# Patient Record
Sex: MALE | Race: WHITE | URBAN - NONMETROPOLITAN AREA
[De-identification: names, ages, dates, MRNs, and addresses within clinical notes are randomized per-mention and may not be internally consistent; named-entity substitution may affect disease eponyms.]

---

## 2017-08-02 ENCOUNTER — DOCTOR'S OFFICE (OUTPATIENT)
Dept: URBAN - NONMETROPOLITAN AREA CLINIC 1 | Facility: CLINIC | Age: 57
Setting detail: OPHTHALMOLOGY
End: 2017-08-02
Payer: COMMERCIAL

## 2017-08-02 ENCOUNTER — RX ONLY (RX ONLY)
Age: 57
End: 2017-08-02

## 2017-08-02 DIAGNOSIS — H53.9: ICD-10-CM

## 2017-08-02 DIAGNOSIS — H53.002: ICD-10-CM

## 2017-08-02 DIAGNOSIS — H25.13: ICD-10-CM

## 2017-08-02 PROCEDURE — 99204 OFFICE O/P NEW MOD 45 MIN: CPT | Performed by: OPHTHALMOLOGY

## 2017-08-02 ASSESSMENT — REFRACTION_MANIFEST
OD_VA1: 20/
OD_VA1: 20/
OS_VA3: 20/
OU_VA: 20/
OS_VA1: 20/
OD_VA2: 20/
OU_VA: 20/
OS_VA2: 20/
OS_VA3: 20/
OS_VA1: 20/
OD_VA3: 20/
OD_VA3: 20/
OS_VA3: 20/
OD_VA1: 20/
OU_VA: 20/
OS_VA2: 20/
OD_VA2: 20/
OS_VA2: 20/
OD_VA2: 20/
OD_VA3: 20/
OS_VA1: 20/

## 2017-08-02 ASSESSMENT — CONFRONTATIONAL VISUAL FIELD TEST (CVF)
OS_FINDINGS: FULL
OD_FINDINGS: FULL

## 2017-08-02 ASSESSMENT — REFRACTION_CURRENTRX
OD_OVR_VA: 20/
OS_OVR_VA: 20/
OD_OVR_VA: 20/
OD_OVR_VA: 20/

## 2017-08-02 ASSESSMENT — VISUAL ACUITY
OS_BCVA: 20/25-1
OD_BCVA: 20/40-1

## 2017-08-02 ASSESSMENT — REFRACTION_AUTOREFRACTION
OS_CYLINDER: -0.75
OS_SPHERE: 0.00
OD_CYLINDER: 0.00
OD_AXIS: 0
OD_SPHERE: +0.50
OS_AXIS: 78

## 2017-08-02 ASSESSMENT — SPHEQUIV_DERIVED
OS_SPHEQUIV: -0.375
OD_SPHEQUIV: 0.5

## 2017-08-23 ENCOUNTER — DOCTOR'S OFFICE (OUTPATIENT)
Dept: URBAN - NONMETROPOLITAN AREA CLINIC 1 | Facility: CLINIC | Age: 57
Setting detail: OPHTHALMOLOGY
End: 2017-08-23
Payer: COMMERCIAL

## 2017-08-23 DIAGNOSIS — H53.9: ICD-10-CM

## 2017-08-23 PROCEDURE — 92134 CPTRZ OPH DX IMG PST SGM RTA: CPT | Performed by: OPHTHALMOLOGY

## 2017-08-23 PROCEDURE — 92083 EXTENDED VISUAL FIELD XM: CPT | Performed by: OPHTHALMOLOGY

## 2017-11-01 ENCOUNTER — DOCTOR'S OFFICE (OUTPATIENT)
Dept: URBAN - NONMETROPOLITAN AREA CLINIC 1 | Facility: CLINIC | Age: 57
Setting detail: OPHTHALMOLOGY
End: 2017-11-01
Payer: COMMERCIAL

## 2017-11-01 DIAGNOSIS — H25.13: ICD-10-CM

## 2017-11-01 DIAGNOSIS — H53.002: ICD-10-CM

## 2017-11-01 DIAGNOSIS — H34.231: ICD-10-CM

## 2017-11-01 DIAGNOSIS — H53.9: ICD-10-CM

## 2017-11-01 PROCEDURE — 92133 CPTRZD OPH DX IMG PST SGM ON: CPT | Performed by: OPHTHALMOLOGY

## 2017-11-01 PROCEDURE — 92250 FUNDUS PHOTOGRAPHY W/I&R: CPT | Performed by: OPHTHALMOLOGY

## 2017-11-01 PROCEDURE — 92014 COMPRE OPH EXAM EST PT 1/>: CPT | Performed by: OPHTHALMOLOGY

## 2017-11-01 ASSESSMENT — REFRACTION_MANIFEST
OD_VA3: 20/
OU_VA: 20/
OS_VA1: 20/
OS_VA2: 20/
OD_VA2: 20/
OD_VA1: 20/
OS_VA2: 20/
OU_VA: 20/
OD_VA3: 20/
OU_VA: 20/
OD_VA2: 20/
OS_VA3: 20/
OD_VA1: 20/
OS_VA1: 20/
OD_VA1: 20/
OS_VA2: 20/
OS_VA3: 20/
OD_VA3: 20/
OS_VA3: 20/
OS_VA1: 20/
OD_VA2: 20/

## 2017-11-01 ASSESSMENT — CONFRONTATIONAL VISUAL FIELD TEST (CVF)
OS_FINDINGS: FULL
OD_FINDINGS: FULL

## 2017-11-01 ASSESSMENT — REFRACTION_CURRENTRX
OD_OVR_VA: 20/
OS_OVR_VA: 20/
OD_OVR_VA: 20/
OD_OVR_VA: 20/
OS_OVR_VA: 20/
OS_OVR_VA: 20/

## 2017-11-01 ASSESSMENT — REFRACTION_AUTOREFRACTION
OD_SPHERE: +0.50
OS_AXIS: 78
OS_CYLINDER: -0.75
OD_AXIS: 0
OS_SPHERE: 0.00
OD_CYLINDER: 0.00

## 2017-11-01 ASSESSMENT — SPHEQUIV_DERIVED
OS_SPHEQUIV: -0.375
OD_SPHEQUIV: 0.5

## 2017-11-01 ASSESSMENT — VISUAL ACUITY
OS_BCVA: 20/25-1
OD_BCVA: 20/40-1

## 2017-11-27 ENCOUNTER — DOCTOR'S OFFICE (OUTPATIENT)
Dept: URBAN - NONMETROPOLITAN AREA CLINIC 1 | Facility: CLINIC | Age: 57
Setting detail: OPHTHALMOLOGY
End: 2017-11-27
Payer: COMMERCIAL

## 2017-11-27 DIAGNOSIS — H34.231: ICD-10-CM

## 2017-11-27 DIAGNOSIS — H53.002: ICD-10-CM

## 2017-11-27 DIAGNOSIS — H25.13: ICD-10-CM

## 2017-11-27 DIAGNOSIS — H53.9: ICD-10-CM

## 2017-11-27 PROCEDURE — 92134 CPTRZ OPH DX IMG PST SGM RTA: CPT | Performed by: OPHTHALMOLOGY

## 2017-11-27 PROCEDURE — 92014 COMPRE OPH EXAM EST PT 1/>: CPT | Performed by: OPHTHALMOLOGY

## 2017-11-27 ASSESSMENT — CONFRONTATIONAL VISUAL FIELD TEST (CVF)
OD_FINDINGS: FULL
OS_FINDINGS: FULL

## 2017-11-28 ASSESSMENT — REFRACTION_CURRENTRX
OD_OVR_VA: 20/
OD_OVR_VA: 20/
OS_OVR_VA: 20/
OS_OVR_VA: 20/
OD_OVR_VA: 20/
OS_OVR_VA: 20/

## 2017-11-28 ASSESSMENT — REFRACTION_MANIFEST
OS_VA2: 20/
OS_VA3: 20/
OD_VA2: 20/
OS_VA1: 20/
OS_VA2: 20/
OS_VA1: 20/
OD_VA1: 20/
OD_VA3: 20/
OS_VA1: 20/
OS_VA2: 20/
OS_VA3: 20/
OD_VA2: 20/
OD_VA1: 20/
OD_VA2: 20/
OS_VA3: 20/
OU_VA: 20/
OU_VA: 20/
OD_VA3: 20/
OD_VA3: 20/
OD_VA1: 20/
OU_VA: 20/

## 2017-11-28 ASSESSMENT — REFRACTION_AUTOREFRACTION
OS_SPHERE: 0.00
OD_SPHERE: +0.50
OS_CYLINDER: -0.75
OD_AXIS: 0
OD_CYLINDER: 0.00
OS_AXIS: 78

## 2017-11-28 ASSESSMENT — SPHEQUIV_DERIVED
OD_SPHEQUIV: 0.5
OS_SPHEQUIV: -0.375

## 2017-11-28 ASSESSMENT — VISUAL ACUITY
OS_BCVA: 20/25-2
OD_BCVA: 20/40-2

## 2017-12-06 ENCOUNTER — DOCTOR'S OFFICE (OUTPATIENT)
Dept: URBAN - NONMETROPOLITAN AREA CLINIC 1 | Facility: CLINIC | Age: 57
Setting detail: OPHTHALMOLOGY
End: 2017-12-06
Payer: COMMERCIAL

## 2017-12-06 DIAGNOSIS — H53.19: ICD-10-CM

## 2017-12-06 PROCEDURE — 92083 EXTENDED VISUAL FIELD XM: CPT | Performed by: OPHTHALMOLOGY

## 2017-12-11 ENCOUNTER — DOCTOR'S OFFICE (OUTPATIENT)
Dept: URBAN - NONMETROPOLITAN AREA CLINIC 1 | Facility: CLINIC | Age: 57
Setting detail: OPHTHALMOLOGY
End: 2017-12-11
Payer: COMMERCIAL

## 2017-12-11 DIAGNOSIS — H25.13: ICD-10-CM

## 2017-12-11 DIAGNOSIS — H34.231: ICD-10-CM

## 2017-12-11 DIAGNOSIS — H53.9: ICD-10-CM

## 2017-12-11 PROCEDURE — 92235 FLUORESCEIN ANGRPH MLTIFRAME: CPT | Performed by: OPHTHALMOLOGY

## 2017-12-11 PROCEDURE — 92250 FUNDUS PHOTOGRAPHY W/I&R: CPT | Performed by: OPHTHALMOLOGY

## 2017-12-11 PROCEDURE — 92014 COMPRE OPH EXAM EST PT 1/>: CPT | Performed by: OPHTHALMOLOGY

## 2017-12-11 ASSESSMENT — REFRACTION_MANIFEST
OD_VA1: 20/
OD_VA2: 20/
OS_VA3: 20/
OS_VA3: 20/
OS_VA2: 20/
OD_VA3: 20/
OS_VA2: 20/
OD_VA1: 20/
OS_VA1: 20/
OS_VA1: 20/
OD_VA2: 20/
OD_VA3: 20/
OD_VA2: 20/
OS_VA2: 20/
OD_VA3: 20/
OU_VA: 20/
OS_VA3: 20/
OU_VA: 20/
OU_VA: 20/
OD_VA1: 20/
OS_VA1: 20/

## 2017-12-11 ASSESSMENT — REFRACTION_AUTOREFRACTION
OD_AXIS: 0
OD_CYLINDER: 0.00
OS_SPHERE: 0.00
OS_AXIS: 78
OS_CYLINDER: -0.75
OD_SPHERE: +0.50

## 2017-12-11 ASSESSMENT — CONFRONTATIONAL VISUAL FIELD TEST (CVF)
OS_FINDINGS: FULL
OD_FINDINGS: FULL

## 2017-12-11 ASSESSMENT — REFRACTION_CURRENTRX
OS_OVR_VA: 20/
OD_OVR_VA: 20/
OD_OVR_VA: 20/
OS_OVR_VA: 20/
OD_OVR_VA: 20/
OS_OVR_VA: 20/

## 2017-12-11 ASSESSMENT — SPHEQUIV_DERIVED
OS_SPHEQUIV: -0.375
OD_SPHEQUIV: 0.5

## 2017-12-11 ASSESSMENT — VISUAL ACUITY
OD_BCVA: 20/30-1
OS_BCVA: 20/20-2

## 2018-05-31 ENCOUNTER — DOCTOR'S OFFICE (OUTPATIENT)
Dept: URBAN - NONMETROPOLITAN AREA CLINIC 1 | Facility: CLINIC | Age: 58
Setting detail: OPHTHALMOLOGY
End: 2018-05-31
Payer: COMMERCIAL

## 2018-05-31 DIAGNOSIS — H43.813: ICD-10-CM

## 2018-05-31 DIAGNOSIS — H34.231: ICD-10-CM

## 2018-05-31 DIAGNOSIS — H43.812: ICD-10-CM

## 2018-05-31 DIAGNOSIS — H53.9: ICD-10-CM

## 2018-05-31 DIAGNOSIS — H25.13: ICD-10-CM

## 2018-05-31 DIAGNOSIS — H43.811: ICD-10-CM

## 2018-05-31 PROCEDURE — 92134 CPTRZ OPH DX IMG PST SGM RTA: CPT | Performed by: OPHTHALMOLOGY

## 2018-05-31 PROCEDURE — 92225 OPHTHALMOSCOPY EXTENDED INITIAL: CPT | Performed by: OPHTHALMOLOGY

## 2018-05-31 PROCEDURE — 92014 COMPRE OPH EXAM EST PT 1/>: CPT | Performed by: OPHTHALMOLOGY

## 2018-05-31 ASSESSMENT — SPHEQUIV_DERIVED
OS_SPHEQUIV: -0.375
OD_SPHEQUIV: 0.5

## 2018-05-31 ASSESSMENT — REFRACTION_AUTOREFRACTION
OS_AXIS: 78
OD_AXIS: 0
OD_SPHERE: +0.50
OD_CYLINDER: 0.00
OS_CYLINDER: -0.75
OS_SPHERE: 0.00

## 2018-05-31 ASSESSMENT — REFRACTION_MANIFEST
OD_VA3: 20/
OD_VA1: 20/
OU_VA: 20/
OS_VA1: 20/
OD_VA1: 20/
OS_VA2: 20/
OD_VA3: 20/
OS_VA2: 20/
OD_VA2: 20/
OD_VA3: 20/
OD_VA2: 20/
OS_VA1: 20/
OS_VA1: 20/
OS_VA3: 20/
OS_VA2: 20/
OU_VA: 20/
OS_VA3: 20/
OD_VA2: 20/
OD_VA1: 20/
OU_VA: 20/
OS_VA3: 20/

## 2018-05-31 ASSESSMENT — CONFRONTATIONAL VISUAL FIELD TEST (CVF)
OD_FINDINGS: FULL
OS_FINDINGS: FULL

## 2018-05-31 ASSESSMENT — REFRACTION_CURRENTRX
OS_OVR_VA: 20/
OD_OVR_VA: 20/
OD_OVR_VA: 20/
OS_OVR_VA: 20/
OS_OVR_VA: 20/
OD_OVR_VA: 20/

## 2018-05-31 ASSESSMENT — VISUAL ACUITY
OD_BCVA: 20/30-1
OS_BCVA: 20/20-2

## 2018-06-11 ENCOUNTER — DOCTOR'S OFFICE (OUTPATIENT)
Dept: URBAN - NONMETROPOLITAN AREA CLINIC 1 | Facility: CLINIC | Age: 58
Setting detail: OPHTHALMOLOGY
End: 2018-06-11
Payer: COMMERCIAL

## 2018-06-11 DIAGNOSIS — H53.9: ICD-10-CM

## 2018-06-11 PROCEDURE — 92083 EXTENDED VISUAL FIELD XM: CPT | Performed by: OPHTHALMOLOGY

## 2018-06-18 ENCOUNTER — DOCTOR'S OFFICE (OUTPATIENT)
Dept: URBAN - NONMETROPOLITAN AREA CLINIC 1 | Facility: CLINIC | Age: 58
Setting detail: OPHTHALMOLOGY
End: 2018-06-18
Payer: COMMERCIAL

## 2018-06-18 DIAGNOSIS — H34.231: ICD-10-CM

## 2018-06-18 DIAGNOSIS — H25.13: ICD-10-CM

## 2018-06-18 DIAGNOSIS — H43.813: ICD-10-CM

## 2018-06-18 DIAGNOSIS — H53.9: ICD-10-CM

## 2018-06-18 PROCEDURE — 92235 FLUORESCEIN ANGRPH MLTIFRAME: CPT | Performed by: OPHTHALMOLOGY

## 2018-06-18 PROCEDURE — 92250 FUNDUS PHOTOGRAPHY W/I&R: CPT | Performed by: OPHTHALMOLOGY

## 2018-06-18 PROCEDURE — 92014 COMPRE OPH EXAM EST PT 1/>: CPT | Performed by: OPHTHALMOLOGY

## 2018-06-18 ASSESSMENT — REFRACTION_CURRENTRX
OS_OVR_VA: 20/
OD_OVR_VA: 20/

## 2018-06-18 ASSESSMENT — REFRACTION_AUTOREFRACTION
OS_AXIS: 78
OS_SPHERE: 0.00
OS_CYLINDER: -0.75
OD_SPHERE: +0.50
OD_CYLINDER: 0.00
OD_AXIS: 0

## 2018-06-18 ASSESSMENT — REFRACTION_MANIFEST
OS_VA2: 20/
OD_VA3: 20/
OS_VA3: 20/
OS_VA2: 20/
OD_VA2: 20/
OD_VA2: 20/
OD_VA1: 20/
OU_VA: 20/
OD_VA1: 20/
OS_VA1: 20/
OD_VA2: 20/
OU_VA: 20/
OS_VA1: 20/
OD_VA3: 20/
OS_VA2: 20/
OD_VA3: 20/
OD_VA1: 20/
OU_VA: 20/
OS_VA3: 20/
OS_VA3: 20/
OS_VA1: 20/

## 2018-06-18 ASSESSMENT — VISUAL ACUITY
OS_BCVA: 20/20-1
OD_BCVA: 20/25-2

## 2018-06-18 ASSESSMENT — SPHEQUIV_DERIVED
OS_SPHEQUIV: -0.375
OD_SPHEQUIV: 0.5

## 2018-06-18 ASSESSMENT — CONFRONTATIONAL VISUAL FIELD TEST (CVF)
OS_FINDINGS: FULL
OD_FINDINGS: FULL

## 2018-12-20 ENCOUNTER — DOCTOR'S OFFICE (OUTPATIENT)
Dept: URBAN - NONMETROPOLITAN AREA CLINIC 1 | Facility: CLINIC | Age: 58
Setting detail: OPHTHALMOLOGY
End: 2018-12-20
Payer: COMMERCIAL

## 2018-12-20 DIAGNOSIS — H43.812: ICD-10-CM

## 2018-12-20 DIAGNOSIS — H43.813: ICD-10-CM

## 2018-12-20 DIAGNOSIS — H25.13: ICD-10-CM

## 2018-12-20 DIAGNOSIS — H43.811: ICD-10-CM

## 2018-12-20 DIAGNOSIS — H34.231: ICD-10-CM

## 2018-12-20 DIAGNOSIS — H53.9: ICD-10-CM

## 2018-12-20 PROBLEM — H53.002: Status: ACTIVE | Noted: 2017-08-02

## 2018-12-20 PROCEDURE — 92134 CPTRZ OPH DX IMG PST SGM RTA: CPT | Performed by: OPHTHALMOLOGY

## 2018-12-20 PROCEDURE — 92014 COMPRE OPH EXAM EST PT 1/>: CPT | Performed by: OPHTHALMOLOGY

## 2018-12-20 PROCEDURE — 92226 OPHTHALMOSCOPY EXT SUBSEQUENT: CPT | Performed by: OPHTHALMOLOGY

## 2018-12-20 ASSESSMENT — REFRACTION_AUTOREFRACTION
OS_SPHERE: 0.00
OD_CYLINDER: 0.00
OS_CYLINDER: -0.75
OD_AXIS: 0
OD_SPHERE: +0.50
OS_AXIS: 78

## 2018-12-20 ASSESSMENT — REFRACTION_CURRENTRX
OD_OVR_VA: 20/
OD_OVR_VA: 20/
OS_OVR_VA: 20/
OD_OVR_VA: 20/

## 2018-12-20 ASSESSMENT — REFRACTION_MANIFEST
OD_VA2: 20/
OU_VA: 20/
OS_VA2: 20/
OS_VA1: 20/
OD_VA1: 20/
OS_VA2: 20/
OD_VA2: 20/
OS_VA1: 20/
OD_VA3: 20/
OS_VA3: 20/
OS_VA3: 20/
OU_VA: 20/
OD_VA3: 20/
OD_VA1: 20/

## 2018-12-20 ASSESSMENT — CONFRONTATIONAL VISUAL FIELD TEST (CVF)
OS_FINDINGS: FULL
OD_FINDINGS: FULL

## 2018-12-20 ASSESSMENT — SPHEQUIV_DERIVED
OS_SPHEQUIV: -0.375
OD_SPHEQUIV: 0.5

## 2018-12-20 ASSESSMENT — VISUAL ACUITY
OS_BCVA: 20/20-1
OD_BCVA: 20/25-2

## 2020-07-31 ENCOUNTER — HOSPITAL ENCOUNTER (EMERGENCY)
Facility: HOSPITAL | Age: 60
Discharge: HOME | End: 2020-07-31
Attending: EMERGENCY MEDICINE | Admitting: EMERGENCY MEDICINE
Payer: MEDICARE

## 2020-07-31 VITALS
DIASTOLIC BLOOD PRESSURE: 90 MMHG | HEIGHT: 69 IN | TEMPERATURE: 97.7 F | BODY MASS INDEX: 32.65 KG/M2 | OXYGEN SATURATION: 97 % | SYSTOLIC BLOOD PRESSURE: 170 MMHG | RESPIRATION RATE: 18 BRPM | WEIGHT: 220.46 LBS | HEART RATE: 78 BPM

## 2020-07-31 DIAGNOSIS — K08.89 PAIN, DENTAL: Primary | ICD-10-CM

## 2020-07-31 PROCEDURE — 99283 EMERGENCY DEPT VISIT LOW MDM: CPT

## 2020-07-31 PROCEDURE — 99284 EMERGENCY DEPT VISIT MOD MDM: CPT | Performed by: EMERGENCY MEDICINE

## 2020-07-31 RX ORDER — SENNOSIDES 8.6 MG
650 CAPSULE ORAL EVERY 8 HOURS PRN
Qty: 30 TABLET | Refills: 0 | Status: SHIPPED | OUTPATIENT
Start: 2020-07-31

## 2020-07-31 RX ORDER — PENICILLIN V POTASSIUM 500 MG/1
500 TABLET ORAL 4 TIMES DAILY
Qty: 40 TABLET | Refills: 0 | Status: SHIPPED | OUTPATIENT
Start: 2020-07-31 | End: 2020-08-10

## 2020-07-31 RX ORDER — TRAMADOL HYDROCHLORIDE 50 MG/1
50 TABLET ORAL EVERY 6 HOURS PRN
Qty: 12 TABLET | Refills: 0 | Status: SHIPPED | OUTPATIENT
Start: 2020-07-31 | End: 2020-08-03

## 2020-07-31 NOTE — ED ATTENDING ATTESTATION
7/31/2020  I, Vanessa Garcia MD, saw and evaluated the patient  I have discussed the patient with the resident/non-physician practitioner and agree with the resident's/non-physician practitioner's findings, Plan of Care, and MDM as documented in the resident's/non-physician practitioner's note, except where noted  All available labs and Radiology studies were reviewed  I was present for key portions of any procedure(s) performed by the resident/non-physician practitioner and I was immediately available to provide assistance  At this point I agree with the current assessment done in the Emergency Department        ED Course         Critical Care Time  Procedures

## 2020-07-31 NOTE — ED PROVIDER NOTES
History  Chief Complaint   Patient presents with    Dental Pain     cracked a rightsided bottom tooth eating a rootbeer barrel, has an appt with dentist on 8/11, using orajel with no relief of pain     Patient is a 44-year-old male who presents emergency department today with a complaint of right sided tooth pain x2 days  Patient states that he was eating a root beer barrel and cracked his right lower tooth  Patient states that he has been having increased pain in the to since  Patient states that he has appointment on the 11th of August with his dentist however he could not stand the pain so he came in today  Patient denies any fevers or chills, chest pain, shortness of breath, difficulty swallowing, drooling  Patient has been attempted to take Tylenol, Motrin and Orajel without relief  Dental Pain   Location:  Lower  Lower teeth location:  28/RL 1st bicuspid  Quality:  Aching  Severity:  Moderate  Onset quality:  Sudden  Duration:  2 days  Timing:  Constant  Progression:  Unchanged  Chronicity:  New  Context: dental fracture and trauma    Relieved by:  Nothing  Worsened by:  Cold food/drink and hot food/drink  Ineffective treatments:  None tried  Associated symptoms: no congestion, no difficulty swallowing, no drooling, no facial pain, no facial swelling, no fever, no gum swelling, no headaches, no neck pain, no neck swelling, no oral bleeding, no oral lesions and no trismus    Risk factors: no alcohol problem, no cancer, no chewing tobacco use, no diabetes and no smoking        None       Past Medical History:   Diagnosis Date    Diverticulitis large intestine     Hypertension     TIA (transient ischemic attack)        Past Surgical History:   Procedure Laterality Date    ANTERIOR CRUCIATE LIGAMENT REPAIR      BOWEL RESECTION         History reviewed  No pertinent family history  I have reviewed and agree with the history as documented      E-Cigarette/Vaping    E-Cigarette Use Never User E-Cigarette/Vaping Substances     Social History     Tobacco Use    Smoking status: Former Smoker    Smokeless tobacco: Never Used   Substance Use Topics    Alcohol use: Yes     Frequency: 2-3 times a week     Drinks per session: 3 or 4    Drug use: Never       Review of Systems   Constitutional: Negative for chills and fever  HENT: Negative for congestion, drooling, ear pain, facial swelling and mouth sores  Eyes: Negative for pain and visual disturbance  Respiratory: Negative for shortness of breath and stridor  Cardiovascular: Negative for chest pain and palpitations  Gastrointestinal: Negative for abdominal pain, nausea and vomiting  Genitourinary: Negative for dysuria and hematuria  Musculoskeletal: Negative for arthralgias, back pain and neck pain  Skin: Negative for color change and rash  Neurological: Negative for seizures, speech difficulty and headaches  Physical Exam  Physical Exam   Constitutional: He is oriented to person, place, and time  He appears well-developed and well-nourished  No distress  HENT:   Head: Normocephalic and atraumatic  Mouth/Throat: Oropharynx is clear and moist and mucous membranes are normal  No trismus in the jaw  No dental abscesses or uvula swelling  No tonsillar abscesses  No abscess   Eyes: Pupils are equal, round, and reactive to light  EOM are normal    Neck: Normal range of motion  Cardiovascular: Normal rate and regular rhythm  No murmur heard  Pulmonary/Chest: Effort normal and breath sounds normal  No respiratory distress  Abdominal: Soft  Bowel sounds are normal  There is no tenderness  Neurological: He is alert and oriented to person, place, and time  Skin: Skin is warm and dry  Capillary refill takes less than 2 seconds  Psychiatric: He has a normal mood and affect  His behavior is normal    Nursing note and vitals reviewed        Vital Signs  ED Triage Vitals [07/31/20 1128]   Temperature Pulse Respirations Blood Pressure SpO2   97 7 °F (36 5 °C) 78 18 (!) 184/96 97 %      Temp Source Heart Rate Source Patient Position - Orthostatic VS BP Location FiO2 (%)   Temporal Monitor Lying Left arm --      Pain Score       Worst Possible Pain           Vitals:    07/31/20 1128 07/31/20 1141   BP: (!) 184/96 170/90   Pulse: 78    Patient Position - Orthostatic VS: Lying          Visual Acuity      ED Medications  Medications - No data to display    Diagnostic Studies  Results Reviewed     None                 No orders to display              Procedures  General Procedure  Date/Time: 7/31/2020 4:23 PM  Performed by: Valentín Hernandez PA-C  Authorized by: Valentín Hernandez PA-C     Patient location:  ED  Assisting Provider(s): No    Consent:     Consent obtained:  Verbal    Consent given by:  Patient    Risks discussed:  Infection, pain and poor cosmetic result    Alternatives discussed:  No treatment  Indications:     Indications:  Pain and broken tooth  Anesthesia (see MAR for exact dosages): Anesthesia method:  None  Procedure Detail:     Procedure note (site, laterality, method, findings):  Patient had Dermabond applied to right broken tooth to alleviate pain by giving a protective layer to the tooth  Post-procedure details:     Patient tolerance of procedure: Tolerated well, no immediate complications  Comments:      Patient actually had improvement of pain             ED Course       US AUDIT      Most Recent Value   Initial Alcohol Screen: US AUDIT-C    1  How often do you have a drink containing alcohol? 3 Filed at: 07/31/2020 1132   2  How many drinks containing alcohol do you have on a typical day you are drinking? 3 Filed at: 07/31/2020 1132   3a  Male UNDER 65: How often do you have five or more drinks on one occasion? 0 Filed at: 07/31/2020 1132   3b  FEMALE Any Age, or MALE 65+: How often do you have 4 or more drinks on one occassion?   0 Filed at: 07/31/2020 1132   Audit-C Score  6 Filed at: 07/31/2020 1132 JUAN DAVID/DAST-10      Most Recent Value   How many times in the past year have you    Used an illegal drug or used a prescription medication for non-medical reasons? Never Filed at: 07/31/2020 1132        [facility]  Number of Diagnoses or Management Options  Pain, dental:   Diagnosis management comments: Diagnosis of toothache  Amount and/or Complexity of Data Reviewed  Decide to obtain previous medical records or to obtain history from someone other than the patient: yes  Review and summarize past medical records: yes  Independent visualization of images, tracings, or specimens: yes    Risk of Complications, Morbidity, and/or Mortality  Presenting problems: low  Diagnostic procedures: low  Management options: low    Patient Progress  Patient progress: improved        Disposition  Final diagnoses:   Pain, dental     Time reflects when diagnosis was documented in both MDM as applicable and the Disposition within this note     Time User Action Codes Description Comment    7/31/2020 11:35 AM Magdalena Coleman [K08 89] Pain, dental       ED Disposition     ED Disposition Condition Date/Time Comment    Discharge Stable Fri Jul 31, 2020 11:35 AM Kenny Fam discharge to home/self care  Follow-up Information    None         Discharge Medication List as of 7/31/2020 11:38 AM      START taking these medications    Details   acetaminophen (TYLENOL) 650 mg CR tablet Take 1 tablet (650 mg total) by mouth every 8 (eight) hours as needed for mild pain, Starting Fri 7/31/2020, Normal      penicillin V potassium (VEETID) 500 mg tablet Take 1 tablet (500 mg total) by mouth 4 (four) times a day for 10 days, Starting Fri 7/31/2020, Until Mon 8/10/2020, Normal      traMADol (ULTRAM) 50 mg tablet Take 1 tablet (50 mg total) by mouth every 6 (six) hours as needed for severe pain for up to 3 days, Starting Fri 7/31/2020, Until Mon 8/3/2020, Normal           No discharge procedures on file      PDMP Review Value Time User    PDMP Reviewed  Yes 7/31/2020 11:36 AM Cathy Barrow PA-C          ED Provider  Electronically Signed by           Cathy Barrow PA-C  07/31/20 9692       Nursy Chavez MD  08/01/20 8692

## 2020-11-16 ENCOUNTER — OFFICE VISIT (OUTPATIENT)
Dept: OBGYN CLINIC | Facility: CLINIC | Age: 60
End: 2020-11-16
Payer: MEDICARE

## 2020-11-16 VITALS
SYSTOLIC BLOOD PRESSURE: 148 MMHG | WEIGHT: 210 LBS | DIASTOLIC BLOOD PRESSURE: 76 MMHG | HEART RATE: 78 BPM | HEIGHT: 69 IN | BODY MASS INDEX: 31.1 KG/M2 | TEMPERATURE: 96.7 F

## 2020-11-16 DIAGNOSIS — G56.03 CARPAL TUNNEL SYNDROME, BILATERAL: Primary | ICD-10-CM

## 2020-11-16 DIAGNOSIS — M25.562 LEFT KNEE PAIN, UNSPECIFIED CHRONICITY: ICD-10-CM

## 2020-11-16 PROCEDURE — 99204 OFFICE O/P NEW MOD 45 MIN: CPT | Performed by: ORTHOPAEDIC SURGERY

## 2020-12-04 ENCOUNTER — HOSPITAL ENCOUNTER (OUTPATIENT)
Dept: RADIOLOGY | Facility: CLINIC | Age: 60
Discharge: HOME | End: 2020-12-04
Payer: MEDICARE

## 2020-12-04 ENCOUNTER — OFFICE VISIT (OUTPATIENT)
Dept: OBGYN CLINIC | Facility: CLINIC | Age: 60
End: 2020-12-04
Payer: MEDICARE

## 2020-12-04 VITALS
WEIGHT: 210 LBS | BODY MASS INDEX: 31.1 KG/M2 | SYSTOLIC BLOOD PRESSURE: 128 MMHG | DIASTOLIC BLOOD PRESSURE: 70 MMHG | HEIGHT: 69 IN | HEART RATE: 68 BPM

## 2020-12-04 DIAGNOSIS — M25.562 CHRONIC PAIN OF LEFT KNEE: ICD-10-CM

## 2020-12-04 DIAGNOSIS — G56.03 CARPAL TUNNEL SYNDROME, BILATERAL: Primary | ICD-10-CM

## 2020-12-04 DIAGNOSIS — M17.12 PRIMARY OSTEOARTHRITIS OF LEFT KNEE: ICD-10-CM

## 2020-12-04 DIAGNOSIS — G89.29 CHRONIC PAIN OF LEFT KNEE: ICD-10-CM

## 2020-12-04 PROCEDURE — 73564 X-RAY EXAM KNEE 4 OR MORE: CPT

## 2020-12-04 PROCEDURE — 20610 DRAIN/INJ JOINT/BURSA W/O US: CPT | Performed by: ORTHOPAEDIC SURGERY

## 2020-12-04 PROCEDURE — 99213 OFFICE O/P EST LOW 20 MIN: CPT | Performed by: ORTHOPAEDIC SURGERY

## 2020-12-04 RX ORDER — TRIAMCINOLONE ACETONIDE 40 MG/ML
40 INJECTION, SUSPENSION INTRA-ARTICULAR; INTRAMUSCULAR
Status: COMPLETED | OUTPATIENT
Start: 2020-12-04 | End: 2020-12-04

## 2020-12-04 RX ORDER — LIDOCAINE HYDROCHLORIDE 10 MG/ML
4 INJECTION, SOLUTION INFILTRATION; PERINEURAL
Status: COMPLETED | OUTPATIENT
Start: 2020-12-04 | End: 2020-12-04

## 2020-12-04 RX ADMIN — TRIAMCINOLONE ACETONIDE 40 MG: 40 INJECTION, SUSPENSION INTRA-ARTICULAR; INTRAMUSCULAR at 10:55

## 2020-12-04 RX ADMIN — LIDOCAINE HYDROCHLORIDE 4 ML: 10 INJECTION, SOLUTION INFILTRATION; PERINEURAL at 10:55

## 2020-12-18 ENCOUNTER — OFFICE VISIT (OUTPATIENT)
Dept: OBGYN CLINIC | Facility: CLINIC | Age: 60
End: 2020-12-18
Payer: MEDICARE

## 2020-12-18 VITALS — TEMPERATURE: 98.3 F | WEIGHT: 207 LBS | HEIGHT: 69 IN | BODY MASS INDEX: 30.66 KG/M2 | RESPIRATION RATE: 18 BRPM

## 2020-12-18 DIAGNOSIS — M17.12 PRIMARY OSTEOARTHRITIS OF LEFT KNEE: Primary | ICD-10-CM

## 2020-12-18 PROCEDURE — 99213 OFFICE O/P EST LOW 20 MIN: CPT | Performed by: ORTHOPAEDIC SURGERY

## 2020-12-18 RX ORDER — HYDROCHLOROTHIAZIDE 25 MG/1
25 TABLET ORAL DAILY
COMMUNITY
Start: 2020-09-26

## 2021-02-16 ENCOUNTER — HOSPITAL ENCOUNTER (EMERGENCY)
Facility: HOSPITAL | Age: 61
Discharge: HOME | End: 2021-02-16
Attending: EMERGENCY MEDICINE
Payer: MEDICARE

## 2021-02-16 VITALS
RESPIRATION RATE: 17 BRPM | BODY MASS INDEX: 32.98 KG/M2 | TEMPERATURE: 98.6 F | SYSTOLIC BLOOD PRESSURE: 148 MMHG | HEIGHT: 69 IN | HEART RATE: 74 BPM | OXYGEN SATURATION: 97 % | WEIGHT: 222.66 LBS | DIASTOLIC BLOOD PRESSURE: 78 MMHG

## 2021-02-16 DIAGNOSIS — B34.9 VIRAL SYNDROME: Primary | ICD-10-CM

## 2021-02-16 PROCEDURE — 99284 EMERGENCY DEPT VISIT MOD MDM: CPT | Performed by: EMERGENCY MEDICINE

## 2021-02-16 PROCEDURE — U0003 INFECTIOUS AGENT DETECTION BY NUCLEIC ACID (DNA OR RNA); SEVERE ACUTE RESPIRATORY SYNDROME CORONAVIRUS 2 (SARS-COV-2) (CORONAVIRUS DISEASE [COVID-19]), AMPLIFIED PROBE TECHNIQUE, MAKING USE OF HIGH THROUGHPUT TECHNOLOGIES AS DESCRIBED BY CMS-2020-01-R: HCPCS | Performed by: EMERGENCY MEDICINE

## 2021-02-16 PROCEDURE — U0005 INFEC AGEN DETEC AMPLI PROBE: HCPCS | Performed by: EMERGENCY MEDICINE

## 2021-02-16 PROCEDURE — 99283 EMERGENCY DEPT VISIT LOW MDM: CPT

## 2021-02-16 NOTE — DISCHARGE INSTRUCTIONS
Probable viral syndrome   EXPOSED  TO COVID OR TEST PENDING isolate for 10 days from symptom onset and until symptoms resolve  Return immediately if worse or any new symptoms  COVID 19 Hotlines:  [facility] Jose CARRENO [facility] [phone]

## 2021-02-16 NOTE — ED PROVIDER NOTES
History  Chief Complaint   Patient presents with    Generalized Body Aches     pt c/o body aches, cough, fever, and headache yesterday  pt stated he's here for covid test  pt exposed to family member covid positive last week and has no c/o today  denies travel/sob/fevers/cough     25-year-old man complains of headache and fever 101 7 yesterday amenable to Aleve, last taken this morning  He denies chest pain or dyspnea  He denies nausea vomiting and diarrhea  He denies UTI symptoms  No rash changes  Notes he was probably exposed to Dom via 1month-old granddaughter, extended family that tested positive over the weekend  Notes he does not mask often    Past medical history includes hypertension  Surgical history includes UPPP, subtotal colectomy secondary to diverticulitis  Social history denies tobacco use currently, infrequent alcohol use, no illicits      History provided by:  Patient  Medical Problem  Location:  Generalized  Quality:  Fever, headache, aches  Severity:  Mild  Onset quality:  Sudden  Timing:  Intermittent  Progression:  Resolved  Chronicity:  New  Relieved by:  Aleve  Associated symptoms: fever    Associated symptoms: no abdominal pain, no chest pain, no congestion, no diarrhea, no nausea, no shortness of breath and no vomiting    Associated symptoms comment:  Chronic cough without change      Prior to Admission Medications   Prescriptions Last Dose Informant Patient Reported?  Taking?   acetaminophen (TYLENOL) 650 mg CR tablet   No No   Sig: Take 1 tablet (650 mg total) by mouth every 8 (eight) hours as needed for mild pain   Patient not taking: Reported on 11/16/2020   hydrochlorothiazide (HYDRODIURIL) 25 mg tablet   Yes Yes   Sig: Take 25 mg by mouth daily      Facility-Administered Medications: None       Past Medical History:   Diagnosis Date    Diverticulitis large intestine     "During Bowel Resection determined not to have Diverticulitis "- Patient    Hypertension     TIA (transient ischemic attack)        Past Surgical History:   Procedure Laterality Date    ANTERIOR CRUCIATE LIGAMENT REPAIR Right     ACL Repair    BOWEL RESECTION      FOOT SURGERY Right     patient not sure procedure    SHOULDER SURGERY Bilateral     Rotator cuff/ Right shoulder bone growth removal       History reviewed  No pertinent family history  I have reviewed and agree with the history as documented  E-Cigarette/Vaping    E-Cigarette Use Never User      E-Cigarette/Vaping Substances    Nicotine No     THC No     CBD No     Flavoring No     Other No     Unknown No      Social History     Tobacco Use    Smoking status: Former Smoker    Smokeless tobacco: Former User     Types: Chew   Substance Use Topics    Alcohol use: Yes     Alcohol/week: 1 0 - 2 0 standard drinks     Types: 1 - 2 Cans of beer per week     Frequency: 2-4 times a month     Drinks per session: 1 or 2     Binge frequency: Never    Drug use: Never       Review of Systems   Constitutional: Positive for fever  HENT: Negative for congestion  Respiratory: Negative for shortness of breath  Cardiovascular: Negative for chest pain  Gastrointestinal: Negative for abdominal pain, diarrhea, nausea and vomiting  All other systems reviewed and are negative  Physical Exam  Physical Exam  Vitals signs and nursing note reviewed  Constitutional:       Comments: Pleasant, comfortable-appearing   HENT:      Head: Normocephalic and atraumatic  Eyes:      Conjunctiva/sclera: Conjunctivae normal       Pupils: Pupils are equal, round, and reactive to light  Neck:      Musculoskeletal: Neck supple  Cardiovascular:      Rate and Rhythm: Normal rate and regular rhythm  Heart sounds: Normal heart sounds  Pulmonary:      Effort: Pulmonary effort is normal       Breath sounds: Normal breath sounds  Abdominal:      General: Bowel sounds are normal  There is no distension  Palpations: Abdomen is soft  Tenderness:  There is no abdominal tenderness  Musculoskeletal: Normal range of motion  Skin:     General: Skin is warm and dry  Neurological:      Mental Status: He is alert and oriented to person, place, and time  Cranial Nerves: No cranial nerve deficit  Coordination: Coordination normal    Psychiatric:         Behavior: Behavior normal          Thought Content: Thought content normal          Judgment: Judgment normal          Vital Signs  ED Triage Vitals [02/16/21 0800]   Temperature Pulse Respirations Blood Pressure SpO2   98 6 °F (37 °C) 74 17 148/78 97 %      Temp Source Heart Rate Source Patient Position - Orthostatic VS BP Location FiO2 (%)   Temporal Monitor Sitting Right arm --      Pain Score       --           Vitals:    02/16/21 0800   BP: 148/78   Pulse: 74   Patient Position - Orthostatic VS: Sitting         Visual Acuity      ED Medications  Medications - No data to display    Diagnostic Studies  Results Reviewed     Procedure Component Value Units Date/Time    Novel Coronavirus Ruta [facility]s [facility] [[id number]] Collected: 02/16/21 0813    Lab Status: In process Specimen: Nares from Nasopharyngeal Swab Updated: 02/16/21 0823                 No orders to display              Procedures  Procedures         ED Course                             SBIRT 20yo+      Most Recent Value   SBIRT (25 yo +)   In order to provide better care to our patients, we are screening all of our patients for alcohol and drug use  Would it be okay to ask you these screening questions? Yes Filed at: 02/16/2021 1631   Initial Alcohol Screen: US AUDIT-C    1  How often do you have a drink containing alcohol?  0 Filed at: 02/16/2021 0806   2  How many drinks containing alcohol do you have on a typical day you are drinking? 0 Filed at: 02/16/2021 0806   3a  Male UNDER 65: How often do you have five or more drinks on one occasion? 0 Filed at: 02/16/2021 0806   3b  FEMALE Any Age, or MALE 65+:  How often do you have 4 or more drinks on one occassion? 0 Filed at: 02/16/2021 0806   Audit-C Score  0 Filed at: 02/16/2021 4593   JUAN DAVID: How many times in the past year have you    Used an illegal drug or used a prescription medication for non-medical reasons? Never Filed at: 02/16/2021 0806                    MDM    Disposition  Final diagnoses:   Viral syndrome     Time reflects when diagnosis was documented in both MDM as applicable and the Disposition within this note     Time User Action Codes Description Comment    2/16/2021  8:04 AM Pamla Rings Add [B34 9] Viral illness     2/16/2021  8:04 AM Pamla Rings Remove [B34 9] Viral illness     2/16/2021  8:04 AM Pamla Rings Add [B34 9] Viral syndrome       ED Disposition     ED Disposition Condition Date/Time Comment    Discharge Stable Tue Feb 16, 2021  8:04 AM Cindy Monterroso discharge to home/self care  Follow-up Information     Follow up With Specialties Details Why Contact Info    Kelly Peñaloza DO Family Medicine Schedule an appointment as soon as possible for a visit in 1 week  [phone]  [phone]            Discharge Medication List as of 2/16/2021  8:06 AM      CONTINUE these medications which have NOT CHANGED    Details   hydrochlorothiazide (HYDRODIURIL) 25 mg tablet Take 25 mg by mouth daily, Starting Sat 9/26/2020, Historical Med      acetaminophen (TYLENOL) 650 mg CR tablet Take 1 tablet (650 mg total) by mouth every 8 (eight) hours as needed for mild pain, Starting Fri 7/31/2020, Normal           No discharge procedures on file      PDMP Review       Value Time User    PDMP Reviewed  Yes 7/31/2020 11:36 AM Lucio Cadet PA-C          ED Provider  Electronically Signed by           Jim Carrera DO  02/16/21 7864

## 2021-02-17 ENCOUNTER — TELEPHONE (OUTPATIENT)
Dept: EMERGENCY DEPT | Facility: HOSPITAL | Age: 61
End: 2021-02-17

## 2021-02-17 LAB — SARS-COV-2 RNA RESP QL NAA+PROBE: POSITIVE

## 2021-02-17 NOTE — TELEPHONE ENCOUNTER
Patient called requesting lab results  He was advised a positive COVID  A letter was provided for him

## 2021-07-14 ENCOUNTER — APPOINTMENT (INPATIENT)
Dept: RADIOLOGY | Facility: HOSPITAL | Age: 61
DRG: 037 | End: 2021-07-14
Payer: MEDICARE

## 2021-07-14 ENCOUNTER — APPOINTMENT (EMERGENCY)
Dept: CT IMAGING | Facility: HOSPITAL | Age: 61
End: 2021-07-14
Payer: MEDICARE

## 2021-07-14 ENCOUNTER — APPOINTMENT (EMERGENCY)
Dept: RADIOLOGY | Facility: HOSPITAL | Age: 61
End: 2021-07-14
Payer: MEDICARE

## 2021-07-14 ENCOUNTER — HOSPITAL ENCOUNTER (INPATIENT)
Facility: HOSPITAL | Age: 61
LOS: 13 days | Discharge: HOME | DRG: 037 | End: 2021-07-27
Attending: ANESTHESIOLOGY | Admitting: ANESTHESIOLOGY
Payer: MEDICARE

## 2021-07-14 ENCOUNTER — HOSPITAL ENCOUNTER (EMERGENCY)
Facility: HOSPITAL | Age: 61
End: 2021-07-14
Attending: EMERGENCY MEDICINE | Admitting: EMERGENCY MEDICINE
Payer: MEDICARE

## 2021-07-14 VITALS
HEIGHT: 69 IN | RESPIRATION RATE: 18 BRPM | HEART RATE: 77 BPM | DIASTOLIC BLOOD PRESSURE: 72 MMHG | SYSTOLIC BLOOD PRESSURE: 137 MMHG | BODY MASS INDEX: 33.65 KG/M2 | WEIGHT: 227.18 LBS | TEMPERATURE: 97.5 F | OXYGEN SATURATION: 96 %

## 2021-07-14 DIAGNOSIS — E87.6 HYPOKALEMIA: ICD-10-CM

## 2021-07-14 DIAGNOSIS — I63.9 STROKE (CEREBRUM) (HCC): Primary | ICD-10-CM

## 2021-07-14 DIAGNOSIS — K59.00 CONSTIPATION: ICD-10-CM

## 2021-07-14 DIAGNOSIS — R78.81 GRAM-NEGATIVE BACTEREMIA: ICD-10-CM

## 2021-07-14 DIAGNOSIS — I63.9 ISCHEMIC STROKE (HCC): ICD-10-CM

## 2021-07-14 DIAGNOSIS — I63.239 CAROTID STENOSIS, SYMPTOMATIC, WITH INFARCTION (HCC): ICD-10-CM

## 2021-07-14 DIAGNOSIS — I65.21 STENOSIS OF RIGHT INTERNAL CAROTID ARTERY: ICD-10-CM

## 2021-07-14 DIAGNOSIS — I63.9 CVA (CEREBRAL VASCULAR ACCIDENT) (HCC): Primary | ICD-10-CM

## 2021-07-14 PROBLEM — I10 HYPERTENSION: Status: ACTIVE | Noted: 2021-07-14

## 2021-07-14 LAB
ANION GAP SERPL CALCULATED.3IONS-SCNC: 7 MMOL/L (ref 4–13)
APTT PPP: 26 SECONDS (ref 23–37)
APTT PPP: 28 SECONDS (ref 23–37)
APTT PPP: 32 SECONDS (ref 23–37)
BUN SERPL-MCNC: 20 MG/DL (ref 5–25)
CALCIUM SERPL-MCNC: 8.9 MG/DL (ref 8.3–10.1)
CHLORIDE SERPL-SCNC: 104 MMOL/L (ref 100–108)
CO2 SERPL-SCNC: 31 MMOL/L (ref 21–32)
CREAT SERPL-MCNC: 1.3 MG/DL (ref 0.6–1.3)
ERYTHROCYTE [DISTWIDTH] IN BLOOD BY AUTOMATED COUNT: 13 % (ref 11.6–15.1)
ERYTHROCYTE [DISTWIDTH] IN BLOOD BY AUTOMATED COUNT: 13 % (ref 11.6–15.1)
GFR SERPL CREATININE-BSD FRML MDRD: 59 ML/MIN/1.73SQ M
GLUCOSE SERPL-MCNC: 102 MG/DL (ref 65–140)
GLUCOSE SERPL-MCNC: 120 MG/DL (ref 65–140)
HCT VFR BLD AUTO: 41.2 % (ref 36.5–49.3)
HCT VFR BLD AUTO: 43.1 % (ref 36.5–49.3)
HGB BLD-MCNC: 14.2 G/DL (ref 12–17)
HGB BLD-MCNC: 14.7 G/DL (ref 12–17)
INR PPP: 0.92 (ref 0.84–1.19)
INR PPP: 0.97 (ref 0.84–1.19)
MCH RBC QN AUTO: 31.6 PG (ref 26.8–34.3)
MCH RBC QN AUTO: 31.9 PG (ref 26.8–34.3)
MCHC RBC AUTO-ENTMCNC: 34.1 G/DL (ref 31.4–37.4)
MCHC RBC AUTO-ENTMCNC: 34.5 G/DL (ref 31.4–37.4)
MCV RBC AUTO: 93 FL (ref 82–98)
MCV RBC AUTO: 93 FL (ref 82–98)
PLATELET # BLD AUTO: 212 THOUSANDS/UL (ref 149–390)
PLATELET # BLD AUTO: 224 THOUSANDS/UL (ref 149–390)
PMV BLD AUTO: 9.2 FL (ref 8.9–12.7)
PMV BLD AUTO: 9.3 FL (ref 8.9–12.7)
POTASSIUM SERPL-SCNC: 3.4 MMOL/L (ref 3.5–5.3)
PROTHROMBIN TIME: 12.2 SECONDS (ref 11.6–14.5)
PROTHROMBIN TIME: 12.9 SECONDS (ref 11.6–14.5)
RBC # BLD AUTO: 4.45 MILLION/UL (ref 3.88–5.62)
RBC # BLD AUTO: 4.65 MILLION/UL (ref 3.88–5.62)
SARS-COV-2 RNA RESP QL NAA+PROBE: NEGATIVE
SODIUM SERPL-SCNC: 142 MMOL/L (ref 136–145)
TROPONIN I SERPL-MCNC: <0.02 NG/ML
WBC # BLD AUTO: 4.85 THOUSAND/UL (ref 4.31–10.16)
WBC # BLD AUTO: 4.91 THOUSAND/UL (ref 4.31–10.16)

## 2021-07-14 PROCEDURE — 82948 REAGENT STRIP/BLOOD GLUCOSE: CPT

## 2021-07-14 PROCEDURE — NC001 PR NO CHARGE: Performed by: ANESTHESIOLOGY

## 2021-07-14 PROCEDURE — 71045 X-RAY EXAM CHEST 1 VIEW: CPT

## 2021-07-14 PROCEDURE — 70498 CT ANGIOGRAPHY NECK: CPT

## 2021-07-14 PROCEDURE — 4A133J1 MONITORING OF ARTERIAL PULSE, PERIPHERAL, PERCUTANEOUS APPROACH: ICD-10-PCS | Performed by: SURGERY

## 2021-07-14 PROCEDURE — 85730 THROMBOPLASTIN TIME PARTIAL: CPT | Performed by: EMERGENCY MEDICINE

## 2021-07-14 PROCEDURE — 96365 THER/PROPH/DIAG IV INF INIT: CPT

## 2021-07-14 PROCEDURE — 80048 BASIC METABOLIC PNL TOTAL CA: CPT | Performed by: EMERGENCY MEDICINE

## 2021-07-14 PROCEDURE — 70551 MRI BRAIN STEM W/O DYE: CPT

## 2021-07-14 PROCEDURE — 70496 CT ANGIOGRAPHY HEAD: CPT

## 2021-07-14 PROCEDURE — 99223 1ST HOSP IP/OBS HIGH 75: CPT | Performed by: ANESTHESIOLOGY

## 2021-07-14 PROCEDURE — 4A133B1 MONITORING OF ARTERIAL PRESSURE, PERIPHERAL, PERCUTANEOUS APPROACH: ICD-10-PCS | Performed by: SURGERY

## 2021-07-14 PROCEDURE — 03HY32Z INSERTION OF MONITORING DEVICE INTO UPPER ARTERY, PERCUTANEOUS APPROACH: ICD-10-PCS | Performed by: SURGERY

## 2021-07-14 PROCEDURE — G1004 CDSM NDSC: HCPCS

## 2021-07-14 PROCEDURE — 85610 PROTHROMBIN TIME: CPT | Performed by: STUDENT IN AN ORGANIZED HEALTH CARE EDUCATION/TRAINING PROGRAM

## 2021-07-14 PROCEDURE — 99221 1ST HOSP IP/OBS SF/LOW 40: CPT | Performed by: PSYCHIATRY & NEUROLOGY

## 2021-07-14 PROCEDURE — 99291 CRITICAL CARE FIRST HOUR: CPT

## 2021-07-14 PROCEDURE — 36415 COLL VENOUS BLD VENIPUNCTURE: CPT | Performed by: STUDENT IN AN ORGANIZED HEALTH CARE EDUCATION/TRAINING PROGRAM

## 2021-07-14 PROCEDURE — 02HV33Z INSERTION OF INFUSION DEVICE INTO SUPERIOR VENA CAVA, PERCUTANEOUS APPROACH: ICD-10-PCS | Performed by: PHYSICIAN ASSISTANT

## 2021-07-14 PROCEDURE — U0003 INFECTIOUS AGENT DETECTION BY NUCLEIC ACID (DNA OR RNA); SEVERE ACUTE RESPIRATORY SYNDROME CORONAVIRUS 2 (SARS-COV-2) (CORONAVIRUS DISEASE [COVID-19]), AMPLIFIED PROBE TECHNIQUE, MAKING USE OF HIGH THROUGHPUT TECHNOLOGIES AS DESCRIBED BY CMS-2020-01-R: HCPCS | Performed by: EMERGENCY MEDICINE

## 2021-07-14 PROCEDURE — 99292 CRITICAL CARE ADDL 30 MIN: CPT | Performed by: EMERGENCY MEDICINE

## 2021-07-14 PROCEDURE — 36620 INSERTION CATHETER ARTERY: CPT | Performed by: ANESTHESIOLOGY

## 2021-07-14 PROCEDURE — 99222 1ST HOSP IP/OBS MODERATE 55: CPT | Performed by: RADIOLOGY

## 2021-07-14 PROCEDURE — 85730 THROMBOPLASTIN TIME PARTIAL: CPT | Performed by: ANESTHESIOLOGY

## 2021-07-14 PROCEDURE — 85027 COMPLETE CBC AUTOMATED: CPT | Performed by: EMERGENCY MEDICINE

## 2021-07-14 PROCEDURE — 93005 ELECTROCARDIOGRAM TRACING: CPT

## 2021-07-14 PROCEDURE — 36556 INSERT NON-TUNNEL CV CATH: CPT | Performed by: ANESTHESIOLOGY

## 2021-07-14 PROCEDURE — 85730 THROMBOPLASTIN TIME PARTIAL: CPT | Performed by: STUDENT IN AN ORGANIZED HEALTH CARE EDUCATION/TRAINING PROGRAM

## 2021-07-14 PROCEDURE — 36415 COLL VENOUS BLD VENIPUNCTURE: CPT | Performed by: EMERGENCY MEDICINE

## 2021-07-14 PROCEDURE — 99291 CRITICAL CARE FIRST HOUR: CPT | Performed by: EMERGENCY MEDICINE

## 2021-07-14 PROCEDURE — 96375 TX/PRO/DX INJ NEW DRUG ADDON: CPT

## 2021-07-14 PROCEDURE — 96374 THER/PROPH/DIAG INJ IV PUSH: CPT

## 2021-07-14 PROCEDURE — 85027 COMPLETE CBC AUTOMATED: CPT | Performed by: STUDENT IN AN ORGANIZED HEALTH CARE EDUCATION/TRAINING PROGRAM

## 2021-07-14 PROCEDURE — 84484 ASSAY OF TROPONIN QUANT: CPT | Performed by: EMERGENCY MEDICINE

## 2021-07-14 PROCEDURE — 85610 PROTHROMBIN TIME: CPT | Performed by: EMERGENCY MEDICINE

## 2021-07-14 PROCEDURE — U0005 INFEC AGEN DETEC AMPLI PROBE: HCPCS | Performed by: EMERGENCY MEDICINE

## 2021-07-14 RX ORDER — LORAZEPAM 2 MG/ML
1 INJECTION INTRAMUSCULAR ONCE
Status: COMPLETED | OUTPATIENT
Start: 2021-07-14 | End: 2021-07-14

## 2021-07-14 RX ORDER — POTASSIUM CHLORIDE 14.9 MG/ML
20 INJECTION INTRAVENOUS ONCE
Status: DISCONTINUED | OUTPATIENT
Start: 2021-07-14 | End: 2021-07-14 | Stop reason: HOSPADM

## 2021-07-14 RX ORDER — ASPIRIN 81 MG/1
324 TABLET, CHEWABLE ORAL ONCE
Status: COMPLETED | OUTPATIENT
Start: 2021-07-14 | End: 2021-07-14

## 2021-07-14 RX ORDER — SODIUM CHLORIDE 9 MG/ML
100 INJECTION, SOLUTION INTRAVENOUS CONTINUOUS
Status: DISCONTINUED | OUTPATIENT
Start: 2021-07-14 | End: 2021-07-15

## 2021-07-14 RX ORDER — HEPARIN SODIUM 10000 [USP'U]/100ML
3-20 INJECTION, SOLUTION INTRAVENOUS
Status: DISCONTINUED | OUTPATIENT
Start: 2021-07-14 | End: 2021-07-16

## 2021-07-14 RX ORDER — ALBUMIN, HUMAN INJ 5% 5 %
12.5 SOLUTION INTRAVENOUS ONCE
Status: COMPLETED | OUTPATIENT
Start: 2021-07-14 | End: 2021-07-14

## 2021-07-14 RX ORDER — HEPARIN SODIUM 10000 [USP'U]/100ML
300-2000 INJECTION, SOLUTION INTRAVENOUS
Status: DISCONTINUED | OUTPATIENT
Start: 2021-07-14 | End: 2021-07-14 | Stop reason: HOSPADM

## 2021-07-14 RX ORDER — ATORVASTATIN CALCIUM 40 MG/1
40 TABLET, FILM COATED ORAL EVERY EVENING
Status: DISCONTINUED | OUTPATIENT
Start: 2021-07-14 | End: 2021-07-27 | Stop reason: HOSPADM

## 2021-07-14 RX ADMIN — LORAZEPAM 1 MG: 2 INJECTION INTRAMUSCULAR; INTRAVENOUS at 15:48

## 2021-07-14 RX ADMIN — ALBUMIN (HUMAN) 12.5 G: 12.5 INJECTION, SOLUTION INTRAVENOUS at 18:40

## 2021-07-14 RX ADMIN — NOREPINEPHRINE BITARTRATE 18 MCG/MIN: 1 INJECTION, SOLUTION, CONCENTRATE INTRAVENOUS at 18:39

## 2021-07-14 RX ADMIN — HEPARIN SODIUM 11.1 UNITS/KG/HR: 10000 INJECTION, SOLUTION INTRAVENOUS at 17:40

## 2021-07-14 RX ADMIN — SODIUM CHLORIDE 100 ML/HR: 0.9 INJECTION, SOLUTION INTRAVENOUS at 18:42

## 2021-07-14 RX ADMIN — NOREPINEPHRINE BITARTRATE 25 MCG/MIN: 1 INJECTION, SOLUTION, CONCENTRATE INTRAVENOUS at 22:19

## 2021-07-14 RX ADMIN — IOHEXOL 85 ML: 350 INJECTION, SOLUTION INTRAVENOUS at 13:55

## 2021-07-14 RX ADMIN — NOREPINEPHRINE BITARTRATE 1 MCG/MIN: 1 INJECTION INTRAVENOUS at 15:24

## 2021-07-14 RX ADMIN — HEPARIN SODIUM 500 UNITS/HR: 10000 INJECTION, SOLUTION INTRAVENOUS at 15:19

## 2021-07-14 RX ADMIN — ASPIRIN 81 MG CHEWABLE TABLET 324 MG: 81 TABLET CHEWABLE at 15:10

## 2021-07-14 RX ADMIN — NOREPINEPHRINE BITARTRATE 5 MCG/MIN: 1 INJECTION, SOLUTION, CONCENTRATE INTRAVENOUS at 16:36

## 2021-07-14 NOTE — ED NOTES
[facility] called with transfer info: Rudy Dietrich arriving ; Dr Patricia Vazquez accepting; B ED; report called to [address], RN  07/14/21 6442

## 2021-07-14 NOTE — CONSULTS
NEUROLOGY RESIDENCY CONSULT NOTE     Name: Edi Amaya   Age & Sex: 64 y o  male   MRN: [id number]  Unit/Bed#: ICU 10   Encounter: [id number]  Length of Stay: 0    ASSESSMENT & PLAN     * CVA (cerebral vascular accident) [facility])  Assessment & Plan  Assessment: 63 y/o M w/ acute ischemic stroke of thromboembolic etiology (carotid likely)   Presenting sx: left facial droop, dysarthria, LKN 2100 last night (approx 20 hrs prior to this eval)    However, on further discussion w/ family it appears his [de-identified] may have been the day before yesterday and that he was experiencing a right-sided headache for about 2 weeks which peaked around the time the family noticed sx   NIHSS: 2 on arrival    tPA not given 2/2 time course, non-disabling sx   CTH/CTA:  Right caudate/lentiform/BG hypodensity and right frontal focal encephalomalacia due to remote infarct, small, likely chronic infarct in the right parietal lobe; severe right ICA stenosis (noncalcified plaque at the carotid bifurcation), and nearly occlusive thrombus in the right supraclinoid ICA extending to the terminus, and into right M1 and A1 segments; hypoplastic but patent left A1     Plan:   Admit on stroke pathway   Heparin gtt, PTT q4-q6, goal PTT 60-90 given known thrombus   ASA, statin   Goal BP < 180 (goal 160-180), levo for support (pt has RIJ)   If patient deteriorates, stat [facility] and endovascular may reconsider intervention - reach out to neurovasc   F/u am labs: A1c, lipid panel, mb panel, cbc    F/u MRI   Frequent neuro checks: stat [facility] for acute change   Telemetry, echo   + BLE VAS, + thrombophilia workup   Replete lytes to goal K 4, Mg 2, PO4 4   Euglycemia (gluc < 180, SSI or drip as needed), normothermia   PT/OT/PMR/ST w/ swallow eval    Hypertension  Assessment & Plan  Permissive HTN to max 160-180 at this time    Edi Amaya will need follow up in in 4 weeks with neurovascular      He will not require outpatient neurological testing at this time but will likely need repeat imaging pending progression during this stay    SUBJECTIVE     Reason for Consult / Principal Problem: Facial droop/stroke-like sx  Hx and PE limited by: nothing    HPI: Garfield Trujillo is a 64 y o  male w/ PMH multiple episodes of focal neurologic deficits (6 y/a L weakness, 4 y/a L weakness, 3 y/a R vision loss, no AC/AP, no neuro f/u), HTN, diverticulitis who p/w left facial droop and slurred speech noticed this morning @ 8am by family, LKW 2100 last night prior to bed  No known blood thinners  CTH/CTA demonstrated R caudate/BG hypodensity and chronic infarcts in the right parietal lobe and right frontal lobe with focal right frontal encephalomalacia, w/ severe R ICA stenosis, and nearly occlusive thrombus in the right supraclinoid ICA extending to the terminus and into the right M1 and A1 segments  Not tPA candidate (outside window)  Transferred [facility] to [facility] for [facility]ovascular evaluation  Per neurovascular no endovascular intervention at this time either  No recent illnesses, no recent travel, no new exposures or changes in habits, no changes in weight, diet, or medications  No palpitations, or chest pain reported  No known conditions  Patient does tend to eschew medical care and has not seen a physician for the above-mentioned episodes of focal neurologic deficits  His episode 3 years ago resulted in a partial, permanent, subtle right eye right field defect  Patient endorses having had a right-sided headache for about 1-2 weeks, which peaked about the day before yesterday  Though he says that he went to bed last night at his known normal, family states that the day before yesterday they found him in bed with severe left-sided weakness including LUE, severe left facial droop and severe dysarthria    They report that he is not back to his normal self cognitively and that he is a little bit slower than they would expect him to be normally  Inpatient consult to Neurology  Consult performed by: Jasmyne Mederos MD  Consult ordered by: Aaron Torrez DO          Inpatient consult to Neurology     Performed by  Jasmyne Mederos MD     Authorized by Aaron Torrez DO            Historical Information   Past Medical History:   Diagnosis Date    Diverticulitis large intestine     "During Bowel Resection determined not to have Diverticulitis "- Patient    Hypertension     TIA (transient ischemic attack)      Past Surgical History:   Procedure Laterality Date    ANTERIOR CRUCIATE LIGAMENT REPAIR Right     ACL Repair    BOWEL RESECTION      FOOT SURGERY Right     patient not sure procedure    SHOULDER SURGERY Bilateral     Rotator cuff/ Right shoulder bone growth removal     Social History   Social History     Substance and Sexual Activity   Alcohol Use Yes    Alcohol/week: 1 0 - 2 0 standard drinks    Types: 1 - 2 Cans of beer per week     Social History     Substance and Sexual Activity   Drug Use Never     E-Cigarette/Vaping    E-Cigarette Use Never User      E-Cigarette/Vaping Substances    Nicotine No     THC No     CBD No     Flavoring No     Other No     Unknown No      Social History     Tobacco Use   Smoking Status Former Smoker   Smokeless Tobacco Former User    Types: Chew     Family History: History reviewed  No pertinent family history    Meds/Allergies   all current active meds have been reviewed, current meds:   Current Facility-Administered Medications   Medication Dose Route Frequency    atorvastatin (LIPITOR) tablet 40 mg  40 mg Oral QPM    heparin (porcine) 25,000 units in 0 45% NaCl 250 mL infusion (premix)  3-20 Units/kg/hr (Order-Specific) Intravenous Titrated    norepinephrine (LEVOPHED) 4 mg (STANDARD CONCENTRATION) IV in sodium chloride 0 9% 250 mL  1-30 mcg/min Intravenous Titrated    sodium chloride 0 9 % infusion  100 mL/hr Intravenous Continuous    and PTA meds:   Prior to Admission Medications   Prescriptions Last Dose Informant Patient Reported? Taking?   acetaminophen (TYLENOL) 650 mg CR tablet   No Yes   Sig: Take 1 tablet (650 mg total) by mouth every 8 (eight) hours as needed for mild pain   hydrochlorothiazide (HYDRODIURIL) 25 mg tablet   Yes Yes   Sig: Take 25 mg by mouth daily      Facility-Administered Medications: None     No Known Allergies    Previous medical records under review    Review of Systems   Constitutional: Negative for chills, fatigue, fever and unexpected weight change  Eyes: Positive for visual disturbance  Cardiovascular: Negative for chest pain and palpitations  Gastrointestinal: Negative for abdominal pain, nausea and vomiting  Endocrine: Negative for polyuria  Genitourinary: Negative for dysuria and hematuria  Neurological: Positive for facial asymmetry, weakness and headaches  Negative for dizziness, tremors and numbness  OBJECTIVE     Patient ID: Garfield Trujillo is a 64 y o  male  Vitals:   Vitals:    21 1700 21 1706 21 1729 21 1800   BP: 118/68  138/75 129/68   BP Location: Left arm      Pulse: 72  68 68   Resp: 18  15 20   Temp:  98 °F (36 7 °C)     TempSrc:  Oral     SpO2: 96%  98% 97%   Weight:   100 kg (220 lb 7 4 oz)    Height:   5' 11" (1 803 m)       Body mass index is 30 75 kg/m²  Intake/Output Summary (Last 24 hours) at 2021  Last data filed at 2021 1800  Gross per 24 hour   Intake 42 36 ml   Output --   Net 42 36 ml     Temperature:   Temp (24hrs), Av 7 °F (36 5 °C), Min:97 5 °F (36 4 °C), Max:98 °F (36 7 °C)    Temperature: 98 °F (36 7 °C)    Invasive Devices:    Invasive Devices     Central Venous Catheter Line            CVC Central Lines 21 Triple 16cm <1 day          Peripheral Intravenous Line            Peripheral IV 21 Left Antecubital <1 day    Peripheral IV 21 Right Antecubital <1 day          Arterial Line            Arterial Line 21 Radial <1 day              Physical Exam  Vitals reviewed  Constitutional:       General: He is not in acute distress  Appearance: Normal appearance  He is not ill-appearing, toxic-appearing or diaphoretic  Comments: RIJ in place   HENT:      Head: Normocephalic and atraumatic  Right Ear: External ear normal       Left Ear: External ear normal       Nose: Nose normal  No congestion or rhinorrhea  Mouth/Throat:      Mouth: Mucous membranes are moist       Pharynx: Oropharynx is clear  No oropharyngeal exudate or posterior oropharyngeal erythema  Eyes:      General: No scleral icterus  Right eye: No discharge  Left eye: No discharge  Extraocular Movements: Extraocular movements intact and EOM normal       Conjunctiva/sclera: Conjunctivae normal       Pupils: Pupils are equal, round, and reactive to light  Pulmonary:      Effort: Pulmonary effort is normal  No respiratory distress  Musculoskeletal:         General: No swelling or deformity  Right lower leg: No edema  Left lower leg: No edema  Skin:     Coloration: Skin is not pale  Findings: No erythema or rash  Neurological:      Mental Status: He is alert and oriented to person, place, and time  Cranial Nerves: Cranial nerve deficit present  Sensory: No sensory deficit  Motor: Weakness present  Coordination: Coordination normal       Deep Tendon Reflexes: Reflexes normal       Reflex Scores:       Bicep reflexes are 2+ on the right side and 2+ on the left side  Brachioradialis reflexes are 2+ on the right side and 2+ on the left side  Patellar reflexes are 3+ on the right side and 3+ on the left side  Psychiatric:         Mood and Affect: Mood normal          Behavior: Behavior normal          Thought Content: Thought content normal          Judgment: Judgment normal        Neurologic Exam     Mental Status   Oriented to person, place, and time  Follows 2 step commands     Attention: normal    Speech: (Mild dysarthria - per patient and family, significantly improved from prior)  Level of consciousness: alert  Knowledge: consistent with education  Able to name object  Able to repeat  Normal comprehension  Cranial Nerves     CN II   Visual acuity: normal  Right visual field deficit: upper temporal and lower temporal (subtle deficit) quadrant(s)    CN III, IV, VI   Pupils are equal, round, and reactive to light  Extraocular motions are normal    Right pupil: Reactivity: brisk  Consensual response: intact  Left pupil: Reactivity: brisk  Consensual response: intact  CN III: no CN III palsy  CN VI: no CN VI palsy  Nystagmus: none     CN V   Facial sensation intact  CN VII   Left facial weakness: central    CN VIII   Hearing: intact    CN IX, X   Palate: symmetric    CN XI   Right trapezius strength: normal  Left trapezius strength: normal    CN XII   CN XII normal      Motor Exam   Muscle bulk: normal  Overall muscle tone: normal  Right arm pronator drift: absent  Left arm pronator drift: absent    Strength   Right deltoid: 5/5  Left deltoid: 5/5  Right biceps: 5/5  Left biceps: 5/5  Right triceps: 5/5  Left triceps: 5/5  Right iliopsoas: 5/5  Left iliopsoas: 5/5  Right anterior tibial: 5/5  Left anterior tibial: 5/5  Right gastroc: 5/5  Left gastroc: 5/5    Sensory Exam   Light touch normal    Temperature intact and symmetric     Gait, Coordination, and Reflexes     Tremor   Resting tremor: absent  Action tremor: absent    Reflexes   Right brachioradialis: 2+  Left brachioradialis: 2+  Right biceps: 2+  Left biceps: 2+  Right patellar: 3+  Left patellar: 3+  Right plantar: equivocal  Left plantar: equivocal  DTRs mildly diffusely brisk, possibly R > L      LABORATORY DATA     Labs: I have personally reviewed pertinent reports      Results from last 7 days   Lab Units 07/14/21  1706 07/14/21  1349   WBC Thousand/uL 4 91 4 85   HEMOGLOBIN g/dL 14 2 14 7   HEMATOCRIT % 41 2 43 1   PLATELETS Thousands/uL 224 212      Results from last 7 days   Lab Units 07/14/21  1349   POTASSIUM mmol/L 3 4*   CHLORIDE mmol/L 104   CO2 mmol/L 31   BUN mg/dL 20   CREATININE mg/dL 1 30   CALCIUM mg/dL 8 9              Results from last 7 days   Lab Units 07/14/21  1706 07/14/21  1349   INR  0 97 0 92   PTT seconds 28 26         Results from last 7 days   Lab Units 07/14/21  1349   TROPONIN I ng/mL <0 02     IMAGING & DIAGNOSTIC TESTING     Radiology Results: I have personally reviewed pertinent reports  and I have personally reviewed pertinent films in PACS  MRI brain wo contrast    (Results Pending)   XR chest portable    (Results Pending)     CTH/CTA: Acute infarct in the right basal ganglia  Hyperdense distal right ICA and right MCA consistent with thrombus  Small chronic infarct in the right frontal lobe  Small parietal infarct also favored to be chronic  No acute hemorrhage or mass effect  Severe noncalcified plaque in the proximal right internal carotid artery with small mobile thrombus at the distal aspect - RIGHT EXTRACRANIAL CAROTID SEGMENT: Severe noncalcified plaque at the carotid bifurcation and proximal internal carotid artery with a small focus of mobile thrombus at the distal aspect  Remainder of the cervical carotid artery is widely patent  INTERNAL CAROTID ARTERIES:  There is nearly occlusive thrombus in the right supraclinoid ICA extending to the terminus  Extends into the right A1 and M1 segments  No significant stenosis of the left internal carotid artery  Normal ophthalmic artery origins  ANTERIOR CIRCULATION:  Nonocclusive thrombus in the dominant right A1 segment  Hypoplastic left A1 segment is patent  Anterior cerebral arteries are otherwise patent  Normal anterior communicating artery  MIDDLE CEREBRAL ARTERY CIRCULATION: Nearly occlusive thrombus in the right M1 segment  Right M2 and distal MCA branches are patent     Left M1 segment and middle cerebral artery branches demonstrate normal enhancement bilaterally  Other Diagnostic Testing: I have personally reviewed pertinent reports  ACTIVE MEDICATIONS     Current Facility-Administered Medications   Medication Dose Route Frequency    atorvastatin (LIPITOR) tablet 40 mg  40 mg Oral QPM    heparin (porcine) 25,000 units in 0 45% NaCl 250 mL infusion (premix)  3-20 Units/kg/hr (Order-Specific) Intravenous Titrated    norepinephrine (LEVOPHED) 4 mg (STANDARD CONCENTRATION) IV in sodium chloride 0 9% 250 mL  1-30 mcg/min Intravenous Titrated    sodium chloride 0 9 % infusion  100 mL/hr Intravenous Continuous     Prior to Admission medications    Medication Sig Start Date End Date Taking?  Authorizing Provider   acetaminophen (TYLENOL) 650 mg CR tablet Take 1 tablet (650 mg total) by mouth every 8 (eight) hours as needed for mild pain 7/31/20   Kalee Smith PA-C   hydrochlorothiazide (HYDRODIURIL) 25 mg tablet Take 25 mg by mouth daily 9/26/20   Historical Provider, MD     CODE STATUS & ADVANCED DIRECTIVES     Code Status: Level 1 - Full Code  Advance Directive and Living Will:      Power of :    POLST:      VTE Pharmacologic Prophylaxis: Heparin Drip  VTE Mechanical Prophylaxis: sequential compression device    ==  Christy Hardwick MD  Resident, Neurology, PGY-2  [address]

## 2021-07-14 NOTE — EMTALA/ACUTE CARE TRANSFER
[facility] EMERGENCY DEPARTMENT  [address]  [facility] [address]  Dept: [phone]      EMTALA TRANSFER CONSENT    NAME Ryan Buck                                         1960                              MRN [id number]    I have been informed of my rights regarding examination, treatment, and transfer   by Dr Rolando Johnston MD    Benefits: Specialized equipment and/or services available at the receiving facility (Include comment)________________________, Continuity of care, Patient preference    Risks: Potential for delay in receiving treatment, Potential deterioration of medical condition, Loss of IV, Increased discomfort during transfer, Possible worsening of condition or death during transfer      Transfer Request   I acknowledge that my medical condition has been evaluated and explained to me by the emergency department physician or other qualified medical person and/or my attending physician who has recommended and offered to me further medical examination and treatment  I understand the Hospital's obligation with respect to the treatment and stabilization of my emergency medical condition  I nevertheless request to be transferred  I release the Hospital, the doctor, and any other persons caring for me from all responsibility or liability for any injury or ill effects that may result from my transfer and agree to accept all responsibility for the consequences of my choice to transfer, rather than receive stabilizing treatment at the Hospital  I understand that because the transfer is my request, my insurance may not provide reimbursement for the services  The Hospital will assist and direct me and my family in how to make arrangements for transfer, but the hospital is not liable for any fees charged by the transport service    In spite of this understanding, I refuse to consent to further medical examination and treatment which has been offered to me, and request transfer to [address] [place] [address] Rd Name, [address] : [facility]  I authorize the performance of emergency medical procedures and treatments upon me in both transit and upon arrival at the receiving facility  Additionally, I authorize the release of any and all medical records to the receiving facility and request they be transported with me, if possible  I authorize the performance of emergency medical procedures and treatments upon me in both transit and upon arrival at the receiving facility  Additionally, I authorize the release of any and all medical records to the receiving facility and request they be transported with me, if possible  I understand that the safest mode of transportation during a medical emergency is an ambulance and that the Hospital advocates the use of this mode of transport  Risks of traveling to the receiving facility by car, including absence of medical control, life sustaining equipment, such as oxygen, and medical personnel has been explained to me and I fully understand them  (SARA CORRECT BOX BELOW)  [  ]  I consent to the stated transfer and to be transported by ambulance/helicopter  [  ]  I consent to the stated transfer, but refuse transportation by ambulance and accept full responsibility for my transportation by car  I understand the risks of non-ambulance transfers and I exonerate the Hospital and its staff from any deterioration in my condition that results from this refusal     X___________________________________________    DATE  21  TIME________  Signature of patient or legally responsible individual signing on patient behalf           RELATIONSHIP TO PATIENT_________________________          Provider Certification    NAME Lexy Arellano                                         1960                              MRN [id number]    A medical screening exam was performed on the above named patient    Based on the examination:    Condition Necessitating Transfer The encounter diagnosis was Stroke (cerebrum) ([address] )  Patient Condition: The patient has been stabilized such that within reasonable medical probability, no material deterioration of the patient condition or the condition of the unborn child(jennifer) is likely to result from the transfer    Reason for Transfer: Level of Care needed not available at this facility, Patient/Family request, No bed available at level of patient's needs    Transfer Requirements: [address]   · Space available and qualified personnel available for treatment as acknowledged by    · Agreed to accept transfer and to provide appropriate medical treatment as acknowledged by       Dr Merlin Shire ([address])  · Appropriate medical records of the examination and treatment of the patient are provided at the time of transfer   [address], [address] _______  · Transfer will be performed by qualified personnel from    and appropriate transfer equipment as required, including the use of necessary and appropriate life support measures      Provider Certification: I have examined the patient and explained the following risks and benefits of being transferred/refusing transfer to the patient/family:  General risk, such as traffic hazards, adverse weather conditions, rough terrain or turbulence, possible failure of equipment (including vehicle or aircraft), or consequences of actions of persons outside the control of the transport personnel, Unanticipated needs of medical equipment and personnel during transport, Risk of worsening condition, The possibility of a transport vehicle being unavailable      Based on these reasonable risks and benefits to the patient and/or the unborn child(jennifer), and based upon the information available at the time of the patients examination, I certify that the medical benefits reasonably to be expected from the provision of appropriate medical treatments at another medical facility outweigh the increasing risks, if any, to the individuals medical condition, and in the case of labor to the unborn child, from effecting the transfer      X____________________________________________ DATE 07/14/21        TIME_______      ORIGINAL - SEND TO MEDICAL RECORDS   COPY - SEND WITH PATIENT DURING TRANSFER

## 2021-07-14 NOTE — PROCEDURES
Arterial Line Insertion    Date/Time: 7/14/2021 6:18 PM  Performed by: Jose Jackson MD  Authorized by: Jose Jackson MD     Patient location:  Bedside  Other Assisting Provider: No    Consent:     Consent obtained:  Written    Consent given by:  Patient    Risks discussed:  Bleeding, ischemia, repeat procedure, infection and pain  Universal protocol:     Procedure explained and questions answered to patient or proxy's satisfaction: yes      Relevant documents present and verified: yes      Test results available and properly labeled: yes      Radiology Images displayed and confirmed  If images not available, report reviewed: yes      Required blood products, implants, devices, and special equipment available: yes      Site/side marked: yes      Immediately prior to procedure a time out was called: yes      Patient identity confirmed:  Verbally with patient, arm band and hospital-assigned identification number  Indications:     Indications: hemodynamic monitoring and continuous blood pressure monitoring    Pre-procedure details:     Skin preparation:  Chlorhexidine    Preparation: Patient was prepped and draped in sterile fashion    Procedure details:     Location / Tip of Catheter:  Radial    Laterality:  Left    Armani's test performed: yes      Needle gauge:  22 G    Placement technique:  Percutaneous    Number of attempts:  1    Successful placement: yes      Transducer: waveform confirmed    Post-procedure details:     Post-procedure:  Biopatch applied and sutured    CMS:  Normal    Patient tolerance of procedure:   Tolerated well, no immediate complications

## 2021-07-14 NOTE — ED NOTES
Noreprinephrine continued from [facility] at this time   Noreprinephrine continued at 5 mcg/min at this time in [address], [facility]  07/14/21 6520

## 2021-07-14 NOTE — QUICK NOTE
Stroke alert note    Paged at 1:47pm    Responded at 1:47pm    NIH = 2    Last known normal last night at 9pm     Patient has Left sided facial droop and slurred speech that was noticed by family this AM at 8  He went to bed last night normal at 9pm   He is currently not on any blood thinners  CT head does show hypodensity in the R caudate/R BG region     CTA head & neck R ICA stenosis with R M1 and R LUZ thrombus     Not a candidate for TPA since symptoms outside the window but will need to touch base with IR regarding whether or not she can go for thrombectomy  Spoke to IR and ICU team at [facility] and patient will be transferred to [facility]

## 2021-07-14 NOTE — ED NOTES
Heparin continued from [facility] transport  Non-weight base Heparin infusion continued at 500 units/hr        Johana Akbar RN  07/14/21 4298

## 2021-07-14 NOTE — PROCEDURES
Central Line Insertion    Date/Time: 7/14/2021 6:27 PM  Performed by: Marlyn Severance, PA-C  Authorized by: Marlyn Severance, PA-C     Patient location:  ICU  Other Assisting Provider: Yes (comment) (Dr Hannah Hendricks )    Consent:     Consent obtained:  Verbal and written    Consent given by:  Patient    Risks discussed:  Arterial puncture, incorrect placement, nerve damage, pneumothorax, infection and bleeding    Alternatives discussed:  No treatment, delayed treatment, alternative treatment, observation and referral  Universal protocol:     Procedure explained and questions answered to patient or proxy's satisfaction: yes      Relevant documents present and verified: yes      Radiology Images displayed and confirmed  If images not available, report reviewed: yes      Required blood products, implants, devices, and special equipment available: yes      Site/side marked: yes      Immediately prior to procedure, a time out was called: yes      Patient identity confirmed:  Verbally with patient, arm band, provided demographic data and hospital-assigned identification number  Pre-procedure details:     Hand hygiene: Hand hygiene performed prior to insertion      Sterile barrier technique: All elements of maximal sterile technique followed      Skin preparation:  2% chlorhexidine    Skin preparation agent: Skin preparation agent completely dried prior to procedure    Indications:     Central line indications: medications requiring central line    Anesthesia (see MAR for exact dosages):      Anesthesia method:  Local infiltration    Local anesthetic:  Lidocaine 1% w/o epi  Procedure details:     Location:  Right internal jugular    Vessel type: vein      Laterality:  Right    Approach: percutaneous technique used      Patient position:  Reverse Trendelenburg    Catheter type:  Triple lumen 16cm    Catheter size:  7 Fr    Landmarks identified: yes      Ultrasound guidance: yes      Ultrasound image availability:  Still images obtained    Sterile ultrasound techniques: Sterile gel and sterile probe covers were used      Manometry confirmation: yes      Number of attempts:  1    Successful placement: yes      Vessel of catheter tip end:  Cavoatrial   Post-procedure details:     Post-procedure:  Dressing applied and line sutured    Assessment:  Blood return through all ports, no pneumothorax on x-ray, free fluid flow and placement verified by x-ray    Patient tolerance of procedure:   Tolerated well, no immediate complications

## 2021-07-14 NOTE — ASSESSMENT & PLAN NOTE
Assessment/Impression: 63 y/o M w/ acute ischemic strokes likely 2/2 mobile carotid thrombus (possible to likely etiology for his prior strokes as well), currently significantly improved clinically and overall stable    Presenting sx: left facial droop, dysarthria, LKN 2100 reportedly the night PTA (approx 20 hrs prior to initial consult), but on further discussion w/ family it appears his LKN may have been the day before yesterday and that he was experiencing a right-sided headache for about 2 weeks which peaked around the time the family noticed sx   NIHSS: 2 on arrival    tPA not given 2/2 time course, non-disabling sx   CTH/CTA:  Right caudate/lentiform/BG hypodensity and right frontal focal encephalomalacia due to remote infarct, small, likely chronic infarct in the right parietal lobe; severe right ICA stenosis (noncalcified plaque at the carotid bifurcation), and nearly occlusive thrombus in the right supraclinoid ICA extending to the terminus, and into right M1 and A1 segments; hypoplastic but patent left A1   MRI: new focal ischemia in medial R T lobe, R corpus striatum, R corona radiata, R frontal lobe (all R MCA territory strokes); no susceptibility weighting imaging signal consistent w/ old or new blood products   Labs: A1c 5 3, LDL 97,    Echo: LVEF 75%, mild concentric hypertrophy, no RWMA, no PFO on bubble, trace MR, TR, MO, mild dilatation of the ascending aorta (4 2 cm)   Exam: mild left facial weakness but no residual dysarthria or asymmetric tongue protrusion   Repeat CTA w/ significant improvement in intracranial thrombus    Plan: Repeat CTA w/ improved intracranial thrombus - no neurosurgical intervention or f/u indicated, will ultimately need CEA for symptomatic R ICA stenosis   Repeat CTA w/ significant improvement in intracranial thrombus - can d/c heparin gtt and start DAPT, NSg signed off - no indication for neurosurgical intervention   Recommend DAPT w/ ASA, plavix, Continue statin   Euglycemia, normothermia, normotension   Now s/p R CEA w/ vascular surgery   Discharge plan: after CEA (tentatively scheduled for Monday)  o DAPT w/ ASA and plavix for 3 months or per vascular surgery recommendations  o Statin   o F/u neuro stroke clinic

## 2021-07-14 NOTE — ED PROVIDER NOTES
History  Chief Complaint   Patient presents with    Facial Droop     pt c/o left facial droop with slurred speech starting at 0800 per family member  hx mini stroke  70-year-old male with a past medical history of TIA, not on blood thinners, hypertension, diverticulitis presents with left-sided facial droop and slurred speech since awakening at 8:00 a m  This morning  Patient and family member at bedside states that patient was last seen normal at 9:00 p m  When he went to bed last night  Patient woke up at 8:00 a m  And family noticed that he had left-sided facial droop and mild slurred speech  Patient states he had his first TIA six years ago which presented with left-sided weakness  Patient had another TIA four years ago with similar presentation  Patient had third TIA three years ago with right sided visual loss  Patient does not follow with neurology  Family member at bedside states that he appears improved from this morning when his symptoms were worse  Patient denies fever, chills, dizziness, diaphoresis, loss of taste or smell, headache, neck stiffness, sore throat, cough, sputum production, nausea, vomiting, chest pain, shortness of breath, back pain, rash, abdominal pain, urinary symptoms, penile discharge, scrotal swelling, focal motor or sensory deficits, lower extremity swelling or pain, diarrhea, bloody stools or constipation  No known COVID-19 exposure  Review of medical chart shows no recent hospitalizations          History provided by:  Patient and medical records   used: No    STROKE Alert  Location:  Left facial droop, slurred speech  Onset quality:  Unable to specify  Timing:  Constant  Progression:  Partially resolved  Chronicity:  New  Context:  Last seen normal was 9pm last night; woke up at 8am today and family noticed left facial droop and slurred speech  Associated symptoms: no abdominal pain, no chest pain, no cough, no diarrhea, no headaches, no loss of consciousness, no myalgias, no nausea, no shortness of breath, no vomiting and no wheezing    Risk factors: Three previous TIAs; not on blood thinners      Prior to Admission Medications   Prescriptions Last Dose Informant Patient Reported? Taking?   acetaminophen (TYLENOL) 650 mg CR tablet Past Month at Unknown time  No Yes   Sig: Take 1 tablet (650 mg total) by mouth every 8 (eight) hours as needed for mild pain   hydrochlorothiazide (HYDRODIURIL) 25 mg tablet   Yes No   Sig: Take 25 mg by mouth daily      Facility-Administered Medications: None       Past Medical History:   Diagnosis Date    Diverticulitis large intestine     "During Bowel Resection determined not to have Diverticulitis "- Patient    Hypertension     TIA (transient ischemic attack)        Past Surgical History:   Procedure Laterality Date    ANTERIOR CRUCIATE LIGAMENT REPAIR Right     ACL Repair    BOWEL RESECTION      FOOT SURGERY Right     patient not sure procedure    SHOULDER SURGERY Bilateral     Rotator cuff/ Right shoulder bone growth removal       History reviewed  No pertinent family history  I have reviewed and agree with the history as documented  E-Cigarette/Vaping    E-Cigarette Use Never User      E-Cigarette/Vaping Substances    Nicotine No     THC No     CBD No     Flavoring No     Other No     Unknown No      Social History     Tobacco Use    Smoking status: Former Smoker    Smokeless tobacco: Former User     Types: Chew   Vaping Use    Vaping Use: Never used   Substance Use Topics    Alcohol use: Yes     Alcohol/week: 1 0 - 2 0 standard drinks     Types: 1 - 2 Cans of beer per week    Drug use: Never       Review of Systems   Constitutional: Negative  HENT: Negative  Eyes: Negative  Respiratory: Negative for cough, chest tightness, shortness of breath and wheezing  Cardiovascular: Negative for chest pain, palpitations and leg swelling     Gastrointestinal: Negative for abdominal pain, constipation, diarrhea, nausea and vomiting  Genitourinary: Negative  Musculoskeletal: Negative for arthralgias, back pain, myalgias, neck pain and neck stiffness  Skin: Negative  Allergic/Immunologic: Negative  Neurological: Positive for facial asymmetry and speech difficulty  Negative for dizziness, tremors, seizures, loss of consciousness, syncope, weakness, light-headedness, numbness and headaches  Hematological: Negative  Psychiatric/Behavioral: Negative  Physical Exam  Physical Exam  Vitals and nursing note reviewed  Exam conducted with a chaperone present  Constitutional:       General: He is not in acute distress  Appearance: Normal appearance  He is well-developed and normal weight  He is not ill-appearing, toxic-appearing or diaphoretic  Comments: Well appearing, in no acute distress   HENT:      Head: Normocephalic and atraumatic  Jaw: There is normal jaw occlusion  Right Ear: Hearing, tympanic membrane, ear canal and external ear normal  There is no impacted cerumen  No mastoid tenderness  No hemotympanum  Left Ear: Hearing, tympanic membrane, ear canal and external ear normal  There is no impacted cerumen  No mastoid tenderness  No hemotympanum  Nose: Nose normal       Right Nostril: No epistaxis  Left Nostril: No epistaxis  Right Sinus: No maxillary sinus tenderness or frontal sinus tenderness  Left Sinus: No maxillary sinus tenderness or frontal sinus tenderness  Mouth/Throat:      Lips: Pink  No lesions  Mouth: Mucous membranes are moist  No lacerations or angioedema  Tongue: No lesions  Tongue does not deviate from midline  Palate: No mass and lesions  Pharynx: Oropharynx is clear  Uvula midline  No pharyngeal swelling, oropharyngeal exudate, posterior oropharyngeal erythema or uvula swelling  Tonsils: No tonsillar exudate or tonsillar abscesses     Eyes:      General: Lids are normal  Vision grossly intact  Gaze aligned appropriately  No visual field deficit or scleral icterus  Right eye: No discharge  Left eye: No discharge  Extraocular Movements: Extraocular movements intact  Right eye: No nystagmus  Left eye: No nystagmus  Conjunctiva/sclera: Conjunctivae normal       Right eye: Right conjunctiva is not injected  Left eye: Left conjunctiva is not injected  Pupils: Pupils are equal, round, and reactive to light  Neck:      Thyroid: No thyroid mass or thyromegaly  Trachea: Trachea and phonation normal    Cardiovascular:      Rate and Rhythm: Normal rate and regular rhythm  Pulses: Normal pulses  Radial pulses are 2+ on the right side and 2+ on the left side  Dorsalis pedis pulses are 2+ on the right side and 2+ on the left side  Heart sounds: Normal heart sounds, S1 normal and S2 normal    Pulmonary:      Effort: Pulmonary effort is normal  No tachypnea, accessory muscle usage, respiratory distress or retractions  Breath sounds: Normal breath sounds and air entry  No stridor or decreased air movement  No decreased breath sounds, wheezing, rhonchi or rales  Chest:      Chest wall: No tenderness  Abdominal:      General: Abdomen is flat  Bowel sounds are normal  There is no distension  Palpations: Abdomen is soft  Abdomen is not rigid  There is no mass  Tenderness: There is no abdominal tenderness  There is no right CVA tenderness, left CVA tenderness, guarding or rebound  Negative signs include Jarrell's sign and McBurney's sign  Hernia: No hernia is present  Musculoskeletal:         General: No swelling, tenderness, deformity or signs of injury  Normal range of motion  Cervical back: Full passive range of motion without pain, normal range of motion and neck supple  No rigidity  No spinous process tenderness or muscular tenderness  Right lower leg: No edema  Left lower leg: No edema  Lymphadenopathy:      Cervical: No cervical adenopathy  Skin:     General: Skin is warm and dry  Capillary Refill: Capillary refill takes less than 2 seconds  Coloration: Skin is not ashen, cyanotic, jaundiced, mottled, pale or sallow  Findings: No abrasion, abscess, acne, bruising, burn, ecchymosis, erythema, signs of injury, laceration, lesion, petechiae, rash or wound  Rash is not macular or papular  Neurological:      Mental Status: He is alert and oriented to person, place, and time  He is not disoriented  GCS: GCS eye subscore is 4  GCS verbal subscore is 5  GCS motor subscore is 6  Cranial Nerves: Dysarthria and facial asymmetry present  No cranial nerve deficit  Sensory: Sensation is intact  No sensory deficit  Motor: Motor function is intact  No weakness, tremor, atrophy, abnormal muscle tone or seizure activity  Coordination: Coordination is intact  Coordination normal       Gait: Gait is intact  Gait normal       Comments: Patient is AAOx4, GCS 15; motor and sensation intact; visual fields intact; cranial nerves II-XII grossly intact; patient has left-sided facial droop and mild slurred speech; no arm drift   Psychiatric:         Attention and Perception: Attention and perception normal  He is attentive  Mood and Affect: Mood and affect normal          Speech: Speech normal          Behavior: Behavior normal  Behavior is cooperative  Thought Content:  Thought content normal          Cognition and Memory: Cognition and memory normal          Judgment: Judgment normal          Vital Signs  ED Triage Vitals [07/14/21 1342]   Temperature Pulse Respirations Blood Pressure SpO2   97 5 °F (36 4 °C) 75 17 143/67 97 %      Temp Source Heart Rate Source Patient Position - Orthostatic VS BP Location FiO2 (%)   Temporal Monitor Lying Right arm --      Pain Score       --           Vitals:    07/14/21 1430 07/14/21 1445 07/14/21 1500 07/14/21 1515   BP: 112/65 104/62 139/70 146/73   Pulse: 73 70 73 78   Patient Position - Orthostatic VS: Lying Lying Lying Lying         Visual Acuity  Visual Acuity      Most Recent Value   L Pupil Size (mm)  3   R Pupil Size (mm)  3          ED Medications  Medications   potassium chloride 20 mEq IVPB (premix) (0 mEq Intravenous Hold 7/14/21 1516)   heparin (porcine) 25,000 units in 0 45% NaCl 250 mL infusion (premix) (500 Units/hr Intravenous New Bag 7/14/21 1519)   norepinephrine (LEVOPHED) 4 mg (STANDARD CONCENTRATION) IV in sodium chloride 0 9% 250 mL (1 mcg/min Intravenous New Bag 7/14/21 1524)   sodium chloride 0 9 % bolus 500 mL (has no administration in time range)   LORazepam (ATIVAN) injection 1 mg (has no administration in time range)   iohexol (OMNIPAQUE) 350 MG/ML injection (SINGLE-DOSE) 85 mL (85 mL Intravenous Given 7/14/21 1355)   aspirin chewable tablet 324 mg (324 mg Oral Given 7/14/21 1510)       Diagnostic Studies  Results Reviewed     Procedure Component Value Units Date/Time    Novel Coronavirus (Covid-19),PCR [facility] - 2 hour stat [[id number]]  (Normal) Collected: 07/14/21 1404    Lab Status: Final result Specimen: Nares from Nose Updated: 07/14/21 1508     SARS-CoV-2 Negative    Narrative: The specimen collection materials, transport medium, and/or testing methodology utilized in the production of these test results have been proven to be reliable in a limited validation with an abbreviated program under the Emergency Utilization Authorization provided by the FDA  Testing reported as "Presumptive positive" will be confirmed with secondary testing to ensure result accuracy  Clinical caution and judgement should be used with the interpretation of these results with consideration of the clinical impression and other laboratory testing  Testing reported as "Positive" or "Negative" has been proven to be accurate according to standard laboratory validation requirements    All testing is performed with control materials showing appropriate reactivity at standard intervals        Troponin I [[id number]]  (Normal) Collected: 07/14/21 1349    Lab Status: Final result Specimen: Blood from Arm, Left Updated: 07/14/21 1418     Troponin I <0 02 ng/mL     Protime-INR [[id number]]  (Normal) Collected: 07/14/21 1349    Lab Status: Final result Specimen: Blood from Arm, Left Updated: 07/14/21 1411     Protime 12 2 seconds      INR 0 92    APTT [[id number]]  (Normal) Collected: 07/14/21 1349    Lab Status: Final result Specimen: Blood from Arm, Left Updated: 07/14/21 1411     PTT 26 seconds     Basic metabolic panel [[id number]]  (Abnormal) Collected: 07/14/21 1349    Lab Status: Final result Specimen: Blood from Arm, Left Updated: 07/14/21 1410     Sodium 142 mmol/L      Potassium 3 4 mmol/L      Chloride 104 mmol/L      CO2 31 mmol/L      ANION GAP 7 mmol/L      BUN 20 mg/dL      Creatinine 1 30 mg/dL      Glucose 102 mg/dL      Calcium 8 9 mg/dL      eGFR 59 ml/min/1 73sq m     Narrative:      [place] guidelines for Chronic Kidney Disease (CKD):     Stage 1 with normal or high GFR (GFR > 90 mL/min/1 73 square meters)    Stage 2 Mild CKD (GFR = 60-89 mL/min/1 73 square meters)    Stage 3A Moderate CKD (GFR = 45-59 mL/min/1 73 square meters)    Stage 3B Moderate CKD (GFR = 30-44 mL/min/1 73 square meters)    Stage 4 Severe CKD (GFR = 15-29 mL/min/1 73 square meters)    Stage 5 End Stage CKD (GFR <15 mL/min/1 73 square meters)  Note: GFR calculation is accurate only with a steady state creatinine    CBC and Platelet [[id number]]  (Normal) Collected: 07/14/21 1349    Lab Status: Final result Specimen: Blood from Arm, Left Updated: 07/14/21 1357     WBC 4 85 Thousand/uL      RBC 4 65 Million/uL      Hemoglobin 14 7 g/dL      Hematocrit 43 1 %      MCV 93 fL      MCH 31 6 pg      MCHC 34 1 g/dL      RDW 13 0 %      Platelets 746 Thousands/uL      MPV 9 2 fL     Fingerstick Glucose (POCT) [[id number]]  (Normal) Collected: 07/14/21 1341    Lab Status: Final result Updated: 07/14/21 1349     POC Glucose 120 mg/dl                  X-ray chest 1 view portable   ED Interpretation by Rolando Johnston MD (07/14 1442)   Chest x-ray read and interpreted by me  Negative for pneumothorax, mediastinal widening, rib fracture, focal consolidation or pleural effusion  Final Result by Ajay Rodríguez DO (07/14 1535)      No acute cardiopulmonary disease  Workstation performed: [id number]         CT stroke alert brain   Final Result by Niharika Mcdermott MD (07/14 1439)      Acute infarct in the right basal ganglia  Hyperdense distal right ICA and right MCA consistent with thrombus  Small chronic infarct in the right frontal lobe  Small parietal infarct also favored to be chronic  No acute hemorrhage or mass effect  Findings were directly discussed with Dr Kylah Lipscomb on 7/14/2021 2:21 PM      Workstation performed: [id number]         CTA stroke alert (head/neck)   Final Result by Niharika Mcdermott MD (07/14 1439)      Severe noncalcified plaque in the proximal right internal carotid artery with small mobile thrombus at the distal aspect  Nearly occlusive thrombus in the distal right internal carotid artery, right M1 and dominant right A1 segments  Findings were directly discussed with Dr Kylah Lipscomb on 7/14/2021 2:21 PM                      Workstation performed: [id number]                    Procedures  ECG 12 Lead Documentation Only    Date/Time: 7/14/2021 1:45 PM  Performed by: Rolando Johnston MD  Authorized by:  Rolando Johnston MD     Indications / Diagnosis:  Stroke  ECG reviewed by me, the ED Provider: yes    Patient location:  ED  Previous ECG:     Comparison to cardiac monitor: Yes    Interpretation:     Interpretation: normal    Rate:     ECG rate:  75bpm    ECG rate assessment: normal    Rhythm:     Rhythm: sinus rhythm    Ectopy:     Ectopy: none    QRS:     QRS axis: Normal  Conduction:     Conduction: normal    ST segments:     ST segments:  Normal  T waves:     T waves: flattening and inverted      Flattening:  III and aVL    Inverted:  V1  Comments:      No STEMI  SC 184ms  QRS 96ms  QT 406ms    Cardiac monitoring ordered  Heart rate and rhythm as above  I have personally read and interpreted the EKG as above  CriticalCare Time  Performed by: Lakia Woods MD  Authorized by: Lakia Woods MD     Critical care provider statement:     Critical care time (minutes):  90    Critical care was necessary to treat or prevent imminent or life-threatening deterioration of the following conditions:  CNS failure or compromise    Critical care was time spent personally by me on the following activities:  Obtaining history from patient or surrogate, discussions with consultants, discussions with primary provider, evaluation of patient's response to treatment, examination of patient, review of old charts, re-evaluation of patient's condition, ordering and review of radiographic studies, ordering and review of laboratory studies and ordering and performing treatments and interventions    I assumed direction of critical care for this patient from another provider in my specialty: no    Comments:      60-year-old male presents with left-sided facial droop and slurred speech noticed upon waking at 8:00 a m  This morning  Last seen normal was 9:00 p m  Last night  Previous history of 3 transient ischemic attacks, patient is not on blood thinners  Vitals have been stable patient is alert and oriented x4 with GCS 15  NIH 2  Ischemic stroke requiring ischemic stroke heparin protocol, aspirin 325 and Levophed drip to maintain systolic blood pressure over 160  Management and disposition of patient managed with [facility] neurologist, neuro interventionalist and critical care attendings  Required [facility] due to lack of critical care ambulance availability               ED Course  ED Course as of Jul 14 1542   Wed Jul 14, 2021   1340 Stroke Alert called  NIH 2      65 Spoke with Dr Luis Black, Neurology who agrees with CTH/CTA and to admit for stroke pathway  Patient's last seen normal was last night at 2100, so he is out of the window for tPA  [address] Dr Luis Black called me about partial M1 occlusion  She will contact neuro-interventionalist at [facility] to discuss whether patient should be immediately transferred for intervention or if he should go for Neuro ICU only  Spoke with patient and daughter who is at bedside  Patient has no neurologist and does not have a preference of hospital should he require transfer  Patient and daughter are agreeable to [facility] if needed  1438 Will replete potassium 3 4        1441 CTH:IMPRESSION:     Acute infarct in the right basal ganglia  Hyperdense distal right ICA and right MCA consistent with thrombus  Small chronic infarct in the right frontal lobe  Small parietal infarct also favored to be chronic  No acute hemorrhage or mass effect      Findings were directly discussed with Dr Luis Black on 7/14/2021 2:21 PM         1500 CTA:IMPRESSION:     Severe noncalcified plaque in the proximal right internal carotid artery with small mobile thrombus at the distal aspect  Nearly occlusive thrombus in the distal right internal carotid artery, right M1 and dominant right A1 segments      Findings were directly discussed with Dr Luis Black on 7/14/2021 2:21 PM             1507 Spoke with Dr Jones (Neurology), Dr Shaheen Moreno (IR) and Dr Rachele Gregory ([phone]) at [facility] regarding patient  Due to severe plaque build-up and nearly occlusive thrombus in right ICA, right M1 and right A1 segments, decision made to transfer [facility] to [facility] [place] under Dr Rachele Gregory  There are no ICU beds at present and patient will go to [facility] ED   Dr Shaheen Moreno recommends SBP > 160, start Levophed drip, give ASA 324mg PO, and ischemic stroke heparin drip (no bolus)  Guero Bunch from [facility] stated that the Levophed gtt will require Critical Care ambulance  If no ambulance is available, Dr Ida Ahmadi agreed with helicopter option  Patient updated on plan  NIH 2, similar symptoms as upon initial presentation  /70, HR 84       1519 Patient is requesting Ativan before flying as he is anxious  [facility] will be here in 10 minutes  [address] [facility] has arrived to transport patient          MDM  Number of Diagnoses or Management Options     Amount and/or Complexity of Data Reviewed  Clinical lab tests: reviewed and ordered  Tests in the radiology section of CPT®: reviewed and ordered  Tests in the medicine section of CPT®: ordered and reviewed  Review and summarize past medical records: yes  Discuss the patient with other providers: yes (Neurology, Dr Greer Renee, Neuro Interventionalist, Dr Maritza Wray, Critical Care, Dr Rachele Gregory)  Independent visualization of images, tracings, or specimens: yes (CT head, CT angio, chest x-ray, EKG)    Risk of Complications, Morbidity, and/or Mortality  Presenting problems: moderate  Diagnostic procedures: moderate  Management options: moderate    Patient Progress  Patient progress: stable      Disposition  Final diagnoses:   Stroke (cerebrum) ([facility] [address] )   Hypokalemia   Ischemic stroke ([facility] [address] )     Time reflects when diagnosis was documented in both MDM as applicable and the Disposition within this note     Time User Action Codes Description Comment    7/14/2021  1:45 PM Matheus Avalos Add [I63 9] Stroke (cerebrum) ([facility] [address] )     7/14/2021  3:40 PM Matheus Avalos Add [E87 6] Hypokalemia     7/14/2021  3:41 PM Matheus Avalos Add [I63 9] Ischemic stroke [facility])       ED Disposition     ED Disposition Condition Date/Time Comment    Transfer to Another Facility-In Network  Wed Jul 14, 2021  3:00 PM Audelia Cortes should be transferred out to [facility]""        MD Documentation      Most Recent Value   Patient Condition  The patient has been stabilized such that within reasonable medical probability, no material deterioration of the patient condition or the condition of the unborn child(jennifer) is likely to result from the transfer   Reason for Transfer  Level of Care needed not available at this facility, Patient/Family request, No bed available at level of patient's needs   Benefits of Transfer  Specialized equipment and/or services available at the receiving facility (Include comment)________________________, Continuity of care, Patient preference   Risks of Transfer  Potential for delay in receiving treatment, Potential deterioration of medical condition, Loss of IV, Increased discomfort during transfer, Possible worsening of condition or death during transfer   Accepting Physician  Dr Chari Siddiqi ([phone])   [address], [address] [facility]   Perry Stokes MD   Provider Certification  General risk, such as traffic hazards, adverse weather conditions, rough terrain or turbulence, possible failure of equipment (including vehicle or aircraft), or consequences of actions of persons outside the control of the transport personnel, Unanticipated needs of medical equipment and personnel during transport, Risk of worsening condition, The possibility of a transport vehicle being unavailable      RN Documentation      Most Recent Value   [address] Name, [address] [facility]   Transport Mode  Helicopter      Follow-up Information    None         Patient's Medications   Discharge Prescriptions    No medications on file     No discharge procedures on file      PDMP Review       Value Time User    PDMP Reviewed  Yes 7/31/2020 11:36 AM Roxy Holguin PA-C          ED Provider  Electronically Signed by    MD Jamey Hua MD  07/14/21 2726

## 2021-07-14 NOTE — CONSULTS
Consultation - Neurosurgery   Dimitris Haro 64 y o  male MRN: [id number]  Unit/Bed#: ICU 10 Encounter: [id number]      Assessment/Plan     Assessment:  Right carotid critical stenosis  Right ICA/MCA/LUZ thrombus    Plan:  Bello Rodas has significant carotid disease and intracranial thrombus  Fortunately it is all non-occlusive and symptoms are surprisingly very minimal  However, risk for deterioration is high  We discussed the need for potential thrombectomy and stenting if he worsens  He is understanding of this  Heparin gtt, 60-90 ptt goal with q4 ptt check   once and then 81 mg daily  -180  Head CT in 24 hours or once PTT therapeutic  Repeat CTA H&N in one week  History of Present Illness       HPI: Dimitris Haro is a 64y o  year old male who presents with acute right MCA infarct  He has a history of remote TIAs without workup  This morning woke-up with left facial droop and some dysarthria  Presented to ED this afternoon  NIHSS 2  CT with subacute BG infarct  Remote cortical infarct  CTA with right carotid near occlusion but also with heavy thrombus burden in the carotid T  Thrombus is non-occlusive  Transferred for closer neuro-monitoring  Consults    Review of Systems   Neurological: Positive for speech difficulty  All other systems reviewed and are negative        Historical Information   Past Medical History:   Diagnosis Date    Diverticulitis large intestine     "During Bowel Resection determined not to have Diverticulitis "- Patient    Hypertension     TIA (transient ischemic attack)      Past Surgical History:   Procedure Laterality Date    ANTERIOR CRUCIATE LIGAMENT REPAIR Right     ACL Repair    BOWEL RESECTION      FOOT SURGERY Right     patient not sure procedure    SHOULDER SURGERY Bilateral     Rotator cuff/ Right shoulder bone growth removal     Social History     Substance and Sexual Activity   Alcohol Use Yes    Alcohol/week: 1 0 - 2 0 standard drinks    Types: 1 - 2 Cans of beer per week     Social History     Substance and Sexual Activity   Drug Use Never     E-Cigarette/Vaping    E-Cigarette Use Never User      E-Cigarette/Vaping Substances    Nicotine No     THC No     CBD No     Flavoring No     Other No     Unknown No      Social History     Tobacco Use   Smoking Status Former Smoker   Smokeless Tobacco Former User    Types: Chew     History reviewed  No pertinent family history  Meds/Allergies   all current active meds have been reviewed  No Known Allergies    Objective     Intake/Output Summary (Last 24 hours) at 7/14/2021 1828  Last data filed at 7/14/2021 1800  Gross per 24 hour   Intake 42 36 ml   Output --   Net 42 36 ml       Physical Exam  Constitutional:       Appearance: Normal appearance  HENT:      Head: Normocephalic and atraumatic  Eyes:      Extraocular Movements: Extraocular movements intact  Pupils: Pupils are equal, round, and reactive to light  Cardiovascular:      Rate and Rhythm: Normal rate  Pulses: Normal pulses  Pulmonary:      Effort: Pulmonary effort is normal    Musculoskeletal:         General: Normal range of motion  Skin:     General: Skin is warm and dry  Neurological:      Mental Status: He is alert and oriented to person, place, and time  Cranial Nerves: Cranial nerve deficit present  Sensory: No sensory deficit  Motor: No weakness  Coordination: Coordination normal    Psychiatric:         Mood and Affect: Mood normal          Behavior: Behavior normal          Thought Content: Thought content normal          Judgment: Judgment normal        Neurologic Exam     Mental Status   Oriented to person, place, and time  Cranial Nerves     CN III, IV, VI   Pupils are equal, round, and reactive to light  Vitals:Blood pressure 129/68, pulse 68, temperature 98 °F (36 7 °C), temperature source Oral, resp   rate 20, height 5' 11" (1 803 m), weight 100 kg (220 lb 7 4 oz), SpO2 97 %  ,Body mass index is 30 75 kg/m²  Lab Results: I have personally reviewed pertinent results  Imaging Studies: I have personally reviewed pertinent reports  EKG, Pathology, and Other Studies: I have personally reviewed pertinent reports        VTE Prophylaxis: Sequential compression device Lauren Garcia     Code Status: Level 1 - Full Code  Advance Directive and Living Will:      Power of :    POLST:      Counseling / Coordination of Care  None

## 2021-07-14 NOTE — H&P
H&P Exam - [address] 64 y o  male MRN: [id number]  Unit/Bed#: ED 26 Encounter: [id number]      -------------------------------------------------------------------------------------------------------------  Chief Complaint: stroke    History of Present Illness   HX and PE limited by: N/A  Carter Neri is a 64 y o  male who presents with PMH TIA and hypertension who presented to [facility] 7/14 for concern of new onset L sided facial droop and slurred speech since he woke up at 8am today  CTH showed Acute infarct in the right basal ganglia, hyperdense distal right ICA and right MCA consistent with thrombus, small chronic infarct in the right frontal lobe with CTA demonstrating noncalcified plaque in the proximal right internal carotid artery with small mobile thrombus at the distal aspect, nearly occlusive thrombus in the distal right ICA, right M1, and R A1 segment  Patient not a tpa candidate due to time frame, not a thrombectomy candidate per neurosurgery as he still had flow, and concern for propagation of clot  He was started on heparin drip per neurosurgery recommendations and transferred here for close blood pressure management and in case there is acute decompensation so that neurosurgery can intervene     -------------------------------------------------------------------------------------------------------------  Assessment and Plan:    Neuro:    Diagnosis: CVA  o Imaging:  - 7/14 CTA: severe noncalcified plaque in the proximal right internal carotid artery with small mobile thrombus at the distal aspect, nearly occlusive thrombus in the distal right ICA, right M1, and R A1 segments  - 7/14 CTH: Acute infarct in the right basal ganglia, hyperdense distal right ICA and right MCA consistent with thrombus, small chronic infarct in the right frontal lobe   Small parietal infarct also favored to be chronic  No acute hemorrhage or mass effect     o Plan:  - Not tpa candidate due to outside window/last known normal unknown  - Neurology consulted - appreciate recs  - Neurosurgery consulted - appreciate recs   No intervention at this time as patient currently has flow and mobile thrombus   Recommend starting heparin drip with PTT Q4 hours and goal 60-90  o Repeat CTH when heparin therapeutic   SBP goals 160-180 - currently on levophed to maintain  - Echo and MRI pending  - A1C and Lipids in AM labs  - Q1 hour neuro checks  - Stat head CT for change in neuro exam or drop in GCS >2 in 1 hour  - PT/OT/Speech   Diagnosis: Hx of TIA  o Plan:  - Patient reports three prior instances of TIA  - States he is supposed to be on baby aspirin daily - noncompliant  - Never on statin per patient report      CV:    Diagnosis: Hypertension  o Plan:   - On HCTZ at home - held at this time  - Goal SBP per neurosurgery 160-180   Continue levophed drip to maintain      Pulm:   Diagnosis: No acute issues      GI:    Diagnosis: No acute issues      :    Diagnosis: No acute issues      F/E/N:    Plan:  o Fluids - NSS at 100cc/hour while NPO  o Electrolytes - monitor and replete as needed  o Nutrition - NPO      Heme/Onc:    Diagnosis: DVT prophylaxis  o Plan:  - SCD  - Patient on heparin drip per neurosurgery recs      Endo:    Diagnosis: No acute issues      ID:    Diagnosis: No acute issues  o Plan:  - Monitor and trend fever curve and WBC      MSK/Skin:    Diagnosis: L knee osteoarthritis  o Plan:  - Tylenol for pain   Diagnosis: Recent CVA  o Plan:   - PT/OT/PM&R      Disposition: Admit to Critical Care   Code Status: Level 1 - Full Code  --------------------------------------------------------------------------------------------------------------  Review of Systems   Constitutional: Negative for chills and fatigue  HENT: Negative for hearing loss, rhinorrhea, sinus pressure, sneezing, sore throat and trouble swallowing  Eyes: Negative for pain and visual disturbance     Respiratory: Negative for cough, shortness of breath and wheezing  Cardiovascular: Negative for chest pain, palpitations and leg swelling  Gastrointestinal: Negative for abdominal pain, constipation, diarrhea, nausea and vomiting  Genitourinary: Negative for difficulty urinating and dysuria  Musculoskeletal: Negative for arthralgias and back pain  Skin: Negative for color change and rash  Neurological: Positive for facial asymmetry and speech difficulty  Negative for dizziness, weakness, light-headedness and headaches  Psychiatric/Behavioral: Negative for confusion  The patient is not nervous/anxious  Physical Exam  Vitals reviewed  Constitutional:       General: He is not in acute distress  Appearance: Normal appearance  He is obese  He is not ill-appearing, toxic-appearing or diaphoretic  HENT:      Head: Normocephalic and atraumatic  Right Ear: External ear normal       Left Ear: External ear normal       Nose: Nose normal       Mouth/Throat:      Mouth: Mucous membranes are moist       Pharynx: Oropharynx is clear  Eyes:      General:         Right eye: No discharge  Left eye: No discharge  Extraocular Movements: Extraocular movements intact  Conjunctiva/sclera: Conjunctivae normal       Pupils: Pupils are equal, round, and reactive to light  Cardiovascular:      Rate and Rhythm: Normal rate and regular rhythm  Heart sounds: Normal heart sounds  Pulmonary:      Effort: Pulmonary effort is normal  No respiratory distress  Breath sounds: Normal breath sounds  Abdominal:      General: Bowel sounds are normal       Palpations: Abdomen is soft  Tenderness: There is no abdominal tenderness  Musculoskeletal:         General: No swelling or tenderness  Normal range of motion  Cervical back: Normal range of motion  Right lower leg: No edema  Left lower leg: No edema  Skin:     General: Skin is warm and dry  Coloration: Skin is not jaundiced  Findings: No bruising  Neurological:      General: No focal deficit present  Mental Status: He is alert and oriented to person, place, and time  Sensory: No sensory deficit  Motor: No weakness  Coordination: Coordination normal       Comments: Patient with L sided facial droop and mild dysarthria, strength 5/5 throughout, no visual field deficit or ataxia   Psychiatric:         Mood and Affect: Mood normal          Behavior: Behavior normal          Thought Content: Thought content normal        --------------------------------------------------------------------------------------------------------------  Vitals: There were no vitals filed for this visit  Temp  Min: 97 5 °F (36 4 °C)  Max: 97 5 °F (36 4 °C)        There is no height or weight on file to calculate BMI  Laboratory and Diagnostics:  Results from last 7 days   Lab Units 07/14/21  1349   WBC Thousand/uL 4 85   HEMOGLOBIN g/dL 14 7   HEMATOCRIT % 43 1   PLATELETS Thousands/uL 212     Results from last 7 days   Lab Units 07/14/21  1349   SODIUM mmol/L 142   POTASSIUM mmol/L 3 4*   CHLORIDE mmol/L 104   CO2 mmol/L 31   ANION GAP mmol/L 7   BUN mg/dL 20   CREATININE mg/dL 1 30   CALCIUM mg/dL 8 9   GLUCOSE RANDOM mg/dL 102          Results from last 7 days   Lab Units 07/14/21  1349   INR  0 92   PTT seconds 26      Results from last 7 days   Lab Units 07/14/21  1349   TROPONIN I ng/mL <0 02     Imaging: I have personally reviewed pertinent reports     and I have personally reviewed pertinent films in PACS    Historical Information   Past Medical History:   Diagnosis Date    Diverticulitis large intestine     "During Bowel Resection determined not to have Diverticulitis "- Patient    Hypertension     TIA (transient ischemic attack)      Past Surgical History:   Procedure Laterality Date    ANTERIOR CRUCIATE LIGAMENT REPAIR Right     ACL Repair    BOWEL RESECTION      FOOT SURGERY Right     patient not sure procedure    SHOULDER SURGERY Bilateral     Rotator cuff/ Right shoulder bone growth removal     Social History   Social History     Substance and Sexual Activity   Alcohol Use Yes    Alcohol/week: 1 0 - 2 0 standard drinks    Types: 1 - 2 Cans of beer per week     Social History     Substance and Sexual Activity   Drug Use Never     Social History     Tobacco Use   Smoking Status Former Smoker   Smokeless Tobacco Former User    Types: Chew     Medications:  Current Facility-Administered Medications   Medication Dose Route Frequency    atorvastatin (LIPITOR) tablet 40 mg  40 mg Oral QPM    heparin (ACS LOW)   Intravenous Once     Home medications:  Prior to Admission Medications   Prescriptions Last Dose Informant Patient Reported? Taking?   acetaminophen (TYLENOL) 650 mg CR tablet   No No   Sig: Take 1 tablet (650 mg total) by mouth every 8 (eight) hours as needed for mild pain   hydrochlorothiazide (HYDRODIURIL) 25 mg tablet   Yes No   Sig: Take 25 mg by mouth daily      Facility-Administered Medications: None     Allergies:  No Known Allergies  ------------------------------------------------------------------------------------------------------------  Advance Directive and Living Will:      Power of :    POLST:    ------------------------------------------------------------------------------------------------------------  Anticipated Length of Stay is > 2 midnights    Zaida Vogel DO    Portions of the record may have been created with voice recognition software  Occasional wrong word or "sound a like" substitutions may have occurred due to the inherent limitations of voice recognition software    Read the chart carefully and recognize, using context, where substitutions have occurred

## 2021-07-15 ENCOUNTER — APPOINTMENT (INPATIENT)
Dept: RADIOLOGY | Facility: HOSPITAL | Age: 61
DRG: 037 | End: 2021-07-15
Payer: MEDICARE

## 2021-07-15 ENCOUNTER — APPOINTMENT (INPATIENT)
Dept: NON INVASIVE DIAGNOSTICS | Facility: HOSPITAL | Age: 61
DRG: 037 | End: 2021-07-15
Payer: MEDICARE

## 2021-07-15 LAB
ALBUMIN SERPL BCP-MCNC: 3.6 G/DL (ref 3.5–5)
ALP SERPL-CCNC: 56 U/L (ref 46–116)
ALT SERPL W P-5'-P-CCNC: 32 U/L (ref 12–78)
ANION GAP SERPL CALCULATED.3IONS-SCNC: 7 MMOL/L (ref 4–13)
APTT PPP: 39 SECONDS (ref 23–37)
APTT PPP: 56 SECONDS (ref 23–37)
APTT PPP: 77 SECONDS (ref 23–37)
APTT PPP: 81 SECONDS (ref 23–37)
AST SERPL W P-5'-P-CCNC: 17 U/L (ref 5–45)
ATRIAL RATE: 75 BPM
BILIRUB SERPL-MCNC: 1.78 MG/DL (ref 0.2–1)
BUN SERPL-MCNC: 12 MG/DL (ref 5–25)
CALCIUM SERPL-MCNC: 8.3 MG/DL (ref 8.3–10.1)
CHLORIDE SERPL-SCNC: 111 MMOL/L (ref 100–108)
CHOLEST SERPL-MCNC: 172 MG/DL (ref 50–200)
CO2 SERPL-SCNC: 22 MMOL/L (ref 21–32)
CREAT SERPL-MCNC: 0.98 MG/DL (ref 0.6–1.3)
ERYTHROCYTE [DISTWIDTH] IN BLOOD BY AUTOMATED COUNT: 12.4 % (ref 11.6–15.1)
EST. AVERAGE GLUCOSE BLD GHB EST-MCNC: 105 MG/DL
GFR SERPL CREATININE-BSD FRML MDRD: 83 ML/MIN/1.73SQ M
GLUCOSE SERPL-MCNC: 133 MG/DL (ref 65–140)
HBA1C MFR BLD: 5.3 %
HCT VFR BLD AUTO: 38.9 % (ref 36.5–49.3)
HDLC SERPL-MCNC: 31 MG/DL
HGB BLD-MCNC: 13.6 G/DL (ref 12–17)
LDLC SERPL CALC-MCNC: 97 MG/DL (ref 0–100)
MCH RBC QN AUTO: 31.8 PG (ref 26.8–34.3)
MCHC RBC AUTO-ENTMCNC: 35 G/DL (ref 31.4–37.4)
MCV RBC AUTO: 91 FL (ref 82–98)
P AXIS: 23 DEGREES
PLATELET # BLD AUTO: 221 THOUSANDS/UL (ref 149–390)
PMV BLD AUTO: 9.3 FL (ref 8.9–12.7)
POTASSIUM SERPL-SCNC: 3.6 MMOL/L (ref 3.5–5.3)
PR INTERVAL: 184 MS
PROT SERPL-MCNC: 6.9 G/DL (ref 6.4–8.2)
QRS AXIS: 6 DEGREES
QRSD INTERVAL: 96 MS
QT INTERVAL: 364 MS
QTC INTERVAL: 406 MS
RBC # BLD AUTO: 4.28 MILLION/UL (ref 3.88–5.62)
SODIUM SERPL-SCNC: 140 MMOL/L (ref 136–145)
T WAVE AXIS: 31 DEGREES
TRIGL SERPL-MCNC: 222 MG/DL
VENTRICULAR RATE: 75 BPM
WBC # BLD AUTO: 7.66 THOUSAND/UL (ref 4.31–10.16)

## 2021-07-15 PROCEDURE — 93306 TTE W/DOPPLER COMPLETE: CPT

## 2021-07-15 PROCEDURE — 93010 ELECTROCARDIOGRAM REPORT: CPT | Performed by: INTERNAL MEDICINE

## 2021-07-15 PROCEDURE — 99233 SBSQ HOSP IP/OBS HIGH 50: CPT | Performed by: ANESTHESIOLOGY

## 2021-07-15 PROCEDURE — 93799 UNLISTED CV SVC/PROCEDURE: CPT

## 2021-07-15 PROCEDURE — 70450 CT HEAD/BRAIN W/O DYE: CPT

## 2021-07-15 PROCEDURE — 99232 SBSQ HOSP IP/OBS MODERATE 35: CPT | Performed by: PHYSICIAN ASSISTANT

## 2021-07-15 PROCEDURE — 99232 SBSQ HOSP IP/OBS MODERATE 35: CPT | Performed by: PSYCHIATRY & NEUROLOGY

## 2021-07-15 PROCEDURE — 97163 PT EVAL HIGH COMPLEX 45 MIN: CPT

## 2021-07-15 PROCEDURE — 93306 TTE W/DOPPLER COMPLETE: CPT | Performed by: INTERNAL MEDICINE

## 2021-07-15 PROCEDURE — 85027 COMPLETE CBC AUTOMATED: CPT | Performed by: STUDENT IN AN ORGANIZED HEALTH CARE EDUCATION/TRAINING PROGRAM

## 2021-07-15 PROCEDURE — 85730 THROMBOPLASTIN TIME PARTIAL: CPT | Performed by: REGISTERED NURSE

## 2021-07-15 PROCEDURE — 99222 1ST HOSP IP/OBS MODERATE 55: CPT | Performed by: PHYSICAL MEDICINE & REHABILITATION

## 2021-07-15 PROCEDURE — 83036 HEMOGLOBIN GLYCOSYLATED A1C: CPT | Performed by: STUDENT IN AN ORGANIZED HEALTH CARE EDUCATION/TRAINING PROGRAM

## 2021-07-15 PROCEDURE — 80053 COMPREHEN METABOLIC PANEL: CPT | Performed by: STUDENT IN AN ORGANIZED HEALTH CARE EDUCATION/TRAINING PROGRAM

## 2021-07-15 PROCEDURE — 80061 LIPID PANEL: CPT | Performed by: STUDENT IN AN ORGANIZED HEALTH CARE EDUCATION/TRAINING PROGRAM

## 2021-07-15 PROCEDURE — 97166 OT EVAL MOD COMPLEX 45 MIN: CPT

## 2021-07-15 PROCEDURE — 85730 THROMBOPLASTIN TIME PARTIAL: CPT | Performed by: ANESTHESIOLOGY

## 2021-07-15 PROCEDURE — 92610 EVALUATE SWALLOWING FUNCTION: CPT

## 2021-07-15 RX ORDER — ACETAMINOPHEN 325 MG/1
650 TABLET ORAL EVERY 6 HOURS PRN
Status: DISCONTINUED | OUTPATIENT
Start: 2021-07-15 | End: 2021-07-17

## 2021-07-15 RX ORDER — ASPIRIN 81 MG/1
81 TABLET, CHEWABLE ORAL DAILY
Status: DISCONTINUED | OUTPATIENT
Start: 2021-07-15 | End: 2021-07-27 | Stop reason: HOSPADM

## 2021-07-15 RX ORDER — ALBUMIN, HUMAN INJ 5% 5 %
12.5 SOLUTION INTRAVENOUS ONCE
Status: COMPLETED | OUTPATIENT
Start: 2021-07-15 | End: 2021-07-15

## 2021-07-15 RX ORDER — MIDODRINE HYDROCHLORIDE 5 MG/1
5 TABLET ORAL
Status: DISCONTINUED | OUTPATIENT
Start: 2021-07-15 | End: 2021-07-24

## 2021-07-15 RX ORDER — POTASSIUM CHLORIDE 20 MEQ/1
40 TABLET, EXTENDED RELEASE ORAL ONCE
Status: COMPLETED | OUTPATIENT
Start: 2021-07-15 | End: 2021-07-15

## 2021-07-15 RX ADMIN — NOREPINEPHRINE BITARTRATE 18 MCG/MIN: 1 INJECTION, SOLUTION, CONCENTRATE INTRAVENOUS at 18:14

## 2021-07-15 RX ADMIN — ASPIRIN 81 MG CHEWABLE TABLET 81 MG: 81 TABLET CHEWABLE at 08:26

## 2021-07-15 RX ADMIN — MIDODRINE HYDROCHLORIDE 5 MG: 5 TABLET ORAL at 16:19

## 2021-07-15 RX ADMIN — NOREPINEPHRINE BITARTRATE 28 MCG/MIN: 1 INJECTION, SOLUTION, CONCENTRATE INTRAVENOUS at 00:53

## 2021-07-15 RX ADMIN — NOREPINEPHRINE BITARTRATE 15 MCG/MIN: 1 INJECTION, SOLUTION, CONCENTRATE INTRAVENOUS at 22:51

## 2021-07-15 RX ADMIN — NOREPINEPHRINE BITARTRATE 25 MCG/MIN: 1 INJECTION, SOLUTION, CONCENTRATE INTRAVENOUS at 12:18

## 2021-07-15 RX ADMIN — ALBUMIN (HUMAN) 12.5 G: 12.5 INJECTION, SOLUTION INTRAVENOUS at 10:38

## 2021-07-15 RX ADMIN — NOREPINEPHRINE BITARTRATE 27 MCG/MIN: 1 INJECTION, SOLUTION, CONCENTRATE INTRAVENOUS at 03:15

## 2021-07-15 RX ADMIN — HEPARIN SODIUM 19.1 UNITS/KG/HR: 10000 INJECTION, SOLUTION INTRAVENOUS at 08:26

## 2021-07-15 RX ADMIN — POTASSIUM CHLORIDE 40 MEQ: 1500 TABLET, EXTENDED RELEASE ORAL at 08:26

## 2021-07-15 RX ADMIN — MIDODRINE HYDROCHLORIDE 5 MG: 5 TABLET ORAL at 11:54

## 2021-07-15 RX ADMIN — NOREPINEPHRINE BITARTRATE 25 MCG/MIN: 1 INJECTION, SOLUTION, CONCENTRATE INTRAVENOUS at 14:59

## 2021-07-15 RX ADMIN — NOREPINEPHRINE BITARTRATE 25.01 MCG/MIN: 1 INJECTION, SOLUTION, CONCENTRATE INTRAVENOUS at 09:29

## 2021-07-15 RX ADMIN — SODIUM CHLORIDE 100 ML/HR: 0.9 INJECTION, SOLUTION INTRAVENOUS at 05:00

## 2021-07-15 RX ADMIN — HEPARIN SODIUM 21.1 UNITS/KG/HR: 10000 INJECTION, SOLUTION INTRAVENOUS at 21:18

## 2021-07-15 RX ADMIN — NOREPINEPHRINE BITARTRATE 24 MCG/MIN: 1 INJECTION, SOLUTION, CONCENTRATE INTRAVENOUS at 06:41

## 2021-07-15 RX ADMIN — ATORVASTATIN CALCIUM 40 MG: 20 TABLET, FILM COATED ORAL at 19:00

## 2021-07-15 NOTE — OCCUPATIONAL THERAPY NOTE
Occupational Therapy Evaluation     Patient Name: Lizzie Im  EKRUC'Y Date: 7/15/2021  Problem List  Principal Problem:    CVA (cerebral vascular accident) [facility])  Active Problems:    Primary osteoarthritis of left knee    Hypertension    Past Medical History  Past Medical History:   Diagnosis Date    Diverticulitis large intestine     "During Bowel Resection determined not to have Diverticulitis "- Patient    Hypertension     TIA (transient ischemic attack)      Past Surgical History  Past Surgical History:   Procedure Laterality Date    ANTERIOR CRUCIATE LIGAMENT REPAIR Right     ACL Repair    BOWEL RESECTION      FOOT SURGERY Right     patient not sure procedure    SHOULDER SURGERY Bilateral     Rotator cuff/ Right shoulder bone growth removal             07/15/21 0904   OT Last Visit   OT Visit Date 07/15/21   Note Type   Note type Evaluation   Restrictions/Precautions   Weight Bearing Precautions Per Order No   Other Precautions Multiple lines;Telemetry; Fall Risk;Pain   Pain Assessment   Pain Assessment Tool Pain Assessment not indicated - pt denies pain   Pain Score No Pain   Home Living   Type of Home House   Home Layout Two level;Performs ADLs on one level; Able to live on main level with bedroom/bathroom  (3 TAO; 1st floor setup)   Bathroom Shower/Tub Tub/shower unit   Bathroom Toilet Standard   Bathroom Equipment   (denies any)   Home Equipment Other (Comment)  (denies any)   Prior Function   Level of [place] Independent with ADLs and functional mobility   Lives With Spouse   Receives Help From Family   ADL Assistance Independent   IADLs Independent   Falls in the last 6 months 0   Vocational Full time employment   Lifestyle   Autonomy I w/ ADLS/IADLS, transfers and functional mobility PTA   Reciprocal Relationships Pt lives w/ his spouse who works during the day   Service to Others Pt works full time as a    Intrinsic Gratification Enjoyed teaching martial arts; 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   Psychosocial   Psychosocial (WDL) WDL   ADL   Eating Assistance 7  Independent   Grooming Assistance 7  Independent   UB Bathing Assistance 7  Independent   LB Bathing Assistance 5  Supervision/Setup   UB Dressing Assistance 7  Independent   LB Dressing Assistance 5  [phone]  [phone]  Supervision/Setup   Functional Assistance 5  Supervision/Setup   Bed Mobility   Supine to Sit Unable to assess   Sit to Supine Unable to assess   Additional Comments Seated up OOB at beginning and end of session   Transfers   Sit to Stand 5  Supervision   Additional items Verbal cues   Stand to Sit 5  Supervision   Additional items Verbal cues   Additional Comments no AD/DME used   Functional Mobility   Functional Mobility 5  Supervision   Additional Comments Pt ambulated short household distance w/ S, no DME/AD used   Balance   Static Sitting Good   Dynamic Sitting Fair +   Static Standing Fair   Dynamic Standing Fair   Ambulatory Fair   Activity Tolerance   Activity Tolerance Patient tolerated treatment well   Medical Staff Made Aware PT   Nurse Made Aware yes, Alice   RUE Assessment   RUE Assessment WFL   LUE Assessment   LUE Assessment WFL   Hand Function   Gross Motor Coordination Functional   Fine Motor Coordination Functional   Sensation   Light Touch No apparent deficits   Proprioception   Proprioception No apparent deficits   Vision-Basic Assessment   Current Vision Wears glasses only for reading   Vision - Complex Assessment   Ocular Range of Motion [facility]   Additional Comments per chart review has hx of R eye R visual field deficit   Cognition   Overall Cognitive Status WFL   Arousal/Participation Responsive; Cooperative   Attention Within functional limits   Orientation Level Oriented X4   Memory Within functional limits   Following Commands Follows all commands and directions without difficulty   Comments Pt is pleasant and cooperative   Assessment   Limitation Decreased endurance;Decreased high-level ADLs   Prognosis Fair   Assessment Pt is a 65 y/o male seen for OT eval s/p adm to [facility] as a [facility] where he presented on 7/14 for concern of new onset of L sided facial droop and slurred speech  CTH showed acute infarct in R basal ganglia and R frontal lobe; in addition R ICA and MCA thrombus  Pt is dx'd w/ acute ischemic stroke  Pt  has a past medical history of Diverticulitis large intestine, Hypertension, and TIA (transient ischemic attack)  Pt with active OT orders and up with assistance  orders  Pt lives with his spouse in 2 SH, 3 TAO, 1st floor setup  Pt was I w/  ADLS and IADLS, drove, & required no use of DME PTA  Pt is not currently demonstrating any significant deficits in occupational performance @ this time  Overall, pt is functioning at a S level for all functional tasks w/ no use of DME  Pt reports no concerns about D/C home w/ family support once medically stable  Recommend home with family support upon D/C  Pt will no longer benefit from immediate acute skilled OT services  Pt w/ no further questions or concerns regarding OT services  Will D/C OT at this time  Please re-consult if pt's medical status changes  Goals   Patient Goals to go home   Recommendation   OT Discharge Recommendation No rehabilitation needs   OT - OK to Discharge Yes  (when medically stable)   Additional Comments  The patient's raw score on the AM-PAC Daily Activity inpatient short form is 24, standardized score is 57 54, greater than 39 4  Patients at this level are likely to benefit from discharge to home  Please refer to the recommendation of the Occupational Therapist for safe discharge planning     AM-PAC Daily Activity Inpatient   Lower Body Dressing 4   Bathing 4   Toileting 4   Upper Body Dressing 4   Grooming 4   Eating 4   Daily Activity Raw Score 24   Daily Activity Standardized Score (Calc for Raw Score >=11) 57 54   AM-PAC Applied Cognition Inpatient   Following a Speech/Presentation 4   Understanding Ordinary Conversation 4   Taking Medications 4   Remembering Where Things Are Placed or Put Away 4   Remembering List of 4-5 Errands 4   Taking Care of Complicated Tasks 4   Applied Cognition Raw Score 24   Applied Cognition Standardized Score 62 21      Rachel Lizama MS, OTR/L

## 2021-07-15 NOTE — PLAN OF CARE
Problem: PHYSICAL THERAPY ADULT  Goal: Performs mobility at highest level of function for planned discharge setting  See evaluation for individualized goals  Description: Treatment/Interventions: LE strengthening/ROM, Functional transfer training, Therapeutic exercise, Elevations, Endurance training, Bed mobility, Gait training          See flowsheet documentation for full assessment, interventions and recommendations  Note: Prognosis: Good  Problem List: Decreased strength, Decreased endurance, Impaired balance, Decreased mobility  Assessment: Pt is a 65 yo male admitted to [place] [id number] on 7/14/2021 s/p facial droop  DX: CVA, hypertension, L knee osteoarthritis  Two patient identifiers were used to confirm  Pt lives in a [facility] with 3 TAO  Pt lives with his wife who works  Pt works part time and drives  Pt has a FFSU if needed  Pt was I for ADL's and mobility prior  Pt's impairments include reduced mobility, reduced balance, high risk of falling  These impairments limit the ability of the patient to perform mobility without increased assistance, return to PLOF and participate in everyday life activities  Pt would benefit from continued skilled therapy while in the hospital to improve overall mobility and work towards a safe d/c  Recommend discharge to home  At the end of the session the patient was left in seated position with call bell and phone within reach  Would like to see the patient 1-2x to ambulate further and try steps again due to LOB  The patient's AM-PAC Basic Mobility Inpatient Short Form Raw Score is 20, Standardized Score is 43 99  A standardized score greater than 42 9 suggests the patient may benefit from discharge to home  Please also refer to the recommendation of the Physical Therapist for safe discharge planning    Barriers to Discharge: Inaccessible home environment, Decreased caregiver support        PT Discharge Recommendation: No rehabilitation needs     PT - OK to Discharge: Yes    See flowsheet documentation for full assessment

## 2021-07-15 NOTE — ASSESSMENT & PLAN NOTE
Right acute ischemia  · Patient presented with left-sided facial weakness and speech difficulties  · CTA was critical right carotid stenosis along with right ICA, MCA and LUZ thrombus    Imaging:  · MRI brain without 7/14/2021:  Medial right temporal lobe, right corpus striatum, right chronic Rady on a and right frontal cortical focus of is acute ischemia  · CTA stroke alert 7/14/2021:  Severe noncalcified plaque in proximal right internal carotid artery with small mobile thrombus at the distal aspect  Near occlusive thrombus in the distal right internal carotid artery, right M1 and dominant right A1 segments  · CTA stroke alert 7/14/2021:  Acute infarct in the right nasal cannula  Hyperdense distal right ICA and right MCA consistent with thrombus  Small chronic infarct in the right frontal lobe  Small parietal infarcts also fever chronic  No acute hemorrhage or mass effect  Plan:  · Continue frequent neurological checks  · Continue on heparin drip  A PTT goal 60-90  Requests aPTT check Q4H - d/w nursing  · Repeat CT head once a PTT therapeutic  · Continue aspirin 81 mg daily  · Systolic blood pressure goal 160-180mmHg  · Imaging results reviewed with patient and family at bedside  Patient continues with minimal symptoms but remains at high risk for deterioration  We discussed potential need for thrombectomy or stenting if he had any change in clinical status  · Continue stroke pathway  · Medical management per primary team  · Neurology following and input appreciated  Neurosurgery will continue follow  Resulting questions or concerns    · Repeat CTA head and neck in one week

## 2021-07-15 NOTE — PROGRESS NOTES
Daily Progress Note - Critical Care   Audrey Breen 64 y o  male MRN: [id number]  Unit/Bed#: ICU 10 Encounter: [id number]        ----------------------------------------------------------------------------------------  HPI/24hr events: Audrey Breen is a 64 y o  male who presents with PMH TIA and hypertension who presented to [facility] 7/14 for concern of new onset L sided facial droop and slurred speech, found to have nearly occlusive thrombus in the distal right ICA, right M1, and R A1 segment with small mobile thrombus in R ICA  No acute events overnight  PTT remains subtherapuetic  No changes in neuro exam     ---------------------------------------------------------------------------------------  SUBJECTIVE  Patient feeling well overall  Denies acute complaints at this time  Review of Systems   Constitutional: Negative for chills and fever  HENT: Negative for ear pain, rhinorrhea, sore throat and trouble swallowing  Eyes: Negative for pain and visual disturbance  Respiratory: Negative for cough and shortness of breath  Cardiovascular: Negative for chest pain, palpitations and leg swelling  Gastrointestinal: Negative for abdominal pain, constipation, diarrhea, nausea and vomiting  Genitourinary: Negative for difficulty urinating and dysuria  Musculoskeletal: Negative for back pain and joint swelling  Skin: Negative for color change and rash  Neurological: Positive for facial asymmetry  Negative for dizziness, weakness and light-headedness  Psychiatric/Behavioral: Negative for confusion  The patient is not nervous/anxious       ---------------------------------------------------------------------------------------  Assessment and Plan:     Neuro:   · Diagnosis: CVA  ?  Imaging:  § 7/14 CTA: severe noncalcified plaque in the proximal right internal carotid artery with small mobile thrombus at the distal aspect, nearly occlusive thrombus in the distal right ICA, right M1, and R A1 segments  § 7/14 CTH: Acute infarct in the right basal ganglia, hyperdense distal right ICA and right MCA consistent with thrombus, small chronic infarct in the right frontal lobe   Small parietal infarct also favored to be chronic  No acute hemorrhage or mass effect  § 7/14 MRI: Medial right temporal lobe, right corpus striatum, right corona radiata and right frontal cortical foci of acute ischemia  ? Plan:  § Not tpa candidate due to outside window/last known normal unknown  § Neurology consulted - appreciate [address] Neurosurgery consulted - appreciate recs  § Heparin drip with PTT Q4 hours and goal 60-90  § Repeat CTH when heparin therapeutic or 24 hours  § SBP goals 160-180 - currently on levophed to maintain  § Repeat CTA H/N in 1 week  § Aspirin 81mg daily  § Echo pending  § A1C and Lipids in AM labs  § Q1 hour neuro checks  § Stat head CT for change in neuro exam or drop in GCS >2 in 1 hour  § PT/OT/Speech  · Diagnosis: Hx of TIA  ? Plan:  § Patient reports three prior instances of TIA  § States he is supposed to be on baby aspirin daily - noncompliant  § Never on statin per patient report        CV:   · Diagnosis: Hypertension  ? Plan:   § On HCTZ at home - held at this time  § Goal SBP per neurosurgery 160-180  § Continue levophed drip to maintain        Pulm:  · Diagnosis: No acute issues        GI:   · Diagnosis: No acute issues        :   · Diagnosis: No acute issues        F/E/N:   · Plan:  ? Fluids - NSS at 100cc/hour while NPO  ? Electrolytes - monitor and replete as needed  ? Nutrition - NPO        Heme/Onc:   · Diagnosis: DVT prophylaxis  ? Plan:  § SCD  § Patient on heparin drip per neurosurgery recs        Endo:   · Diagnosis: No acute issues        ID:   · Diagnosis: No acute issues  ? Plan:  § Monitor and trend fever curve and WBC        MSK/Skin:   · Diagnosis: L knee osteoarthritis  ? Plan:  § Tylenol for pain  · Diagnosis: Recent CVA  ?  Plan:   § PT/OT/PM&R    Disposition: Continue Critical Care   Code Status: Level 1 - Full Code  ---------------------------------------------------------------------------------------  ICU CORE MEASURES    Prophylaxis   VTE Pharmacologic Prophylaxis: Heparin Drip  VTE Mechanical Prophylaxis: sequential compression device  Stress Ulcer Prophylaxis: Prophylaxis Not Indicated     Invasive Devices Review  Invasive Devices     Central Venous Catheter Line            CVC Central Lines 21 Triple 16cm <1 day          Peripheral Intravenous Line            Peripheral IV 21 Left Antecubital <1 day    Peripheral IV 21 Right Antecubital <1 day          Arterial Line            Arterial Line 21 Radial <1 day              ---------------------------------------------------------------------------------------  OBJECTIVE    Vitals   Vitals:    07/15/21 0400 07/15/21 0419 07/15/21 0420 07/15/21 0500   BP:    (!) 174/76   BP Location:       Pulse: 56 56 60 66   Resp: 20 16 20 18   Temp: 98 2 °F (36 8 °C)      TempSrc:       SpO2: 96% 96% 94% 97%   Weight:       Height:         Temp (24hrs), Av 9 °F (36 6 °C), Min:97 5 °F (36 4 °C), Max:98 4 °F (36 9 °C)  Current: Temperature: 98 2 °F (36 8 °C) 98 2  HR: 66  BP: 129-174/66-76 (174/76)  RR: 18  SpO2: 97%  I/O: 1112/600, +512/24    Respiratory:  SpO2: SpO2: 97 %, SpO2 Activity: SpO2 Activity: At Rest       Invasive/non-invasive ventilation settings   Respiratory    Lab Data (Last 4 hours)    None         O2/Vent Data (Last 4 hours)    None                Physical Exam  Vitals reviewed  Constitutional:       General: He is not in acute distress  Appearance: Normal appearance  He is well-developed  He is obese  He is not ill-appearing, toxic-appearing or diaphoretic  HENT:      Head: Normocephalic and atraumatic        Right Ear: External ear normal       Left Ear: External ear normal       Nose: Nose normal       Mouth/Throat:      Mouth: Mucous membranes are moist       Pharynx: Oropharynx is clear  Eyes:      General:         Right eye: No discharge  Left eye: No discharge  Conjunctiva/sclera: Conjunctivae normal    Cardiovascular:      Rate and Rhythm: Normal rate and regular rhythm  Heart sounds: Normal heart sounds  Pulmonary:      Effort: Pulmonary effort is normal  No respiratory distress  Breath sounds: Normal breath sounds  No wheezing  Abdominal:      General: Bowel sounds are normal  There is no distension  Palpations: Abdomen is soft  Tenderness: There is no abdominal tenderness  Musculoskeletal:         General: No swelling or tenderness  Normal range of motion  Cervical back: Normal range of motion  Right lower leg: No edema  Left lower leg: No edema  Skin:     General: Skin is warm and dry  Coloration: Skin is not jaundiced  Findings: No bruising  Neurological:      General: No focal deficit present  Mental Status: He is alert and oriented to person, place, and time  Cranial Nerves: Dysarthria and facial asymmetry present  Motor: No weakness  Psychiatric:         Mood and Affect: Mood normal          Behavior: Behavior normal  Behavior is cooperative  Thought Content:  Thought content normal          Laboratory and Diagnostics:  Results from last 7 days   Lab Units 07/15/21  0552 07/14/21  1706 07/14/21  1349   WBC Thousand/uL 7 66 4 91 4 85   HEMOGLOBIN g/dL 13 6 14 2 14 7   HEMATOCRIT % 38 9 41 2 43 1   PLATELETS Thousands/uL 221 224 212     Results from last 7 days   Lab Units 07/14/21  1349   SODIUM mmol/L 142   POTASSIUM mmol/L 3 4*   CHLORIDE mmol/L 104   CO2 mmol/L 31   ANION GAP mmol/L 7   BUN mg/dL 20   CREATININE mg/dL 1 30   CALCIUM mg/dL 8 9   GLUCOSE RANDOM mg/dL 102          Results from last 7 days   Lab Units 07/14/21  2326 07/14/21  1706 07/14/21  1349   INR   --  0 97 0 92   PTT seconds 32 28 26      Results from last 7 days   Lab Units 07/14/21  1349   TROPONIN I ng/mL <0 02     Intake and Output  I/O       07/13 0701 - 07/14 0700 07/14 0701 - 07/15 0700    I V  (mL/kg)  1112 (11 1)    Total Intake(mL/kg)  1112 (11 1)    Urine (mL/kg/hr)  600    Total Output  600    Net  +512              Height and Weights   Height: 5' 11" (180 3 cm)  IBW (Ideal Body Weight): 75 3 kg  Body mass index is 30 75 kg/m²  Weight (last 2 days)     Date/Time   Weight    07/14/21 1729   100 (220 46)    07/14/21 1640   103 (227 18)            Nutrition       Diet Orders   (From admission, onward)             Start     Ordered    07/14/21 1641  Diet NPO  Diet effective now     Question Answer Comment   Diet Type NPO    RD to adjust diet per protocol? No        07/14/21 1640              Active Medications  Scheduled Meds:  Current Facility-Administered Medications   Medication Dose Route Frequency Provider Last Rate    atorvastatin  40 mg Oral QPM Charlsie Boom, DO      heparin (porcine)  3-20 Units/kg/hr (Order-Specific) Intravenous Titrated Charlsie Boom, DO 15 1 Units/kg/hr (07/14/21 2351)    norepinephrine  1-30 mcg/min Intravenous Titrated Charlsie Boom, DO 24 mcg/min (07/15/21 0516)    sodium chloride  100 mL/hr Intravenous Continuous Charlsie Boom,  mL/hr (07/15/21 0500)     Continuous Infusions:  heparin (porcine), 3-20 Units/kg/hr (Order-Specific), Last Rate: 15 1 Units/kg/hr (07/14/21 2351)  norepinephrine, 1-30 mcg/min, Last Rate: 24 mcg/min (07/15/21 0516)  sodium chloride, 100 mL/hr, Last Rate: 100 mL/hr (07/15/21 0500)      PRN Meds:        Allergies   No Known Allergies  ---------------------------------------------------------------------------------------  Advance Directive and Living Will:      Power of :    POLST:    ---------------------------------------------------------------------------------------    Agusto Nuñez DO      Portions of the record may have been created with voice recognition software    Occasional wrong word or "sound a like" substitutions may have occurred due to the inherent limitations of voice recognition software    Read the chart carefully and recognize, using context, where substitutions have occurred

## 2021-07-15 NOTE — PROGRESS NOTES
NEUROLOGY RESIDENCY PROGRESS NOTE     Name: Angelina Medina   Age & Sex: 64 y o  male   MRN: [id number]  Unit/Bed#: ICU 10   Encounter: [id number]    ASSESSMENT & PLAN     * CVA (cerebral vascular accident) [facility])  Assessment & Plan  Assessment: 63 y/o M w/ acute ischemic stroke likely 2/2 mobile carotid thrombus (likely etiology for his prior strokes as well)   Presenting sx: left facial droop, dysarthria, LKN 2100 reportedly the night PTA (approx 20 hrs prior to initial consult), but on further discussion w/ family it appears his [de-identified] may have been the day before yesterday and that he was experiencing a right-sided headache for about 2 weeks which peaked around the time the family noticed sx   NIHSS: 2 on arrival    tPA not given 2/2 time course, non-disabling sx   CTH/CTA:  Right caudate/lentiform/BG hypodensity and right frontal focal encephalomalacia due to remote infarct, small, likely chronic infarct in the right parietal lobe; severe right ICA stenosis (noncalcified plaque at the carotid bifurcation), and nearly occlusive thrombus in the right supraclinoid ICA extending to the terminus, and into right M1 and A1 segments; hypoplastic but patent left A1   MRI: new focal ischemia in medial R T lobe, R corpus striatum, R corona radiata, R frontal lobe    Labs: A1c 5 3, LDL 97,    Exam: mild left facial weakness w/ flattening of nasolabial fold and asymmetric tongue protrusion but no residual dysarthria    Plan:   Admit on stroke pathway   Heparin gtt, PTT q4-q6, goal PTT 60-90 given known thrombus (given difficulty in reaching PTT goal, possibly consider thrombophilic workup?)   ASA, statin   Goal BP < 180 (goal 160-180), levo for support (pt has RIJ)   If patient deteriorates, stat CTH and endovascular may reconsider intervention - reach out to neurovasc   Frequent neuro checks: stat CTH for acute change   Telemetry, echo   Replete lytes to goal K 4, Mg 2, PO4 4   Euglycemia (gluc < 180, SSI or drip as needed), normothermia   PT/OT/PMR/ST w/ swallow eval    Hypertension  Assessment & Plan  Permissive HTN to max 160-180 at this time    Dominique Pal will need follow up in in 4 weeks with neurovascular    He will not require outpatient neurological testing at this time but this is subject to change pending his progress    SUBJECTIVE     Patient was seen and examined  No critical events overnight  Sx improving from yesterday     ROS negative unless otherwise noted    OBJECTIVE     Patient ID: Dominique Pal is a 64 y o  male  Vitals:    07/15/21 0700 07/15/21 0800 07/15/21 0845 07/15/21 1000   BP:       BP Location:       Pulse: 70 68 78 98   Resp: 21 21 15 21   Temp:       TempSrc:       SpO2: 96% 96%  97%   Weight:       Height:          Temperature:   Temp (24hrs), Av 9 °F (36 6 °C), Min:97 5 °F (36 4 °C), Max:98 4 °F (36 9 °C)    Temperature: 98 2 °F (36 8 °C)    Physical Exam  Vitals reviewed  Constitutional:       General: He is not in acute distress  Appearance: Normal appearance  He is not ill-appearing, toxic-appearing or diaphoretic  HENT:      Head: Normocephalic and atraumatic  Right Ear: External ear normal       Left Ear: External ear normal       Nose: Nose normal  No congestion or rhinorrhea  Mouth/Throat:      Mouth: Mucous membranes are moist       Pharynx: Oropharynx is clear  No oropharyngeal exudate or posterior oropharyngeal erythema  Eyes:      General: No scleral icterus  Right eye: No discharge  Left eye: No discharge  Extraocular Movements: Extraocular movements intact and EOM normal       Conjunctiva/sclera: Conjunctivae normal       Pupils: Pupils are equal, round, and reactive to light  Pulmonary:      Effort: Pulmonary effort is normal  No respiratory distress  Skin:     Coloration: Skin is not pale  Findings: No erythema or rash     Neurological:      Mental Status: He is alert and oriented to person, place, and time  Cranial Nerves: Cranial nerve deficit present  Sensory: No sensory deficit  Motor: No weakness  Coordination: Coordination normal  Finger-Nose-Finger Test abnormal (mild incoordination LUE)  Deep Tendon Reflexes: Reflexes abnormal    Psychiatric:         Mood and Affect: Mood normal          Speech: Speech normal          Behavior: Behavior normal      Neurologic Exam     Mental Status   Oriented to person, place, and time  Follows 2 step commands  Attention: normal    Speech: speech is normal   Level of consciousness: alert  Knowledge: consistent with education  Able to name object  Able to repeat  Normal comprehension  Cranial Nerves     CN II   Right visual field deficit: upper temporal and lower temporal (subtle deficit) quadrant(s)    CN III, IV, VI   Pupils are equal, round, and reactive to light  Extraocular motions are normal    Right pupil: Reactivity: brisk  Consensual response: intact  Left pupil: Reactivity: brisk  Consensual response: intact  CN III: no CN III palsy  CN VI: no CN VI palsy  Nystagmus: none     CN V   Facial sensation intact       CN VII   Left facial weakness: central (significantly improved from yesterday)    CN VIII   Hearing: intact    CN IX, X   Palate: symmetric    CN XI   Right trapezius strength: normal  Left trapezius strength: normal    CN XII   Tongue deviation: right    Motor Exam   Muscle bulk: normal  Overall muscle tone: normal  Right arm pronator drift: absent  Left arm pronator drift: absent    Strength   Right deltoid: 5/5  Left deltoid: 5/5  Right biceps: 5/5  Left biceps: 5/5  Right triceps: 5/5  Left triceps: 5/5  Right iliopsoas: 5/5  Left iliopsoas: 5/5  Right anterior tibial: 5/5  Left anterior tibial: 5/5  Right gastroc: 5/5  Left gastroc: 5/5    Sensory Exam   Light touch normal    Temperature intact and symmetric     Gait, Coordination, and Reflexes     Coordination   Finger to nose coordination: abnormal (mild incoordination LUE)    Tremor   Resting tremor: absent  Action tremor: absent    Reflexes   Right plantar: equivocal  Left plantar: equivocal  DTR's diffusely brisk, less so than yesterday     LABORATORY DATA     Labs: I have personally reviewed pertinent reports  Results from last 7 days   Lab Units 07/15/21  0552 07/14/21  1706 07/14/21  1349   WBC Thousand/uL 7 66 4 91 4 85   HEMOGLOBIN g/dL 13 6 14 2 14 7   HEMATOCRIT % 38 9 41 2 43 1   PLATELETS Thousands/uL 221 224 212      Results from last 7 days   Lab Units 07/15/21  0552 07/14/21  1349   POTASSIUM mmol/L 3 6 3 4*   CHLORIDE mmol/L 111* 104   CO2 mmol/L 22 31   BUN mg/dL 12 20   CREATININE mg/dL 0 98 1 30   CALCIUM mg/dL 8 3 8 9   ALK PHOS U/L 56  --    ALT U/L 32  --    AST U/L 17  --               Results from last 7 days   Lab Units 07/15/21  0552 07/14/21  2326 07/14/21  1706 07/14/21  1349   INR   --   --  0 97 0 92   PTT seconds 39* 32 28 26         Results from last 7 days   Lab Units 07/14/21  1349   TROPONIN I ng/mL <0 02     ABG:No results found for: PHART, WSB8LTO, PO2ART, ABT1JFW, D0EAHFLV, BEART, SOURCE    IMAGING & DIAGNOSTIC TESTING   Radiology Results: I have personally reviewed pertinent reports  and I have personally reviewed pertinent films in PACS  MRI brain wo contrast   Final Result by Petrona Macias MD (07/14 2255)      Medial right temporal lobe, right corpus striatum, right corona radiata and right frontal cortical foci of acute ischemia  Findings were marked as "immediate"in Epic and will now be related to the ordering physician or covering clinical team by the radiology liaison  Workstation performed: [id number]         XR chest portable   Final Result by Valerio Peterson MD (07/15 0815)      No acute cardiopulmonary disease  Workstation performed: [id number]           Other Diagnostic Testing: I have personally reviewed pertinent reports        ACTIVE MEDICATIONS     Current Facility-Administered Medications   Medication Dose Route Frequency    acetaminophen (TYLENOL) tablet 650 mg  650 mg Oral Q6H PRN    aspirin chewable tablet 81 mg  81 mg Oral Daily    atorvastatin (LIPITOR) tablet 40 mg  40 mg Oral QPM    heparin (porcine) 25,000 units in 0 45% NaCl 250 mL infusion (premix)  3-20 Units/kg/hr (Order-Specific) Intravenous Titrated    midodrine (PROAMATINE) tablet 5 mg  5 mg Oral TID AC    norepinephrine (LEVOPHED) 4 mg (STANDARD CONCENTRATION) IV in sodium chloride 0 9% 250 mL  1-30 mcg/min Intravenous Titrated     Prior to Admission medications    Medication Sig Start Date End Date Taking?  Authorizing Provider   acetaminophen (TYLENOL) 650 mg CR tablet Take 1 tablet (650 mg total) by mouth every 8 (eight) hours as needed for mild pain 7/31/20  Yes Shanice Ramirez PA-C   hydrochlorothiazide (HYDRODIURIL) 25 mg tablet Take 25 mg by mouth daily 9/26/20  Yes Historical Provider, MD     VTE Pharmacologic Prophylaxis: Heparin Drip  VTE Mechanical Prophylaxis: sequential compression device    ==  Judith Hudson MD  Resident, Neurology, PGY-2  [facility]

## 2021-07-15 NOTE — CONSULTS
PHYSICAL MEDICINE AND REHABILITATION CONSULT NOTE  Linda Lombardi 64 y o  male MRN: [id number]  Unit/Bed#: ICU 10 Encounter: [id number]    Requested by (Physician/Service): Dimitry Reddy MD  Reason for Consultation:  Assessment of rehabilitation needs  Chief Complaint:  Feeling fine    Assessment:  Rehabilitation Diagnosis:   Acute CVA with L sided facial droop and slurred speech  - Nearly occlusive thrombus in distal R ICA, R M1 and R A1 segment with small mobile thrombus in ICA  - On a heparin gtt  Recommendations:  Rehabilitation Plan:   Continue PT/OT/SLP while on acute care   Deficits are minimal  I suspect barring any complications that if he maintains his current exam he may be able to discharge home once medically cleared   However, will continue to follow his medical/functional progress  Medical Co-morbidities Pending Issues:  #Pain: Tylenol PRN  #Bowel:  No BMs recorded, monitoring  #Bladder: Voiding and continent  #Skin/Pressure Injury Prevention: Turn Q2hr in bed, with weight shifts [id number] in wheelchair  #DVT Prophylaxis: on heparin Q8hr, SCDs   #GI Prophylaxis: On PO diet  Thank you for allowing the PM&R service to participate in the care of this patient  We will continue to follow Ruiz Acevedo's progress with you  Please do not hesitate to call with questions or concerns    History of Present Illness:  Linda Lombardi is a 64 y o  male with PMH of diverticulitis, HTN, TIA who presented to [facility] on 7/14 with left-sided facial droop and slurred speech  NIHSS 2  CT head with hypodensity in the right caudate and basal ganglia region  CTA head and neck showed right ICA stenosis with right M1 and right RCA thrombus  Patient was not a candidate for tPA as symptoms were outside of the window  Per Neurosurgery not a thrombectomy candidate  He was started on heparin drip and transferred to [place] for closer monitoring    MRI showed medial right temporal lobe, right corpus striatum, right corona radiata, and right frontal cortical foci of acute ischemia  He has been evaluated by speech  They have cleared him for regular and thin liquid diet    Review of Systems: 10 point ROS negative except for what is noted in HPI    CURRENT GAP IN FUNCTION     Prior to Admission:     Functional Status: Patient was independent with mobility/ambulation, transfers, ADL's, IADL's  More or less retired, but working as a   FUNCTIONAL STATUS:  Physical Therapy: pending    Occupational Therapy:  pending    Speech Therapy: oropharyngeal swallow WFL, Reg/Thins  Social History:    Scott Cruz Lives with: lives with their family  He lives in [facility] single family home - 2  [address]  His room and full bathroom on main floor  The living area: can live on one level  There 3 steps to enter the home  Patient/family's goals: Return to previous home/apartment  Social History     Socioeconomic History    Marital status: /Civil Union     Spouse name: None    Number of children: None    Years of education: None    Highest education level: None   Occupational History    None   Tobacco Use    Smoking status: Former Smoker    Smokeless tobacco: Former User     Types: Chew   Vaping Use    Vaping Use: Never used   Substance and Sexual Activity    Alcohol use: Yes     Alcohol/week: 1 0 - 2 0 standard drinks     Types: 1 - 2 Cans of beer per week    Drug use: Never    Sexual activity: None   Other Topics Concern    None   Social History Narrative    None     Social Determinants of Health     Financial Resource Strain:     Difficulty of Paying Living Expenses:    Food Insecurity:     Worried About Running Out of Food in the Last Year:     Ran Out of Food in the Last Year:    Transportation Needs:     Lack of Transportation (Medical):      Lack of Transportation (Non-Medical):    Physical Activity:     Days of Exercise per Week:     Minutes of Exercise per Session:    Stress:     Feeling of Stress :    Social Connections:     Frequency of Communication with Friends and Family:     Frequency of Social Gatherings with Friends and Family:     Attends [employer] Services:     Active Member of Clubs or Organizations:     Attends Club or Organization Meetings:     Marital Status:    Intimate Partner Violence:     Fear of Current or Ex-Partner:     Emotionally Abused:     Physically Abused:     Sexually Abused:         Family History:    History reviewed  No pertinent family history        MEDICATIONS:     Current Facility-Administered Medications:     acetaminophen (TYLENOL) tablet 650 mg, 650 mg, Oral, Q6H PRN, Dianah Isaac, DO    aspirin chewable tablet 81 mg, 81 mg, Oral, Daily, Dianah Isaac, DO, 81 mg at 07/15/21 9989    atorvastatin (LIPITOR) tablet 40 mg, 40 mg, Oral, QPM, Dianah Isaac, DO    heparin (porcine) 25,000 units in 0 45% NaCl 250 mL infusion (premix), 3-20 Units/kg/hr (Order-Specific), Intravenous, Titrated, Didi Gray DO, Last Rate: 17 2 mL/hr at 07/15/21 0826, 19 1 Units/kg/hr at 07/15/21 0826    norepinephrine (LEVOPHED) 4 mg (STANDARD CONCENTRATION) IV in sodium chloride 0 9% 250 mL, 1-30 mcg/min, Intravenous, Titrated, Dianabin Gray, DO, Last Rate: 93 8 mL/hr at 07/15/21 0929, 25 013 mcg/min at 07/15/21 9403    sodium chloride 0 9 % infusion, 100 mL/hr, Intravenous, Continuous, Didi Gray DO, Last Rate: 100 mL/hr at 07/15/21 0500, 100 mL/hr at 07/15/21 0500    Past Medical History:     Past Medical History:   Diagnosis Date    Diverticulitis large intestine     "During Bowel Resection determined not to have Diverticulitis "- Patient    Hypertension     TIA (transient ischemic attack)         Past Surgical History:     Past Surgical History:   Procedure Laterality Date    ANTERIOR CRUCIATE LIGAMENT REPAIR Right     ACL Repair    BOWEL RESECTION      FOOT SURGERY Right     patient not sure procedure    SHOULDER SURGERY Bilateral     Rotator cuff/ Right shoulder bone growth removal         Allergies:     No Known Allergies        Physical Exam:  BP (!) 174/76   Pulse 78   Temp 98 2 °F (36 8 °C)   Resp 15   Ht 5' 11" (1 803 m)   Wt 100 kg (220 lb 7 4 oz)   SpO2 96%   BMI 30 75 kg/m²        Intake/Output Summary (Last 24 hours) at 7/15/2021 0945  Last data filed at 7/15/2021 0830  Gross per 24 hour   Intake 2899 93 ml   Output 1400 ml   Net 1499 93 ml       Body mass index is 30 75 kg/m²  Gen: No acute distress, Well-nourished, well-appearing  HEENT: Moist mucus membranes, Normocephalic/Atraumatic  Cardiovascular: Regular rate, rhythm, S1/S2  Distal pulses palpable  Heme/Extr: No edema  Pulmonary: Non-labored breathing  Lungs CTAB  : No smith  GI: Soft, non-tender, non-distended  BS+  MSK: ROM is WFL in all extremities  No effusions or deformities  Bulk is symmetric  See below for MMT scores  Integumentary: Skin is warm, dry  No rashes or ulcers  Neuro: AAOx3, CN 2-12 intact except for slight L sided lower facial weakness  Sensation intact to light touch throughout  Speech is intact  Appropriate to questioning  Tone is normal  No catarino ataxia/dysmetria with FTN  Reflexes are symmetric throughout  MMT:   Strength:   Right  Left  Site  Right  Left  Site    5 5  S Ab: Shoulder Abductors  5  5  HF: Hip Flexors    5 5  EF: Elbow Flexors  5  5 KF: Knee Flexors    5  5  EE: Elbow Extensors  5  5  KE: Knee Extensors    5  5  WE: Wrist Extensors  5  5  DR: Dorsi Flexors    5  5  FF: Finger Flexors  5  5  PF: Plantar Flexors    5  5  HI: Hand Intrinsics  5  5  EHL: Extensor Hallucis Longus   Psych: Normal mood and affect         LABORATORY RESULTS:      Lab Results   Component Value Date    HGB 13 6 07/15/2021    HCT 38 9 07/15/2021    WBC 7 66 07/15/2021     Lab Results   Component Value Date    BUN 12 07/15/2021    K 3 6 07/15/2021     (H) 07/15/2021    CREATININE 0 98 07/15/2021     Lab Results   Component Value Date    PROTIME 12 9 07/14/2021    INR 0 97 07/14/2021        DIAGNOSTIC STUDIES: Reviewed  XR chest portable    Result Date: 7/15/2021  Impression: No acute cardiopulmonary disease  Workstation performed: [id number]     X-ray chest 1 view portable    Result Date: 7/14/2021  Impression: No acute cardiopulmonary disease  Workstation performed: [id number]     MRI brain wo contrast    Result Date: 7/14/2021  Impression: Medial right temporal lobe, right corpus striatum, right corona radiata and right frontal cortical foci of acute ischemia  Findings were marked as "immediate"in Epic and will now be related to the ordering physician or covering clinical team by the radiology liaison  Workstation performed: [id number]     CT stroke alert brain    Result Date: 7/14/2021  Impression: Acute infarct in the right basal ganglia  Hyperdense distal right ICA and right MCA consistent with thrombus  Small chronic infarct in the right frontal lobe  Small parietal infarct also favored to be chronic  No acute hemorrhage or mass effect  Findings were directly discussed with Dr Rosalinda Green on 7/14/2021 2:21 PM Workstation performed: [id number]     CTA stroke alert (head/neck)    Result Date: 7/14/2021  Impression: Severe noncalcified plaque in the proximal right internal carotid artery with small mobile thrombus at the distal aspect  Nearly occlusive thrombus in the distal right internal carotid artery, right M1 and dominant right A1 segments   Findings were directly discussed with Dr Rosalinda Green on 7/14/2021 2:21 PM  Workstation performed: [id number]

## 2021-07-15 NOTE — CASE MANAGEMENT
Pt is not a <30 day readmission  Pt IS a current bundle  CM met with pt and pt's spouse Cate dykes to introduce CM role and begin discharge planning  Pt lives with spouse in a 2 story house that has 5 TAO  Pt has first floor set up on main level  Pt reports being independent with ADLs PTA, no DME  Pt reports no history of VNA, STR, inpatient MH treatment or D/A treatment  Pt not working fulltime PTA and reports he does not drive  PCP is Silvano Ocampo and pt uses [facility]Z LANDRUM in [facility] for prescriptions  Pt's spouse to transport at time of discharge  Pt has no AD/LW  Spouse requested additional information; CM provided [facility] document and advance care planning brochure bedside  CM to follow for d/c planning  CM reviewed d/c planning process including the following: identifying help at home, patient preference for d/c planning needs, Discharge Lounge, [facility] Meds to Bed program, availability of treatment team to discuss questions or concerns patient and/or family may have regarding understanding medications and recognizing signs and symptoms once discharged  CM also encouraged patient to follow up with all recommended appointments after discharge  Patient advised of importance for patient and family to participate in managing patients medical well being

## 2021-07-15 NOTE — SPEECH THERAPY NOTE
Speech-Language Pathology Bedside Swallow Evaluation      Patient Name: Scott Cruz    DFRWT'D Date: 7/15/2021     Problem List  Principal Problem:    CVA (cerebral vascular accident) [facility])  Active Problems:    Primary osteoarthritis of left knee    Hypertension      Past Medical History  Past Medical History:   Diagnosis Date    Diverticulitis large intestine     "During Bowel Resection determined not to have Diverticulitis "- Patient    Hypertension     TIA (transient ischemic attack)        Past Surgical History  Past Surgical History:   Procedure Laterality Date    ANTERIOR CRUCIATE LIGAMENT REPAIR Right     ACL Repair    BOWEL RESECTION      FOOT SURGERY Right     patient not sure procedure    SHOULDER SURGERY Bilateral     Rotator cuff/ Right shoulder bone growth removal       Summary   Pt presented with functional appearing oral and pharyngeal stage swallowing skills with materials administered today  Patient admitted on stroke pathway  Appears to have adequate speech, language and cognitive skills during information assessment  Risk/s for Aspiration: absent      Recommended Diet: regular diet and thin liquids   Recommended Form of Meds: whole with liquid   Aspiration precautions and swallowing strategies: upright posture  Other Recommendations: Continue frequent oral care    Current Medical Status  Scott Cruz is a 64 y o  male who presents with PMH TIA and hypertension who presented to [facility] 7/14 for concern of new onset L sided facial droop and slurred speech since he woke up at 8am today  CTH showed Acute infarct in the right basal ganglia, hyperdense distal right ICA and right MCA consistent with thrombus, small chronic infarct in the right frontal lobe with CTA demonstrating noncalcified plaque in the proximal right internal carotid artery with small mobile thrombus at the distal aspect, nearly occlusive thrombus in the distal right ICA, right M1, and R A1 segment  Patient not a tpa candidate due to time frame, not a thrombectomy candidate per neurosurgery as he still had flow, and concern for propagation of clot  He was started on heparin drip per neurosurgery recommendations and transferred here for close blood pressure management and in case there is acute decompensation so that neurosurgery can intervene  Current Precautions:  Fall  Aspiration    Allergies:  No known food allergies  Past medical history:  Please see H&P for details    Special Studies:  MRI brain 7/14/2021: Medial right temporal lobe, right corpus striatum, right corona radiata and right frontal cortical foci of acute ischemia  Social/Education/Vocational Hx:  Pt lives with family    Swallow Information   Current Risks for Dysphagia & Aspiration: CVA  Current Symptoms/Concerns: none observed   Current Diet: NPO   Baseline Diet: regular diet and thin liquids    Baseline Assessment   Behavior/Cognition: alert  Speech/Language Status: able to participate in conversation, able to follow commands and limited verbal output  Patient Positioning: upright in chair  Pain Status/Interventions/Response to Interventions: No report of or nonverbal indications of pain  Swallow Mechanism Exam  Facial: symmetrical at rest; min left side weakness upon retraction  Labial: decreased ROM left side  Lingual: WFL  Velum: symmetrical  Mandible: adequate ROM  Dentition: adequate  Vocal quality:clear/adequate     Consistencies Assessed and Performance   Consistencies Administered: thin liquids, puree and hard solids  Materials administered included applesauce and toast with thin liquids     Oral Stage: WFL  Mastication was adequate with the materials administered today  Bolus formation and transfer were functional with no significant oral residue noted  No overt s/s reduced oral control  Pharyngeal Stage: WFL  Swallow Mechanics:  Swallowing initiation appeared prompt    Laryngeal rise was palpated and judged to be within functional limits  No coughing, throat clearing, change in vocal quality or respiratory status noted today       Esophageal Concerns: none reported    Summary and Recommendations (see above)    Results Reviewed with: patient, RN and MD     Treatment Recommended: Evaluation only

## 2021-07-15 NOTE — PLAN OF CARE
Problem: Neurological Deficit  Goal: Neurological status is stable or improving  Description: Interventions:  - Monitor and assess patient's level of consciousness, motor function, sensory function, and level of assistance needed for ADLs  - Monitor and report changes from baseline  Collaborate with interdisciplinary team to initiate plan and implement interventions as ordered  - Provide and maintain a safe environment  - Consider seizure precautions  - Consider fall precautions  - Consider aspiration precautions  - Consider bleeding precautions  Outcome: Progressing     Problem: Activity Intolerance/Impaired Mobility  Goal: Mobility/activity is maintained at optimum level for patient  Description: Interventions:  - Assess and monitor patient  barriers to mobility and need for assistive/adaptive devices  - Assess patient's emotional response to limitations  - Collaborate with interdisciplinary team and initiate plans and interventions as ordered  - Encourage independent activity per ability   - Maintain proper body alignment  - Perform active/passive rom as tolerated/ordered  - Plan activities to conserve energy   - Turn patient as appropriate  Outcome: Progressing     Problem: Communication Impairment  Goal: Ability to express needs and understand communication  Description: Assess patient's communication skills and ability to understand information  Patient will demonstrate use of effective communication techniques, alternative methods of communication and understanding even if not able to speak  - Encourage communication and provide alternate methods of communication as needed  - Collaborate with case management/ for discharge needs  - Include patient/family/caregiver in decisions related to communication    Outcome: Progressing     Problem: Potential for Aspiration  Goal: Non-ventilated patient's risk of aspiration is minimized  Description: Assess and monitor vital signs, respiratory status, and labs (WBC)  Monitor for signs of aspiration (tachypnea, cough, rales, wheezing, cyanosis, fever)  - Assess and monitor patient's ability to swallow  - Place patient up in chair to eat if possible  - HOB up at 90 degrees to eat if unable to get patient up into chair   - Supervise patient during oral intake  - Instruct patient/ family to take small bites  - Instruct patient/ family to take small single sips when taking liquids  - Follow patient-specific strategies generated by speech pathologist   Outcome: Progressing     Problem: Nutrition  Goal: Nutrition/Hydration status is improving  Description: Monitor and assess patient's nutrition/hydration status for malnutrition (ex- brittle hair, bruises, dry skin, pale skin and conjunctiva, muscle wasting, smooth red tongue, and disorientation)  Collaborate with interdisciplinary team and initiate plan and interventions as ordered  Monitor patient's weight and dietary intake as ordered or per policy  Utilize nutrition screening tool and intervene per policy  Determine patient's food preferences and provide high-protein, high-caloric foods as appropriate  - Assist patient with eating   - Allow adequate time for meals   - Encourage patient to take dietary supplement as ordered  - Collaborate with clinical nutritionist   - Include patient/family/caregiver in decisions related to nutrition    Outcome: Progressing     Problem: PAIN - ADULT  Goal: Verbalizes/displays adequate comfort level or baseline comfort level  Description: Interventions:  - Encourage patient to monitor pain and request assistance  - Assess pain using appropriate pain scale  - Administer analgesics based on type and severity of pain and evaluate response  - Implement non-pharmacological measures as appropriate and evaluate response  - Consider cultural and social influences on pain and pain management  - Notify physician/advanced practitioner if interventions unsuccessful or patient reports new pain  Outcome: Progressing     Problem: INFECTION - ADULT  Goal: Absence or prevention of progression during hospitalization  Description: INTERVENTIONS:  - Assess and monitor for signs and symptoms of infection  - Monitor lab/diagnostic results  - Monitor all insertion sites, i e  indwelling lines, tubes, and drains  - Monitor endotracheal if appropriate and nasal secretions for changes in amount and color  - Dix appropriate cooling/warming therapies per order  - Administer medications as ordered  - Instruct and encourage patient and family to use good hand hygiene technique  - Identify and instruct in appropriate isolation precautions for identified infection/condition  Outcome: Progressing     Problem: SAFETY ADULT  Goal: Patient will remain free of falls  Description: INTERVENTIONS:  - Educate patient/family on patient safety including physical limitations  - Instruct patient to call for assistance with activity   - Consult OT/PT to assist with strengthening/mobility   - Keep Call bell within reach  - Keep bed low and locked with side rails adjusted as appropriate  - Keep care items and personal belongings within reach  - Initiate and maintain comfort rounds  - Make Fall Risk Sign visible to staff  - Offer Toileting every 2 Hours, in advance of need  - Initiate/Maintain bed alarm  - Obtain necessary fall risk management equipment: alarm  - Apply yellow socks and bracelet for high fall risk patients  - Consider moving patient to room near nurses station  Outcome: Progressing  Goal: Maintain or return to baseline ADL function  Description: INTERVENTIONS:  -  Assess patient's ability to carry out ADLs; assess patient's baseline for ADL function and identify physical deficits which impact ability to perform ADLs (bathing, care of mouth/teeth, toileting, grooming, dressing, etc )  - Assess/evaluate cause of self-care deficits   - Assess range of motion  - Assess patient's mobility; develop plan if impaired  - Assess patient's need for assistive devices and provide as appropriate  - Encourage maximum independence but intervene and supervise when necessary  - Involve family in performance of ADLs  - Assess for home care needs following discharge   - Consider OT consult to assist with ADL evaluation and planning for discharge  - Provide patient education as appropriate  Outcome: Progressing  Goal: Maintains/Returns to pre admission functional level  Description: INTERVENTIONS:  - Perform BMAT or MOVE assessment daily    - Set and communicate daily mobility goal to care team and patient/family/caregiver  - Collaborate with rehabilitation services on mobility goals if consulted  - Perform Range of Motion 3 times a day  - Reposition patient every 2 hours    - Dangle patient 3 times a day  - Stand patient 3 times a day  - Ambulate patient 3 times a day  - Out of bed to chair 33 times a day   - Out of bed for meals 3 times a day  - Out of bed for toileting  - Record patient progress and toleration of activity level   Outcome: Progressing     Problem: DISCHARGE PLANNING  Goal: Discharge to home or other facility with appropriate resources  Description: INTERVENTIONS:  - Identify barriers to discharge w/patient and caregiver  - Arrange for needed discharge resources and transportation as appropriate  - Identify discharge learning needs (meds, wound care, etc )  - Arrange for interpretive services to assist at discharge as needed  - Refer to Case Management Department for coordinating discharge planning if the patient needs post-hospital services based on physician/advanced practitioner order or complex needs related to functional status, cognitive ability, or social support system  Outcome: Progressing     Problem: Knowledge Deficit  Goal: Patient/family/caregiver demonstrates understanding of disease process, treatment plan, medications, and discharge instructions  Description: Complete learning assessment and assess knowledge base  Interventions:  - Provide teaching at level of understanding  - Provide teaching via preferred learning methods  Outcome: Progressing     Problem: Nutrition/Hydration-ADULT  Goal: Nutrient/Hydration intake appropriate for improving, restoring or maintaining nutritional needs  Description: Monitor and assess patient's nutrition/hydration status for malnutrition  Collaborate with interdisciplinary team and initiate plan and interventions as ordered  Monitor patient's weight and dietary intake as ordered or per policy  Utilize nutrition screening tool and intervene as necessary  Determine patient's food preferences and provide high-protein, high-caloric foods as appropriate       INTERVENTIONS:  - Monitor oral intake, urinary output, labs, and treatment plans  - Assess nutrition and hydration status and recommend course of action  - Evaluate amount of meals eaten  - Assist patient with eating if necessary   - Allow adequate time for meals  - Recommend/ encourage appropriate diets, oral nutritional supplements, and vitamin/mineral supplements  - Order, calculate, and assess calorie counts as needed  - Recommend, monitor, and adjust tube feedings and TPN/PPN based on assessed needs  - Assess need for intravenous fluids  - Provide specific nutrition/hydration education as appropriate  - Include patient/family/caregiver in decisions related to nutrition  Outcome: Progressing     Problem: NEUROSENSORY - ADULT  Goal: Achieves stable or improved neurological status  Description: INTERVENTIONS  - Monitor and report changes in neurological status  - Monitor vital signs such as temperature, blood pressure, glucose, and any other labs ordered   - Initiate measures to prevent increased intracranial pressure  - Monitor for seizure activity and implement precautions if appropriate      Outcome: Progressing

## 2021-07-15 NOTE — PROGRESS NOTES
[facility]  Progress Note - Edi Amaya 1960, 64 y o  male MRN: [id number]  Unit/Bed#: ICU 10 Encounter: [id number]  Primary Care Provider: Mario Deng DO   Date and time admitted to hospital: 7/14/2021  4:22 PM    * CVA (cerebral vascular accident) [facility])  Assessment & Plan  Right acute ischemia  · Patient presented with left-sided facial weakness and speech difficulties  · CTA was critical right carotid stenosis along with right ICA, MCA and LUZ thrombus    Imaging:  · MRI brain without 7/14/2021:  Medial right temporal lobe, right corpus striatum, right chronic Rady on a and right frontal cortical focus of is acute ischemia  · CTA stroke alert 7/14/2021:  Severe noncalcified plaque in proximal right internal carotid artery with small mobile thrombus at the distal aspect  Near occlusive thrombus in the distal right internal carotid artery, right M1 and dominant right A1 segments  · CTA stroke alert 7/14/2021:  Acute infarct in the right nasal cannula  Hyperdense distal right ICA and right MCA consistent with thrombus  Small chronic infarct in the right frontal lobe  Small parietal infarcts also fever chronic  No acute hemorrhage or mass effect  Plan:  · Continue frequent neurological checks  · Continue on heparin drip  A PTT goal 60-90  Requests aPTT check Q4H - d/w nursing  · Repeat CT head once a PTT therapeutic  · Continue aspirin 81 mg daily  · Systolic blood pressure goal 160-180mmHg  · Imaging results reviewed with patient and family at bedside  Patient continues with minimal symptoms but remains at high risk for deterioration  We discussed potential need for thrombectomy or stenting if he had any change in clinical status  · Continue stroke pathway  · Medical management per primary team  · Neurology following and input appreciated  Neurosurgery will continue follow  Resulting questions or concerns    · Repeat CTA head and neck in one week      Subjective/Objective   Chief Complaint:  I feel good    Subjective:  Patient offers no complaints this time  He states that he did not even notice his facial weakness or difficulties with his speech prior to presentation  Family urged patient to present to the hospital secondary to history stroke  Patient admits to prior stroke and loss right peripheral vision  At this time he denies any headaches, vision or speech changes  Denies any weakness or sensation changes  Denies any chest pain, shortness breath, nausea or vomiting  Denies any difficulty with ambulation  Denies any dizziness or lightheadedness  Objective:  Sitting in a chair  NAD  I/O       07/13 0701 - 07/14 0700 07/14 0701 - 07/15 0700 07/15 0701 - 07/16 0700    I V  (mL/kg)  2397 7 (24) 502 3 (5)    Total Intake(mL/kg)  2397 7 (24) 502 3 (5)    Urine (mL/kg/hr)  1200 200 (0 5)    Stool   0    Total Output  1200 200    Net  +1197 7 +302 3           Unmeasured Urine Occurrence   1 x    Unmeasured Stool Occurrence   1 x          Invasive Devices     Central Venous Catheter Line            CVC Central Lines 07/14/21 Triple 16cm <1 day          Peripheral Intravenous Line            Peripheral IV 07/14/21 Left Antecubital <1 day    Peripheral IV 07/14/21 Right Antecubital <1 day          Arterial Line            Arterial Line 07/14/21 Radial <1 day                Physical Exam:  Vitals: Blood pressure (!) 174/76, pulse 82, temperature 98 2 °F (36 8 °C), resp  rate 21, height 5' 11" (1 803 m), weight 100 kg (220 lb 7 4 oz), SpO2 95 %  ,Body mass index is 30 75 kg/m²      Hemodynamic Monitoring: MAP: Arterial Line MAP (mmHg): 90 mmHg    General appearance: alert, appears stated age, cooperative and no distress  Head: Normocephalic, without obvious abnormality, atraumatic  Eyes: EOMI, PERRL  Neck: supple, symmetrical, trachea midline   Lungs: non labored breathing  Heart: regular heart rate  Neurologic:   Mental status: Alert, oriented, thought content appropriate  Cranial nerves: grossly intact (Cranial nerves II-XII) except mild left facial weakness  Sensory: normal to LT x4  Motor: moving all extremities without focal weakness   Coordination: finger to nose normal bilaterally, no drift bilaterally    Lab Results:  Results from last 7 days   Lab Units 07/15/21  0552 07/14/21  1706 07/14/21  1349   WBC Thousand/uL 7 66 4 91 4 85   HEMOGLOBIN g/dL 13 6 14 2 14 7   HEMATOCRIT % 38 9 41 2 43 1   PLATELETS Thousands/uL 221 224 212     Results from last 7 days   Lab Units 07/15/21  0552 07/14/21  1349   POTASSIUM mmol/L 3 6 3 4*   CHLORIDE mmol/L 111* 104   CO2 mmol/L 22 31   BUN mg/dL 12 20   CREATININE mg/dL 0 98 1 30   CALCIUM mg/dL 8 3 8 9   ALK PHOS U/L 56  --    ALT U/L 32  --    AST U/L 17  --              Results from last 7 days   Lab Units 07/15/21  0552 07/14/21  2326 07/14/21 1706 07/14/21  1349   INR   --   --  0 97 0 92   PTT seconds 39* 32 28 26     No results found for: TROPONINT  ABG:No results found for: PHART, MXI1ZSF, PO2ART, HBG6SVW, Z1SDZXLB, BEART, SOURCE    Imaging Studies: I have personally reviewed pertinent reports  and I have personally reviewed pertinent films in PACS    XR chest portable    Result Date: 7/15/2021  Impression: No acute cardiopulmonary disease  Workstation performed: [id number]     X-ray chest 1 view portable    Result Date: 7/14/2021  Impression: No acute cardiopulmonary disease  Workstation performed: [id number]     MRI brain wo contrast    Result Date: 7/14/2021  Impression: Medial right temporal lobe, right corpus striatum, right corona radiata and right frontal cortical foci of acute ischemia  Findings were marked as "immediate"in Epic and will now be related to the ordering physician or covering clinical team by the radiology liaison  Workstation performed: [id number]     CT stroke alert brain    Result Date: 7/14/2021  Impression: Acute infarct in the right basal ganglia   Hyperdense distal right ICA and right MCA consistent with thrombus  Small chronic infarct in the right frontal lobe  Small parietal infarct also favored to be chronic  No acute hemorrhage or mass effect  Findings were directly discussed with Dr Fabrizio Crawford on 7/14/2021 2:21 PM Workstation performed: [id number]     CTA stroke alert (head/neck)    Result Date: 7/14/2021  Impression: Severe noncalcified plaque in the proximal right internal carotid artery with small mobile thrombus at the distal aspect  Nearly occlusive thrombus in the distal right internal carotid artery, right M1 and dominant right A1 segments  Findings were directly discussed with Dr Fabrizio Crawford on 7/14/2021 2:21 PM  Workstation performed: [id number]       EKG, Pathology, and Other Studies: I have personally reviewed pertinent reports        VTE Pharmacologic Prophylaxis: Heparin    VTE Mechanical Prophylaxis: sequential compression device

## 2021-07-15 NOTE — PHYSICAL THERAPY NOTE
Physical Therapy evaluation note     Patient Name: Mckayla WREN Date: 7/15/2021     Problem List  Principal Problem:    CVA (cerebral vascular accident) [facility])  Active Problems:    Primary osteoarthritis of left knee    Hypertension       Past Medical History  Past Medical History:   Diagnosis Date    Diverticulitis large intestine     "During Bowel Resection determined not to have Diverticulitis "- Patient    Hypertension     TIA (transient ischemic attack)         Past Surgical History  Past Surgical History:   Procedure Laterality Date    ANTERIOR CRUCIATE LIGAMENT REPAIR Right     ACL Repair    BOWEL RESECTION      FOOT SURGERY Right     patient not sure procedure    SHOULDER SURGERY Bilateral     Rotator cuff/ Right shoulder bone growth removal      07/15/21 0905   PT Last Visit   PT Visit Date 07/15/21   Note Type   Note type Evaluation   Pain Assessment   Pain Assessment Tool Pain Assessment not indicated - pt denies pain   Pain Score No Pain   Home Living   Type of Home House   Home Layout Two level   Additional Comments Pt lives in [facility] with 3 TAO  Pt has a FFSU if needed  Pt lives with his spouse and was I for ADL's and mobility prior  PT does not have any DME  Pt ports part time and drives  Pt's wife works  Prior Function   Level of [place] Independent with ADLs and functional mobility   Lives With Spouse   Receives Help From Family   ADL Assistance Independent   IADLs Independent   Falls in the last 6 months 0   Vocational Part time employment   Comments +drives   Restrictions/Precautions   Weight Bearing Precautions Per Order No   Other Precautions Multiple lines;Telemetry; Fall Risk   General   Family/Caregiver Present No   Cognition   Overall Cognitive Status WFL   Arousal/Participation Alert   Attention Within functional limits   Orientation Level Oriented X4   Memory Within functional limits   Following Commands Follows all commands and directions without difficulty   RLE Assessment   RLE Assessment WNL   LLE Assessment   LLE Assessment WNL   Bed Mobility   Additional Comments pt seated OOB at the begining of the session   Transfers   Sit to Stand 5  Supervision   Stand to Sit 5  Supervision   Ambulation/Elevation   Gait pattern Excessively slow; Foward flexed; Shuffling   Gait Assistance 5  Supervision   Assistive Device None   Distance [id number], [id number]   Stair Management Assistance 4  Minimal assist   Additional items Assist x 1   Stair Management Technique One rail R   Number of Stairs 3  (limted by lines, slight LOB req min A)   Balance   Static Sitting Good   Dynamic Sitting Fair +   Static Standing Fair +   Dynamic Standing Fair   Ambulatory Fair -   Endurance Deficit   Endurance Deficit No   Activity Tolerance   Activity Tolerance Patient tolerated treatment well   Medical Staff Made Aware OT   Nurse Made Aware nurse approved therapy session   Assessment   Prognosis Good   Problem List Decreased strength;Decreased endurance; Impaired balance;Decreased mobility   Assessment Pt is a 65 yo male admitted to [place] [address] on 7/14/2021 s/p facial droop  DX: CVA, hypertension, L knee osteoarthritis  Two patient identifiers were used to confirm  Pt lives in a [facility] with 3 TAO  Pt lives with his wife who works  Pt works part time and drives  Pt has a FFSU if needed  Pt was I for ADL's and mobility prior  Pt's impairments include reduced mobility, reduced balance, high risk of falling  These impairments limit the ability of the patient to perform mobility without increased assistance, return to PLOF and participate in everyday life activities  Pt would benefit from continued skilled therapy while in the hospital to improve overall mobility and work towards a safe d/c  Recommend discharge to home  At the end of the session the patient was left in seated position with call bell and phone within reach   Would like to see the patient 1-2x to ambulate further and try steps again due to LOB  The patient's AM-PAC Basic Mobility Inpatient Short Form Raw Score is 20, Standardized Score is 43 99  A standardized score greater than 42 9 suggests the patient may benefit from discharge to home  Please also refer to the recommendation of the Physical Therapist for safe discharge planning  Barriers to Discharge Inaccessible home environment;Decreased caregiver support   Goals   STG Expiration Date 07/29/21   Short Term Goal #1 STG 1: Pt will perform transfers at a I level to return to baseline of function  STG 2: Pt will ambulate 300ft with no AD at a I level to reduce the level of assistance needed upon d/c home  STG 3: Pt will negotiate 12 steps with HR at a I level to ensure safety with activity  STG 4: Pt will perform bed mobility at a I to safety return to PLOF  PT Treatment Day 0   Plan   Treatment/Interventions LE strengthening/ROM; Functional transfer training; Therapeutic exercise;Elevations; Endurance training;Bed mobility;Gait training   PT Frequency 2-3x/wk   Recommendation   PT Discharge Recommendation No rehabilitation needs   PT - OK to Discharge Yes   Additional Comments when medically stable    AM-PAC Basic Mobility Inpatient   Turning in Bed Without Bedrails 4   Lying on Back to Sitting on Edge of Flat Bed 4   Moving Bed to Chair 3   Standing Up From Chair 3   Walk in Room 3   Climb 3-5 Stairs 3   Basic Mobility Inpatient Raw Score 20   Basic Mobility Standardized Score 43 99   Keri Kerns, PT, DPT

## 2021-07-16 ENCOUNTER — APPOINTMENT (INPATIENT)
Dept: RADIOLOGY | Facility: HOSPITAL | Age: 61
DRG: 037 | End: 2021-07-16
Payer: MEDICARE

## 2021-07-16 PROBLEM — E78.5 HYPERLIPIDEMIA: Status: ACTIVE | Noted: 2021-07-16

## 2021-07-16 LAB
ANION GAP SERPL CALCULATED.3IONS-SCNC: 6 MMOL/L (ref 4–13)
APTT PPP: 76 SECONDS (ref 23–37)
APTT PPP: 76 SECONDS (ref 23–37)
APTT PPP: 79 SECONDS (ref 23–37)
BASOPHILS # BLD AUTO: 0.04 THOUSANDS/ΜL (ref 0–0.1)
BASOPHILS NFR BLD AUTO: 0 % (ref 0–1)
BUN SERPL-MCNC: 7 MG/DL (ref 5–25)
CALCIUM SERPL-MCNC: 8.2 MG/DL (ref 8.3–10.1)
CHLORIDE SERPL-SCNC: 110 MMOL/L (ref 100–108)
CO2 SERPL-SCNC: 25 MMOL/L (ref 21–32)
CREAT SERPL-MCNC: 0.92 MG/DL (ref 0.6–1.3)
EOSINOPHIL # BLD AUTO: 0.17 THOUSAND/ΜL (ref 0–0.61)
EOSINOPHIL NFR BLD AUTO: 2 % (ref 0–6)
ERYTHROCYTE [DISTWIDTH] IN BLOOD BY AUTOMATED COUNT: 12.7 % (ref 11.6–15.1)
GFR SERPL CREATININE-BSD FRML MDRD: 89 ML/MIN/1.73SQ M
GLUCOSE SERPL-MCNC: 137 MG/DL (ref 65–140)
GLUCOSE SERPL-MCNC: 97 MG/DL (ref 65–140)
HCT VFR BLD AUTO: 36.7 % (ref 36.5–49.3)
HGB BLD-MCNC: 12.7 G/DL (ref 12–17)
IMM GRANULOCYTES # BLD AUTO: 0.02 THOUSAND/UL (ref 0–0.2)
IMM GRANULOCYTES NFR BLD AUTO: 0 % (ref 0–2)
LYMPHOCYTES # BLD AUTO: 1.66 THOUSANDS/ΜL (ref 0.6–4.47)
LYMPHOCYTES NFR BLD AUTO: 18 % (ref 14–44)
MAGNESIUM SERPL-MCNC: 2 MG/DL (ref 1.6–2.6)
MCH RBC QN AUTO: 31.7 PG (ref 26.8–34.3)
MCHC RBC AUTO-ENTMCNC: 34.6 G/DL (ref 31.4–37.4)
MCV RBC AUTO: 92 FL (ref 82–98)
MONOCYTES # BLD AUTO: 0.89 THOUSAND/ΜL (ref 0.17–1.22)
MONOCYTES NFR BLD AUTO: 10 % (ref 4–12)
NEUTROPHILS # BLD AUTO: 6.38 THOUSANDS/ΜL (ref 1.85–7.62)
NEUTS SEG NFR BLD AUTO: 70 % (ref 43–75)
NRBC BLD AUTO-RTO: 0 /100 WBCS
PHOSPHATE SERPL-MCNC: 2.6 MG/DL (ref 2.3–4.1)
PLATELET # BLD AUTO: 196 THOUSANDS/UL (ref 149–390)
PMV BLD AUTO: 9.8 FL (ref 8.9–12.7)
POTASSIUM SERPL-SCNC: 3.6 MMOL/L (ref 3.5–5.3)
RBC # BLD AUTO: 4.01 MILLION/UL (ref 3.88–5.62)
SODIUM SERPL-SCNC: 141 MMOL/L (ref 136–145)
WBC # BLD AUTO: 9.16 THOUSAND/UL (ref 4.31–10.16)

## 2021-07-16 PROCEDURE — 80048 BASIC METABOLIC PNL TOTAL CA: CPT | Performed by: FAMILY MEDICINE

## 2021-07-16 PROCEDURE — 82948 REAGENT STRIP/BLOOD GLUCOSE: CPT

## 2021-07-16 PROCEDURE — 85730 THROMBOPLASTIN TIME PARTIAL: CPT | Performed by: RADIOLOGY

## 2021-07-16 PROCEDURE — 83735 ASSAY OF MAGNESIUM: CPT | Performed by: FAMILY MEDICINE

## 2021-07-16 PROCEDURE — 85025 COMPLETE CBC W/AUTO DIFF WBC: CPT | Performed by: FAMILY MEDICINE

## 2021-07-16 PROCEDURE — NC001 PR NO CHARGE: Performed by: EMERGENCY MEDICINE

## 2021-07-16 PROCEDURE — 71045 X-RAY EXAM CHEST 1 VIEW: CPT

## 2021-07-16 PROCEDURE — 84100 ASSAY OF PHOSPHORUS: CPT | Performed by: FAMILY MEDICINE

## 2021-07-16 PROCEDURE — 94640 AIRWAY INHALATION TREATMENT: CPT

## 2021-07-16 PROCEDURE — 99233 SBSQ HOSP IP/OBS HIGH 50: CPT | Performed by: EMERGENCY MEDICINE

## 2021-07-16 PROCEDURE — 85730 THROMBOPLASTIN TIME PARTIAL: CPT | Performed by: EMERGENCY MEDICINE

## 2021-07-16 PROCEDURE — 99232 SBSQ HOSP IP/OBS MODERATE 35: CPT | Performed by: PSYCHIATRY & NEUROLOGY

## 2021-07-16 RX ORDER — ALBUTEROL SULFATE 2.5 MG/3ML
2.5 SOLUTION RESPIRATORY (INHALATION) EVERY 6 HOURS PRN
Status: DISCONTINUED | OUTPATIENT
Start: 2021-07-16 | End: 2021-07-19

## 2021-07-16 RX ORDER — HEPARIN SODIUM 10000 [USP'U]/100ML
3-21.1 INJECTION, SOLUTION INTRAVENOUS
Status: DISCONTINUED | OUTPATIENT
Start: 2021-07-16 | End: 2021-07-21

## 2021-07-16 RX ORDER — ALBUTEROL SULFATE 2.5 MG/3ML
SOLUTION RESPIRATORY (INHALATION)
Status: COMPLETED
Start: 2021-07-16 | End: 2021-07-16

## 2021-07-16 RX ADMIN — NOREPINEPHRINE BITARTRATE 15 MCG/MIN: 1 INJECTION, SOLUTION, CONCENTRATE INTRAVENOUS at 03:30

## 2021-07-16 RX ADMIN — ALBUTEROL SULFATE 2.5 MG: 2.5 SOLUTION RESPIRATORY (INHALATION) at 22:02

## 2021-07-16 RX ADMIN — MIDODRINE HYDROCHLORIDE 5 MG: 5 TABLET ORAL at 12:58

## 2021-07-16 RX ADMIN — ALBUTEROL SULFATE 2.5 MG: 2.5 SOLUTION RESPIRATORY (INHALATION) at 22:10

## 2021-07-16 RX ADMIN — HEPARIN SODIUM 21.1 UNITS/KG/HR: 10000 INJECTION, SOLUTION INTRAVENOUS at 12:26

## 2021-07-16 RX ADMIN — ASPIRIN 81 MG CHEWABLE TABLET 81 MG: 81 TABLET CHEWABLE at 08:33

## 2021-07-16 RX ADMIN — ATORVASTATIN CALCIUM 40 MG: 20 TABLET, FILM COATED ORAL at 17:56

## 2021-07-16 RX ADMIN — MIDODRINE HYDROCHLORIDE 5 MG: 5 TABLET ORAL at 08:33

## 2021-07-16 RX ADMIN — MIDODRINE HYDROCHLORIDE 5 MG: 5 TABLET ORAL at 17:56

## 2021-07-16 RX ADMIN — NOREPINEPHRINE BITARTRATE 19 MCG/MIN: 1 INJECTION, SOLUTION, CONCENTRATE INTRAVENOUS at 07:56

## 2021-07-16 NOTE — PROGRESS NOTES
Daily Progress Note - [address] 64 [address] o  male MRN: [id number]  Unit/Bed#: ICU 10 Encounter: [id number]        ----------------------------------------------------------------------------------------  HPI/24hr events:   Evgeny Quiros is a 64year old male with a PMH of TIA and HTN who presented from [facility] on 7/14 with new non-occlusive thrombus in right distal ICA, right M1, and right A1 segment with small mobile thrombus in R ICA  Transferred for ICU for neurologic monitoring and blood pressure management  24 Hr: No acute events overnight  PTT therapeutic yesterday  Stable CT scan  No neuro changes  ---------------------------------------------------------------------------------------  SUBJECTIVE  No acute complaints  The patient denies any headaches, dizziness, syncope, changes in vision, weakness, numbness, chest pain, SOB, abdominal pain, nausea, vomiting, diarrhea, or lower extremity pain/swelling  Review of Systems   Constitutional: Positive for activity change  Negative for appetite change, chills, fatigue and fever  Respiratory: Negative for cough, choking, chest tightness, shortness of breath and wheezing  Cardiovascular: Negative for chest pain and leg swelling  Gastrointestinal: Negative for abdominal pain, diarrhea, nausea and vomiting  Genitourinary: Negative for dysuria, frequency and urgency  Musculoskeletal: Negative for arthralgias, back pain, gait problem and joint swelling  Skin: Negative for color change, pallor, rash and wound  Neurological: Negative for dizziness, seizures, weakness and headaches  Psychiatric/Behavioral: Negative for agitation and behavioral problems  All other systems reviewed and are negative      Review of systems was reviewed and negative unless stated above in HPI/24-hour events   ---------------------------------------------------------------------------------------  Assessment and Plan:    Neuro:  Diagnosis:Right MCA infarct, right carotid stenosis, ICA/MCA/LUZ thrombus- Not a TPA candidate   o Plan: Heparin GTT with PTT goal of 60-90  - PTT q4 hours  - SBP goals 160-180 maintained on levophed  - ASA 81mg daily  - Midodrine 5 TID added for BP goals   o Neuro Checks Q2 hour  o Neurology Consulted, Neurosurgery Consulted, following recommendations   o Stat head CT for change in neuro exam/ GCS change >2 in 1 hour  o Imaging:  - [facility] 7/15- Stable right MCA territory infarct with vasogenic edema in the basal ganglia  No hemorrhagic conversion or mass effect   Diagnosis: At risk for ICU Delirium   o CAM-ICU  o Regulate Sleep Wake Cycle  o Redirection as needed   Analgesia:  o Tylenol 650mg Q6 PRN       CV:    Diagnosis:Hypertension  o Plan:Home HCTZ held at this time in setting of BP goals  o -180, maintained on levophed  o Continue Cardiopulmonary Monitoring   o MAP >65  o Norepinephrine 18 mcg/min   Diagnosis:History of hyperlipidemia  o Plan:Lipitor 40 mg PO daily       Pulm:   Diagnosis: No Active Issues   o Plan: Continue Cardiopulmonary Monitoring   o Maintain SPO2 >90%      GI:    Diagnosis: No active Issues    Bowel Regimen:Sennakot/MiraLAx PRN    Stress Ulcer Prophylaxis:Not indicated       :    Diagnosis: No active Issues   o Continue to monitor BUN/Cr   - Baseline: 0 9  - Today:0 92  o Continue to Monitor I/O      F/E/N:    F: None   E: Replete as needed, continue to trend electrolytes  Goal: K >4 0, Mg >2 0, Phos >3 0   N: Regular House         Heme/Onc:    Diagnosis:No acute issues  o Continue to monitor CBC, Current HgB 9  o Consider Transfusion for Hemoglobin <7   DVT Prophylaxis: Heparin Drip, bilateral SCD      Endo:   Diagnosis:No active Issues   o Maintain Euglycemia  o Goal blood glucose 140-180 in the setting of critical illness       ID:    Diagnosis: No Active Issues   o Continue to Monitor Fever Curve and WBC Count       MSK/Skin:   Diagnosis:  At risk for pressure ulcers  o Plan: Q2 Turning  o Strict skin surveillance   o PT/OT Eval and Treat        Disposition: Continue Critical Care   Code Status: Level 1 - Full Code  ---------------------------------------------------------------------------------------  ICU CORE MEASURES    Prophylaxis   VTE Pharmacologic Prophylaxis: Heparin Drip  VTE Mechanical Prophylaxis: sequential compression device  Stress Ulcer Prophylaxis: Prophylaxis Not Indicated     ABCDE Protocol (if indicated)  Plan to perform spontaneous awakening trial today? Not applicable  Plan to perform spontaneous breathing trial today? Not applicable  Obvious barriers to extubation? Not applicable  CAM-ICU: Negative    Invasive Devices Review  Invasive Devices     Central Venous Catheter Line            CVC Central Lines 21 Triple 16cm 1 day          Peripheral Intravenous Line            Peripheral IV 21 Left Antecubital 1 day    Peripheral IV 21 Right Antecubital 1 day          Arterial Line            Arterial Line 21 Radial 1 day              Can any invasive devices be discontinued today? No  ---------------------------------------------------------------------------------------  OBJECTIVE    Vitals   Vitals:    21 0300 21 0400 21 0500 21 0538   BP:       BP Location:       Pulse: 90 62 64    Resp: (!) 25 (!) 23 (!) 23    Temp:       TempSrc:       SpO2: 95% 93% 95%    Weight:    101 kg (222 lb 7 1 oz)   Height:         Temp (24hrs), Av 6 °F (37 °C), Min:98 2 °F (36 8 °C), Max:99 °F (37 2 °C)  Current: Temperature: 99 °F (37 2 °C)  HR: 66  BP: 140/60  RR: 20  SpO2: 100    Respiratory:  SpO2: SpO2: 94 %       Invasive/non-invasive ventilation settings   Respiratory    Lab Data (Last 4 hours)    None         O2/Vent Data (Last 4 hours)    None                Physical Exam  Vitals and nursing note reviewed  Constitutional:       General: He is not in acute distress  Appearance: Normal appearance   He is normal weight  He is not ill-appearing or toxic-appearing  HENT:      Head: Normocephalic and atraumatic  Nose: Nose normal       Mouth/Throat:      Mouth: Mucous membranes are moist       Pharynx: Oropharynx is clear  No oropharyngeal exudate or posterior oropharyngeal erythema  Eyes:      General: No visual field deficit  Right eye: No discharge  Left eye: No discharge  Conjunctiva/sclera: Conjunctivae normal       Pupils: Pupils are equal, round, and reactive to light  Cardiovascular:      Rate and Rhythm: Normal rate and regular rhythm  Pulses: Normal pulses  Heart sounds: Normal heart sounds  No murmur heard  No friction rub  No gallop  Pulmonary:      Effort: Pulmonary effort is normal  No respiratory distress  Breath sounds: Normal breath sounds  No stridor  No wheezing, rhonchi or rales  Abdominal:      General: Abdomen is flat  Bowel sounds are normal  There is no distension  Palpations: Abdomen is soft  There is no mass  Musculoskeletal:         General: No swelling or tenderness  Normal range of motion  Skin:     General: Skin is warm and dry  Capillary Refill: Capillary refill takes less than 2 seconds  Neurological:      Mental Status: He is alert and oriented to person, place, and time  GCS: GCS eye subscore is 4  GCS verbal subscore is 5  GCS motor subscore is 6  Cranial Nerves: Cranial nerve deficit and facial asymmetry present  No dysarthria  Sensory: Sensation is intact  No sensory deficit  Motor: Motor function is intact  No weakness, tremor, atrophy, abnormal muscle tone or seizure activity  Coordination: Coordination is intact  Gait: Gait is intact        Comments: GCS 15, alert and oriented to person,place, and time  Sensation and strength (5/5) intact in bilateral upper and lower extremities   Notable left sided facial droop appears stable  No dysdiadokenesia/dysmetria   Dysarthria improved on exam this morning           Laboratory and Diagnostics:  Results from last 7 days   Lab Units 07/15/21  0552 07/14/21  1706 07/14/21  1349   WBC Thousand/uL 7 66 4 91 4 85   HEMOGLOBIN g/dL 13 6 14 2 14 7   HEMATOCRIT % 38 9 41 2 43 1   PLATELETS Thousands/uL 221 224 212     Results from last 7 days   Lab Units 07/15/21  0552 07/14/21  1349   SODIUM mmol/L 140 142   POTASSIUM mmol/L 3 6 3 4*   CHLORIDE mmol/L 111* 104   CO2 mmol/L 22 31   ANION GAP mmol/L 7 7   BUN mg/dL 12 20   CREATININE mg/dL 0 98 1 30   CALCIUM mg/dL 8 3 8 9   GLUCOSE RANDOM mg/dL 133 102   ALT U/L 32  --    AST U/L 17  --    ALK PHOS U/L 56  --    ALBUMIN g/dL 3 6  --    TOTAL BILIRUBIN mg/dL 1 78*  --           Results from last 7 days   Lab Units 07/16/21  0044 07/15/21  2037 07/15/21  1621 07/15/21  1154 07/15/21  0552 07/14/21  2326 07/14/21  1706 07/14/21  1349   INR   --   --   --   --   --   --  0 97 0 92   PTT seconds 79* 81* 77* 56* 39* 32 28 26      Results from last 7 days   Lab Units 07/14/21  1349   TROPONIN I ng/mL <0 02         ABG:    VBG:          Micro        Imaging:  I have personally reviewed pertinent reports  Intake and Output  I/O       07/14 0701 - 07/15 0700 07/15 0701 - 07/16 0700    P  O   720    I V  (mL/kg) 2397 7 (24) 2536 8 (25 1)    IV Piggyback  250    Total Intake(mL/kg) 2397 7 (24) 3506 8 (34 7)    Urine (mL/kg/hr) 1200 1990 (0 8)    Stool  0    Total Output 1200 1990    Net +1197 7 +1516 8          Unmeasured Urine Occurrence  1 x    Unmeasured Stool Occurrence  1 x          Height and Weights   Height: 5' 11" (180 3 cm)  IBW (Ideal Body Weight): 75 3 kg  Body mass index is 31 02 kg/m²    Weight (last 2 days)     Date/Time   Weight    07/16/21 0538   101 (222 44)    07/14/21 1729   100 (220 46)    07/14/21 1640   103 (227 18)                Nutrition       Diet Orders   (From admission, onward)             Start     Ordered    07/15/21 0808  Diet Regular; Regular House  Diet effective now     Question Answer Comment   Diet Type Regular    Regular Regular House    RD to adjust diet per protocol? No        07/15/21 0807                Active Medications  Scheduled Meds:  Current Facility-Administered Medications   Medication Dose Route Frequency Provider Last Rate    acetaminophen  650 mg Oral Q6H PRN Rene Butts, DO      aspirin  81 mg Oral Daily Rene Butts, DO      atorvastatin  40 mg Oral QPM Rene Butts, DO      heparin (porcine)  3-20 Units/kg/hr (Order-Specific) Intravenous Titrated Rene Butts, DO 21 1 Units/kg/hr (07/15/21 2118)    midodrine  5 mg Oral TID AC Kaushik Freitas PA-C      norepinephrine  1-30 mcg/min Intravenous Titrated Rene Butts, DO 16 mcg/min (07/16/21 0539)     Continuous Infusions:  heparin (porcine), 3-20 Units/kg/hr (Order-Specific), Last Rate: 21 1 Units/kg/hr (07/15/21 2118)  norepinephrine, 1-30 mcg/min, Last Rate: 16 mcg/min (07/16/21 0539)      PRN Meds:   acetaminophen, 650 mg, Q6H PRN        Allergies   No Known Allergies  ---------------------------------------------------------------------------------------  Advance Directive and Living Will:      Power of :    POLST:    ---------------------------------------------------------------------------------------  Care Time Delivered:   No Critical Care time spent     Automatic ZAINA Schmidt      Portions of the record may have been created with voice recognition software  Occasional wrong word or "sound a like" substitutions may have occurred due to the inherent limitations of voice recognition software    Read the chart carefully and recognize, using context, where substitutions have occurred

## 2021-07-16 NOTE — PROGRESS NOTES
NEUROLOGY RESIDENCY PROGRESS NOTE     Name: Russell Houston   Age & Sex: 64 y o  male   MRN: [id number]  Unit/Bed#: ICU 10   Encounter: [id number]    ASSESSMENT & PLAN     * CVA (cerebral vascular accident) [facility])  Assessment & Plan  Assessment/Impression: 65 y/o M w/ acute ischemic strokes likely 2/2 mobile carotid thrombus (likely etiology for his prior strokes as well), currently significantly improved and overall stable    Presenting sx: left facial droop, dysarthria, LKN 2100 reportedly the night PTA (approx 20 hrs prior to initial consult), but on further discussion w/ family it appears his [de-identified] may have been the day before yesterday and that he was experiencing a right-sided headache for about 2 weeks which peaked around the time the family noticed sx   NIHSS: 2 on arrival    tPA not given 2/2 time course, non-disabling sx   CTH/CTA:  Right caudate/lentiform/BG hypodensity and right frontal focal encephalomalacia due to remote infarct, small, likely chronic infarct in the right parietal lobe; severe right ICA stenosis (noncalcified plaque at the carotid bifurcation), and nearly occlusive thrombus in the right supraclinoid ICA extending to the terminus, and into right M1 and A1 segments; hypoplastic but patent left A1   MRI: new focal ischemia in medial R T lobe, R corpus striatum, R corona radiata, R frontal lobe (all R MCA territory strokes); no susceptibility weighting imaging signal consistent w/ old or new blood products   Labs: A1c 5 3, LDL 97,    Echo: LVEF 75%, mild concentric hypertrophy, no RWMA, no PFO on bubble, trace MR, TR, NH, mild dilatation of the ascending aorta (4 2 cm)   Exam: mild left facial weakness w/ flattening of nasolabial fold but no residual dysarthria or asymmetric tongue protrusion; continues to have minimal deficits    0   Lab Value Date/Time    PTT 76 (H) 07/16/2021 0535     Plan: Stroke pathway, repeat CT next week, can likely downgrade to SD   Heparin gtt, PTT q4-q6, goal PTT 60-90 given known thrombus (given difficulty in reaching PTT goal, possibly consider thrombophilic workup?) - current PTT w/in goal   ASA, statin   Goal SBP < 180 (goal 160-180) this morning, can start to trend towards normotension thereafter if patient remains asx, levo for support (pt has RIJ), goal  while on [facility]   If patient deteriorates, stat [address] and endovascular may reconsider intervention - reach out to neurovas (they are following)   Frequent neuro checks: stat CTH for acute change   Replete lytes to goal K 4, Mg 2, PO4 4   Euglycemia (gluc < 180, SSI or drip as needed), normothermia   PT/OT/PMR/ST w/ swallow eval   Repeat CTA H&N 1 wk   F/u w/ neuro stroke clinic   Neuro will continue to follow peripherally    Hypertension  Assessment & Plan  As above    Ankit Lam will need follow up in in 4 weeks with neurovascular    He will not require outpatient neurological testing at this time    SUBJECTIVE     Patient was seen and examined  No critical events overnight  Sx improving from yesterday  Pt denied N/V/F/C, abdominal pain, HA, dizziness, confusion, focal weakness or dysarthria, any new neurologic sx    ROS negative unless otherwise noted    OBJECTIVE     Patient ID: Ankit Lam is a 64 y o  male  Vitals:    21 0700 21 0800 21 0900 21 1000   BP:       BP Location:       Pulse: 64 68 68 80   Resp: 22 22 21 (!) 26   Temp:       TempSrc:       SpO2: 96% 96% 95% 95%   Weight:       Height:          Temperature:   Temp (24hrs), Av 6 °F (37 °C), Min:98 2 °F (36 8 °C), Max:99 °F (37 2 °C)    Temperature: 98 6 °F (37 °C)    Physical Exam  Vitals reviewed  Constitutional:       General: He is not in acute distress  Appearance: Normal appearance  He is not ill-appearing, toxic-appearing or diaphoretic  HENT:      Head: Normocephalic and atraumatic        Right Ear: External ear normal       Left Ear: External ear normal  Nose: Nose normal  No congestion or rhinorrhea  Mouth/Throat:      Mouth: Mucous membranes are moist       Pharynx: Oropharynx is clear  No oropharyngeal exudate or posterior oropharyngeal erythema  Eyes:      General: No scleral icterus  Right eye: No discharge  Left eye: No discharge  Extraocular Movements: Extraocular movements intact and EOM normal       Conjunctiva/sclera: Conjunctivae normal       Pupils: Pupils are equal, round, and reactive to light  Neck:      Comments: RIJ  Pulmonary:      Effort: Pulmonary effort is normal  No respiratory distress  Skin:     Coloration: Skin is not pale  Findings: No erythema or rash  Neurological:      Mental Status: He is alert and oriented to person, place, and time  Cranial Nerves: Cranial nerve deficit present  Sensory: No sensory deficit  Motor: No weakness  Coordination: Coordination normal  Finger-Nose-Finger Test abnormal (very subtle incoordination LUE)  Deep Tendon Reflexes: Reflexes abnormal    Psychiatric:         Mood and Affect: Mood normal          Speech: Speech normal          Behavior: Behavior normal      Neurologic Exam     Mental Status   Oriented to person, place, and time  Follows 2 step commands  Attention: normal    Speech: speech is normal   Level of consciousness: alert  Knowledge: consistent with education  Able to name object  Able to repeat  Normal comprehension  Cranial Nerves     CN II   Right visual field deficit: upper temporal and lower temporal (subtle deficit) quadrant(s)    CN III, IV, VI   Pupils are equal, round, and reactive to light  Extraocular motions are normal    Right pupil: Reactivity: brisk  Consensual response: intact  Left pupil: Reactivity: brisk  Consensual response: intact  CN III: no CN III palsy  CN VI: no CN VI palsy  Nystagmus: none     CN V   Facial sensation intact       CN VII   Left facial weakness: central (subtle flattening of nasolabial fold)    CN VIII   Hearing: intact    CN IX, X   Palate: symmetric    CN XI   Right trapezius strength: normal  Left trapezius strength: normal    CN XII   CN XII normal    Tongue deviation: none    Motor Exam   Muscle bulk: normal  Overall muscle tone: normal  Right arm pronator drift: absent  Left arm pronator drift: absent    Strength   Right deltoid: 5/5  Left deltoid: 5/5  Right biceps: 5/5  Left biceps: 5/5  Right triceps: 5/5  Left triceps: 5/5  Right iliopsoas: 5/5  Left iliopsoas: 5/5  Right anterior tibial: 5/5  Left anterior tibial: 5/5  Right gastroc: 5/5  Left gastroc: 5/5    Sensory Exam   Light touch normal    Temperature intact and symmetric     Gait, Coordination, and Reflexes     Coordination   Finger to nose coordination: abnormal (very subtle incoordination LUE)    Tremor   Resting tremor: absent  Action tremor: absent  DTR's diffusely brisk     LABORATORY DATA     Labs: I have personally reviewed pertinent reports      Results from last 7 days   Lab Units 07/16/21  0535 07/15/21  0552 07/14/21  1706   WBC Thousand/uL 9 16 7 66 4 91   HEMOGLOBIN g/dL 12 7 13 6 14 2   HEMATOCRIT % 36 7 38 9 41 2   PLATELETS Thousands/uL 196 221 224   NEUTROS PCT % 70  --   --    MONOS PCT % 10  --   --       Results from last 7 days   Lab Units 07/16/21  0535 07/15/21  0552 07/14/21  1349   POTASSIUM mmol/L 3 6 3 6 3 4*   CHLORIDE mmol/L 110* 111* 104   CO2 mmol/L 25 22 31   BUN mg/dL 7 12 20   CREATININE mg/dL 0 92 0 98 1 30   CALCIUM mg/dL 8 2* 8 3 8 9   ALK PHOS U/L  --  56  --    ALT U/L  --  32  --    AST U/L  --  17  --      Results from last 7 days   Lab Units 07/16/21  0535   MAGNESIUM mg/dL 2 0     Results from last 7 days   Lab Units 07/16/21  0535   PHOSPHORUS mg/dL 2 6      Results from last 7 days   Lab Units 07/16/21  0944 07/16/21  0535 07/16/21  0044 07/14/21  1706 07/14/21  1349   INR   --   --   --  0 97 0 92   PTT seconds 76* 76* 79* 28 26         Results from last 7 days   Lab Units 07/14/21  1349   TROPONIN I ng/mL <0 02     ABG:No results found for: PHART, KIN9GDS, PO2ART, TAT3VPF, F8EALQQD, BEART, SOURCE    IMAGING & DIAGNOSTIC TESTING   Radiology Results: I have personally reviewed pertinent reports  and I have personally reviewed pertinent films in PACS  CT head wo contrast   Final Result by Michele Braswell MD (07/15 1941)         Stable right MCA territory infarct with vasogenic edema in the basal ganglia  No hemorrhagic conversion or significant mass effect  Workstation performed: [id number]         MRI brain wo contrast   Final Result by Adriana Chavez MD (07/14 8374)      Medial right temporal lobe, right corpus striatum, right corona radiata and right frontal cortical foci of acute ischemia  Findings were marked as "immediate"in Epic and will now be related to the ordering physician or covering clinical team by the radiology liaison  Workstation performed: [id number]         XR chest portable   Final Result by Mary Bran MD (07/15 0815)      No acute cardiopulmonary disease  Workstation performed: [id number]           Other Diagnostic Testing: I have personally reviewed pertinent reports        ACTIVE MEDICATIONS     Current Facility-Administered Medications   Medication Dose Route Frequency    acetaminophen (TYLENOL) tablet 650 mg  650 mg Oral Q6H PRN    aspirin chewable tablet 81 mg  81 mg Oral Daily    atorvastatin (LIPITOR) tablet 40 mg  40 mg Oral QPM    heparin (porcine) 25,000 units in 0 45% NaCl 250 mL infusion (premix)  3-20 Units/kg/hr (Order-Specific) Intravenous Titrated    midodrine (PROAMATINE) tablet 5 mg  5 mg Oral TID AC    norepinephrine (LEVOPHED) 4 mg (STANDARD CONCENTRATION) IV in sodium chloride 0 9% 250 mL  1-30 mcg/min Intravenous Titrated     Medication Sig   acetaminophen (TYLENOL) 650 mg CR tablet Take 1 tablet (650 mg total) by mouth every 8 (eight) hours as needed for mild pain hydrochlorothiazide (HYDRODIURIL) 25 mg tablet Take 25 mg by mouth daily     VTE Pharmacologic Prophylaxis: Heparin Drip  VTE Mechanical Prophylaxis: sequential compression device    ==  Rosa Irwni MD  Resident, Neurology, PGY-2  [address] [place] [address]

## 2021-07-16 NOTE — PROGRESS NOTES
[facility]  Progress Note - Reza Han 1960, 64 y o  male MRN: [id number]  Unit/Bed#: ICU 10 Encounter: [id number]  Primary Care Provider: Deanna Christina DO   Date and time admitted to hospital: 7/14/2021  4:22 PM    * CVA (cerebral vascular accident) [facility])  Assessment & Plan  · Right MCA infarct, right carotid stenosis, ICA/MCA/LUZ thrombus- Not a TPA candidate   ? Plan: Heparin GTT with PTT goal of 60-90  § PTT q4 hours  § ASA 81mg daily  § Midodrine 5 TID added for BP goals   ? Neuro Checks Q2 hour  ? Neurology Consulted, Neurosurgery Consulted, following recommendations   ? Stat head CT for change in neuro exam/ GCS change >2 in 1 hour  ? Imaging:  § [facility] 7/15- Stable right MCA territory infarct with vasogenic edema in the basal ganglia  No hemorrhagic conversion or mass effect  Hypertension  Assessment & Plan  · History of HTN- takes home HCTZ  · Liberalized from BP Goals per neuro surgery    Hyperlipidemia  Assessment & Plan  Home Lipitor 20mg daily  Code Status: Level 1 - Full Code  POA:    POLST:      Reason for ICU admission:   For neurologic monitoring status post CVA with thrombus with heparinization and blood pressure management    Active problems:   Principal Problem:    CVA (cerebral vascular accident) ([address] )  Active Problems:    Hyperlipidemia  Resolved Problems:    * No resolved hospital problems  *      Consultants:   Neurosurgery  Neurology  PMR    History of Present Illness:   Reza Han is a 64 y o  male who presents with PMH TIA and hypertension who presented to [facility] 7/14 for concern of new onset L sided facial droop and slurred speech since he woke up at 8am today   CTH showed Acute infarct in the right basal ganglia, hyperdense distal right ICA and right MCA consistent with thrombus, small chronic infarct in the right frontal lobe with CTA demonstrating noncalcified plaque in the proximal right internal carotid artery with small mobile thrombus at the distal aspect, nearly occlusive thrombus in the distal right ICA, right M1, and R A1 segment  Patient not a tpa candidate due to time frame, not a thrombectomy candidate per neurosurgery as he still had flow, and concern for propagation of clot  He was started on heparin drip per neurosurgery recommendations and transferred here for close blood pressure management and in case there is acute decompensation so that neurosurgery can intervene  Summary of clinical course: The patient was admitted to the ICU for close blood pressure management and neurologic monitoring secondary to mobile thrombus in carotid artery  Patient was heparinized due to mobile thrombus with a repeat CT of the head once therapeutic, revealing stable  right MCA territory infarct with vasogenic edema  Patient received high doses of norepinephrine for blood pressure goals, patient is immobilized in blood pressure goals as well as vasoactive medication  Patient neurologically remained stable and hemodynamically stable while in the ICU  Patient persists with left-sided facial droop, this has been present since admission, and remained stable  Patient remains on heparin drip likely for extended course of approximately 1 week  Will receive Q6 PTT for remainder of day then Qday  Neurosurgery following, Neurology following           Cultures:   None       Mobilization Plan:   Out of bed as tolerated    Nutrition Plan:   Regular house diet    Invasive Devices Review  Invasive Devices     Central Venous Catheter Line            CVC Central Lines 07/14/21 Triple 16cm 1 day          Peripheral Intravenous Line            Peripheral IV 07/14/21 Left Antecubital 1 day    Peripheral IV 07/14/21 Right Antecubital 1 day          Arterial Line            Arterial Line 07/14/21 Radial 1 day                Rationale for remaining devices:   Central venous catheter and arterial line to be removed prior to transfer    VTE Pharmacologic Prophylaxis: Heparin Drip  VTE Mechanical Prophylaxis: sequential compression device    Discharge Plan:   Initial Physical Therapy Recommendations:  No rehab  Initial Occupational Therapy Recommendations:  Pending  Initial /Plan:  Pending    Home medications that are not reordered and reason why:   HCTZ secondary to blood pressure management    Spoke with Angy Rich  regarding transfer  Please contact critical care via [employer] with any questions or concerns  Portions of the record may have been created with voice recognition software  Occasional wrong word or "sound a like" substitutions may have occurred due to the inherent limitations of voice recognition software  Read the chart carefully and recognize, using context, where substitutions have occurred

## 2021-07-16 NOTE — QUICK NOTE
PMR Consults    Reviewed therapies: as suspected, Sup-Ind with all mobility and ADLs  If able to maintain this function, and remains stable, he can be discharged home  Not a candidate for acute inpatient rehab at this time  Will follow peripherally  Call/Tigertext for questions      Rose Mary Earl MD  Physical Medicine and Rehabilitation

## 2021-07-16 NOTE — PLAN OF CARE
Problem: Activity Intolerance/Impaired Mobility  Goal: Mobility/activity is maintained at optimum level for patient  Description: Interventions:  - Assess and monitor patient  barriers to mobility and need for assistive/adaptive devices  - Assess patient's emotional response to limitations  - Collaborate with interdisciplinary team and initiate plans and interventions as ordered  - Encourage independent activity per ability   - Maintain proper body alignment  - Perform active/passive rom as tolerated/ordered  - Plan activities to conserve energy   - Turn patient as appropriate  Outcome: Progressing     Problem: Neurological Deficit  Goal: Neurological status is stable or improving  Description: Interventions:  - Monitor and assess patient's level of consciousness, motor function, sensory function, and level of assistance needed for ADLs  - Monitor and report changes from baseline  Collaborate with interdisciplinary team to initiate plan and implement interventions as ordered  - Provide and maintain a safe environment  - Consider seizure precautions  - Consider fall precautions  - Consider aspiration precautions  - Consider bleeding precautions  Outcome: Progressing     Problem: Potential for Aspiration  Goal: Non-ventilated patient's risk of aspiration is minimized  Description: Assess and monitor vital signs, respiratory status, and labs (WBC)  Monitor for signs of aspiration (tachypnea, cough, rales, wheezing, cyanosis, fever)  - Assess and monitor patient's ability to swallow  - Place patient up in chair to eat if possible  - HOB up at 90 degrees to eat if unable to get patient up into chair   - Supervise patient during oral intake  - Instruct patient/ family to take small bites  - Instruct patient/ family to take small single sips when taking liquids    - Follow patient-specific strategies generated by speech pathologist   Outcome: Progressing     Problem: Nutrition  Goal: Nutrition/Hydration status is improving  Description: Monitor and assess patient's nutrition/hydration status for malnutrition (ex- brittle hair, bruises, dry skin, pale skin and conjunctiva, muscle wasting, smooth red tongue, and disorientation)  Collaborate with interdisciplinary team and initiate plan and interventions as ordered  Monitor patient's weight and dietary intake as ordered or per policy  Utilize nutrition screening tool and intervene per policy  Determine patient's food preferences and provide high-protein, high-caloric foods as appropriate  - Assist patient with eating   - Allow adequate time for meals   - Encourage patient to take dietary supplement as ordered  - Collaborate with clinical nutritionist   - Include patient/family/caregiver in decisions related to nutrition    Outcome: Progressing     Problem: PAIN - ADULT  Goal: Verbalizes/displays adequate comfort level or baseline comfort level  Description: Interventions:  - Encourage patient to monitor pain and request assistance  - Assess pain using appropriate pain scale  - Administer analgesics based on type and severity of pain and evaluate response  - Implement non-pharmacological measures as appropriate and evaluate response  - Consider cultural and social influences on pain and pain management  - Notify physician/advanced practitioner if interventions unsuccessful or patient reports new pain  Outcome: Progressing     Problem: INFECTION - ADULT  Goal: Absence or prevention of progression during hospitalization  Description: INTERVENTIONS:  - Assess and monitor for signs and symptoms of infection  - Monitor lab/diagnostic results  - Monitor all insertion sites, i e  indwelling lines, tubes, and drains  - Monitor endotracheal if appropriate and nasal secretions for changes in amount and color  - [place] appropriate cooling/warming therapies per order  - Administer medications as ordered  - Instruct and encourage patient and family to use good hand hygiene technique  - Identify and instruct in appropriate isolation precautions for identified infection/condition  Outcome: Progressing     Problem: Knowledge Deficit  Goal: Patient/family/caregiver demonstrates understanding of disease process, treatment plan, medications, and discharge instructions  Description: Complete learning assessment and assess knowledge base    Interventions:  - Provide teaching at level of understanding  - Provide teaching via preferred learning methods  Outcome: Progressing     Problem: Potential for Falls  Goal: Patient will remain free of falls  Description: INTERVENTIONS:  - Educate patient/family on patient safety including physical limitations  - Instruct patient to call for assistance with activity   - Consult OT/PT to assist with strengthening/mobility   - Keep Call bell within reach  - Keep bed low and locked with side rails adjusted as appropriate  - Keep care items and personal belongings within reach  - Initiate and maintain comfort rounds  - Make Fall Risk Sign visible to staff  - Offer Toileting every 2 Hours, in advance of need  - Initiate/Maintain bed/chair alarm  - Obtain necessary fall risk management equipment: bed alarm  - Apply yellow socks and bracelet for high fall risk patients  - Consider moving patient to room near nurses station  Outcome: Progressing     Problem: MOBILITY - ADULT  Goal: Maintain or return to baseline ADL function  Description: INTERVENTIONS:  -  Assess patient's ability to carry out ADLs; assess patient's baseline for ADL function and identify physical deficits which impact ability to perform ADLs (bathing, care of mouth/teeth, toileting, grooming, dressing, etc )  - Assess/evaluate cause of self-care deficits   - Assess range of motion  - Assess patient's mobility; develop plan if impaired  - Assess patient's need for assistive devices and provide as appropriate  - Encourage maximum independence but intervene and supervise when necessary  - Involve family in performance of ADLs  - Assess for home care needs following discharge   - Consider OT consult to assist with ADL evaluation and planning for discharge  - Provide patient education as appropriate  Outcome: Progressing

## 2021-07-16 NOTE — PLAN OF CARE
Problem: Neurological Deficit  Goal: Neurological status is stable or improving  Description: Interventions:  - Monitor and assess patient's level of consciousness, motor function, sensory function, and level of assistance needed for ADLs  - Monitor and report changes from baseline  Collaborate with interdisciplinary team to initiate plan and implement interventions as ordered  - Provide and maintain a safe environment  - Consider seizure precautions  - Consider fall precautions  - Consider aspiration precautions  - Consider bleeding precautions  Outcome: Progressing     Problem: Activity Intolerance/Impaired Mobility  Goal: Mobility/activity is maintained at optimum level for patient  Description: Interventions:  - Assess and monitor patient  barriers to mobility and need for assistive/adaptive devices  - Assess patient's emotional response to limitations  - Collaborate with interdisciplinary team and initiate plans and interventions as ordered  - Encourage independent activity per ability   - Maintain proper body alignment  - Perform active/passive rom as tolerated/ordered  - Plan activities to conserve energy   - Turn patient as appropriate  Outcome: Progressing     Problem: Communication Impairment  Goal: Ability to express needs and understand communication  Description: Assess patient's communication skills and ability to understand information  Patient will demonstrate use of effective communication techniques, alternative methods of communication and understanding even if not able to speak  - Encourage communication and provide alternate methods of communication as needed  - Collaborate with case management/ for discharge needs  - Include patient/family/caregiver in decisions related to communication    Outcome: Progressing     Problem: Potential for Aspiration  Goal: Non-ventilated patient's risk of aspiration is minimized  Description: Assess and monitor vital signs, respiratory status, and labs (WBC)  Monitor for signs of aspiration (tachypnea, cough, rales, wheezing, cyanosis, fever)  - Assess and monitor patient's ability to swallow  - Place patient up in chair to eat if possible  - HOB up at 90 degrees to eat if unable to get patient up into chair   - Supervise patient during oral intake  - Instruct patient/ family to take small bites  - Instruct patient/ family to take small single sips when taking liquids  - Follow patient-specific strategies generated by speech pathologist   Outcome: Progressing     Problem: Nutrition  Goal: Nutrition/Hydration status is improving  Description: Monitor and assess patient's nutrition/hydration status for malnutrition (ex- brittle hair, bruises, dry skin, pale skin and conjunctiva, muscle wasting, smooth red tongue, and disorientation)  Collaborate with interdisciplinary team and initiate plan and interventions as ordered  Monitor patient's weight and dietary intake as ordered or per policy  Utilize nutrition screening tool and intervene per policy  Determine patient's food preferences and provide high-protein, high-caloric foods as appropriate  - Assist patient with eating   - Allow adequate time for meals   - Encourage patient to take dietary supplement as ordered  - Collaborate with clinical nutritionist   - Include patient/family/caregiver in decisions related to nutrition    Outcome: Progressing     Problem: PAIN - ADULT  Goal: Verbalizes/displays adequate comfort level or baseline comfort level  Description: Interventions:  - Encourage patient to monitor pain and request assistance  - Assess pain using appropriate pain scale  - Administer analgesics based on type and severity of pain and evaluate response  - Implement non-pharmacological measures as appropriate and evaluate response  - Consider cultural and social influences on pain and pain management  - Notify physician/advanced practitioner if interventions unsuccessful or patient reports new pain  Outcome: Progressing     Problem: INFECTION - ADULT  Goal: Absence or prevention of progression during hospitalization  Description: INTERVENTIONS:  - Assess and monitor for signs and symptoms of infection  - Monitor lab/diagnostic results  - Monitor all insertion sites, i e  indwelling lines, tubes, and drains  - Monitor endotracheal if appropriate and nasal secretions for changes in amount and color  - [place] appropriate cooling/warming therapies per order  - Administer medications as ordered  - Instruct and encourage patient and family to use good hand hygiene technique  - Identify and instruct in appropriate isolation precautions for identified infection/condition  Outcome: Progressing     Problem: SAFETY ADULT  Goal: Patient will remain free of falls  Description: INTERVENTIONS:  - Educate patient/family on patient safety including physical limitations  - Instruct patient to call for assistance with activity   - Consult OT/PT to assist with strengthening/mobility   - Keep Call bell within reach  - Keep bed low and locked with side rails adjusted as appropriate  - Keep care items and personal belongings within reach  - Initiate and maintain comfort rounds  - Make Fall Risk Sign visible to staff  - Offer Toileting every 2 Hours, in advance of need  - Initiate/Maintain bed alarm  - Obtain necessary fall risk management equipment: bed alarm  - Apply yellow socks and bracelet for high fall risk patients  - Consider moving patient to room near nurses station  Outcome: Progressing  Goal: Maintain or return to baseline ADL function  Description: INTERVENTIONS:  -  Assess patient's ability to carry out ADLs; assess patient's baseline for ADL function and identify physical deficits which impact ability to perform ADLs (bathing, care of mouth/teeth, toileting, grooming, dressing, etc )  - Assess/evaluate cause of self-care deficits   - Assess range of motion  - Assess patient's mobility; develop plan if impaired  - Assess patient's need for assistive devices and provide as appropriate  - Encourage maximum independence but intervene and supervise when necessary  - Involve family in performance of ADLs  - Assess for home care needs following discharge   - Consider OT consult to assist with ADL evaluation and planning for discharge  - Provide patient education as appropriate  Outcome: Progressing  Goal: Maintains/Returns to pre admission functional level  Description: INTERVENTIONS:  - Perform BMAT or MOVE assessment daily    - Set and communicate daily mobility goal to care team and patient/family/caregiver  - Collaborate with rehabilitation services on mobility goals if consulted  - Perform Range of Motion 4 times a day  - Reposition patient every 2 hours    - Dangle patient 4 times a day  - Stand patient 2 times a day  - Ambulate patient 3 times a day  - Out of bed to chair 3 times a day   - Out of bed for meals 3 times a day  - Out of bed for toileting  - Record patient progress and toleration of activity level   Outcome: Progressing     Problem: DISCHARGE PLANNING  Goal: Discharge to home or other facility with appropriate resources  Description: INTERVENTIONS:  - Identify barriers to discharge w/patient and caregiver  - Arrange for needed discharge resources and transportation as appropriate  - Identify discharge learning needs (meds, wound care, etc )  - Arrange for interpretive services to assist at discharge as needed  - Refer to Case Management Department for coordinating discharge planning if the patient needs post-hospital services based on physician/advanced practitioner order or complex needs related to functional status, cognitive ability, or social support system  Outcome: Progressing     Problem: Knowledge Deficit  Goal: Patient/family/caregiver demonstrates understanding of disease process, treatment plan, medications, and discharge instructions  Description: Complete learning assessment and assess knowledge base  Interventions:  - Provide teaching at level of understanding  - Provide teaching via preferred learning methods  Outcome: Progressing     Problem: Nutrition/Hydration-ADULT  Goal: Nutrient/Hydration intake appropriate for improving, restoring or maintaining nutritional needs  Description: Monitor and assess patient's nutrition/hydration status for malnutrition  Collaborate with interdisciplinary team and initiate plan and interventions as ordered  Monitor patient's weight and dietary intake as ordered or per policy  Utilize nutrition screening tool and intervene as necessary  Determine patient's food preferences and provide high-protein, high-caloric foods as appropriate       INTERVENTIONS:  - Monitor oral intake, urinary output, labs, and treatment plans  - Assess nutrition and hydration status and recommend course of action  - Evaluate amount of meals eaten  - Assist patient with eating if necessary   - Allow adequate time for meals  - Recommend/ encourage appropriate diets, oral nutritional supplements, and vitamin/mineral supplements  - Order, calculate, and assess calorie counts as needed  - Recommend, monitor, and adjust tube feedings and TPN/PPN based on assessed needs  - Assess need for intravenous fluids  - Provide specific nutrition/hydration education as appropriate  - Include patient/family/caregiver in decisions related to nutrition  Outcome: Progressing     Problem: NEUROSENSORY - ADULT  Goal: Achieves stable or improved neurological status  Description: INTERVENTIONS  - Monitor and report changes in neurological status  - Monitor vital signs such as temperature, blood pressure, glucose, and any other labs ordered   - Initiate measures to prevent increased intracranial pressure  - Monitor for seizure activity and implement precautions if appropriate      Outcome: Progressing     Problem: NEUROSENSORY - ADULT  Goal: Achieves stable or improved neurological status  Description: INTERVENTIONS  - Monitor and report changes in neurological status  - Monitor vital signs such as temperature, blood pressure, glucose, and any other labs ordered   - Initiate measures to prevent increased intracranial pressure  - Monitor for seizure activity and implement precautions if appropriate      Outcome: Progressing     Problem: MOBILITY - ADULT  Goal: Maintain or return to baseline ADL function  Description: INTERVENTIONS:  -  Assess patient's ability to carry out ADLs; assess patient's baseline for ADL function and identify physical deficits which impact ability to perform ADLs (bathing, care of mouth/teeth, toileting, grooming, dressing, etc )  - Assess/evaluate cause of self-care deficits   - Assess range of motion  - Assess patient's mobility; develop plan if impaired  - Assess patient's need for assistive devices and provide as appropriate  - Encourage maximum independence but intervene and supervise when necessary  - Involve family in performance of ADLs  - Assess for home care needs following discharge   - Consider OT consult to assist with ADL evaluation and planning for discharge  - Provide patient education as appropriate  Outcome: Progressing  Goal: Maintains/Returns to pre admission functional level  Description: INTERVENTIONS:  - Perform BMAT or MOVE assessment daily    - Set and communicate daily mobility goal to care team and patient/family/caregiver  - Collaborate with rehabilitation services on mobility goals if consulted  - Perform Range of Motion 4 times a day  - Reposition patient every 2 hours    - Dangle patient 2 times a day  - Stand patient 2 times a day  - Ambulate patient 2 times a day  - Out of bed to chair 3 times a day   - Out of bed for meals 3 times a day  - Out of bed for toileting  - Record patient progress and toleration of activity level   Outcome: Progressing

## 2021-07-16 NOTE — ASSESSMENT & PLAN NOTE
· Right MCA infarct, right carotid stenosis, ICA/MCA/LUZ thrombus- Not a TPA candidate   ? Plan: Heparin GTT with PTT goal of 60-90  § PTT q4 hours  § ASA 81mg daily  § Midodrine 5 TID added for BP goals   ? Neuro Checks Q2 hour  ? Neurology Consulted, Neurosurgery Consulted, following recommendations   ? Stat head CT for change in neuro exam/ GCS change >2 in 1 hour  ? Imaging:  § [facility] 7/15- Stable right MCA territory infarct with vasogenic edema in the basal ganglia  No hemorrhagic conversion or mass effect

## 2021-07-17 LAB
ANION GAP SERPL CALCULATED.3IONS-SCNC: 8 MMOL/L (ref 4–13)
APTT PPP: 56 SECONDS (ref 23–37)
APTT PPP: 59 SECONDS (ref 23–37)
APTT PPP: 71 SECONDS (ref 23–37)
BASOPHILS # BLD AUTO: 0.04 THOUSANDS/ΜL (ref 0–0.1)
BASOPHILS NFR BLD AUTO: 1 % (ref 0–1)
BUN SERPL-MCNC: 10 MG/DL (ref 5–25)
CALCIUM SERPL-MCNC: 8.4 MG/DL (ref 8.3–10.1)
CHLORIDE SERPL-SCNC: 108 MMOL/L (ref 100–108)
CO2 SERPL-SCNC: 23 MMOL/L (ref 21–32)
CREAT SERPL-MCNC: 1.12 MG/DL (ref 0.6–1.3)
EOSINOPHIL # BLD AUTO: 0.15 THOUSAND/ΜL (ref 0–0.61)
EOSINOPHIL NFR BLD AUTO: 2 % (ref 0–6)
ERYTHROCYTE [DISTWIDTH] IN BLOOD BY AUTOMATED COUNT: 13.2 % (ref 11.6–15.1)
GFR SERPL CREATININE-BSD FRML MDRD: 71 ML/MIN/1.73SQ M
GLUCOSE SERPL-MCNC: 99 MG/DL (ref 65–140)
HCT VFR BLD AUTO: 36.1 % (ref 36.5–49.3)
HGB BLD-MCNC: 12.2 G/DL (ref 12–17)
IMM GRANULOCYTES # BLD AUTO: 0.04 THOUSAND/UL (ref 0–0.2)
IMM GRANULOCYTES NFR BLD AUTO: 1 % (ref 0–2)
LYMPHOCYTES # BLD AUTO: 1.52 THOUSANDS/ΜL (ref 0.6–4.47)
LYMPHOCYTES NFR BLD AUTO: 18 % (ref 14–44)
MAGNESIUM SERPL-MCNC: 1.7 MG/DL (ref 1.6–2.6)
MCH RBC QN AUTO: 31.4 PG (ref 26.8–34.3)
MCHC RBC AUTO-ENTMCNC: 33.8 G/DL (ref 31.4–37.4)
MCV RBC AUTO: 93 FL (ref 82–98)
MONOCYTES # BLD AUTO: 0.7 THOUSAND/ΜL (ref 0.17–1.22)
MONOCYTES NFR BLD AUTO: 8 % (ref 4–12)
NEUTROPHILS # BLD AUTO: 6.03 THOUSANDS/ΜL (ref 1.85–7.62)
NEUTS SEG NFR BLD AUTO: 70 % (ref 43–75)
NRBC BLD AUTO-RTO: 0 /100 WBCS
NT-PROBNP SERPL-MCNC: 519 PG/ML
PHOSPHATE SERPL-MCNC: 3.5 MG/DL (ref 2.3–4.1)
PLATELET # BLD AUTO: 154 THOUSANDS/UL (ref 149–390)
PMV BLD AUTO: 9.9 FL (ref 8.9–12.7)
POTASSIUM SERPL-SCNC: 3.6 MMOL/L (ref 3.5–5.3)
PROCALCITONIN SERPL-MCNC: 0.05 NG/ML
RBC # BLD AUTO: 3.89 MILLION/UL (ref 3.88–5.62)
SODIUM SERPL-SCNC: 139 MMOL/L (ref 136–145)
WBC # BLD AUTO: 8.48 THOUSAND/UL (ref 4.31–10.16)

## 2021-07-17 PROCEDURE — 87040 BLOOD CULTURE FOR BACTERIA: CPT | Performed by: INTERNAL MEDICINE

## 2021-07-17 PROCEDURE — 80048 BASIC METABOLIC PNL TOTAL CA: CPT | Performed by: PHYSICIAN ASSISTANT

## 2021-07-17 PROCEDURE — 83735 ASSAY OF MAGNESIUM: CPT | Performed by: PHYSICIAN ASSISTANT

## 2021-07-17 PROCEDURE — 84100 ASSAY OF PHOSPHORUS: CPT | Performed by: PHYSICIAN ASSISTANT

## 2021-07-17 PROCEDURE — 84145 PROCALCITONIN (PCT): CPT | Performed by: PHYSICIAN ASSISTANT

## 2021-07-17 PROCEDURE — 85025 COMPLETE CBC W/AUTO DIFF WBC: CPT | Performed by: PHYSICIAN ASSISTANT

## 2021-07-17 PROCEDURE — 83880 ASSAY OF NATRIURETIC PEPTIDE: CPT | Performed by: PHYSICIAN ASSISTANT

## 2021-07-17 PROCEDURE — 99232 SBSQ HOSP IP/OBS MODERATE 35: CPT | Performed by: INTERNAL MEDICINE

## 2021-07-17 PROCEDURE — 85730 THROMBOPLASTIN TIME PARTIAL: CPT | Performed by: GENERAL PRACTICE

## 2021-07-17 PROCEDURE — 85730 THROMBOPLASTIN TIME PARTIAL: CPT | Performed by: INTERNAL MEDICINE

## 2021-07-17 RX ORDER — ACETAMINOPHEN 325 MG/1
650 TABLET ORAL EVERY 6 HOURS PRN
Status: DISCONTINUED | OUTPATIENT
Start: 2021-07-17 | End: 2021-07-27 | Stop reason: HOSPADM

## 2021-07-17 RX ADMIN — HEPARIN SODIUM 23.1 UNITS/KG/HR: 10000 INJECTION, SOLUTION INTRAVENOUS at 16:37

## 2021-07-17 RX ADMIN — HEPARIN SODIUM 21.11 UNITS/KG/HR: 10000 INJECTION, SOLUTION INTRAVENOUS at 02:41

## 2021-07-17 RX ADMIN — MIDODRINE HYDROCHLORIDE 5 MG: 5 TABLET ORAL at 11:43

## 2021-07-17 RX ADMIN — ACETAMINOPHEN 650 MG: 325 TABLET, FILM COATED ORAL at 13:45

## 2021-07-17 RX ADMIN — ATORVASTATIN CALCIUM 40 MG: 20 TABLET, FILM COATED ORAL at 17:29

## 2021-07-17 RX ADMIN — ACETAMINOPHEN 650 MG: 325 TABLET, FILM COATED ORAL at 19:25

## 2021-07-17 RX ADMIN — MIDODRINE HYDROCHLORIDE 5 MG: 5 TABLET ORAL at 04:57

## 2021-07-17 RX ADMIN — ASPIRIN 81 MG CHEWABLE TABLET 81 MG: 81 TABLET CHEWABLE at 09:35

## 2021-07-17 RX ADMIN — MIDODRINE HYDROCHLORIDE 5 MG: 5 TABLET ORAL at 17:00

## 2021-07-17 NOTE — PROGRESS NOTES
[facility]  Progress Note - Dimitris Haro 1960, 64 y o  male MRN: [id number]  Unit/Bed#: [facility] 762-23 Encounter: [id number]  Primary Care Provider: Mariam Rodriguez DO   Date and time admitted to hospital: 2021  4:22 PM    * CVA (cerebral vascular accident) [facility])  Assessment & Plan  · Presented with right middle cerebral artery infarct  Right-sided carotid stenosis  Not a tPA candidate  Patient was followed by Neurology  Note plans regarding repeat CTA on   · Heparin drip with goal PTT between 60 and 90  Continue to monitor PTT  Continue with aspirin therapy  · Aspirin 81 mg daily as above  · CT head on July 15 showed stable right middle cerebral artery territory infarct with vasogenic edema in the basal ganglia  No hemorrhage or mass effect was noted  · Repeat CT on the  as above  Hyperlipidemia  Assessment & Plan  Lipitor 20mg daily  Hypertension  Assessment & Plan  · History of HTN- takes home HCTZ  · Liberalized from BP Goals per neuro surgery    VTE Pharmacologic Prophylaxis:   Pharmacologic: Heparin Drip  Mechanical VTE Prophylaxis in Place: No    Patient Centered Rounds: I have performed bedside rounds with nursing staff today  Time Spent for Care: 15 minutes  More than 50% of total time spent on counseling and coordination of care as described above  Current Length of Stay: 3 day(s)    Current Patient Status: Inpatient       Code Status: Level 1 - Full Code      Subjective:   nad    Objective:     Vitals:   Temp (24hrs), Av 5 °F (36 9 °C), Min:98 2 °F (36 8 °C), Max:98 7 °F (37 1 °C)    Temp:  [98 2 °F (36 8 °C)-98 7 °F (37 1 °C)] 98 2 °F (36 8 °C)  HR:  [80-95] 95  Resp:  [16-30] 16  BP: (115-136)/(69-91) 115/73  SpO2:  [93 %-97 %] 96 %  Body mass index is 31 02 kg/m²  Input and Output Summary (last 24 hours):        Intake/Output Summary (Last 24 hours) at 2021 0940  Last data filed at 2021 1800  Gross per 24 hour   Intake 1097 46 ml   Output 400 ml   Net 697 46 ml       Physical Exam:     Physical Exam  Constitutional:       Appearance: Normal appearance  HENT:      Head: Normocephalic and atraumatic  Cardiovascular:      Rate and Rhythm: Normal rate and regular rhythm  Heart sounds: No murmur heard  Pulmonary:      Effort: Pulmonary effort is normal       Breath sounds: Normal breath sounds  Abdominal:      General: Abdomen is flat  Palpations: Abdomen is soft  Musculoskeletal:         General: No swelling  Normal range of motion  Neurological:      General: No focal deficit present  Mental Status: He is alert and oriented to person, place, and time  Additional Data:     Labs:    Results from last 7 days   Lab Units 07/17/21  0455   WBC Thousand/uL 8 48   HEMOGLOBIN g/dL 12 2   HEMATOCRIT % 36 1*   PLATELETS Thousands/uL 154   NEUTROS PCT % 70   LYMPHS PCT % 18   MONOS PCT % 8   EOS PCT % 2     Results from last 7 days   Lab Units 07/17/21  0455 07/15/21  0552   POTASSIUM mmol/L 3 6 3 6   CHLORIDE mmol/L 108 111*   CO2 mmol/L 23 22   BUN mg/dL 10 12   CREATININE mg/dL 1 12 0 98   CALCIUM mg/dL 8 4 8 3   ALK PHOS U/L  --  56   ALT U/L  --  32   AST U/L  --  17     Results from last 7 days   Lab Units 07/14/21  1706   INR  0 97       * I Have Reviewed All Lab Data Listed Above  * Additional Pertinent Lab Tests Reviewed:  All Labs Within Last 24 Hours Reviewed        Recent Cultures (last 7 days):           Last 24 Hours Medication List:   Current Facility-Administered Medications   Medication Dose Route Frequency Provider Last Rate    acetaminophen  650 mg Oral Q6H PRN Adalberto Armijo PA-C      albuterol  2 5 mg Nebulization Q6H PRN Li Saba PA-C      aspirin  81 mg Oral Daily Kaushik Freitas PA-C      atorvastatin  40 mg Oral QPM Kaushik Freitas PA-C      heparin (porcine)  3-21 1 Units/kg/hr (Order-Specific) Intravenous Titrated Li Saba ZAINA 21 1 Units/kg/hr (07/17/21 0252)    midodrine  5 mg Oral TID AC Kaushik Castrejon PA-C          Today, Patient Was Seen By: Nick Ruiz DO    ** Please Note: Dictation voice to text software may have been used in the creation of this document   **

## 2021-07-17 NOTE — RESPIRATORY THERAPY NOTE
RT Protocol Note  Unity Forth 64 y o  male MRN: [id number]  Unit/Bed#: [facility] 718-01 Encounter: [id number]    Assessment    Principal Problem:    CVA (cerebral vascular accident) [facility])  Active Problems:    Hyperlipidemia      Home Pulmonary Medications:  None       Past Medical History:   Diagnosis Date    Diverticulitis large intestine     "During Bowel Resection determined not to have Diverticulitis "- Patient    Hypertension     TIA (transient ischemic attack)      Social History     Socioeconomic History    Marital status: /Civil Union     Spouse name: None    Number of children: None    Years of education: None    Highest education level: None   Occupational History    None   Tobacco Use    Smoking status: Former Smoker    Smokeless tobacco: Former User     Types: Chew   Vaping Use    Vaping Use: Never used   Substance and Sexual Activity    Alcohol use: Yes     Alcohol/week: 1 0 - 2 0 standard drinks     Types: 1 - 2 Cans of beer per week    Drug use: Never    Sexual activity: None   Other Topics Concern    None   Social History Narrative    None     Social Determinants of Health     Financial Resource Strain:     Difficulty of Paying Living Expenses:    Food Insecurity:     Worried About Running Out of Food in the Last Year:     Ran Out of Food in the Last Year:    Transportation Needs:     Lack of Transportation (Medical):      Lack of Transportation (Non-Medical):    Physical Activity:     Days of Exercise per Week:     Minutes of Exercise per Session:    Stress:     Feeling of Stress :    Social Connections:     Frequency of Communication with Friends and Family:     Frequency of Social Gatherings with Friends and Family:     Attends [employer] Services:     Active Member of Clubs or Organizations:     Attends Club or Organization Meetings:     Marital Status:    Intimate Partner Violence:     Fear of Current or Ex-Partner:     Emotionally Abused:     Physically Abused:     Sexually Abused:        Subjective         Objective    Physical Exam:   Assessment Type: (P) Assess only  General Appearance: (P) Alert, Awake  Respiratory Pattern: (P) Spontaneous  Chest Assessment: (P) Chest expansion symmetrical  Bilateral Breath Sounds: (P) Clear  R Breath Sounds: (P) Clear    Vitals:  Blood pressure 136/91, pulse 84, temperature 98 5 °F (36 9 °C), resp  rate (!) 29, height 5' 11" (1 803 m), weight 101 kg (222 lb 7 1 oz), SpO2 96 %  Imaging and other studies: I have personally reviewed pertinent reports  Plan    Respiratory Plan: (P) No distress/Pulmonary history        Resp Comments: (P) Pt with PMHx of CVA and no pulmonary history suddenly develped an episode of wheezing and SOB/ cough requiring 2L NC  Pt given albuterol 2 5mg udn prn  Will continue with bronchodilator therapy Q6 prn at this time

## 2021-07-17 NOTE — ASSESSMENT & PLAN NOTE
· Presented with right middle cerebral artery infarct  Right-sided carotid stenosis  Not a tPA candidate  Patient was followed by Neurology  Note plans regarding repeat CTA on July 22nd  · Heparin drip with goal PTT between 60 and 90  Continue to monitor PTT  Continue with aspirin therapy  · Aspirin 81 mg daily as above  · CT head on July 15 showed stable right middle cerebral artery territory infarct with vasogenic edema in the basal ganglia  No hemorrhage or mass effect was noted  · Repeat CT on the 22nd as above

## 2021-07-17 NOTE — QUICK NOTE
On-call team notified by nursing that patient had a fever; blood cultures and Tylenol ordered  Review procalcitonin labs from today were negative; no acute findings on chest x-ray  Will obtain UA for completeness sake

## 2021-07-17 NOTE — PLAN OF CARE
Problem: Neurological Deficit  Goal: Neurological status is stable or improving  Description: Interventions:  - Monitor and assess patient's level of consciousness, motor function, sensory function, and level of assistance needed for ADLs  - Monitor and report changes from baseline  Collaborate with interdisciplinary team to initiate plan and implement interventions as ordered  - Provide and maintain a safe environment  - Consider seizure precautions  - Consider fall precautions  - Consider aspiration precautions  - Consider bleeding precautions  Outcome: Progressing     Problem: Activity Intolerance/Impaired Mobility  Goal: Mobility/activity is maintained at optimum level for patient  Description: Interventions:  - Assess and monitor patient  barriers to mobility and need for assistive/adaptive devices  - Assess patient's emotional response to limitations  - Collaborate with interdisciplinary team and initiate plans and interventions as ordered  - Encourage independent activity per ability   - Maintain proper body alignment  - Perform active/passive rom as tolerated/ordered  - Plan activities to conserve energy   - Turn patient as appropriate  Outcome: Progressing     Problem: Communication Impairment  Goal: Ability to express needs and understand communication  Description: Assess patient's communication skills and ability to understand information  Patient will demonstrate use of effective communication techniques, alternative methods of communication and understanding even if not able to speak  - Encourage communication and provide alternate methods of communication as needed  - Collaborate with case management/ for discharge needs  - Include patient/family/caregiver in decisions related to communication    Outcome: Progressing     Problem: Potential for Aspiration  Goal: Non-ventilated patient's risk of aspiration is minimized  Description: Assess and monitor vital signs, respiratory status, and labs (WBC)  Monitor for signs of aspiration (tachypnea, cough, rales, wheezing, cyanosis, fever)  - Assess and monitor patient's ability to swallow  - Place patient up in chair to eat if possible  - HOB up at 90 degrees to eat if unable to get patient up into chair   - Supervise patient during oral intake  - Instruct patient/ family to take small bites  - Instruct patient/ family to take small single sips when taking liquids  - Follow patient-specific strategies generated by speech pathologist   Outcome: Progressing     Problem: Nutrition  Goal: Nutrition/Hydration status is improving  Description: Monitor and assess patient's nutrition/hydration status for malnutrition (ex- brittle hair, bruises, dry skin, pale skin and conjunctiva, muscle wasting, smooth red tongue, and disorientation)  Collaborate with interdisciplinary team and initiate plan and interventions as ordered  Monitor patient's weight and dietary intake as ordered or per policy  Utilize nutrition screening tool and intervene per policy  Determine patient's food preferences and provide high-protein, high-caloric foods as appropriate  - Assist patient with eating   - Allow adequate time for meals   - Encourage patient to take dietary supplement as ordered  - Collaborate with clinical nutritionist   - Include patient/family/caregiver in decisions related to nutrition    Outcome: Progressing     Problem: PAIN - ADULT  Goal: Verbalizes/displays adequate comfort level or baseline comfort level  Description: Interventions:  - Encourage patient to monitor pain and request assistance  - Assess pain using appropriate pain scale  - Administer analgesics based on type and severity of pain and evaluate response  - Implement non-pharmacological measures as appropriate and evaluate response  - Consider cultural and social influences on pain and pain management  - Notify physician/advanced practitioner if interventions unsuccessful or patient reports new pain  Outcome: Progressing     Problem: INFECTION - ADULT  Goal: Absence or prevention of progression during hospitalization  Description: INTERVENTIONS:  - Assess and monitor for signs and symptoms of infection  - Monitor lab/diagnostic results  - Monitor all insertion sites, i e  indwelling lines, tubes, and drains  - Monitor endotracheal if appropriate and nasal secretions for changes in amount and color  - [place] appropriate cooling/warming therapies per order  - Administer medications as ordered  - Instruct and encourage patient and family to use good hand hygiene technique  - Identify and instruct in appropriate isolation precautions for identified infection/condition  Outcome: Progressing     Problem: SAFETY ADULT  Goal: Patient will remain free of falls  Description: INTERVENTIONS:  - Educate patient/family on patient safety including physical limitations  - Instruct patient to call for assistance with activity   - Consult OT/PT to assist with strengthening/mobility   - Keep Call bell within reach  - Keep bed low and locked with side rails adjusted as appropriate  - Keep care items and personal belongings within reach  - Initiate and maintain comfort rounds  - Make Fall Risk Sign visible to staff  - Offer Toileting every  Hours, in advance of need  - Initiate/Maintain alarm  - Obtain necessary fall risk management equipment:   - Apply yellow socks and bracelet for high fall risk patients  - Consider moving patient to room near nurses station  Outcome: Progressing  Goal: Maintain or return to baseline ADL function  Description: INTERVENTIONS:  -  Assess patient's ability to carry out ADLs; assess patient's baseline for ADL function and identify physical deficits which impact ability to perform ADLs (bathing, care of mouth/teeth, toileting, grooming, dressing, etc )  - Assess/evaluate cause of self-care deficits   - Assess range of motion  - Assess patient's mobility; develop plan if impaired  - Assess patient's need for assistive devices and provide as appropriate  - Encourage maximum independence but intervene and supervise when necessary  - Involve family in performance of ADLs  - Assess for home care needs following discharge   - Consider OT consult to assist with ADL evaluation and planning for discharge  - Provide patient education as appropriate  Outcome: Progressing  Goal: Maintains/Returns to pre admission functional level  Description: INTERVENTIONS:  - Perform BMAT or MOVE assessment daily    - Set and communicate daily mobility goal to care team and patient/family/caregiver  - Collaborate with rehabilitation services on mobility goals if consulted  - Perform Range of Motion  times a day  - Reposition patient every  hours    - Dangle patient  times a day  - Stand patient  times a day  - Ambulate patient  times a day  - Out of bed to chair  times a day   - Out of bed for meals  times a day  - Out of bed for toileting  - Record patient progress and toleration of activity level   Outcome: Progressing     Problem: DISCHARGE PLANNING  Goal: Discharge to home or other facility with appropriate resources  Description: INTERVENTIONS:  - Identify barriers to discharge w/patient and caregiver  - Arrange for needed discharge resources and transportation as appropriate  - Identify discharge learning needs (meds, wound care, etc )  - Arrange for interpretive services to assist at discharge as needed  - Refer to Case Management Department for coordinating discharge planning if the patient needs post-hospital services based on physician/advanced practitioner order or complex needs related to functional status, cognitive ability, or social support system  Outcome: Progressing     Problem: Knowledge Deficit  Goal: Patient/family/caregiver demonstrates understanding of disease process, treatment plan, medications, and discharge instructions  Description: Complete learning assessment and assess knowledge base  Interventions:  - Provide teaching at level of understanding  - Provide teaching via preferred learning methods  Outcome: Progressing     Problem: Nutrition/Hydration-ADULT  Goal: Nutrient/Hydration intake appropriate for improving, restoring or maintaining nutritional needs  Description: Monitor and assess patient's nutrition/hydration status for malnutrition  Collaborate with interdisciplinary team and initiate plan and interventions as ordered  Monitor patient's weight and dietary intake as ordered or per policy  Utilize nutrition screening tool and intervene as necessary  Determine patient's food preferences and provide high-protein, high-caloric foods as appropriate       INTERVENTIONS:  - Monitor oral intake, urinary output, labs, and treatment plans  - Assess nutrition and hydration status and recommend course of action  - Evaluate amount of meals eaten  - Assist patient with eating if necessary   - Allow adequate time for meals  - Recommend/ encourage appropriate diets, oral nutritional supplements, and vitamin/mineral supplements  - Order, calculate, and assess calorie counts as needed  - Recommend, monitor, and adjust tube feedings and TPN/PPN based on assessed needs  - Assess need for intravenous fluids  - Provide specific nutrition/hydration education as appropriate  - Include patient/family/caregiver in decisions related to nutrition  Outcome: Progressing     Problem: NEUROSENSORY - ADULT  Goal: Achieves stable or improved neurological status  Description: INTERVENTIONS  - Monitor and report changes in neurological status  - Monitor vital signs such as temperature, blood pressure, glucose, and any other labs ordered   - Initiate measures to prevent increased intracranial pressure  - Monitor for seizure activity and implement precautions if appropriate      Outcome: Progressing     Problem: MOBILITY - ADULT  Goal: Maintain or return to baseline ADL function  Description: INTERVENTIONS:  -  Assess patient's ability to carry out ADLs; assess patient's baseline for ADL function and identify physical deficits which impact ability to perform ADLs (bathing, care of mouth/teeth, toileting, grooming, dressing, etc )  - Assess/evaluate cause of self-care deficits   - Assess range of motion  - Assess patient's mobility; develop plan if impaired  - Assess patient's need for assistive devices and provide as appropriate  - Encourage maximum independence but intervene and supervise when necessary  - Involve family in performance of ADLs  - Assess for home care needs following discharge   - Consider OT consult to assist with ADL evaluation and planning for discharge  - Provide patient education as appropriate  Outcome: Progressing     Problem: Potential for Falls  Goal: Patient will remain free of falls  Description: INTERVENTIONS:  - Educate patient/family on patient safety including physical limitations  - Instruct patient to call for assistance with activity   - Consult OT/PT to assist with strengthening/mobility   - Keep Call bell within reach  - Keep bed low and locked with side rails adjusted as appropriate  - Keep care items and personal belongings within reach  - Initiate and maintain comfort rounds  - Make Fall Risk Sign visible to staff  - Offer Toileting every  Hours, in advance of need  - Initiate/Maintain alarm  - Obtain necessary fall risk management equipment:  - Apply yellow socks and bracelet for high fall risk patients  - Consider moving patient to room near nurses station  Outcome: Progressing

## 2021-07-18 ENCOUNTER — APPOINTMENT (INPATIENT)
Dept: RADIOLOGY | Facility: HOSPITAL | Age: 61
DRG: 037 | End: 2021-07-18
Payer: MEDICARE

## 2021-07-18 LAB
APTT PPP: 82 SECONDS (ref 23–37)
BASOPHILS # BLD AUTO: 0.04 THOUSANDS/ΜL (ref 0–0.1)
BASOPHILS NFR BLD AUTO: 1 % (ref 0–1)
BILIRUB UR QL STRIP: NEGATIVE
CLARITY UR: CLEAR
COLOR UR: YELLOW
EOSINOPHIL # BLD AUTO: 0.12 THOUSAND/ΜL (ref 0–0.61)
EOSINOPHIL NFR BLD AUTO: 3 % (ref 0–6)
ERYTHROCYTE [DISTWIDTH] IN BLOOD BY AUTOMATED COUNT: 13.1 % (ref 11.6–15.1)
GLUCOSE UR STRIP-MCNC: NEGATIVE MG/DL
HCT VFR BLD AUTO: 36.3 % (ref 36.5–49.3)
HGB BLD-MCNC: 12.3 G/DL (ref 12–17)
HGB UR QL STRIP.AUTO: NEGATIVE
IMM GRANULOCYTES # BLD AUTO: 0.03 THOUSAND/UL (ref 0–0.2)
IMM GRANULOCYTES NFR BLD AUTO: 1 % (ref 0–2)
KETONES UR STRIP-MCNC: NEGATIVE MG/DL
LEUKOCYTE ESTERASE UR QL STRIP: NEGATIVE
LYMPHOCYTES # BLD AUTO: 0.79 THOUSANDS/ΜL (ref 0.6–4.47)
LYMPHOCYTES NFR BLD AUTO: 20 % (ref 14–44)
MCH RBC QN AUTO: 31.7 PG (ref 26.8–34.3)
MCHC RBC AUTO-ENTMCNC: 33.9 G/DL (ref 31.4–37.4)
MCV RBC AUTO: 94 FL (ref 82–98)
MONOCYTES # BLD AUTO: 0.47 THOUSAND/ΜL (ref 0.17–1.22)
MONOCYTES NFR BLD AUTO: 12 % (ref 4–12)
NEUTROPHILS # BLD AUTO: 2.48 THOUSANDS/ΜL (ref 1.85–7.62)
NEUTS SEG NFR BLD AUTO: 63 % (ref 43–75)
NITRITE UR QL STRIP: NEGATIVE
NRBC BLD AUTO-RTO: 0 /100 WBCS
PH UR STRIP.AUTO: 6 [PH]
PLATELET # BLD AUTO: 123 THOUSANDS/UL (ref 149–390)
PMV BLD AUTO: 9.2 FL (ref 8.9–12.7)
PROCALCITONIN SERPL-MCNC: 0.19 NG/ML
PROT UR STRIP-MCNC: NEGATIVE MG/DL
RBC # BLD AUTO: 3.88 MILLION/UL (ref 3.88–5.62)
SP GR UR STRIP.AUTO: 1.02 (ref 1–1.03)
UROBILINOGEN UR QL STRIP.AUTO: 1 E.U./DL
WBC # BLD AUTO: 3.93 THOUSAND/UL (ref 4.31–10.16)

## 2021-07-18 PROCEDURE — 71045 X-RAY EXAM CHEST 1 VIEW: CPT

## 2021-07-18 PROCEDURE — 85025 COMPLETE CBC W/AUTO DIFF WBC: CPT | Performed by: PHYSICIAN ASSISTANT

## 2021-07-18 PROCEDURE — 81003 URINALYSIS AUTO W/O SCOPE: CPT | Performed by: INTERNAL MEDICINE

## 2021-07-18 PROCEDURE — 84145 PROCALCITONIN (PCT): CPT | Performed by: PHYSICIAN ASSISTANT

## 2021-07-18 PROCEDURE — 85730 THROMBOPLASTIN TIME PARTIAL: CPT | Performed by: INTERNAL MEDICINE

## 2021-07-18 PROCEDURE — 99232 SBSQ HOSP IP/OBS MODERATE 35: CPT | Performed by: INTERNAL MEDICINE

## 2021-07-18 RX ORDER — POLYETHYLENE GLYCOL 3350 17 G/17G
17 POWDER, FOR SOLUTION ORAL DAILY
Status: DISCONTINUED | OUTPATIENT
Start: 2021-07-18 | End: 2021-07-27 | Stop reason: HOSPADM

## 2021-07-18 RX ORDER — LEVALBUTEROL INHALATION SOLUTION 0.63 MG/3ML
0.63 SOLUTION RESPIRATORY (INHALATION) EVERY 8 HOURS PRN
Status: DISCONTINUED | OUTPATIENT
Start: 2021-07-18 | End: 2021-07-18

## 2021-07-18 RX ADMIN — MIDODRINE HYDROCHLORIDE 5 MG: 5 TABLET ORAL at 17:05

## 2021-07-18 RX ADMIN — MIDODRINE HYDROCHLORIDE 5 MG: 5 TABLET ORAL at 12:30

## 2021-07-18 RX ADMIN — ATORVASTATIN CALCIUM 40 MG: 20 TABLET, FILM COATED ORAL at 17:05

## 2021-07-18 RX ADMIN — POLYETHYLENE GLYCOL 3350 17 G: 17 POWDER, FOR SOLUTION ORAL at 13:28

## 2021-07-18 RX ADMIN — HEPARIN SODIUM 23.1 UNITS/KG/HR: 10000 INJECTION, SOLUTION INTRAVENOUS at 18:16

## 2021-07-18 RX ADMIN — MIDODRINE HYDROCHLORIDE 5 MG: 5 TABLET ORAL at 06:14

## 2021-07-18 RX ADMIN — HEPARIN SODIUM 23.1 UNITS/KG/HR: 10000 INJECTION, SOLUTION INTRAVENOUS at 05:18

## 2021-07-18 RX ADMIN — ASPIRIN 81 MG CHEWABLE TABLET 81 MG: 81 TABLET CHEWABLE at 09:44

## 2021-07-18 NOTE — ASSESSMENT & PLAN NOTE
· Presented with right middle cerebral artery infarct  Right-sided carotid stenosis  Not a tPA candidate  Patient was followed by Neurology  Note plans regarding repeat CTA on July 22nd  · Heparin drip with goal PTT between 60 and 90  Continue to monitor PTT  Continue with aspirin therapy  · Aspirin 81 mg daily as above  · CT head on July 15 showed stable right middle cerebral artery territory infarct with vasogenic edema in the basal ganglia  No hemorrhage or mass effect was noted  · Repeat CTA on the 22nd as above

## 2021-07-18 NOTE — PROGRESS NOTES
[address] [facility]  Progress Note - Malvin Trevino 1960, 64 y o  male MRN: [id number]  Unit/Bed#: [facility] 561-97 Encounter: [id number]  Primary Care Provider: Christian Cortes DO   Date and time admitted to hospital: 2021  4:22 PM    * CVA (cerebral vascular accident) [facility])  Assessment & Plan  · Presented with right middle cerebral artery infarct  Right-sided carotid stenosis  Not a tPA candidate  Patient was followed by Neurology  Note plans regarding repeat CTA on   · Heparin drip with goal PTT between 60 and 90  Continue to monitor PTT  Continue with aspirin therapy  · Aspirin 81 mg daily as above  · CT head on July 15 showed stable right middle cerebral artery territory infarct with vasogenic edema in the basal ganglia  No hemorrhage or mass effect was noted  · Repeat CTA on the  as above  Hyperlipidemia  Assessment & Plan  Lipitor 20mg daily  Hypertension  Assessment & Plan  · History of HTN- takes home HCTZ  · Liberalized from BP Goals per neuro surgery        VTE Pharmacologic Prophylaxis:   Pharmacologic: Heparin Drip  Mechanical VTE Prophylaxis in Place: No    Patient Centered Rounds: I have performed bedside rounds with nursing staff today  Time Spent for Care: 15 minutes  More than 50% of total time spent on counseling and coordination of care as described above  Current Length of Stay: 4 day(s)    Current Patient Status: Inpatient       Code Status: Level 1 - Full Code      Subjective:   nad    Objective:     Vitals:   Temp (24hrs), Av 7 °F (37 6 °C), Min:98 1 °F (36 7 °C), Max:101 7 °F (38 7 °C)    Temp:  [98 1 °F (36 7 °C)-101 7 °F (38 7 °C)] 98 5 °F (36 9 °C)  HR:  [79-80] 80  Resp:  [18] 18  BP: (115-120)/(68-70) 120/70  SpO2:  [93 %-96 %] 93 %  Body mass index is 31 24 kg/m²  Input and Output Summary (last 24 hours):        Intake/Output Summary (Last 24 hours) at 2021 0920  Last data filed at 7/18/2021 0656  Gross per 24 hour   Intake 118 ml   Output 300 ml   Net -182 ml       Physical Exam:     Physical Exam  Constitutional:       Appearance: Normal appearance  Cardiovascular:      Rate and Rhythm: Normal rate and regular rhythm  Pulmonary:      Effort: Pulmonary effort is normal       Comments: Wheezing  Abdominal:      General: Abdomen is flat  There is no distension  Palpations: Abdomen is soft  There is no mass  Musculoskeletal:         General: No swelling or tenderness  Cervical back: Normal range of motion  Neurological:      General: No focal deficit present  Mental Status: He is alert and oriented to person, place, and time  Cranial nerves appear to be intact  Patient is ambulatory with no ataxia  Sensation grossly intact  4/5 muscle strength bilateral lower extremity  Speech grossly intact          Additional Data:     Labs:    Results from last 7 days   Lab Units 07/18/21  0518   WBC Thousand/uL 3 93*   HEMOGLOBIN g/dL 12 3   HEMATOCRIT % 36 3*   PLATELETS Thousands/uL 123*   NEUTROS PCT % 63   LYMPHS PCT % 20   MONOS PCT % 12   EOS PCT % 3     Results from last 7 days   Lab Units 07/17/21  0455 07/15/21  0552   POTASSIUM mmol/L 3 6 3 6   CHLORIDE mmol/L 108 111*   CO2 mmol/L 23 22   BUN mg/dL 10 12   CREATININE mg/dL 1 12 0 98   CALCIUM mg/dL 8 4 8 3   ALK PHOS U/L  --  56   ALT U/L  --  32   AST U/L  --  17     Results from last 7 days   Lab Units 07/14/21  1706   INR  0 97       * I Have Reviewed All Lab Data Listed Above  * Additional Pertinent Lab Tests Reviewed: All Labs Within Last 24 Hours Reviewed    Recent Cultures (last 7 days):     Results from last 7 days   Lab Units 07/17/21  2237 07/17/21  2236   BLOOD CULTURE  Received in Microbiology Lab  Culture in Progress  Received in Microbiology Lab  Culture in Progress         Last 24 Hours Medication List:   Current Facility-Administered Medications   Medication Dose Route Frequency Provider Last Rate    acetaminophen  650 mg Oral Q6H PRN Tia Colebrt MD      albuterol  2 5 mg Nebulization Q6H PRN Li Saba PA-C      aspirin  81 mg Oral Daily Ángela Curiel PA-C      atorvastatin  40 mg Oral QPM Kaushik Freitas PA-C      heparin (porcine)  3-21 1 Units/kg/hr (Order-Specific) Intravenous Titrated Li Saba PA-C 23 1 Units/kg/hr (07/18/21 0518)    midodrine  5 mg Oral TID AC Kaushik Lala PA-C          Today, Patient Was Seen By: Angy Villa DO    ** Please Note: Dictation voice to text software may have been used in the creation of this document   **

## 2021-07-18 NOTE — PLAN OF CARE
Problem: Neurological Deficit  Goal: Neurological status is stable or improving  Description: Interventions:  - Monitor and assess patient's level of consciousness, motor function, sensory function, and level of assistance needed for ADLs  - Monitor and report changes from baseline  Collaborate with interdisciplinary team to initiate plan and implement interventions as ordered  - Provide and maintain a safe environment  - Consider seizure precautions  - Consider fall precautions  - Consider aspiration precautions  - Consider bleeding precautions  Outcome: Progressing     Problem: Activity Intolerance/Impaired Mobility  Goal: Mobility/activity is maintained at optimum level for patient  Description: Interventions:  - Assess and monitor patient  barriers to mobility and need for assistive/adaptive devices  - Assess patient's emotional response to limitations  - Collaborate with interdisciplinary team and initiate plans and interventions as ordered  - Encourage independent activity per ability   - Maintain proper body alignment  - Perform active/passive rom as tolerated/ordered  - Plan activities to conserve energy   - Turn patient as appropriate  Outcome: Progressing     Problem: Communication Impairment  Goal: Ability to express needs and understand communication  Description: Assess patient's communication skills and ability to understand information  Patient will demonstrate use of effective communication techniques, alternative methods of communication and understanding even if not able to speak  - Encourage communication and provide alternate methods of communication as needed  - Collaborate with case management/ for discharge needs  - Include patient/family/caregiver in decisions related to communication    Outcome: Progressing     Problem: Potential for Aspiration  Goal: Non-ventilated patient's risk of aspiration is minimized  Description: Assess and monitor vital signs, respiratory status, and labs (WBC)  Monitor for signs of aspiration (tachypnea, cough, rales, wheezing, cyanosis, fever)  - Assess and monitor patient's ability to swallow  - Place patient up in chair to eat if possible  - HOB up at 90 degrees to eat if unable to get patient up into chair   - Supervise patient during oral intake  - Instruct patient/ family to take small bites  - Instruct patient/ family to take small single sips when taking liquids  - Follow patient-specific strategies generated by speech pathologist   Outcome: Progressing     Problem: Nutrition  Goal: Nutrition/Hydration status is improving  Description: Monitor and assess patient's nutrition/hydration status for malnutrition (ex- brittle hair, bruises, dry skin, pale skin and conjunctiva, muscle wasting, smooth red tongue, and disorientation)  Collaborate with interdisciplinary team and initiate plan and interventions as ordered  Monitor patient's weight and dietary intake as ordered or per policy  Utilize nutrition screening tool and intervene per policy  Determine patient's food preferences and provide high-protein, high-caloric foods as appropriate  - Assist patient with eating   - Allow adequate time for meals   - Encourage patient to take dietary supplement as ordered  - Collaborate with clinical nutritionist   - Include patient/family/caregiver in decisions related to nutrition    Outcome: Progressing     Problem: PAIN - ADULT  Goal: Verbalizes/displays adequate comfort level or baseline comfort level  Description: Interventions:  - Encourage patient to monitor pain and request assistance  - Assess pain using appropriate pain scale  - Administer analgesics based on type and severity of pain and evaluate response  - Implement non-pharmacological measures as appropriate and evaluate response  - Consider cultural and social influences on pain and pain management  - Notify physician/advanced practitioner if interventions unsuccessful or patient reports new pain  Outcome: Progressing     Problem: INFECTION - ADULT  Goal: Absence or prevention of progression during hospitalization  Description: INTERVENTIONS:  - Assess and monitor for signs and symptoms of infection  - Monitor lab/diagnostic results  - Monitor all insertion sites, i e  indwelling lines, tubes, and drains  - Monitor endotracheal if appropriate and nasal secretions for changes in amount and color  - Newman appropriate cooling/warming therapies per order  - Administer medications as ordered  - Instruct and encourage patient and family to use good hand hygiene technique  - Identify and instruct in appropriate isolation precautions for identified infection/condition  Outcome: Progressing     Problem: SAFETY ADULT  Goal: Patient will remain free of falls  Description: INTERVENTIONS:  - Educate patient/family on patient safety including physical limitations  - Instruct patient to call for assistance with activity   - Consult OT/PT to assist with strengthening/mobility   - Keep Call bell within reach  - Keep bed low and locked with side rails adjusted as appropriate  - Keep care items and personal belongings within reach  - Initiate and maintain comfort rounds  - Make Fall Risk Sign visible to staff  - Offer Toileting every  Hours, in advance of need  - Initiate/Maintain alarm  - Obtain necessary fall risk management equipment:   - Apply yellow socks and bracelet for high fall risk patients  - Consider moving patient to room near nurses station  Outcome: Progressing  Goal: Maintain or return to baseline ADL function  Description: INTERVENTIONS:  -  Assess patient's ability to carry out ADLs; assess patient's baseline for ADL function and identify physical deficits which impact ability to perform ADLs (bathing, care of mouth/teeth, toileting, grooming, dressing, etc )  - Assess/evaluate cause of self-care deficits   - Assess range of motion  - Assess patient's mobility; develop plan if impaired  - Assess patient's need for assistive devices and provide as appropriate  - Encourage maximum independence but intervene and supervise when necessary  - Involve family in performance of ADLs  - Assess for home care needs following discharge   - Consider OT consult to assist with ADL evaluation and planning for discharge  - Provide patient education as appropriate  Outcome: Progressing  Goal: Maintains/Returns to pre admission functional level  Description: INTERVENTIONS:  - Perform BMAT or MOVE assessment daily    - Set and communicate daily mobility goal to care team and patient/family/caregiver  - Collaborate with rehabilitation services on mobility goals if consulted  - Perform Range of Motion  times a day  - Reposition patient every  hours  - Dangle patient  times a day  - Stand patient  times a day  - Ambulate patient  times a day  - Out of bed to chair  times a day   - Out of bed for meals  times a day  - Out of bed for toileting  - Record patient progress and toleration of activity level   Outcome: Progressing     Problem: Knowledge Deficit  Goal: Patient/family/caregiver demonstrates understanding of disease process, treatment plan, medications, and discharge instructions  Description: Complete learning assessment and assess knowledge base  Interventions:  - Provide teaching at level of understanding  - Provide teaching via preferred learning methods  Outcome: Progressing     Problem: Nutrition/Hydration-ADULT  Goal: Nutrient/Hydration intake appropriate for improving, restoring or maintaining nutritional needs  Description: Monitor and assess patient's nutrition/hydration status for malnutrition  Collaborate with interdisciplinary team and initiate plan and interventions as ordered  Monitor patient's weight and dietary intake as ordered or per policy  Utilize nutrition screening tool and intervene as necessary   Determine patient's food preferences and provide high-protein, high-caloric foods as appropriate  INTERVENTIONS:  - Monitor oral intake, urinary output, labs, and treatment plans  - Assess nutrition and hydration status and recommend course of action  - Evaluate amount of meals eaten  - Assist patient with eating if necessary   - Allow adequate time for meals  - Recommend/ encourage appropriate diets, oral nutritional supplements, and vitamin/mineral supplements  - Order, calculate, and assess calorie counts as needed  - Recommend, monitor, and adjust tube feedings and TPN/PPN based on assessed needs  - Assess need for intravenous fluids  - Provide specific nutrition/hydration education as appropriate  - Include patient/family/caregiver in decisions related to nutrition  Outcome: Progressing     Problem: MOBILITY - ADULT  Goal: Maintain or return to baseline ADL function  Description: INTERVENTIONS:  -  Assess patient's ability to carry out ADLs; assess patient's baseline for ADL function and identify physical deficits which impact ability to perform ADLs (bathing, care of mouth/teeth, toileting, grooming, dressing, etc )  - Assess/evaluate cause of self-care deficits   - Assess range of motion  - Assess patient's mobility; develop plan if impaired  - Assess patient's need for assistive devices and provide as appropriate  - Encourage maximum independence but intervene and supervise when necessary  - Involve family in performance of ADLs  - Assess for home care needs following discharge   - Consider OT consult to assist with ADL evaluation and planning for discharge  - Provide patient education as appropriate  Outcome: Progressing  Goal: Maintains/Returns to pre admission functional level  Description: INTERVENTIONS:  - Perform BMAT or MOVE assessment daily    - Set and communicate daily mobility goal to care team and patient/family/caregiver  - Collaborate with rehabilitation services on mobility goals if consulted  - Perform Range of Motion  times a day    - Reposition patient every  hours    - Dangle patient  times a day  - Stand patient  times a day  - Ambulate patient  times a day  - Out of bed to chair  times a day   - Out of bed for meals  times a day  - Out of bed for toileting  - Record patient progress and toleration of activity level   Outcome: Progressing     Problem: NEUROSENSORY - ADULT  Goal: Achieves stable or improved neurological status  Description: INTERVENTIONS  - Monitor and report changes in neurological status  - Monitor vital signs such as temperature, blood pressure, glucose, and any other labs ordered   - Initiate measures to prevent increased intracranial pressure  - Monitor for seizure activity and implement precautions if appropriate      Outcome: Progressing     Problem: Potential for Falls  Goal: Patient will remain free of falls  Description: INTERVENTIONS:  - Educate patient/family on patient safety including physical limitations  - Instruct patient to call for assistance with activity   - Consult OT/PT to assist with strengthening/mobility   - Keep Call bell within reach  - Keep bed low and locked with side rails adjusted as appropriate  - Keep care items and personal belongings within reach  - Initiate and maintain comfort rounds  - Make Fall Risk Sign visible to staff  - Offer Toileting every  Hours, in advance of need  - Initiate/Maintain alarm  - Obtain necessary fall risk management equipment:  - Apply yellow socks and bracelet for high fall risk patients  - Consider moving patient to room near nurses station  Outcome: Progressing

## 2021-07-19 ENCOUNTER — TELEPHONE (OUTPATIENT)
Dept: NEUROLOGY | Facility: CLINIC | Age: 61
End: 2021-07-19

## 2021-07-19 PROBLEM — R50.9 FEBRILE ILLNESS: Status: ACTIVE | Noted: 2021-07-19

## 2021-07-19 LAB
ANION GAP SERPL CALCULATED.3IONS-SCNC: 7 MMOL/L (ref 4–13)
APTT PPP: 77 SECONDS (ref 23–37)
APTT PPP: 91 SECONDS (ref 23–37)
APTT PPP: 95 SECONDS (ref 23–37)
BASOPHILS # BLD AUTO: 0.04 THOUSANDS/ΜL (ref 0–0.1)
BASOPHILS NFR BLD AUTO: 1 % (ref 0–1)
BUN SERPL-MCNC: 11 MG/DL (ref 5–25)
CALCIUM SERPL-MCNC: 9.1 MG/DL (ref 8.3–10.1)
CHLORIDE SERPL-SCNC: 108 MMOL/L (ref 100–108)
CO2 SERPL-SCNC: 24 MMOL/L (ref 21–32)
CREAT SERPL-MCNC: 1.09 MG/DL (ref 0.6–1.3)
EOSINOPHIL # BLD AUTO: 0.19 THOUSAND/ΜL (ref 0–0.61)
EOSINOPHIL NFR BLD AUTO: 5 % (ref 0–6)
ERYTHROCYTE [DISTWIDTH] IN BLOOD BY AUTOMATED COUNT: 13 % (ref 11.6–15.1)
GFR SERPL CREATININE-BSD FRML MDRD: 73 ML/MIN/1.73SQ M
GLUCOSE SERPL-MCNC: 95 MG/DL (ref 65–140)
HCT VFR BLD AUTO: 37.3 % (ref 36.5–49.3)
HGB BLD-MCNC: 12.5 G/DL (ref 12–17)
IMM GRANULOCYTES # BLD AUTO: 0.03 THOUSAND/UL (ref 0–0.2)
IMM GRANULOCYTES NFR BLD AUTO: 1 % (ref 0–2)
LYMPHOCYTES # BLD AUTO: 1.22 THOUSANDS/ΜL (ref 0.6–4.47)
LYMPHOCYTES NFR BLD AUTO: 31 % (ref 14–44)
MCH RBC QN AUTO: 31.4 PG (ref 26.8–34.3)
MCHC RBC AUTO-ENTMCNC: 33.5 G/DL (ref 31.4–37.4)
MCV RBC AUTO: 94 FL (ref 82–98)
MONOCYTES # BLD AUTO: 0.52 THOUSAND/ΜL (ref 0.17–1.22)
MONOCYTES NFR BLD AUTO: 13 % (ref 4–12)
NEUTROPHILS # BLD AUTO: 1.91 THOUSANDS/ΜL (ref 1.85–7.62)
NEUTS SEG NFR BLD AUTO: 49 % (ref 43–75)
NRBC BLD AUTO-RTO: 0 /100 WBCS
PLATELET # BLD AUTO: 151 THOUSANDS/UL (ref 149–390)
PMV BLD AUTO: 10.1 FL (ref 8.9–12.7)
POTASSIUM SERPL-SCNC: 3.5 MMOL/L (ref 3.5–5.3)
RBC # BLD AUTO: 3.98 MILLION/UL (ref 3.88–5.62)
SODIUM SERPL-SCNC: 139 MMOL/L (ref 136–145)
WBC # BLD AUTO: 3.91 THOUSAND/UL (ref 4.31–10.16)

## 2021-07-19 PROCEDURE — 85025 COMPLETE CBC W/AUTO DIFF WBC: CPT | Performed by: PHYSICIAN ASSISTANT

## 2021-07-19 PROCEDURE — 99232 SBSQ HOSP IP/OBS MODERATE 35: CPT | Performed by: INTERNAL MEDICINE

## 2021-07-19 PROCEDURE — 85730 THROMBOPLASTIN TIME PARTIAL: CPT | Performed by: INTERNAL MEDICINE

## 2021-07-19 PROCEDURE — 99232 SBSQ HOSP IP/OBS MODERATE 35: CPT | Performed by: PSYCHIATRY & NEUROLOGY

## 2021-07-19 PROCEDURE — 80048 BASIC METABOLIC PNL TOTAL CA: CPT | Performed by: INTERNAL MEDICINE

## 2021-07-19 RX ADMIN — ASPIRIN 81 MG CHEWABLE TABLET 81 MG: 81 TABLET CHEWABLE at 08:27

## 2021-07-19 RX ADMIN — MIDODRINE HYDROCHLORIDE 5 MG: 5 TABLET ORAL at 13:02

## 2021-07-19 RX ADMIN — MIDODRINE HYDROCHLORIDE 5 MG: 5 TABLET ORAL at 06:09

## 2021-07-19 RX ADMIN — HEPARIN SODIUM 23.1 UNITS/KG/HR: 10000 INJECTION, SOLUTION INTRAVENOUS at 08:39

## 2021-07-19 RX ADMIN — ATORVASTATIN CALCIUM 40 MG: 20 TABLET, FILM COATED ORAL at 17:17

## 2021-07-19 RX ADMIN — MIDODRINE HYDROCHLORIDE 5 MG: 5 TABLET ORAL at 17:17

## 2021-07-19 RX ADMIN — POLYETHYLENE GLYCOL 3350 17 G: 17 POWDER, FOR SOLUTION ORAL at 08:27

## 2021-07-19 NOTE — PROGRESS NOTES
[address] [facility]  Progress Note - Laurita Manual 1960, 64 y o  male MRN: [id number]  Unit/Bed#: [facility] 777-92 Encounter: [id number]  Primary Care Provider: Fely Schreiber DO   Date and time admitted to hospital: 2021  4:22 PM    * CVA (cerebral vascular accident) [facility])  Assessment & Plan  · Presented with right middle cerebral artery infarct  Right-sided carotid stenosis  Not a tPA candidate  Patient was followed by Neurology  Note plans regarding repeat CTA on   · Heparin drip with goal PTT between 60 and 90  Continue to monitor PTT  Continue with aspirin therapy  · Aspirin 81 mg daily as above  · CT head on July 15 showed stable right middle cerebral artery territory infarct with vasogenic edema in the basal ganglia  No hemorrhage or mass effect was noted  · Repeat CTA on the  as above  Hyperlipidemia  Assessment & Plan  Lipitor 20mg daily  Hypertension  Assessment & Plan  · History of HTN- takes home HCTZ  · Liberalized from BP Goals per neuro surgery        VTE Pharmacologic Prophylaxis:   Pharmacologic: Heparin Drip  Mechanical VTE Prophylaxis in Place: No    Patient Centered Rounds: I have performed bedside rounds with nursing staff today  Time Spent for Care: 15 minutes  More than 50% of total time spent on counseling and coordination of care as described above      Current Length of Stay: 5 day(s)    Current Patient Status: Inpatient   Certification Statement: The patient will continue to require additional inpatient hospital stay due to Need to monitor symptoms        Code Status: Level 1 - Full Code      Subjective:   No acute distress    Objective:     Vitals:   Temp (24hrs), Av 9 °F (37 2 °C), Min:97 9 °F (36 6 °C), Max:100 2 °F (37 9 °C)    Temp:  [97 9 °F (36 6 °C)-100 2 °F (37 9 °C)] 98 6 °F (37 °C)  HR:  [68-80] 71  Resp:  [17-18] 17  BP: (116-134)/(69-73) 133/73  SpO2:  [92 %-95 %] 95 %  Body mass index is 31 24 kg/m²  Input and Output Summary (last 24 hours): Intake/Output Summary (Last 24 hours) at 7/19/2021 0927  Last data filed at 7/18/2021 1700  Gross per 24 hour   Intake 360 ml   Output --   Net 360 ml       Physical Exam:     Physical Exam  Constitutional:       Appearance: Normal appearance  HENT:      Head: Normocephalic  Mouth/Throat:      Mouth: Mucous membranes are dry  Eyes:      Extraocular Movements: Extraocular movements intact  Pupils: Pupils are equal, round, and reactive to light  Cardiovascular:      Rate and Rhythm: Normal rate and regular rhythm  Heart sounds: No murmur heard  Pulmonary:      Effort: Pulmonary effort is normal       Breath sounds: Normal breath sounds  Abdominal:      General: Abdomen is flat  Musculoskeletal:         General: No swelling or tenderness  Skin:     Coloration: Skin is not jaundiced or pale  Neurological:      General: No focal deficit present  Mental Status: He is alert and oriented to person, place, and time  Additional Data:     Labs:    Results from last 7 days   Lab Units 07/19/21  0504   WBC Thousand/uL 3 91*   HEMOGLOBIN g/dL 12 5   HEMATOCRIT % 37 3   PLATELETS Thousands/uL 151   NEUTROS PCT % 49   LYMPHS PCT % 31   MONOS PCT % 13*   EOS PCT % 5     Results from last 7 days   Lab Units 07/19/21  0503 07/15/21  0552   POTASSIUM mmol/L 3 5 3 6   CHLORIDE mmol/L 108 111*   CO2 mmol/L 24 22   BUN mg/dL 11 12   CREATININE mg/dL 1 09 0 98   CALCIUM mg/dL 9 1 8 3   ALK PHOS U/L  --  56   ALT U/L  --  32   AST U/L  --  17     Results from last 7 days   Lab Units 07/14/21  1706   INR  0 97       * I Have Reviewed All Lab Data Listed Above  * Additional Pertinent Lab Tests Reviewed: All Labs Within Last 24 Hours Reviewed        Recent Cultures (last 7 days):     Results from last 7 days   Lab Units 07/17/21 2237 07/17/21 2236   BLOOD CULTURE  No Growth at 24 hrs  No Growth at 24 hrs         Last 24 Hours Medication List:   Current Facility-Administered Medications   Medication Dose Route Frequency Provider Last Rate    acetaminophen  650 mg Oral Q6H PRN Edith Arshad MD      aspirin  81 mg Oral Daily Linda Higgins PA-C      atorvastatin  40 mg Oral QPM Kaushik Freitas PA-C      heparin (porcine)  3-21 1 Units/kg/hr (Order-Specific) Intravenous Titrated Li Saba PA-C 23 1 Units/kg/hr (07/19/21 0839)    midodrine  5 mg Oral TID AC Kaushik Freitas PA-C      polyethylene glycol  17 g Oral Daily Boyd Zuniga DO          Today, Patient Was Seen By: Nick Ruiz DO    ** Please Note: Dictation voice to text software may have been used in the creation of this document   **

## 2021-07-19 NOTE — PROGRESS NOTES
NEUROLOGY RESIDENCY PROGRESS NOTE     Name: Audelia Cortes   Age & Sex: 64 y o  male   MRN: [id number]  [facility]   Encounter: [id number]    ASSESSMENT & PLAN     * CVA (cerebral vascular accident) [facility])  Assessment & Plan  Assessment/Impression: 65 y/o M w/ acute ischemic strokes likely 2/2 mobile carotid thrombus (likely etiology for his prior strokes as well), currently significantly improved and overall stable    Presenting sx: left facial droop, dysarthria, LKN 2100 reportedly the night PTA (approx 20 hrs prior to initial consult), but on further discussion w/ family it appears his [de-identified] may have been the day before yesterday and that he was experiencing a right-sided headache for about 2 weeks which peaked around the time the family noticed sx   NIHSS: 2 on arrival    tPA not given 2/2 time course, non-disabling sx   CTH/CTA:  Right caudate/lentiform/BG hypodensity and right frontal focal encephalomalacia due to remote infarct, small, likely chronic infarct in the right parietal lobe; severe right ICA stenosis (noncalcified plaque at the carotid bifurcation), and nearly occlusive thrombus in the right supraclinoid ICA extending to the terminus, and into right M1 and A1 segments; hypoplastic but patent left A1   MRI: new focal ischemia in medial R T lobe, R corpus striatum, R corona radiata, R frontal lobe (all R MCA territory strokes); no susceptibility weighting imaging signal consistent w/ old or new blood products   Labs: A1c 5 3, LDL 97,    Echo: LVEF 75%, mild concentric hypertrophy, no RWMA, no PFO on bubble, trace MR, TR, CA, mild dilatation of the ascending aorta (4 2 cm)   Exam: mild left facial weakness w/ subtle flattening of nasolabial fold but no residual dysarthria or asymmetric tongue protrusion; continues to have minimal deficits    0   Lab Value Date/Time    PTT 76 (H) 07/16/2021 0535     Plan: Stroke pathway, repeat CT this week, f/u nsg recs for intervention   Given possibility of nsg intervention later in the week, they are continuing to recommend heparin gtt until repeat imaging on 21    Continue ASA, statin for now - would recommend DAPT and statin on d/c    Can relax BP goals   If patient deteriorates, stat [facility] and endovascular may reconsider intervention - reach out to neurovasc (they are following)   Frequent neuro checks: stat CTH for acute change   Replete lytes to goal K 4, Mg 2, PO4 4   Euglycemia (gluc < 180, SSI or drip as needed), normothermia   PT/OT/PMR/ST w/ swallow eval   Repeat CTA H&N Tues/ - nsg to make intervention recommendations based on this    F/u w/ neuro stroke clinic    Hypertension  Assessment & Plan  As above    Reza Han will need follow up in in 4 weeks with neurovascular    He will not require outpatient neurological testing at this time    SUBJECTIVE     Patient was seen and examined  No critical events overnight  Denied N/V/F/C, abdominal pain, HA, dizziness, confusion, focal weakness or dysarthria, any new neurologic sx  Remains in good spirits and is amenable to staying on the heparin as long as necessary  Will f/u w/ nsg pending repeat imaging Weds  ROS negative unless otherwise noted    OBJECTIVE     Patient ID: Reza Han is a 64 y o  male  Vitals:    21 2200 21 0252 21 0754 21 1111   BP: 132/70 134/71 133/73 130/73   BP Location:       Pulse: 68 71     Resp: 17   16   Temp: 100 2 °F (37 9 °C) 97 9 °F (36 6 °C) 98 6 °F (37 °C) 98 °F (36 7 °C)   TempSrc:       SpO2: 94% 95%     Weight:       Height:          Temperature:   Temp (24hrs), Av 7 °F (37 1 °C), Min:97 9 °F (36 6 °C), Max:100 2 °F (37 9 °C)    Temperature: 98 °F (36 7 °C)    Physical Exam  Vitals reviewed  Constitutional:       General: He is not in acute distress  Appearance: Normal appearance  He is not ill-appearing, toxic-appearing or diaphoretic     HENT:      Head: Normocephalic and atraumatic  Right Ear: External ear normal       Left Ear: External ear normal       Nose: Nose normal  No congestion or rhinorrhea  Mouth/Throat:      Mouth: Mucous membranes are moist       Pharynx: Oropharynx is clear  No oropharyngeal exudate or posterior oropharyngeal erythema  Eyes:      General: No scleral icterus  Right eye: No discharge  Left eye: No discharge  Extraocular Movements: Extraocular movements intact and EOM normal       Conjunctiva/sclera: Conjunctivae normal       Pupils: Pupils are equal, round, and reactive to light  Pulmonary:      Effort: Pulmonary effort is normal  No respiratory distress  Skin:     Coloration: Skin is not pale  Findings: No erythema or rash  Neurological:      Mental Status: He is alert and oriented to person, place, and time  Sensory: No sensory deficit  Motor: No weakness  Coordination: Coordination normal  Finger-Nose-Finger Test abnormal (very subtle incoordination LUE)  Heel to [facility] Test normal       Deep Tendon Reflexes: Reflexes abnormal       Reflex Scores:       Bicep reflexes are 2+ on the right side and 2+ on the left side  Brachioradialis reflexes are 2+ on the right side and 2+ on the left side  Patellar reflexes are 2+ on the right side and 2+ on the left side  Psychiatric:         Mood and Affect: Mood normal          Speech: Speech normal          Behavior: Behavior normal      Neurologic Exam     Mental Status   Oriented to person, place, and time  Follows 2 step commands  Attention: normal    Speech: speech is normal   Level of consciousness: alert  Knowledge: consistent with education  Able to name object  Able to repeat  Normal comprehension  Cranial Nerves     CN II   Visual fields full to confrontation  CN III, IV, VI   Pupils are equal, round, and reactive to light  Extraocular motions are normal    Right pupil: Reactivity: brisk   Consensual response: intact  Left pupil: Reactivity: brisk  Consensual response: intact  CN III: no CN III palsy  CN VI: no CN VI palsy  Nystagmus: left     CN V   Facial sensation intact  CN VII   Left facial weakness: central (very subtle flattening of nasolabial fold, left-sided weakness w/ smiling)    CN VIII   Hearing: intact    CN IX, X   Palate: symmetric    CN XI   Right trapezius strength: normal  Left trapezius strength: normal    CN XII   CN XII normal    Tongue deviation: none    Motor Exam   Muscle bulk: normal  Overall muscle tone: normal  Right arm pronator drift: absent  Left arm pronator drift: absent    Strength   Right deltoid: 5/5  Left deltoid: 5/5  Right biceps: 5/5  Left biceps: 5/5  Right triceps: 5/5  Left triceps: 5/5  Right iliopsoas: 5/5  Left iliopsoas: 5/5  Right quadriceps: 5/5  Left quadriceps: 5/5  Right anterior tibial: 5/5  Left anterior tibial: 5/5  Right gastroc: 5/5  Left gastroc: 5/5    Sensory Exam   Light touch normal      Gait, Coordination, and Reflexes     Coordination   Finger to nose coordination: abnormal (very subtle incoordination LUE)  Heel to shin coordination: normal    Tremor   Resting tremor: absent  Action tremor: absent    Reflexes   Right brachioradialis: 2+  Left brachioradialis: 2+  Right biceps: 2+  Left biceps: 2+  Right patellar: 2+  Left patellar: 2+    LABORATORY DATA     Labs: I have personally reviewed pertinent reports      Results from last 7 days   Lab Units 07/19/21  0504 07/18/21  0518 07/17/21  0455   WBC Thousand/uL 3 91* 3 93* 8 48   HEMOGLOBIN g/dL 12 5 12 3 12 2   HEMATOCRIT % 37 3 36 3* 36 1*   PLATELETS Thousands/uL 151 123* 154   NEUTROS PCT % 49 63 70   MONOS PCT % 13* 12 8      Results from last 7 days   Lab Units 07/19/21  0503 07/17/21  0455 07/16/21  0535 07/15/21  0552   POTASSIUM mmol/L 3 5 3 6 3 6 3 6   CHLORIDE mmol/L 108 108 110* 111*   CO2 mmol/L 24 23 25 22   BUN mg/dL 11 10 7 12   CREATININE mg/dL 1 09 1 12 0 92 0 98   CALCIUM mg/dL 9 1 8 4 8 2* 8 3   ALK PHOS U/L  --   --   --  56   ALT U/L  --   --   --  32   AST U/L  --   --   --  17     Results from last 7 days   Lab Units 07/17/21  0455 07/16/21  0535   MAGNESIUM mg/dL 1 7 2 0     Results from last 7 days   Lab Units 07/17/21  0455 07/16/21  0535   PHOSPHORUS mg/dL 3 5 2 6      Results from last 7 days   Lab Units 07/19/21  0830 07/18/21  0519 07/17/21  2238 07/14/21  1706 07/14/21  1349   INR   --   --   --  0 97 0 92   PTT seconds 95* 82* 71* 28 26         Results from last 7 days   Lab Units 07/14/21  1349   TROPONIN I ng/mL <0 02     ABG:No results found for: PHART, GAX4KAY, PO2ART, MRZ1KEY, W1WECQYH, BEART, SOURCE    IMAGING & DIAGNOSTIC TESTING   Radiology Results: I have personally reviewed pertinent reports  and I have personally reviewed pertinent films in PACS  XR chest portable   Final Result by Ana Driscoll MD (07/19 0193)      Right lower lobe subsegmental atelectasis  Workstation performed: [id number]         XR chest portable ICU   Final Result by Primo Donaldson MD (07/17 5504)      No acute cardiopulmonary disease  Workstation performed: [id number]         CT head wo contrast   Final Result by Christelle Ramirez MD (07/15 1941)         Stable right MCA territory infarct with vasogenic edema in the basal ganglia  No hemorrhagic conversion or significant mass effect  Workstation performed: [id number]         MRI brain wo contrast   Final Result by Petrona Macias MD (07/14 6422)      Medial right temporal lobe, right corpus striatum, right corona radiata and right frontal cortical foci of acute ischemia  Findings were marked as "immediate"in Epic and will now be related to the ordering physician or covering clinical team by the radiology liaison  Workstation performed: [id number]         XR chest portable   Final Result by Valerio Peterson MD (07/15 7103)      No acute cardiopulmonary disease  Workstation performed: [id number]           Other Diagnostic Testing: I have personally reviewed pertinent reports        ACTIVE MEDICATIONS     Current Facility-Administered Medications   Medication Dose Route Frequency    acetaminophen (TYLENOL) tablet 650 mg  650 mg Oral Q6H PRN    aspirin chewable tablet 81 mg  81 mg Oral Daily    atorvastatin (LIPITOR) tablet 40 mg  40 mg Oral QPM    heparin (porcine) 25,000 units in 0 45% NaCl 250 mL infusion (premix)  3-21 1 Units/kg/hr (Order-Specific) Intravenous Titrated    midodrine (PROAMATINE) tablet 5 mg  5 mg Oral TID AC    polyethylene glycol (MIRALAX) packet 17 g  17 g Oral Daily     Medication Sig   acetaminophen (TYLENOL) 650 mg CR tablet Take 1 tablet (650 mg total) by mouth every 8 (eight) hours as needed for mild pain   hydrochlorothiazide (HYDRODIURIL) 25 mg tablet Take 25 mg by mouth daily     VTE Pharmacologic Prophylaxis: Heparin Drip  VTE Mechanical Prophylaxis: sequential compression device    ==  Pennelope Overall, MD  Resident, Neurology, PGY-2  [address]

## 2021-07-19 NOTE — ASSESSMENT & PLAN NOTE
Suspect component of atelectasis  Blood cultures were drawn and negative times 24 hours  Continue to monitor symptoms    Incentive spirometry  Ambulate patient

## 2021-07-19 NOTE — RESTORATIVE TECHNICIAN NOTE
Restorative Technician Note      Patient Name: Silvino Ramirez     Note Type: Mobility  Patient Position Upon Consult: Supine  Activity Performed: Ambulated;Dangled;Stood  Assistive Device: Other (Comment) (None)  Education Provided: Yes (Educated/encouraged pt to ambulate with assistance 3-4 x's/day )  Patient Position at End of Consult: Bedside chair; All needs within reach;Bed/Chair alarm activated    Alison Holland BS, Restorative Technician, [employer]

## 2021-07-19 NOTE — RESPIRATORY THERAPY NOTE
resp care      07/19/21 0814   Respiratory Protocol   Respiratory Plan Discontinue Protocol   Respiratory Assessment   Assessment Type Assess only   General Appearance Awake; Alert   Respiratory Pattern Normal   Chest Assessment Chest expansion symmetrical   Bilateral Breath Sounds Clear   Resp Comments Pt denies pulm hx or meds at home  BS clear at this time  PRN not needed  Will d/c prn udn and d/c pt from resp protocol

## 2021-07-20 LAB
APTT PPP: 72 SECONDS (ref 23–37)
BASOPHILS # BLD AUTO: 0.04 THOUSANDS/ΜL (ref 0–0.1)
BASOPHILS NFR BLD AUTO: 1 % (ref 0–1)
EOSINOPHIL # BLD AUTO: 0.24 THOUSAND/ΜL (ref 0–0.61)
EOSINOPHIL NFR BLD AUTO: 6 % (ref 0–6)
ERYTHROCYTE [DISTWIDTH] IN BLOOD BY AUTOMATED COUNT: 12.9 % (ref 11.6–15.1)
HCT VFR BLD AUTO: 36.7 % (ref 36.5–49.3)
HGB BLD-MCNC: 12.6 G/DL (ref 12–17)
IMM GRANULOCYTES # BLD AUTO: 0.02 THOUSAND/UL (ref 0–0.2)
IMM GRANULOCYTES NFR BLD AUTO: 1 % (ref 0–2)
LYMPHOCYTES # BLD AUTO: 1.2 THOUSANDS/ΜL (ref 0.6–4.47)
LYMPHOCYTES NFR BLD AUTO: 30 % (ref 14–44)
MCH RBC QN AUTO: 31.9 PG (ref 26.8–34.3)
MCHC RBC AUTO-ENTMCNC: 34.3 G/DL (ref 31.4–37.4)
MCV RBC AUTO: 93 FL (ref 82–98)
MONOCYTES # BLD AUTO: 0.39 THOUSAND/ΜL (ref 0.17–1.22)
MONOCYTES NFR BLD AUTO: 10 % (ref 4–12)
NEUTROPHILS # BLD AUTO: 2.13 THOUSANDS/ΜL (ref 1.85–7.62)
NEUTS SEG NFR BLD AUTO: 52 % (ref 43–75)
NRBC BLD AUTO-RTO: 0 /100 WBCS
PLATELET # BLD AUTO: 144 THOUSANDS/UL (ref 149–390)
PMV BLD AUTO: 9.2 FL (ref 8.9–12.7)
RBC # BLD AUTO: 3.95 MILLION/UL (ref 3.88–5.62)
WBC # BLD AUTO: 4.02 THOUSAND/UL (ref 4.31–10.16)

## 2021-07-20 PROCEDURE — 99232 SBSQ HOSP IP/OBS MODERATE 35: CPT | Performed by: INTERNAL MEDICINE

## 2021-07-20 PROCEDURE — 99232 SBSQ HOSP IP/OBS MODERATE 35: CPT | Performed by: PSYCHIATRY & NEUROLOGY

## 2021-07-20 PROCEDURE — 85730 THROMBOPLASTIN TIME PARTIAL: CPT | Performed by: INTERNAL MEDICINE

## 2021-07-20 PROCEDURE — 85025 COMPLETE CBC W/AUTO DIFF WBC: CPT | Performed by: PHYSICIAN ASSISTANT

## 2021-07-20 RX ADMIN — ATORVASTATIN CALCIUM 40 MG: 20 TABLET, FILM COATED ORAL at 18:38

## 2021-07-20 RX ADMIN — MIDODRINE HYDROCHLORIDE 5 MG: 5 TABLET ORAL at 18:38

## 2021-07-20 RX ADMIN — MIDODRINE HYDROCHLORIDE 5 MG: 5 TABLET ORAL at 12:17

## 2021-07-20 RX ADMIN — ASPIRIN 81 MG CHEWABLE TABLET 81 MG: 81 TABLET CHEWABLE at 08:53

## 2021-07-20 RX ADMIN — MIDODRINE HYDROCHLORIDE 5 MG: 5 TABLET ORAL at 06:22

## 2021-07-20 RX ADMIN — HEPARIN SODIUM 19.1 UNITS/KG/HR: 10000 INJECTION, SOLUTION INTRAVENOUS at 06:22

## 2021-07-20 NOTE — PROGRESS NOTES
[address] [facility]  Progress Note - Malvin Trevino 1960, 64 y o  male MRN: [id number]  Unit/Bed#: [facility] 463-70 Encounter: [id number]  Primary Care Provider: Christian Cortes DO   Date and time admitted to hospital: 7/14/2021  4:22 PM    * CVA (cerebral vascular accident) [facility])  Assessment & Plan  · Presented with right middle cerebral artery infarct  Right-sided carotid stenosis  Not a tPA candidate  Patient was followed by Neurology  Note plans regarding repeat CTA on July 22nd  · Heparin drip with goal PTT between 60 and 90  Continue to monitor PTT  Continue with aspirin therapy  · Aspirin 81 mg daily as above  · CT head on July 15 showed stable right middle cerebral artery territory infarct with vasogenic edema in the basal ganglia  No hemorrhage or mass effect was noted  · Repeat CTA on the 22nd as above  Febrile illness  Assessment & Plan  Suspect component of atelectasis  Blood cultures were drawn and negative times 24 hours  Continue to monitor symptoms  Incentive spirometry  Ambulate patient    Hyperlipidemia  Assessment & Plan  Lipitor 20mg daily  Hypertension  Assessment & Plan  · History of HTN- takes home HCTZ  · Monitor blood pressures  · Avoid hypotension            VTE Pharmacologic Prophylaxis: VTE Score: 10 High Risk (Score >/= 5) - Pharmacological DVT Prophylaxis Ordered: heparin drip  Sequential Compression Devices Ordered  Patient Centered Rounds: I performed bedside rounds with nursing staff today  Discussions with Specialists or Other Care Team Provider:     Education and Discussions with Family / Patient: Discussed the patient, reports he is keeping his family updated  Time Spent for Care: 30 minutes  More than 50% of total time spent on counseling and coordination of care as described above      Current Length of Stay: 6 day(s)  Current Patient Status: Inpatient   Certification Statement: The patient will continue to require additional inpatient hospital stay due to As outlined  Discharge Plan: CTA head and neck as recommended by neurovascular/vascular surgery    Code Status: Level 1 - Full Code    Subjective:     Comfortably in bed  Reports feeling okay  History chart labs medications reviewed    Objective:     Vitals:   Temp (24hrs), Av 4 °F (36 9 °C), Min:97 3 °F (36 3 °C), Max:99 2 °F (37 3 °C)    Temp:  [97 3 °F (36 3 °C)-99 2 °F (37 3 °C)] 97 3 °F (36 3 °C)  HR:  [67-68] 68  Resp:  [18] 18  BP: (126-133)/(60-79) 128/70  SpO2:  [92 %-93 %] 93 %  Body mass index is 30 93 kg/m²  Input and Output Summary (last 24 hours):      Intake/Output Summary (Last 24 hours) at 2021 1551  Last data filed at 2021 1320  Gross per 24 hour   Intake 240 ml   Output --   Net 240 ml       Physical Exam:   Physical Exam     Comfortably in bed  Neck supple  Lungs diminished breath sounds bilateral  Heart sounds S1-S2 noted  Abdomen soft nontender  Awake obeys simple commands  Pulses noted  No rash    Additional Data:     Labs:  Results from last 7 days   Lab Units 21  0709   WBC Thousand/uL 4 02*   HEMOGLOBIN g/dL 12 6   HEMATOCRIT % 36 7   PLATELETS Thousands/uL 144*   NEUTROS PCT % 52   LYMPHS PCT % 30   MONOS PCT % 10   EOS PCT % 6     Results from last 7 days   Lab Units 21  0503 07/15/21  0552   SODIUM mmol/L 139 140   POTASSIUM mmol/L 3 5 3 6   CHLORIDE mmol/L 108 111*   CO2 mmol/L 24 22   BUN mg/dL 11 12   CREATININE mg/dL 1 09 0 98   ANION GAP mmol/L 7 7   CALCIUM mg/dL 9 1 8 3   ALBUMIN g/dL  --  3 6   TOTAL BILIRUBIN mg/dL  --  1 78*   ALK PHOS U/L  --  56   ALT U/L  --  32   AST U/L  --  17   GLUCOSE RANDOM mg/dL 95 133     Results from last 7 days   Lab Units 21  1706   INR  0 97     Results from last 7 days   Lab Units 21  2143 21  1341   POC GLUCOSE mg/dl 97 120     Results from last 7 days   Lab Units 07/15/21  0552   HEMOGLOBIN A1C % 5 3     Results from last 7 days   Lab Units 21  0518 07/17/21  0455   PROCALCITONIN ng/ml 0 19 0 05       Lines/Drains:  Invasive Devices     Peripheral Intravenous Line            Peripheral IV 07/18/21 Dorsal (posterior); Left Forearm 2 days                      Imaging: Reviewed radiology reports from this admission including: MRI brain    Recent Cultures (last 7 days):   Results from last 7 days   Lab Units 07/17/21 2237 07/17/21  2236   BLOOD CULTURE  No Growth at 48 hrs  No Growth at 48 hrs  Last 24 Hours Medication List:   Current Facility-Administered Medications   Medication Dose Route Frequency Provider Last Rate    acetaminophen  650 mg Oral Q6H PRN Luiza Lopez MD      aspirin  81 mg Oral Daily Wilbert Gonsalves PA-C      atorvastatin  40 mg Oral QPM Kaushik Freitas PA-C      heparin (porcine)  3-21 1 Units/kg/hr (Order-Specific) Intravenous Titrated Li Saba PA-C 19 1 Units/kg/hr (07/20/21 0622)    midodrine  5 mg Oral TID AC Kaushik Freitas PA-C      polyethylene glycol  17 g Oral Daily Boyd Zuniga DO          Today, Patient Was Seen By: Saratha Scheuermann, MD    **Please Note: This note may have been constructed using a voice recognition system  **

## 2021-07-20 NOTE — PROGRESS NOTES
NEUROLOGY RESIDENCY PROGRESS NOTE     Name: Marty Pringle   Age & Sex: 64 y o  male   MRN: [id number]  Unit/Bed#: [facility] 718-01   Encounter: [id number]    ASSESSMENT & PLAN     * CVA (cerebral vascular accident) [facility])  Assessment & Plan  Assessment/Impression: 65 y/o M w/ acute ischemic strokes likely 2/2 mobile carotid thrombus (likely etiology for his prior strokes as well), currently significantly improved and overall stable    Presenting sx: left facial droop, dysarthria, LKN 2100 reportedly the night PTA (approx 20 hrs prior to initial consult), but on further discussion w/ family it appears his [de-identified] may have been the day before yesterday and that he was experiencing a right-sided headache for about 2 weeks which peaked around the time the family noticed sx   NIHSS: 2 on arrival    tPA not given 2/2 time course, non-disabling sx   CTH/CTA:  Right caudate/lentiform/BG hypodensity and right frontal focal encephalomalacia due to remote infarct, small, likely chronic infarct in the right parietal lobe; severe right ICA stenosis (noncalcified plaque at the carotid bifurcation), and nearly occlusive thrombus in the right supraclinoid ICA extending to the terminus, and into right M1 and A1 segments; hypoplastic but patent left A1   MRI: new focal ischemia in medial R T lobe, R corpus striatum, R corona radiata, R frontal lobe (all R MCA territory strokes); no susceptibility weighting imaging signal consistent w/ old or new blood products   Labs: A1c 5 3, LDL 97,    Echo: LVEF 75%, mild concentric hypertrophy, no RWMA, no PFO on bubble, trace MR, TR, IL, mild dilatation of the ascending aorta (4 2 cm)   Exam: mild left facial weakness w/ subtle flattening of nasolabial fold but no residual dysarthria or asymmetric tongue protrusion; continues to have minimal deficits    0   Lab Value Date/Time    PTT 76 (H) 07/16/2021 0535     Plan: Stroke pathway, repeat CT this week, f/u nsg recs for intervention   Given possibility of nsg intervention later in the week, they are continuing to recommend heparin gtt until repeat imaging on 21    Continue ASA, statin for now - would recommend DAPT and statin on d/c    Can relax BP goals   If patient deteriorates, stat [facility] and endovascular may reconsider intervention - reach out to neurovasc (they are following)   Frequent neuro checks: stat CTH for acute change   Replete lytes to goal K 4, Mg 2, PO4 4   Euglycemia (gluc < 180, SSI or drip as needed), normothermia   PT/OT/PMR/ST w/ swallow eval   Repeat CTA H&N Tues/ - nsg to make intervention recommendations based on this    F/u w/ neuro stroke clinic    Hypertension  Assessment & Plan  As above    Carter Neri will need follow up in in 4 weeks with neurovascular    He will not require outpatient neurological testing at this time    SUBJECTIVE     Patient was seen and examined  No critical events overnight  Denied N/V/F/C, abdominal pain, HA, dizziness, or new neurologic sx  Remains in good spirits and is amenable to staying on the heparin as long as necessary  Will f/u w/ nsg pending repeat imaging Weds  ROS negative unless otherwise noted    OBJECTIVE     Patient ID: Carter Neri is a 64 y o  male  Vitals:    21 2200 21 0600 21 0811 21 1045   BP: 126/60  133/79 128/70   Pulse:   67 68   Resp:       Temp: 99 1 °F (37 3 °C)  97 9 °F (36 6 °C) (!) 97 3 °F (36 3 °C)   TempSrc: Oral      SpO2:   92% 93%   Weight:  101 kg (221 lb 12 5 oz)     Height:          Temperature:   Temp (24hrs), Av 4 °F (36 9 °C), Min:97 3 °F (36 3 °C), Max:99 2 °F (37 3 °C)    Temperature: (!) 97 3 °F (36 3 °C)    Physical Exam  Vitals reviewed  Constitutional:       General: He is not in acute distress  Appearance: Normal appearance  He is not ill-appearing, toxic-appearing or diaphoretic  HENT:      Head: Normocephalic and atraumatic        Right Ear: External ear normal       Left Ear: External ear normal       Nose: Nose normal  No congestion or rhinorrhea  Mouth/Throat:      Mouth: Mucous membranes are moist       Pharynx: Oropharynx is clear  No oropharyngeal exudate or posterior oropharyngeal erythema  Eyes:      General: No scleral icterus  Right eye: No discharge  Left eye: No discharge  Extraocular Movements: Extraocular movements intact  Conjunctiva/sclera: Conjunctivae normal    Pulmonary:      Effort: Pulmonary effort is normal  No respiratory distress  Skin:     Coloration: Skin is not pale  Findings: No erythema or rash  Neurological:      Mental Status: He is alert and oriented to person, place, and time  Sensory: No sensory deficit  Motor: No weakness  Coordination: Coordination normal       Deep Tendon Reflexes: Reflexes abnormal    Psychiatric:         Mood and Affect: Mood normal          Speech: Speech normal          Behavior: Behavior normal      Neurologic Exam     Mental Status   Oriented to person, place, and time  Attention: normal    Speech: speech is normal   Level of consciousness: alert  Knowledge: consistent with education  Cranial Nerves     CN III, IV, VI   CN III: no CN III palsy  CN VI: no CN VI palsy    CN VII   Left facial weakness: central (very subtle flattening of nasolabial fold, left-sided weakness w/ smiling)    CN VIII   Hearing: intact    Motor Exam   Muscle bulk: normal  Overall muscle tone: normal  Right arm pronator drift: absent  Left arm pronator drift: absent  BUE/BLE strength grossly intact and symmetric     Sensory Exam   Light touch normal      Gait, Coordination, and Reflexes     Tremor   Resting tremor: absent  Action tremor: absent    LABORATORY DATA     Labs: I have personally reviewed pertinent reports      Results from last 7 days   Lab Units 07/20/21  0709 07/19/21  0504 07/18/21  0518   WBC Thousand/uL 4 02* 3 91* 3 93*   HEMOGLOBIN g/dL 12 6 12 5 12 3   HEMATOCRIT % 36 7 37 3 36 3*   PLATELETS Thousands/uL 144* 151 123*   NEUTROS PCT % 52 49 63   MONOS PCT % 10 13* 12      Results from last 7 days   Lab Units 07/19/21  0503 07/17/21  0455 07/16/21  0535 07/15/21  0552   POTASSIUM mmol/L 3 5 3 6 3 6 3 6   CHLORIDE mmol/L 108 108 110* 111*   CO2 mmol/L 24 23 25 22   BUN mg/dL 11 10 7 12   CREATININE mg/dL 1 09 1 12 0 92 0 98   CALCIUM mg/dL 9 1 8 4 8 2* 8 3   ALK PHOS U/L  --   --   --  56   ALT U/L  --   --   --  32   AST U/L  --   --   --  17     Results from last 7 days   Lab Units 07/17/21  0455 07/16/21  0535   MAGNESIUM mg/dL 1 7 2 0     Results from last 7 days   Lab Units 07/17/21  0455 07/16/21  0535   PHOSPHORUS mg/dL 3 5 2 6      Results from last 7 days   Lab Units 07/20/21  0140 07/19/21  2124 07/19/21  1604 07/14/21  1706 07/14/21  1349   INR   --   --   --  0 97 0 92   PTT seconds 72* 77* 91* 28 26         Results from last 7 days   Lab Units 07/14/21  1349   TROPONIN I ng/mL <0 02     ABG:No results found for: PHART, NXD8BCI, PO2ART, WOB2UVI, V9YXKVJI, BEART, SOURCE    IMAGING & DIAGNOSTIC TESTING   Radiology Results: I have personally reviewed pertinent reports  and I have personally reviewed pertinent films in PACS  XR chest portable   Final Result by Sage Virgen MD (07/19 9615)      Right lower lobe subsegmental atelectasis  Workstation performed: [id number]         XR chest portable ICU   Final Result by Yadira Muñoz MD (07/17 2678)      No acute cardiopulmonary disease  Workstation performed: [id number]         CT head wo contrast   Final Result by Rosio Thomas MD (07/15 1941)         Stable right MCA territory infarct with vasogenic edema in the basal ganglia  No hemorrhagic conversion or significant mass effect                    Workstation performed: [id number]         MRI brain wo contrast   Final Result by Tommy Youngblood MD (07/14 4692)      Medial right temporal lobe, right corpus striatum, right corona radiata and right frontal cortical foci of acute ischemia  Findings were marked as "immediate"in Epic and will now be related to the ordering physician or covering clinical team by the radiology liaison  Workstation performed: [id number]         XR chest portable   Final Result by Riki Rivera MD (07/15 0815)      No acute cardiopulmonary disease  Workstation performed: [id number]         CTA head and neck w wo contrast    (Results Pending)     Other Diagnostic Testing: I have personally reviewed pertinent reports        ACTIVE MEDICATIONS     Current Facility-Administered Medications   Medication Dose Route Frequency    acetaminophen (TYLENOL) tablet 650 mg  650 mg Oral Q6H PRN    aspirin chewable tablet 81 mg  81 mg Oral Daily    atorvastatin (LIPITOR) tablet 40 mg  40 mg Oral QPM    heparin (porcine) 25,000 units in 0 45% NaCl 250 mL infusion (premix)  3-21 1 Units/kg/hr (Order-Specific) Intravenous Titrated    midodrine (PROAMATINE) tablet 5 mg  5 mg Oral TID AC    polyethylene glycol (MIRALAX) packet 17 g  17 g Oral Daily     Medication Sig   acetaminophen (TYLENOL) 650 mg CR tablet Take 1 tablet (650 mg total) by mouth every 8 (eight) hours as needed for mild pain   hydrochlorothiazide (HYDRODIURIL) 25 mg tablet Take 25 mg by mouth daily     VTE Pharmacologic Prophylaxis: Heparin Drip  VTE Mechanical Prophylaxis: sequential compression device    ==  Arlene Hussein MD  Resident, Neurology, PGY-2  [address]

## 2021-07-21 ENCOUNTER — APPOINTMENT (INPATIENT)
Dept: NON INVASIVE DIAGNOSTICS | Facility: HOSPITAL | Age: 61
DRG: 037 | End: 2021-07-21
Payer: MEDICARE

## 2021-07-21 ENCOUNTER — APPOINTMENT (INPATIENT)
Dept: RADIOLOGY | Facility: HOSPITAL | Age: 61
DRG: 037 | End: 2021-07-21
Payer: MEDICARE

## 2021-07-21 ENCOUNTER — TELEPHONE (OUTPATIENT)
Dept: RADIOLOGY | Facility: HOSPITAL | Age: 61
End: 2021-07-21

## 2021-07-21 PROBLEM — I63.239 CAROTID STENOSIS, SYMPTOMATIC, WITH INFARCTION (HCC): Status: ACTIVE | Noted: 2021-07-21

## 2021-07-21 LAB
APTT PPP: 67 SECONDS (ref 23–37)
BASOPHILS # BLD MANUAL: 0.05 THOUSAND/UL (ref 0–0.1)
BASOPHILS NFR MAR MANUAL: 1 % (ref 0–1)
EOSINOPHIL # BLD MANUAL: 0.29 THOUSAND/UL (ref 0–0.4)
EOSINOPHIL NFR BLD MANUAL: 6 % (ref 0–6)
ERYTHROCYTE [DISTWIDTH] IN BLOOD BY AUTOMATED COUNT: 13.2 % (ref 11.6–15.1)
HCT VFR BLD AUTO: 39.2 % (ref 36.5–49.3)
HGB BLD-MCNC: 13.1 G/DL (ref 12–17)
LYMPHOCYTES # BLD AUTO: 1.3 THOUSAND/UL (ref 0.6–4.47)
LYMPHOCYTES # BLD AUTO: 27 % (ref 14–44)
MCH RBC QN AUTO: 31.6 PG (ref 26.8–34.3)
MCHC RBC AUTO-ENTMCNC: 33.4 G/DL (ref 31.4–37.4)
MCV RBC AUTO: 95 FL (ref 82–98)
MONOCYTES # BLD AUTO: 0.19 THOUSAND/UL (ref 0–1.22)
MONOCYTES NFR BLD: 4 % (ref 4–12)
NEUTROPHILS # BLD MANUAL: 2.8 THOUSAND/UL (ref 1.85–7.62)
NEUTS BAND NFR BLD MANUAL: 3 % (ref 0–8)
NEUTS SEG NFR BLD AUTO: 55 % (ref 43–75)
NRBC BLD AUTO-RTO: 0 /100 WBCS
PLASMA CELLS NFR BLD: 4 % (ref 0–0)
PLATELET # BLD AUTO: 193 THOUSANDS/UL (ref 149–390)
PLATELET BLD QL SMEAR: ADEQUATE
PMV BLD AUTO: 10 FL (ref 8.9–12.7)
POLYCHROMASIA BLD QL SMEAR: PRESENT
RBC # BLD AUTO: 4.14 MILLION/UL (ref 3.88–5.62)
RBC MORPH BLD: PRESENT
WBC # BLD AUTO: 4.82 THOUSAND/UL (ref 4.31–10.16)

## 2021-07-21 PROCEDURE — 99232 SBSQ HOSP IP/OBS MODERATE 35: CPT | Performed by: INTERNAL MEDICINE

## 2021-07-21 PROCEDURE — NC001 PR NO CHARGE: Performed by: PHYSICIAN ASSISTANT

## 2021-07-21 PROCEDURE — 99232 SBSQ HOSP IP/OBS MODERATE 35: CPT | Performed by: PSYCHIATRY & NEUROLOGY

## 2021-07-21 PROCEDURE — 85027 COMPLETE CBC AUTOMATED: CPT | Performed by: PHYSICIAN ASSISTANT

## 2021-07-21 PROCEDURE — 85730 THROMBOPLASTIN TIME PARTIAL: CPT | Performed by: INTERNAL MEDICINE

## 2021-07-21 PROCEDURE — 85007 BL SMEAR W/DIFF WBC COUNT: CPT | Performed by: PHYSICIAN ASSISTANT

## 2021-07-21 PROCEDURE — 87040 BLOOD CULTURE FOR BACTERIA: CPT | Performed by: PHYSICIAN ASSISTANT

## 2021-07-21 PROCEDURE — 93880 EXTRACRANIAL BILAT STUDY: CPT

## 2021-07-21 PROCEDURE — 70498 CT ANGIOGRAPHY NECK: CPT

## 2021-07-21 PROCEDURE — 70496 CT ANGIOGRAPHY HEAD: CPT

## 2021-07-21 PROCEDURE — 93880 EXTRACRANIAL BILAT STUDY: CPT | Performed by: SURGERY

## 2021-07-21 PROCEDURE — G1004 CDSM NDSC: HCPCS

## 2021-07-21 RX ORDER — CLOPIDOGREL BISULFATE 75 MG/1
75 TABLET ORAL DAILY
Status: DISCONTINUED | OUTPATIENT
Start: 2021-07-22 | End: 2021-07-22

## 2021-07-21 RX ADMIN — IOHEXOL 100 ML: 350 INJECTION, SOLUTION INTRAVENOUS at 15:39

## 2021-07-21 RX ADMIN — HEPARIN SODIUM 19.1 UNITS/KG/HR: 10000 INJECTION, SOLUTION INTRAVENOUS at 07:19

## 2021-07-21 RX ADMIN — MIDODRINE HYDROCHLORIDE 5 MG: 5 TABLET ORAL at 11:40

## 2021-07-21 RX ADMIN — ATORVASTATIN CALCIUM 40 MG: 20 TABLET, FILM COATED ORAL at 16:28

## 2021-07-21 RX ADMIN — ASPIRIN 81 MG CHEWABLE TABLET 81 MG: 81 TABLET CHEWABLE at 07:15

## 2021-07-21 RX ADMIN — MIDODRINE HYDROCHLORIDE 5 MG: 5 TABLET ORAL at 16:28

## 2021-07-21 RX ADMIN — MIDODRINE HYDROCHLORIDE 5 MG: 5 TABLET ORAL at 07:16

## 2021-07-21 NOTE — PROGRESS NOTES
NEUROLOGY RESIDENCY PROGRESS NOTE     Name: Scott Cruz   Age & Sex: 64 y o  male   MRN: [id number]  [facility] [address] [facility] [address] [facility] [address] [facility]   Encounter: [id number]    ASSESSMENT & PLAN     * CVA (cerebral vascular accident) [facility])  Assessment & Plan  Assessment/Impression: 65 y/o M w/ acute ischemic strokes likely 2/2 mobile carotid thrombus (likely etiology for his prior strokes as well), currently significantly improved and overall stable    Presenting sx: left facial droop, dysarthria, LKN 2100 reportedly the night PTA (approx 20 hrs prior to initial consult), but on further discussion w/ family it appears his [de-identified] may have been the day before yesterday and that he was experiencing a right-sided headache for about 2 weeks which peaked around the time the family noticed sx   NIHSS: 2 on arrival    tPA not given 2/2 time course, non-disabling sx   CTH/CTA:  Right caudate/lentiform/BG hypodensity and right frontal focal encephalomalacia due to remote infarct, small, likely chronic infarct in the right parietal lobe; severe right ICA stenosis (noncalcified plaque at the carotid bifurcation), and nearly occlusive thrombus in the right supraclinoid ICA extending to the terminus, and into right M1 and A1 segments; hypoplastic but patent left A1   MRI: new focal ischemia in medial R T lobe, R corpus striatum, R corona radiata, R frontal lobe (all R MCA territory strokes); no susceptibility weighting imaging signal consistent w/ old or new blood products   Labs: A1c 5 3, LDL 97,    Echo: LVEF 75%, mild concentric hypertrophy, no RWMA, no PFO on bubble, trace MR, TR, SC, mild dilatation of the ascending aorta (4 2 cm)   Exam: mild left facial weakness but no residual dysarthria or asymmetric tongue protrusion   Repeat CTA w/ significant improvement in intracranial thrombus    Plan: Repeat CTA w/ improved intracranial thrombus - no neurosurgical intervention or f/u indicated, will ultimately need CEA for symptomatic R ICA stenosis   Repeat CTA w/ significant improvement in intracranial thrombus - can d/c heparin gtt and start DAPT, NSg signed off - no indication for neurosurgical intervention or    Recommend DAPT w/ ASA, plavix   Continue statin   Will ultimately need CEA/carotid intervention for symptomatic R ICA   Euglycemia, normothermia, normotension   F/u vascular surgery recommendations for carotid intervention, f/u re: bcx   Discharge plan: patient significantly improved, if vascular surgery has no plans for IP CEA, patient can f/u w/ neuro stroke clinic as outpatient and neuro considers him stable for d/c  o Discontinue heparin gtt  o DAPT w/ ASA and plavix for 3 months, w/ aspirin monotherapy thereafter  o Statin   o Will ultimately need CEA/carotid intervention for symptomatic R ICA  o F/u neuro stroke clinic    Carotid stenosis, symptomatic, with infarction [facility])  Assessment & Plan  Patient w/ R MCA strokes, significant intracranial ICA, M1, A1 thrombi which have improved significantly w/ a week on heparin gtt   Also demonstrated significant right ICA stenosis w/ complex ulcerated plaque at origin   Consult vascular surgery re:  CEA candidacy   Plan as above    Hypertension  Assessment & Plan  As above    Daysi Diallo will need follow up in in 4 weeks with neurovascular    He will not require outpatient neurological testing at this time    SUBJECTIVE     Patient was seen and examined  No critical events overnight  Denied N/V/F/C, feels well and has been walking around well - will f/u w/ vascular surgery re: CEA possibility, and hopefully can d/c on DAPT, statin today  ROS negative unless otherwise noted    OBJECTIVE     Patient ID: Daysi Diallo is a 64 y o  male      Vitals:    07/21/21 2336 07/22/21 0600 07/22/21 0625 07/22/21 0748   BP: 127/75  114/54 134/79   BP Location:       Pulse: 61  57 64   Resp:       Temp: 98 8 °F (37 1 °C)   97 7 °F (36 5 °C)   TempSrc:       SpO2: 94%  91% 94%   Weight:  99 6 kg (219 lb 9 3 oz)     Height:          Temperature:   Temp (24hrs), Av 2 °F (36 8 °C), Min:97 7 °F (36 5 °C), Max:98 8 °F (37 1 °C)    Temperature: 97 7 °F (36 5 °C)    Physical Exam  Vitals reviewed  Constitutional:       General: He is not in acute distress  Appearance: Normal appearance  He is not ill-appearing, toxic-appearing or diaphoretic  HENT:      Head: Normocephalic and atraumatic  Right Ear: External ear normal       Left Ear: External ear normal       Nose: Nose normal  No congestion or rhinorrhea  Mouth/Throat:      Mouth: Mucous membranes are moist       Pharynx: Oropharynx is clear  No oropharyngeal exudate or posterior oropharyngeal erythema  Eyes:      General: No scleral icterus  Right eye: No discharge  Left eye: No discharge  Extraocular Movements: Extraocular movements intact  Conjunctiva/sclera: Conjunctivae normal    Pulmonary:      Effort: Pulmonary effort is normal  No respiratory distress  Skin:     Coloration: Skin is not pale  Findings: No erythema or rash  Neurological:      Mental Status: He is alert and oriented to person, place, and time  Sensory: No sensory deficit  Motor: No weakness  Coordination: Coordination normal       Deep Tendon Reflexes: Reflexes abnormal    Psychiatric:         Mood and Affect: Mood normal          Speech: Speech normal          Behavior: Behavior normal      Neurologic Exam     Mental Status   Oriented to person, place, and time  Attention: normal    Speech: speech is normal   Level of consciousness: alert  Knowledge: consistent with education       Cranial Nerves     CN III, IV, VI   CN III: no CN III palsy  CN VI: no CN VI palsy    CN VII   Left facial weakness: central (flattening of nasolabial fold, left-sided weakness w/ smiling)    CN VIII   Hearing: intact    Motor Exam   Muscle bulk: normal  Overall muscle tone: normal  Right arm pronator drift: absent  Left arm pronator drift: absent  BUE/BLE strength grossly intact and symmetric     Sensory Exam   Light touch normal      Gait, Coordination, and Reflexes     Tremor   Resting tremor: absent  Action tremor: absent    LABORATORY DATA     Labs: I have personally reviewed pertinent reports  Results from last 7 days   Lab Units 07/22/21  0527 07/21/21  0406 07/20/21  0709 07/19/21  0504 07/18/21  0518   WBC Thousand/uL 5 52 4 82 4 02* 3 91* 3 93*   HEMOGLOBIN g/dL 13 0 13 1 12 6 12 5 12 3   HEMATOCRIT % 38 9 39 2 36 7 37 3 36 3*   PLATELETS Thousands/uL 186 193 144* 151 123*   NEUTROS PCT %  --   --  52 49 63   MONOS PCT %  --   --  10 13* 12   MONO PCT %  --  4  --   --   --       Results from last 7 days   Lab Units 07/19/21  0503 07/17/21  0455 07/16/21  0535   POTASSIUM mmol/L 3 5 3 6 3 6   CHLORIDE mmol/L 108 108 110*   CO2 mmol/L 24 23 25   BUN mg/dL 11 10 7   CREATININE mg/dL 1 09 1 12 0 92   CALCIUM mg/dL 9 1 8 4 8 2*     Results from last 7 days   Lab Units 07/17/21  0455 07/16/21  0535   MAGNESIUM mg/dL 1 7 2 0     Results from last 7 days   Lab Units 07/17/21  0455 07/16/21  0535   PHOSPHORUS mg/dL 3 5 2 6      Results from last 7 days   Lab Units 07/22/21  0527 07/21/21  0405 07/20/21  0140   PTT seconds 28 67* 72*             ABG:No results found for: PHART, HPA4SCK, PO2ART, YHB0NAB, O9POLFIO, BEART, SOURCE    IMAGING & DIAGNOSTIC TESTING   Radiology Results: I have personally reviewed pertinent reports  and I have personally reviewed pertinent films in PACS  VAS carotid complete study   Final Result by Rocco Willoughby MD (07/21 2226)      CTA head and neck w wo contrast   Final Result by Prisca Askew MD (07/21 1623)      Ischemic edema right MCA distribution reidentified without hemorrhagic conversion  Stable unenhanced CT  Persistent high grade stenosis with complex ulcerated plaque at the origin of the right ICA        Improved filling of the distal supraclinoid right ICA, right A1 and M1 segments when compared to prior study one week earlier  Persistent, nonocclusive thrombus in these vessels as described  Workstation performed: [id number]         XR chest portable   Final Result by Miri Gee MD (07/19 9000)      Right lower lobe subsegmental atelectasis  Workstation performed: [id number]         XR chest portable ICU   Final Result by Lovette Ganser, MD (07/17 1230)      No acute cardiopulmonary disease  Workstation performed: [id number]         CT head wo contrast   Final Result by Az Martinez MD (07/15 1941)         Stable right MCA territory infarct with vasogenic edema in the basal ganglia  No hemorrhagic conversion or significant mass effect  Workstation performed: [id number]         MRI brain wo contrast   Final Result by Delmi Srivastava MD (07/14 7525)      Medial right temporal lobe, right corpus striatum, right corona radiata and right frontal cortical foci of acute ischemia  Findings were marked as "immediate"in Epic and will now be related to the ordering physician or covering clinical team by the radiology liaison  Workstation performed: [id number]         XR chest portable   Final Result by Alejandra Bishop MD (07/15 0815)      No acute cardiopulmonary disease  Workstation performed: [id number]           Other Diagnostic Testing: I have personally reviewed pertinent reports        ACTIVE MEDICATIONS     Current Facility-Administered Medications   Medication Dose Route Frequency    acetaminophen (TYLENOL) tablet 650 mg  650 mg Oral Q6H PRN    aspirin chewable tablet 81 mg  81 mg Oral Daily    atorvastatin (LIPITOR) tablet 40 mg  40 mg Oral QPM    cefepime (MAXIPIME) 2,000 mg in dextrose 5 % 50 mL IVPB  2,000 mg Intravenous Q8H    heparin (porcine) 25,000 units in 0 45% NaCl 250 mL infusion (premix)  3-20 Units/kg/hr (Order-Specific) Intravenous Titrated    midodrine (PROAMATINE) tablet 5 mg  5 mg Oral TID AC    polyethylene glycol (MIRALAX) packet 17 g  17 g Oral Daily     Medication Sig   acetaminophen (TYLENOL) 650 mg CR tablet Take 1 tablet (650 mg total) by mouth every 8 (eight) hours as needed for mild pain   hydrochlorothiazide (HYDRODIURIL) 25 mg tablet Take 25 mg by mouth daily     VTE Pharmacologic Prophylaxis: Heparin Drip  VTE Mechanical Prophylaxis: sequential compression device    ==  Valeria Vasquez MD  Resident, Neurology, PGY-2  [address] [place] [address]

## 2021-07-21 NOTE — PHYSICAL THERAPY NOTE
Physical Therapy Cancellation Note       07/21/21 1513   PT Last Visit   PT Visit Date 07/21/21   Note Type   Note Type Treatment   Cancel Reasons Patient off floor/test     Orders received and chart reviewed  Attempted to see pt- currently off the unit  Will continue to follow and attempt to see pt as able      Anastacia Fleischer, PT, DPT

## 2021-07-21 NOTE — PROGRESS NOTES
NEUROLOGY RESIDENCY PROGRESS NOTE     Name: Kwasi Liu   Age & Sex: 64 y o  male   MRN: [id number]  Unit/Bed#: [facility] 718-01   Encounter: [id number]    ASSESSMENT & PLAN     * CVA (cerebral vascular accident) [facility])  Assessment & Plan  Assessment/Impression: 63 y/o M w/ acute ischemic strokes likely 2/2 mobile carotid thrombus (likely etiology for his prior strokes as well), currently significantly improved and overall stable    Presenting sx: left facial droop, dysarthria, LKN 2100 reportedly the night PTA (approx 20 hrs prior to initial consult), but on further discussion w/ family it appears his [de-identified] may have been the day before yesterday and that he was experiencing a right-sided headache for about 2 weeks which peaked around the time the family noticed sx   NIHSS: 2 on arrival    tPA not given 2/2 time course, non-disabling sx   CTH/CTA:  Right caudate/lentiform/BG hypodensity and right frontal focal encephalomalacia due to remote infarct, small, likely chronic infarct in the right parietal lobe; severe right ICA stenosis (noncalcified plaque at the carotid bifurcation), and nearly occlusive thrombus in the right supraclinoid ICA extending to the terminus, and into right M1 and A1 segments; hypoplastic but patent left A1   MRI: new focal ischemia in medial R T lobe, R corpus striatum, R corona radiata, R frontal lobe (all R MCA territory strokes); no susceptibility weighting imaging signal consistent w/ old or new blood products   Labs: A1c 5 3, LDL 97,    Echo: LVEF 75%, mild concentric hypertrophy, no RWMA, no PFO on bubble, trace MR, TR, VA, mild dilatation of the ascending aorta (4 2 cm)   Exam: mild left facial weakness but no residual dysarthria or asymmetric tongue protrusion; continues to have minimal deficits    0   Lab Value Date/Time    PTT 67 (H) 07/21/2021 0405     Plan: Stroke pathway, repeat CT this week, f/u nsg recs for intervention recs   Given possibility of nsg intervention, they are continuing to recommend heparin gtt until repeat imaging on 21    Continue ASA, statin for now - would recommend DAPT and statin on d/c    Frequent neuro checks: stat CTH for acute change   Euglycemia, normothermia, normotension   Repeat CTA H&N Tues/ - nsg to make intervention recommendations based on this    F/u w/ neuro stroke clinic    Hypertension  Assessment & Plan  As above    Xu Rosado will need follow up in in 4 weeks with neurovascular    He will not require outpatient neurological testing at this time    SUBJECTIVE     Patient was seen and examined  No critical events overnight  Denied N/V/F/C, feels well and has been walking around well - will f/u w/ NSg and hopefully can d/c on DAPT, statin today  ROS negative unless otherwise noted    OBJECTIVE     Patient ID: Xu Rosado is a 64 y o  male  Vitals:    21 1913 21 2147 21 0540 21 0716   BP: 125/72 124/73  141/76   BP Location: Right arm Left arm     Pulse: 69 65  62   Resp: 18 16     Temp: 98 °F (36 7 °C) 98 6 °F (37 °C)  (!) 97 2 °F (36 2 °C)   TempSrc: Oral Oral     SpO2: 93% 92%  94%   Weight:   101 kg (221 lb 9 oz)    Height:          Temperature:   Temp (24hrs), Av 8 °F (36 6 °C), Min:97 2 °F (36 2 °C), Max:98 6 °F (37 °C)    Temperature: (!) 97 2 °F (36 2 °C)    Physical Exam  Vitals reviewed  Constitutional:       General: He is not in acute distress  Appearance: Normal appearance  He is not ill-appearing, toxic-appearing or diaphoretic  HENT:      Head: Normocephalic and atraumatic  Right Ear: External ear normal       Left Ear: External ear normal       Nose: Nose normal  No congestion or rhinorrhea  Mouth/Throat:      Mouth: Mucous membranes are moist       Pharynx: Oropharynx is clear  No oropharyngeal exudate or posterior oropharyngeal erythema  Eyes:      General: No scleral icterus  Right eye: No discharge  Left eye: No discharge  Extraocular Movements: Extraocular movements intact  Conjunctiva/sclera: Conjunctivae normal    Pulmonary:      Effort: Pulmonary effort is normal  No respiratory distress  Skin:     Coloration: Skin is not pale  Findings: No erythema or rash  Neurological:      Mental Status: He is alert and oriented to person, place, and time  Sensory: No sensory deficit  Motor: No weakness  Coordination: Coordination normal       Deep Tendon Reflexes: Reflexes abnormal    Psychiatric:         Mood and Affect: Mood normal          Speech: Speech normal          Behavior: Behavior normal      Neurologic Exam     Mental Status   Oriented to person, place, and time  Attention: normal    Speech: speech is normal   Level of consciousness: alert  Knowledge: consistent with education  Cranial Nerves     CN III, IV, VI   CN III: no CN III palsy  CN VI: no CN VI palsy    CN VII   Left facial weakness: central (flattening of nasolabial fold, left-sided weakness w/ smiling)    CN VIII   Hearing: intact    Motor Exam   Muscle bulk: normal  Overall muscle tone: normal  Right arm pronator drift: absent  Left arm pronator drift: absent  BUE/BLE strength grossly intact and symmetric     Sensory Exam   Light touch normal      Gait, Coordination, and Reflexes     Tremor   Resting tremor: absent  Action tremor: absent    LABORATORY DATA     Labs: I have personally reviewed pertinent reports      Results from last 7 days   Lab Units 07/21/21  0406 07/20/21  0709 07/19/21  0504 07/18/21  0518   WBC Thousand/uL 4 82 4 02* 3 91* 3 93*   HEMOGLOBIN g/dL 13 1 12 6 12 5 12 3   HEMATOCRIT % 39 2 36 7 37 3 36 3*   PLATELETS Thousands/uL 193 144* 151 123*   NEUTROS PCT %  --  52 49 63   MONOS PCT %  --  10 13* 12      Results from last 7 days   Lab Units 07/19/21  0503 07/17/21  0455 07/16/21  0535 07/15/21  0552   POTASSIUM mmol/L 3 5 3 6 3 6 3 6   CHLORIDE mmol/L 108 108 110* 111*   CO2 mmol/L 24 23 25 22   BUN mg/dL 11 10 7 12   CREATININE mg/dL 1 09 1 12 0 92 0 98   CALCIUM mg/dL 9 1 8 4 8 2* 8 3   ALK PHOS U/L  --   --   --  56   ALT U/L  --   --   --  32   AST U/L  --   --   --  17     Results from last 7 days   Lab Units 07/17/21  0455 07/16/21  0535   MAGNESIUM mg/dL 1 7 2 0     Results from last 7 days   Lab Units 07/17/21  0455 07/16/21  0535   PHOSPHORUS mg/dL 3 5 2 6      Results from last 7 days   Lab Units 07/21/21  0405 07/20/21  0140 07/19/21  2124 07/14/21  1706 07/14/21  1349   INR   --   --   --  0 97 0 92   PTT seconds 67* 72* 77* 28 26         Results from last 7 days   Lab Units 07/14/21  1349   TROPONIN I ng/mL <0 02     ABG:No results found for: PHART, KZJ5SDC, PO2ART, RNX1OSD, I2ATPTFB, BEART, SOURCE    IMAGING & DIAGNOSTIC TESTING   Radiology Results: I have personally reviewed pertinent reports  and I have personally reviewed pertinent films in PACS  XR chest portable   Final Result by Huy Rosario MD (07/19 9668)      Right lower lobe subsegmental atelectasis  Workstation performed: [id number]         XR chest portable ICU   Final Result by Silvestre Haq MD (07/17 1922)      No acute cardiopulmonary disease  Workstation performed: [id number]         CT head wo contrast   Final Result by Mara Richter MD (07/15 1941)         Stable right MCA territory infarct with vasogenic edema in the basal ganglia  No hemorrhagic conversion or significant mass effect  Workstation performed: [id number]         MRI brain wo contrast   Final Result by Viet Valdes MD (07/14 6624)      Medial right temporal lobe, right corpus striatum, right corona radiata and right frontal cortical foci of acute ischemia  Findings were marked as "immediate"in Epic and will now be related to the ordering physician or covering clinical team by the radiology liaison           Workstation performed: [id number]         XR chest portable   Final Result by Angel Grewal MD (07/15 5268)      No acute cardiopulmonary disease  Workstation performed: [id number]         CTA head and neck w wo contrast    (Results Pending)     Other Diagnostic Testing: I have personally reviewed pertinent reports        ACTIVE MEDICATIONS     Current Facility-Administered Medications   Medication Dose Route Frequency    acetaminophen (TYLENOL) tablet 650 mg  650 mg Oral Q6H PRN    aspirin chewable tablet 81 mg  81 mg Oral Daily    atorvastatin (LIPITOR) tablet 40 mg  40 mg Oral QPM    heparin (porcine) 25,000 units in 0 45% NaCl 250 mL infusion (premix)  3-21 1 Units/kg/hr (Order-Specific) Intravenous Titrated    midodrine (PROAMATINE) tablet 5 mg  5 mg Oral TID AC    polyethylene glycol (MIRALAX) packet 17 g  17 g Oral Daily     Medication Sig   acetaminophen (TYLENOL) 650 mg CR tablet Take 1 tablet (650 mg total) by mouth every 8 (eight) hours as needed for mild pain   hydrochlorothiazide (HYDRODIURIL) 25 mg tablet Take 25 mg by mouth daily     VTE Pharmacologic Prophylaxis: Heparin Drip  VTE Mechanical Prophylaxis: sequential compression device    ==  Sylvie Caballero MD  Resident, Neurology, PGY-2  [facility]

## 2021-07-21 NOTE — RESTORATIVE TECHNICIAN NOTE
Restorative Technician Note      Patient Name: Tj Candelario     Note Type: Mobility  Patient Position Upon Consult: Supine  Activity Performed: Ambulated;Dangled;Stood  Assistive Device: Other (Comment) (None)  Education Provided: Yes  Patient Position at End of Consult: Supine; All needs within reach;Bed/Chair alarm activated    [facility] FRANK NUNES, Restorative Technician, [employer]

## 2021-07-21 NOTE — TELEPHONE ENCOUNTER
I spoke with Jennifer Haji, she asked to be done on dayshift,  She was also informed pt needed a new IV started 22g in forearm at this time  78 137 18 43 rdr

## 2021-07-21 NOTE — PLAN OF CARE
Problem: Nutrition  Goal: Nutrition/Hydration status is improving  Description: Monitor and assess patient's nutrition/hydration status for malnutrition (ex- brittle hair, bruises, dry skin, pale skin and conjunctiva, muscle wasting, smooth red tongue, and disorientation)  Collaborate with interdisciplinary team and initiate plan and interventions as ordered  Monitor patient's weight and dietary intake as ordered or per policy  Utilize nutrition screening tool and intervene per policy  Determine patient's food preferences and provide high-protein, high-caloric foods as appropriate  - Assist patient with eating   - Allow adequate time for meals   - Encourage patient to take dietary supplement as ordered  - Collaborate with clinical nutritionist   - Include patient/family/caregiver in decisions related to nutrition  Outcome: Progressing     Problem: Nutrition/Hydration-ADULT  Goal: Nutrient/Hydration intake appropriate for improving, restoring or maintaining nutritional needs  Description: Monitor and assess patient's nutrition/hydration status for malnutrition  Collaborate with interdisciplinary team and initiate plan and interventions as ordered  Monitor patient's weight and dietary intake as ordered or per policy  Utilize nutrition screening tool and intervene as necessary  Determine patient's food preferences and provide high-protein, high-caloric foods as appropriate       INTERVENTIONS:  - Monitor oral intake, urinary output, labs, and treatment plans  - Assess nutrition and hydration status and recommend course of action  - Evaluate amount of meals eaten  - Assist patient with eating if necessary   - Allow adequate time for meals  - Recommend/ encourage appropriate diets, oral nutritional supplements, and vitamin/mineral supplements  - Order, calculate, and assess calorie counts as needed  - Recommend, monitor, and adjust tube feedings and TPN/PPN based on assessed needs  - Assess need for intravenous fluids  - Provide specific nutrition/hydration education as appropriate  - Include patient/family/caregiver in decisions related to nutrition  Outcome: Progressing

## 2021-07-21 NOTE — ASSESSMENT & PLAN NOTE
· Presented with right middle cerebral artery infarct  Right-sided carotid stenosis  Not a tPA candidate  Patient was followed by Neurology  Note plans regarding repeat CTA on July 22nd  · Heparin drip with goal PTT between 60 and 90  Continue to monitor PTT  Continue with aspirin therapy  · Aspirin 81 mg daily as above  · CT head on July 15 showed stable right middle cerebral artery territory infarct with vasogenic edema in the basal ganglia  No hemorrhage or mass effect was noted    · Follow-up on CTA  · Neurovascular following  · Neurology input noted

## 2021-07-21 NOTE — PROGRESS NOTES
Repeat CTA head and neck completed  Imaging reviewed personally and by Dr Ruthine Bumpers  Imaging demonstrates significant improvement in intracranial thrombus  May consider discontinuation of heparin  Regarding right carotid stenosis recommend consideration for CEA  Recommend vascular consult  No follow-up with Neurosurgery indicated  Call with questions or concerns  CTA head and neck w wo contrast    Result Date: 7/21/2021  Narrative: CTA NECK AND BRAIN WITH AND WITHOUT CONTRAST INDICATION: Neuro deficit, acute, stroke suspected Stroke, for comparison to prior CTA COMPARISON:   Multiple prior exams to include CT/CTA 7/14/2021, MR 7/14/2021, CT 7/15/2021 TECHNIQUE:  Routine CT imaging of the Brain without contrast   Post contrast imaging was performed after administration of iodinated contrast through the neck and brain  Post contrast axial 0 625 mm images timed to opacify the arterial system  3D rendering was performed on an independent workstation  MIP reconstructions performed  Coronal reconstructions were performed of the noncontrast portion of the brain  Radiation dose length product (DLP) for this visit:  1544 12 mGy-cm   This examination, like all CT scans performed in the [facility], was performed utilizing techniques to minimize radiation dose exposure, including the use of iterative reconstruction and automated exposure control  IV Contrast:  100 mL of iohexol (OMNIPAQUE)  IMAGE QUALITY:   Diagnostic FINDINGS: NONCONTRAST BRAIN PARENCHYMA:  Ischemic edema in the left lentiform and caudate nucleus reidentified without evidence for hemorrhagic conversion  Smaller peripheral infarcts in the cortex and medial left temporal lobe are less obvious  Remote multifocal right MCA cortical ischemia affecting frontal and parietal lobes  VENTRICLES AND EXTRA-AXIAL SPACES:  Normal for the patient's age  VISUALIZED ORBITS AND PARANASAL SINUSES:  Unremarkable   CERVICAL VASCULATURE AORTIC ARCH AND GREAT VESSELS:  Mild atherosclerotic disease of the arch, proximal great vessels and visualized subclavian vessels  No significant stenosis  RIGHT VERTEBRAL ARTERY CERVICAL SEGMENT:  Normal origin  The vessel is normal in caliber throughout the neck  LEFT VERTEBRAL ARTERY CERVICAL SEGMENT:  Normal origin  The vessel is normal in caliber throughout the neck  RIGHT EXTRACRANIAL CAROTID SEGMENT:  Normal caliber common carotid artery  CTA appearance of the right carotid bifurcation is stable  Minor wall thickening of the distal common carotid artery which extends asymmetrically into the ICA  Broad lucent probable ulcerated plaque with significant stenosis  Vessel lumen less than a millimeter 5:178-182  This is a hemodynamically significant stenosis  Lumen of the vessel returns to normal diameter at the termination of a calcified moiety of the plaque, approximately 1 5 cm above the ICA origin  No tandem disease above this, within the neck  LEFT EXTRACRANIAL CAROTID SEGMENT:  Mild atherosclerotic disease of the distal common carotid artery and proximal cervical internal carotid artery without significant stenosis compared to the more distal ICA  NASCET criteria was used to determine the degree of internal carotid artery diameter stenosis  INTRACRANIAL VASCULATURE INTERNAL CAROTID ARTERIES:  Improved filling of the distal most supraclinoid ICA, with improved filling within the M1 A1 segments  These are not normal but extent of opacification has improved to 5:84  Left cavernous carotid and terminus normal  ANTERIOR CIRCULATION:  Slightly improved filling of the proximal most dominant right A1 segment  Moderate stenosis of the vessel origin persists but again, has improved since prior study  Nondominant left A1 segment stable  Distal LUZ branches appear stable  MIDDLE CEREBRAL ARTERY CIRCULATION:  Left M1 and proximal branches normal   Improved filling of the proximal right M1 branch    No hemodynamically significant stenosis is evident although there is some residual irregularity of the posterior wall of the vessel by indicative of residual, nonobstructive thrombus  DISTAL VERTEBRAL ARTERIES:  Normal distal vertebral arteries  Posterior inferior cerebellar artery origins are normal  Normal vertebral basilar junction  BASILAR ARTERY:  Basilar artery is normal in caliber  Normal superior cerebellar arteries  POSTERIOR CEREBRAL ARTERIES: Both posterior cerebral arteries arises from the basilar tip  Both arteries demonstrate normal enhancement  DURAL VENOUS SINUSES:  Normal  NON VASCULAR ANATOMY BONY STRUCTURES:  No acute osseous abnormality  SOFT TISSUES OF THE NECK:  Normal  THORACIC INLET:  Unremarkable  Impression: Ischemic edema right MCA distribution reidentified without hemorrhagic conversion  Stable unenhanced CT  Persistent high grade stenosis with complex ulcerated plaque at the origin of the right ICA  Improved filling of the distal supraclinoid right ICA, right A1 and M1 segments when compared to prior study one week earlier  Persistent, nonocclusive thrombus in these vessels as described   Workstation performed: [id number]

## 2021-07-21 NOTE — PROGRESS NOTES
[address] [facility]  Progress Note - Kiersten Sat 1960, 64 y o  male MRN: [id number]  Unit/Bed#: [facility] 416-39 Encounter: [id number]  Primary Care Provider: Dee Qureshi DO   Date and time admitted to hospital: 7/14/2021  4:22 PM    * CVA (cerebral vascular accident) [facility])  Assessment & Plan  · Presented with right middle cerebral artery infarct  Right-sided carotid stenosis  Not a tPA candidate  Patient was followed by Neurology  Note plans regarding repeat CTA on July 22nd  · Heparin drip with goal PTT between 60 and 90  Continue to monitor PTT  Continue with aspirin therapy  · Aspirin 81 mg daily as above  · CT head on July 15 showed stable right middle cerebral artery territory infarct with vasogenic edema in the basal ganglia  No hemorrhage or mass effect was noted  · Follow-up on CTA  · Neurovascular following  · Neurology input noted    Febrile illness  Assessment & Plan  Suspect component of atelectasis  Blood cultures were drawn and negative times 24 hours  Continue to monitor symptoms  Incentive spirometry  Ambulate patient    Hyperlipidemia  Assessment & Plan  Lipitor 20mg daily  Hypertension  Assessment & Plan  · History of HTN- takes home HCTZ  · Monitor blood pressures  · Avoid hypotension          VTE Pharmacologic Prophylaxis: VTE Score: 10 High Risk (Score >/= 5) - Pharmacological DVT Prophylaxis Ordered: heparin drip  Sequential Compression Devices Ordered  Patient Centered Rounds: I performed bedside rounds with nursing staff today  Discussions with Specialists or Other Care Team Provider:     Education and Discussions with Family / Patient: Discussed the patient, reports he is keeping his family updated  Time Spent for Care: 30 minutes  More than 50% of total time spent on counseling and coordination of care as described above  Current Length of Stay: 7 day(s)  Current Patient Status: Inpatient   Certification Statement:  The patient will continue to require additional inpatient hospital stay due to As outlined  Discharge Plan: Presently on IV heparin GTT, awaiting CTA neurovascular Neurology inputs for management    Code Status: Level 1 - Full Code    Subjective:     Comfortably sitting up in bed  Reports feeling okay  Ambulating the room and hallway    Objective:     Vitals:   Temp (24hrs), Av 9 °F (36 6 °C), Min:97 2 °F (36 2 °C), Max:98 6 °F (37 °C)    Temp:  [97 2 °F (36 2 °C)-98 6 °F (37 °C)] 97 2 °F (36 2 °C)  HR:  [62-69] 64  Resp:  [16-18] 16  BP: (124-141)/(71-76) 131/71  SpO2:  [92 %-94 %] 92 %  Body mass index is 30 9 kg/m²       Input and Output Summary (last 24 hours):   No intake or output data in the 24 hours ending 21 1350    Physical Exam:   Physical Exam     Comfortably sitting up in bed  Neck supple  Lungs diminished breath sounds bilaterally  Heart sounds S1-S2 noted  Abdomen soft  Awake obeys simple commands  Pulses noted  No rash    Additional Data:     Labs:  Results from last 7 days   Lab Units 21  0406 21  0709   WBC Thousand/uL 4 82 4 02*   HEMOGLOBIN g/dL 13 1 12 6   HEMATOCRIT % 39 2 36 7   PLATELETS Thousands/uL 193 144*   BANDS PCT % 3  --    NEUTROS PCT %  --  52   LYMPHS PCT %  --  30   LYMPHO PCT % 27  --    MONOS PCT %  --  10   MONO PCT % 4  --    EOS PCT % 6 6     Results from last 7 days   Lab Units 21  0503 07/15/21  0552   SODIUM mmol/L 139 140   POTASSIUM mmol/L 3 5 3 6   CHLORIDE mmol/L 108 111*   CO2 mmol/L 24 22   BUN mg/dL 11 12   CREATININE mg/dL 1 09 0 98   ANION GAP mmol/L 7 7   CALCIUM mg/dL 9 1 8 3   ALBUMIN g/dL  --  3 6   TOTAL BILIRUBIN mg/dL  --  1 78*   ALK PHOS U/L  --  56   ALT U/L  --  32   AST U/L  --  17   GLUCOSE RANDOM mg/dL 95 133     Results from last 7 days   Lab Units 21  1706   INR  0 97     Results from last 7 days   Lab Units 21  2143   POC GLUCOSE mg/dl 97     Results from last 7 days   Lab Units 07/15/21  0552   HEMOGLOBIN A1C % 5 3     Results from last 7 days   Lab Units 07/18/21  0518 07/17/21  0455   PROCALCITONIN ng/ml 0 19 0 05       Lines/Drains:  Invasive Devices     Peripheral Intravenous Line            Peripheral IV 07/18/21 Dorsal (posterior); Left Forearm 3 days    Peripheral IV 07/21/21 Right Forearm <1 day                      Imaging: Reviewed radiology reports from this admission including: MRI brain and CTA head and neck    Recent Cultures (last 7 days):   Results from last 7 days   Lab Units 07/17/21  2237 07/17/21  2236   BLOOD CULTURE  No Growth at 72 hrs  No Growth at 72 hrs  Last 24 Hours Medication List:   Current Facility-Administered Medications   Medication Dose Route Frequency Provider Last Rate    acetaminophen  650 mg Oral Q6H PRN Isai Cagle MD      aspirin  81 mg Oral Daily Kaushik Freitas PA-C      atorvastatin  40 mg Oral QPM Kaushik Freitas PA-C      heparin (porcine)  3-21 1 Units/kg/hr (Order-Specific) Intravenous Titrated Li Saba PA-C 19 1 Units/kg/hr (07/21/21 0719)    midodrine  5 mg Oral TID AC Kaushik Freitas PA-C      polyethylene glycol  17 g Oral Daily Boyd Zuniga DO          Today, Patient Was Seen By: Angie Wilder MD    **Please Note: This note may have been constructed using a voice recognition system  **

## 2021-07-21 NOTE — ASSESSMENT & PLAN NOTE
Patient w/ R MCA strokes, significant intracranial ICA, M1, A1 thrombi which have improved significantly w/ a week on heparin gtt   Also demonstrated significant right ICA stenosis w/ complex ulcerated plaque at origin   Consult vascular surgery re:  CEA candidacy   Plan as above

## 2021-07-22 PROBLEM — R78.81 GRAM-NEGATIVE BACTEREMIA: Status: ACTIVE | Noted: 2021-07-19

## 2021-07-22 LAB
APTT PPP: 25 SECONDS (ref 23–37)
APTT PPP: 28 SECONDS (ref 23–37)
APTT PPP: 29 SECONDS (ref 23–37)
ERYTHROCYTE [DISTWIDTH] IN BLOOD BY AUTOMATED COUNT: 13.1 % (ref 11.6–15.1)
HCT VFR BLD AUTO: 38.9 % (ref 36.5–49.3)
HGB BLD-MCNC: 13 G/DL (ref 12–17)
MCH RBC QN AUTO: 31.7 PG (ref 26.8–34.3)
MCHC RBC AUTO-ENTMCNC: 33.4 G/DL (ref 31.4–37.4)
MCV RBC AUTO: 95 FL (ref 82–98)
NRBC BLD AUTO-RTO: 0 /100 WBCS
PLATELET # BLD AUTO: 186 THOUSANDS/UL (ref 149–390)
PMV BLD AUTO: 9.4 FL (ref 8.9–12.7)
RBC # BLD AUTO: 4.1 MILLION/UL (ref 3.88–5.62)
WBC # BLD AUTO: 5.52 THOUSAND/UL (ref 4.31–10.16)

## 2021-07-22 PROCEDURE — 97530 THERAPEUTIC ACTIVITIES: CPT

## 2021-07-22 PROCEDURE — 85730 THROMBOPLASTIN TIME PARTIAL: CPT | Performed by: INTERNAL MEDICINE

## 2021-07-22 PROCEDURE — 99223 1ST HOSP IP/OBS HIGH 75: CPT | Performed by: SURGERY

## 2021-07-22 PROCEDURE — 85027 COMPLETE CBC AUTOMATED: CPT | Performed by: PHYSICIAN ASSISTANT

## 2021-07-22 PROCEDURE — 85730 THROMBOPLASTIN TIME PARTIAL: CPT | Performed by: PSYCHIATRY & NEUROLOGY

## 2021-07-22 PROCEDURE — 99232 SBSQ HOSP IP/OBS MODERATE 35: CPT | Performed by: PSYCHIATRY & NEUROLOGY

## 2021-07-22 PROCEDURE — 99223 1ST HOSP IP/OBS HIGH 75: CPT | Performed by: INTERNAL MEDICINE

## 2021-07-22 RX ORDER — HEPARIN SODIUM 10000 [USP'U]/100ML
3-20 INJECTION, SOLUTION INTRAVENOUS
Status: DISCONTINUED | OUTPATIENT
Start: 2021-07-22 | End: 2021-07-24

## 2021-07-22 RX ORDER — CLOPIDOGREL BISULFATE 75 MG/1
75 TABLET ORAL DAILY
Status: DISCONTINUED | OUTPATIENT
Start: 2021-07-23 | End: 2021-07-27 | Stop reason: HOSPADM

## 2021-07-22 RX ADMIN — ASPIRIN 81 MG CHEWABLE TABLET 81 MG: 81 TABLET CHEWABLE at 08:11

## 2021-07-22 RX ADMIN — ATORVASTATIN CALCIUM 40 MG: 20 TABLET, FILM COATED ORAL at 18:16

## 2021-07-22 RX ADMIN — MIDODRINE HYDROCHLORIDE 5 MG: 5 TABLET ORAL at 18:16

## 2021-07-22 RX ADMIN — MIDODRINE HYDROCHLORIDE 5 MG: 5 TABLET ORAL at 11:49

## 2021-07-22 RX ADMIN — CEFEPIME HYDROCHLORIDE 2000 MG: 2 INJECTION, POWDER, FOR SOLUTION INTRAVENOUS at 00:56

## 2021-07-22 RX ADMIN — CEFEPIME HYDROCHLORIDE 2000 MG: 2 INJECTION, POWDER, FOR SOLUTION INTRAVENOUS at 08:10

## 2021-07-22 RX ADMIN — MIDODRINE HYDROCHLORIDE 5 MG: 5 TABLET ORAL at 06:25

## 2021-07-22 RX ADMIN — HEPARIN SODIUM 11.1 UNITS/KG/HR: 10000 INJECTION, SOLUTION INTRAVENOUS at 12:11

## 2021-07-22 RX ADMIN — CLOPIDOGREL BISULFATE 75 MG: 75 TABLET ORAL at 08:11

## 2021-07-22 NOTE — CONSULTS
Consultation - Vascular Surgery  Luis Singh 64 y o  male MRN: [id number]  Unit/Bed#: [facility] 718-01 Encounter: [id number]        Assessment/Plan     Assessment:  61M w/ acute R MCA thromboembolic ischemic CVA in setting of high grade stenosis of R ICA, consulted to vascular surgery for consideration of carotid intervention    Patient's symptoms mostly resolved and his back to baseline  - 3rd episode of CVA on right brain, first episode 5-6 years prior with resolution, 2nd episode 2 years prior with resolution      Plan:  Care per primary  Continue ASA/Plavix/Statin  Will discuss with neurology regarding appropriate timing of carotid intervention based on patient's resolution of symptoms    - Given stenosis patient will ultimately require carotid interention    History of Present Illness     HPI:  Luis Singh is a 64 y o  male who presented on 7/14 to [facility] with concern for new onset L sided facial droop and slurred speech since he had awoken at [address] at that time did show acute right basal ganglia infarct with what appeared to be hyperdense distal right ICA and right MCA thrombus  CTA at the time showed noncalcified plaque in the right proximal internal carotid with mobile thrombus and nearly occlusive thrombus of distal right ICA, right M1 and R A1 segments  Due to timeframe, patient was not a tpa candidate and with neurosurgeyr consult was not a thrombectomy candidate  he was then transferred to [facility] and started on a heparin gtt  Since that time the patient has recovered to his baseline  He denies any residual weakness and states he feels back to normal     Review of Systems   Constitutional: Negative  HENT: Negative  Eyes: Negative  Respiratory: Negative  Cardiovascular: Negative  Gastrointestinal: Negative  Endocrine: Negative  Genitourinary: Negative  Musculoskeletal: Negative  Skin: Negative  Allergic/Immunologic: Negative      Neurological: Positive for facial asymmetry, speech difficulty and weakness  Hematological: Negative  Psychiatric/Behavioral: Negative  Historical Information   Past Medical History:   Diagnosis Date    Diverticulitis large intestine     "During Bowel Resection determined not to have Diverticulitis "- Patient    Hypertension     TIA (transient ischemic attack)      Past Surgical History:   Procedure Laterality Date    ANTERIOR CRUCIATE LIGAMENT REPAIR Right     ACL Repair    BOWEL RESECTION      FOOT SURGERY Right     patient not sure procedure    SHOULDER SURGERY Bilateral     Rotator cuff/ Right shoulder bone growth removal     Social History   Social History     Substance and Sexual Activity   Alcohol Use Yes    Alcohol/week: 1 0 - 2 0 standard drinks    Types: 1 - 2 Cans of beer per week     Social History     Substance and Sexual Activity   Drug Use Never     Social History     Tobacco Use   Smoking Status Former Smoker    Types: Cigarettes   Smokeless Tobacco Former User    Types: Chew     Family History: History reviewed  No pertinent family history  Meds/Allergies   PTA meds:   Prior to Admission Medications   Prescriptions Last Dose Informant Patient Reported?  Taking?   acetaminophen (TYLENOL) 650 mg CR tablet   No Yes   Sig: Take 1 tablet (650 mg total) by mouth every 8 (eight) hours as needed for mild pain   hydrochlorothiazide (HYDRODIURIL) 25 mg tablet   Yes Yes   Sig: Take 25 mg by mouth daily      Facility-Administered Medications: None     No Known Allergies    Objective   First Vitals:   Blood Pressure: 132/66 (07/14/21 1640)  Pulse: 71 (07/14/21 1640)  Temperature: 98 °F (36 7 °C) (07/14/21 1706)  Temp Source: Oral (07/14/21 1706)  Respirations: 18 (07/14/21 1640)  Height: 5' 9" (175 3 cm) (07/14/21 1640)  Weight - Scale: 103 kg (227 lb 2 9 oz) (07/14/21 1640)  SpO2: 97 % (07/14/21 1640)    Current Vitals:   Blood Pressure: 125/76 (07/21/21 2016)  Pulse: 61 (07/21/21 2016)  Temperature: 98 3 °F (36 8 °C) (07/21/21 2016)  Temp Source: Oral (07/21/21 2016)  Respirations: 16 (07/21/21 2016)  Height: 5' 11" (180 3 cm) (07/14/21 1729)  Weight - Scale: 101 kg (221 lb 9 oz) (07/21/21 0540)  SpO2: 90 % (07/21/21 2016)    No intake or output data in the 24 hours ending 07/21/21 2124    Invasive Devices     Peripheral Intravenous Line            Peripheral IV 07/18/21 Dorsal (posterior); Left Forearm 3 days    Peripheral IV 07/21/21 Right Forearm <1 day                Physical Exam  Constitutional:       General: He is not in acute distress  Appearance: He is not toxic-appearing  HENT:      Head: Normocephalic  Nose: Nose normal       Mouth/Throat:      Mouth: Mucous membranes are moist    Eyes:      General: No scleral icterus  Cardiovascular:      Rate and Rhythm: Normal rate  Abdominal:      General: There is no distension  Palpations: Abdomen is soft  Tenderness: There is no abdominal tenderness  Musculoskeletal:         General: Normal range of motion  Cervical back: Neck supple  Skin:     General: Skin is warm  Neurological:      General: No focal deficit present  Mental Status: He is alert and oriented to person, place, and time  Comments: CN II-XII intact, no further facial droop, tongue symmetric    Equal  strength bilateral UE's  Equal strength and sensation b/l LE's   Psychiatric:         Mood and Affect: Mood normal          Lab Results:   I have personally reviewed pertinent lab results  , CBC:   Lab Results   Component Value Date    WBC 4 82 07/21/2021    HGB 13 1 07/21/2021    HCT 39 2 07/21/2021    MCV 95 07/21/2021     07/21/2021    MCH 31 6 07/21/2021    MCHC 33 4 07/21/2021    RDW 13 2 07/21/2021    MPV 10 0 07/21/2021    NRBC 0 07/21/2021   , CMP: No results found for: SODIUM, K, CL, CO2, ANIONGAP, BUN, CREATININE, GLUCOSE, CALCIUM, AST, ALT, ALKPHOS, PROT, BILITOT, EGFR  Imaging: I have personally reviewed pertinent reports      EKG, Pathology, and Other Studies: I have personally reviewed pertinent reports     and I have personally reviewed pertinent films in PACS    Code Status: Level 1 - Full Code  Advance Directive and Living Will:      Power of :    POLST:

## 2021-07-22 NOTE — CONSULTS
Consultation - Infectious Disease   Catherine Kay 64 y o  male MRN: [id number]  [facility] Encounter: [id number]      IMPRESSION & RECOMMENDATIONS:     Bacillus species in blood culture  Blood culture collected 7/17 due to fever now growing bacillus species, not anthracis  Fever resolved prior to the initiation of antibiotics  This is likely a contaminant  This species is commonly a contaminant, and culture took 4 days to grow  Additionally, 1/2 bottles (only one set collected) was positive, and he improved without therapy  He has no signs of infection at this time and work up with UA and CXR was negative   -stop cefepime and monitor off antibiotics   -if repeat cultures from 7/21 collected prior to starting antibiotics are positive, he will need  further work up   -no infectious contraindications for proceeded to carotid revascularization procedure    Fever  Resolved prior to initiation of antibiotics, likely not infectious  Could be related to aspiration after his stroke?   -monitor off antibiotics as above    Right MCA stroke, carotid artery stenosis  Followed by neurosurgery, neurology   -timing of revascularization procedure per vascular surgery, no ID contraindications     I have discussed the above management plan in detail with the primary service    I have performed an extensive review of the medical records in Epic including review of the notes, radiographs, and laboratory results     HISTORY OF PRESENT ILLNESS:  Reason for Consult: bacteremia  HPI: Catherine Kay is a 64y o  year old male with a history of HTN, HLD and carotid stenosis who presented to [facility] on 7[facility]21 with concern for a new left facial droop and dysarthria since waking up  Imaging showed acute ischemia in multiple territories of the right MCA and right basal ganglia, and well as a noncalcified plaque in the proximal right internal carotid artery   He was deemed not to be a tPA candidate due to the timeframe and was not a thrombectomy candidate per neurosurgery  He was transferred to [facility] for closer monitoring  On 7/17, the patient had a fever of 101 7  He was reportedly asymptomatic at the time  A blood culture was collected, and UA did not show pyuria  Procalcitonin was 0 05  He was not started on antibiotics, and fever resolved  Blood culture resulted 7/21 with 1/2 bottles growing GNRs (now speciated as bacillus species)  He was subsequently started on cefepime  Repeat cultures collected prior to antibiotics have no growth to date  ID is consulted for further management prior to the possible carotid revascularization with vascular surgery  The patient remains on anticoagulation  He denies any complaints currently, prior to his stroke symptoms, or at the onset of fever  He doesn't remember having a fever  He has had no cough, rash, abdominal pain, or dysuria  REVIEW OF SYSTEMS:  A complete review of systems is negative other than that noted in the HPI      PAST MEDICAL HISTORY:  Past Medical History:   Diagnosis Date    Diverticulitis large intestine     "During Bowel Resection determined not to have Diverticulitis "- Patient    Hypertension     TIA (transient ischemic attack)      Past Surgical History:   Procedure Laterality Date    ANTERIOR CRUCIATE LIGAMENT REPAIR Right     ACL Repair    BOWEL RESECTION      FOOT SURGERY Right     patient not sure procedure    SHOULDER SURGERY Bilateral     Rotator cuff/ Right shoulder bone growth removal       FAMILY HISTORY:  Non-contributory    SOCIAL HISTORY:  Social History   Social History     Substance and Sexual Activity   Alcohol Use Yes    Alcohol/week: 1 0 - 2 0 standard drinks    Types: 1 - 2 Cans of beer per week     Social History     Substance and Sexual Activity   Drug Use Never     Social History     Tobacco Use   Smoking Status Former Smoker    Types: Cigarettes   Smokeless Tobacco Former User    Types: Chew ALLERGIES:  No Known Allergies    MEDICATIONS:  All current active medications have been reviewed  Cefepime day 2    PHYSICAL EXAM:  Temp:  [97 7 °F (36 5 °C)-98 8 °F (37 1 °C)] 97 7 °F (36 5 °C)  HR:  [57-64] 64  Resp:  [16] 16  BP: (114-150)/(54-86) 134/79  SpO2:  [90 %-94 %] 94 %  Temp (24hrs), Av 2 °F (36 8 °C), Min:97 7 °F (36 5 °C), Max:98 8 °F (37 1 °C)  Current: Temperature: 97 7 °F (36 5 °C)    Intake/Output Summary (Last 24 hours) at 2021 1451  Last data filed at 2021 0900  Gross per 24 hour   Intake 290 ml   Output --   Net 290 ml       General Appearance:  Appearing well, nontoxic, and in no distress   Head:  Normocephalic, without obvious abnormality, atraumatic   Eyes:  Conjunctiva pink and sclera anicteric, both eyes   Nose: Nares normal, mucosa normal, no drainage   Throat: Oropharynx moist without lesions   Neck: Supple, symmetrical, no adenopathy, no tenderness/mass/nodules   Back:   Symmetric, no curvature, ROM normal   Lungs:   Clear to auscultation bilaterally, respirations unlabored   Chest Wall:  No tenderness or deformity   Heart:  RRR; no murmur, rub or gallop   Abdomen:   Soft, non-tender, non-distended, positive bowel sounds    Extremities: No cyanosis, clubbing or edema   Skin: No rashes or lesions  No draining wounds noted  Right arm ecchymoses   Lymph nodes: Cervical, supraclavicular nodes normal   Neurologic: Alert and oriented, weakness  No dysarthria noted       LABS, IMAGING, & OTHER STUDIES:  Lab Results:  I have personally reviewed pertinent labs    Results from last 7 days   Lab Units 21  0527 21  0406 21  0709   WBC Thousand/uL 5 52 4 82 4 02*   HEMOGLOBIN g/dL 13 0 13 1 12 6   PLATELETS Thousands/uL 186 193 144*     Results from last 7 days   Lab Units 21  0503 21  0455 21  0535   SODIUM mmol/L 139 139 141   POTASSIUM mmol/L 3 5 3 6 3 6   CHLORIDE mmol/L 108 108 110*   CO2 mmol/L 24 23 25   BUN mg/dL 11 10 7   CREATININE mg/dL 1 09 1 12 0 92   EGFR ml/min/1 73sq m 73 71 89   CALCIUM mg/dL 9 1 8 4 8 2*     Results from last 7 days   Lab Units 07/21/21  2353 07/17/21  2237 07/17/21  2236   BLOOD CULTURE  Received in Microbiology Lab  Culture in Progress  Received in Microbiology Lab  Culture in Progress  --  No Growth After 4 Days  GRAM STAIN RESULT   --  Gram negative rods*  --      Results from last 7 days   Lab Units 07/18/21  0518 07/17/21  0455   PROCALCITONIN ng/ml 0 19 0 05                   Imaging Studies:   I have personally reviewed pertinent imaging study reports and images in PACS  CXR 7/18/21: RLL atelectasis      Other Studies:   I have personally reviewed pertinent reports

## 2021-07-22 NOTE — PHYSICAL THERAPY NOTE
PHYSICAL THERAPY NOTE  Patient Name: Silvino Ramirez  IKMJY'V Date: 7/22/2021 07/22/21 1020   PT Last Visit   PT Visit Date 07/22/21   Note Type   Note Type Treatment   Pain Assessment   Pain Assessment Tool 0-10   Pain Score No Pain   Restrictions/Precautions   Weight Bearing Precautions Per Order No   Other Precautions Multiple lines   General   Chart Reviewed Yes   Response to Previous Treatment Patient with no complaints from previous session  Family/Caregiver Present No   Cognition   Overall Cognitive Status WFL   Arousal/Participation Responsive; Cooperative   Attention Within functional limits   Orientation Level Oriented X4   Memory Within functional limits   Following Commands Follows all commands and directions without difficulty   Comments Pt pleasant and cooperative to work w/ therapy   Subjective   Subjective "I hope I get to go home today"   Bed Mobility   Supine to Sit 7  Independent   Sit to Supine 7  Independent   Additional Comments Pt lying supine upon PT arrival  Pt returned lying supine at end of session w/ all needs within reach   Transfers   Sit to Stand 7  Independent   Stand to Sit 7  Independent   Additional Comments Transfers w/out AD   Ambulation/Elevation   Gait pattern Inconsistent theresa   Gait Assistance 5  Supervision   Assistive Device None   Distance 120ft   Stair Management Assistance 5  Supervision   Additional items Verbal cues   Stair Management Technique No rails; Step to pattern; Foreward   Number of Stairs 4  (limited 2' lines)   Balance   Static Sitting Good   Dynamic Sitting Fair +   Static Standing Fair +   Dynamic Standing Fair   Ambulatory Fair   Endurance Deficit   Endurance Deficit No   Activity Tolerance   Activity Tolerance Patient tolerated treatment well   Nurse Made Aware yes   Assessment   Prognosis Good   Problem List Decreased mobility   Assessment Pt presents with decreased mobility, strength, balance, and activity tolerance  Pt demonstrated ability to perform bed mobility and functional transfers at IND level  Pt ambulated 120ft with no AD at supervision  Pt performed 4 stairs at supervision- limited 2' lines  Pt appears to be functioning at baseline level with functional mobility; therefore, requires no immediate acute skilled PT services at this time  Pt with no further questions or concerns regarding PT services  Will D/C PT at this time  Please re-consult if pt's medical status changes  Recommendation at this time is no rehab needs  Goals   Patient Goals to return home   STG Expiration Date 07/29/21   PT Treatment Day 1   Plan   Treatment/Interventions Functional transfer training;LE strengthening/ROM; Elevations; Patient/family training;Bed mobility;Gait training;Spoke to nursing   Progress Discontinue PT   Recommendation   PT Discharge Recommendation No rehabilitation needs   PT - OK to Discharge Yes   AM-PAC Basic Mobility Inpatient   Turning in Bed Without Bedrails 4   Lying on Back to Sitting on Edge of Flat Bed 4   Moving Bed to Chair 4   Standing Up From Chair 4   Walk in Room 3   Climb 3-5 Stairs 3   Basic Mobility Inpatient Raw Score 22   Basic Mobility Standardized Score 47 4   The patient's AM-PAC Basic Mobility Inpatient Short Form Raw Score is 22, Standardized Score is 47 4  A standardized score greater than 42 9 suggests the patient may benefit from discharge to home  Please also refer to the recommendation of the Physical Therapist for safe discharge planning      Emeka Garcia, PT, DPT

## 2021-07-22 NOTE — PROGRESS NOTES
INTERNAL MEDICINE RESIDENCY PROGRESS NOTE     Name: Idalia Keith   Age & Sex: 64 y o  male   MRN: [id number]  Unit/Bed#: [address] 726-01   Encounter: [id number]  Team: SLIM    PATIENT INFORMATION     Name: Idalia Keith   Age & Sex: 64 y o  male   MRN: [id number]  Hospital Stay Days: 8    ASSESSMENT/PLAN     Principal Problem:    CVA (cerebral vascular accident) [facility])  Active Problems:    Gram-negative bacteremia    Carotid stenosis, symptomatic, with infarction [facility])    Hypertension    Hyperlipidemia      * CVA (cerebral vascular accident) [facility])  Assessment & Plan  Presented on 7/14/21 with right middle cerebral artery infarct  Admission CTA revealed: Severe noncalcified plaque in the proximal right internal carotid artery with small mobile thrombus at the distal aspect  7/14 MRI noncon showed medial right temporal lobe, right corpus striatum, right corona radiata and right frontal cortical foci of acute ischemia  Nearly occlusive thrombus in the distal right internal carotid artery, right M1 and dominant right A1 segments  <50% right-sided carotid stenosis  There is 70-99% left-sided stenosis  Not a tPA candidate  Patient was followed by Neurology  Note plans regarding repeat CTA on July 22nd  · Heparin drip with goal PTT between 60 and 90  Continue to monitor PTT  Continue with aspirin therapy  · Aspirin 81 mg daily as above  · CT head on July 15 showed stable right middle cerebral artery territory infarct with vasogenic edema in the basal ganglia  No hemorrhage or mass effect was noted    · Follow-up on CTA  · Neurovascular following  · Neurology input noted    Carotid stenosis, symptomatic, with infarction [facility])  Assessment & Plan  Plan:  - Continue ASA/Plavix/Statin  - Pending discussion with neurology and vascular surgery, will receive carotid intervention (and once cleared with blood cultures)    Gram-negative bacteremia  Assessment & Plan  On 7/17 patient was found to be febrile, w/wheezing, leukopenia  He denied abdominal pain or urinary symptoms  Started on cefepime  Has since been afebrile  : 1/2 blood cultures positive for gram negative rods   admission CXR was WNL;  CXR revealed right lower lobe subsegmental atelectasis  : 2 additional peripheral blood cultures drawn  Results pending    Plan:  - Incentive spirometry  - Ambulate patient  -  Cefepime day 6, EOT: to be determined, consulting ID    Hyperlipidemia  Assessment & Plan  Chronic issue  Plan:  -Continue atorvastatin (lipitor) 40 mg qHS    Hypertension  Assessment & Plan  Takes HCTZ at home   ranging 110s-140s/50s-70s without any inpatient BP meds  Plan:    · On midodrine 5 mg TID for orthostatic hypotension, holding home BP meds  · Monitor blood pressures  · Avoid hypotension    Disposition: Floor, continue current level of care, follow up with neuro/vascular recommendations for surgical interventions as an inpatient  Per PT, no acute rehab needs  SUBJECTIVE     Patient seen and examined  No acute events overnight  He reports feeling well with no weakness and being able to get up and move around without difficulty  Endorses voiding his bowels/bladder per his norm  He denies weakness/tingling/numbness in his extremities, changes in his vision/smell/hearing, fever/sweatiness, nausea/vomiting, SOB, chest pain/palpitations, or abdominal pain  OBJECTIVE     Vitals:    21 2336 21 0600 21 0625 21 0748   BP: 127/75  114/54 134/79   BP Location:       Pulse: 61  57 64   Resp:       Temp: 98 8 °F (37 1 °C)   97 7 °F (36 5 °C)   TempSrc:       SpO2: 94%  91% 94%   Weight:  99 6 kg (219 lb 9 3 oz)     Height:          Temperature:   Temp (24hrs), Av 2 °F (36 8 °C), Min:97 7 °F (36 5 °C), Max:98 8 °F (37 1 °C)    Temperature: 97 7 °F (36 5 °C)  Intake & Output:  I/O       701 -  0700  07 -  07 -  07    P  O  240  240    IV Piggyback  50     Total Intake(mL/kg) 240 (2 4) 50 (0 5) 240 (2 4)    Net +240 +50 +240           Unmeasured Urine Occurrence 1 x          Weights:   IBW (Ideal Body Weight): 75 3 kg    Body mass index is 30 62 kg/m²  Weight (last 2 days)     Date/Time   Weight    07/22/21 0600   99 6 (219 58)    07/21/21 0540   101 (221 56)    07/20/21 0600   101 (221 78)            Physical Exam  Constitutional:       General: He is not in acute distress  HENT:      Head: Normocephalic and atraumatic  Mouth/Throat:      Mouth: Mucous membranes are moist       Pharynx: Oropharynx is clear  Eyes:      General: No scleral icterus  Extraocular Movements: Extraocular movements intact  Cardiovascular:      Rate and Rhythm: Normal rate and regular rhythm  Heart sounds: No murmur heard  No friction rub  No gallop  Pulmonary:      Effort: No respiratory distress  Breath sounds: No stridor  No wheezing, rhonchi or rales  Abdominal:      General: Bowel sounds are normal  There is no distension  Palpations: There is no mass  Tenderness: There is no abdominal tenderness  There is no guarding or rebound  Musculoskeletal:         General: Normal range of motion  Comments: Strength 5/5 in all extremities   Skin:     General: Skin is warm and dry  Findings: No rash  Neurological:      Mental Status: He is alert  Cranial Nerves: Cranial nerve deficit (some L sided weakness when smiling) present  Sensory: No sensory deficit  Motor: No weakness  Psychiatric:         Mood and Affect: Mood normal          Thought Content: Thought content normal        LABORATORY DATA     Labs: I have personally reviewed pertinent reports    Results from last 7 days   Lab Units 07/22/21  0527 07/21/21  0406 07/20/21  0709 07/19/21  0504 07/18/21  0518   WBC Thousand/uL 5 52 4 82 4 02* 3 91* 3 93*   HEMOGLOBIN g/dL 13 0 13 1 12 6 12 5 12 3   HEMATOCRIT % 38 9 39 2 36 7 37 3 36 3*   PLATELETS Thousands/uL 186 193 144* 151 123*   NEUTROS PCT %  --   --  52 49 63   MONOS PCT %  --   --  10 13* 12   MONO PCT %  --  4  --   --   --       Results from last 7 days   Lab Units 07/19/21  0503 07/17/21  0455 07/16/21  0535   POTASSIUM mmol/L 3 5 3 6 3 6   CHLORIDE mmol/L 108 108 110*   CO2 mmol/L 24 23 25   BUN mg/dL 11 10 7   CREATININE mg/dL 1 09 1 12 0 92   CALCIUM mg/dL 9 1 8 4 8 2*     Results from last 7 days   Lab Units 07/17/21  0455 07/16/21  0535   MAGNESIUM mg/dL 1 7 2 0     Results from last 7 days   Lab Units 07/17/21  0455 07/16/21  0535   PHOSPHORUS mg/dL 3 5 2 6      Results from last 7 days   Lab Units 07/22/21  1210 07/22/21  0527 07/21/21  0405   PTT seconds 29 28 67*               IMAGING & DIAGNOSTIC TESTING     Radiology Results: I have personally reviewed pertinent reports  XR chest portable    Result Date: 7/19/2021  Impression: Right lower lobe subsegmental atelectasis  Workstation performed: [id number]     XR chest portable    Result Date: 7/15/2021  Impression: No acute cardiopulmonary disease  Workstation performed: [id number]     X-ray chest 1 view portable    Result Date: 7/14/2021  Impression: No acute cardiopulmonary disease  Workstation performed: [id number]     CT head wo contrast    Result Date: 7/15/2021  Impression: Stable right MCA territory infarct with vasogenic edema in the basal ganglia  No hemorrhagic conversion or significant mass effect  Workstation performed: [id number]     MRI brain wo contrast    Result Date: 7/14/2021  Impression: Medial right temporal lobe, right corpus striatum, right corona radiata and right frontal cortical foci of acute ischemia  Findings were marked as "immediate"in Epic and will now be related to the ordering physician or covering clinical team by the radiology liaison  Workstation performed: [id number]     CT stroke alert brain    Result Date: 7/14/2021  Impression: Acute infarct in the right basal ganglia   Hyperdense distal right ICA and right MCA consistent with thrombus  Small chronic infarct in the right frontal lobe  Small parietal infarct also favored to be chronic  No acute hemorrhage or mass effect  Findings were directly discussed with Dr Aquilino Jiménez on 7/14/2021 2:21 PM Workstation performed: [id number]     XR chest portable ICU    Result Date: 7/17/2021  Impression: No acute cardiopulmonary disease  Workstation performed: [id number]     CTA stroke alert (head/neck)    Result Date: 7/14/2021  Impression: Severe noncalcified plaque in the proximal right internal carotid artery with small mobile thrombus at the distal aspect  Nearly occlusive thrombus in the distal right internal carotid artery, right M1 and dominant right A1 segments  Findings were directly discussed with Dr Aquilino Jiménez on 7/14/2021 2:21 PM  Workstation performed: [id number]     Other Diagnostic Testing: I have personally reviewed pertinent reports  ACTIVE MEDICATIONS     Current Facility-Administered Medications   Medication Dose Route Frequency    acetaminophen (TYLENOL) tablet 650 mg  650 mg Oral Q6H PRN    aspirin chewable tablet 81 mg  81 mg Oral Daily    atorvastatin (LIPITOR) tablet 40 mg  40 mg Oral QPM    cefepime (MAXIPIME) 2,000 mg in dextrose 5 % 50 mL IVPB  2,000 mg Intravenous Q8H    heparin (porcine) 25,000 units in 0 45% NaCl 250 mL infusion (premix)  3-20 Units/kg/hr (Order-Specific) Intravenous Titrated    midodrine (PROAMATINE) tablet 5 mg  5 mg Oral TID AC    polyethylene glycol (MIRALAX) packet 17 g  17 g Oral Daily       VTE Pharmacologic Prophylaxis: Heparin  VTE Mechanical Prophylaxis: sequential compression device    Portions of the record may have been created with voice recognition software  Occasional wrong word or "sound a like" substitutions may have occurred due to the inherent limitations of voice recognition software    Read the chart carefully and recognize, using context, where substitutions have occurred   ==  Desiree Gonzalez Asa Casiano, [address]  Internal Medicine Residency PGY-1

## 2021-07-22 NOTE — RESTORATIVE TECHNICIAN NOTE
Restorative Technician Note      Patient Name: Lizzie Menendez     Note Type: Mobility  Patient Position Upon Consult: Supine  Activity Performed: Ambulated;Dangled;Stood  Assistive Device: Other (Comment) (None)  Education Provided: Yes (Educated/encouraged pt to ambulate with assistance 3-4 x's/day )  Patient Position at End of Consult: Supine; All needs within reach;Bed/Chair alarm activated      Adia NUNES, Restorative Technician, [employer]

## 2021-07-22 NOTE — ASSESSMENT & PLAN NOTE
Past medical history of TIA, not on blood thinners, hypertension, diverticulitis presents with left-sided facial droop and slurred speech  On 7/14 in the ED, patient and family member at bedside states that patient was last seen normal at 9:00 p m on 7/13 when he went to bed  Patient woke up at 8:00 a m  And family noticed that he had left-sided facial droop and mild slurred speech  Patient states he had his first TIA six years ago which presented with left-sided weakness  Patient had another TIA four years ago with similar presentation  Patient had third TIA three years ago with right sided visual loss  Not a TPA candidate  7/14 CTA: severe noncalcified plaque in the proximal right internal carotid artery with small mobile thrombus at the distal aspect, nearly occlusive thrombus in the distal right ICA, right M1, and R A1 segments  7/14 CTH: Acute infarct in the right basal ganglia, hyperdense distal right ICA and right MCA consistent with thrombus, small chronic infarct in the right frontal lobe   Small parietal infarct also favored to be chronic  No acute hemorrhage or mass effect  7/14 MRI: Medial right temporal lobe, right corpus striatum, right corona radiata and right frontal cortical foci of acute ischemia  7/15 CTH: Stable right MCA territory infarct with vasogenic edema in the basal ganglia  No hemorrhagic conversion or significant mass effect   7/21 CTA head/neck: Ischemic edema right MCA distribution reidentified without hemorrhagic conversion  Stable unenhanced CT  Persistent high grade stenosis with complex ulcerated plaque at the origin of the right ICA  Improved filling of the distal supraclinoid right ICA, right A1 and M1 segments when compared to prior study one week earlier  Persistent, nonocclusive thrombus in these vessels as described  7/21 VAS carotid study:   RIGHT:There is <50% stenosis noted in the internal carotid artery  Plaque is heterogenous and irregular  Vertebral artery flow is antegrade  There is no significant subclavian artery disease  LEFT:There is 70-99% stenosis noted in the internal carotid artery  Plaque is  heterogenous and irregular  Vertebral artery flow is antegrade  There is no significant subclavian artery disease  Plan:    · Continue aspirin and plavix for 3 months followed by aspirin monotherapy  · Continue lipitor  · Follow up stroke clinic outpatient  · Vascular surgery wants to do inpatient carotid endarterectomy, planning for 7/26  · ID consulted due to gram negative bacteremia  Cleared for carotid endarterectomy  · Cardiology consulted for preop evaluation  Cleared for carotid endarterectomy  · Heparin drip with goal PTT between 60 and 90  Continue to monitor PTT    · 7/24 decreased midodrine to 2 5 mg to wean off

## 2021-07-22 NOTE — PLAN OF CARE
Problem: PHYSICAL THERAPY ADULT  Goal: Performs mobility at highest level of function for planned discharge setting  See evaluation for individualized goals  Description: Treatment/Interventions: LE strengthening/ROM, Functional transfer training, Therapeutic exercise, Elevations, Endurance training, Bed mobility, Gait training          See flowsheet documentation for full assessment, interventions and recommendations  Outcome: Completed  Note: Prognosis: Good  Problem List: Decreased mobility  Assessment: Pt presents with decreased mobility, strength, balance, and activity tolerance  Pt demonstrated ability to perform bed mobility and functional transfers at IND level  Pt ambulated 120ft with no AD at supervision  Pt performed 4 stairs at supervision- limited 2' lines  Pt appears to be functioning at baseline level with functional mobility; therefore, requires no immediate acute skilled PT services at this time  Pt with no further questions or concerns regarding PT services  Will D/C PT at this time  Please re-consult if pt's medical status changes  Recommendation at this time is no rehab needs  Barriers to Discharge: Inaccessible home environment, Decreased caregiver support        PT Discharge Recommendation: No rehabilitation needs     PT - OK to Discharge: Yes    See flowsheet documentation for full assessment

## 2021-07-22 NOTE — ASSESSMENT & PLAN NOTE
Patient had a fever on 7/17 and blood cultures were drawn  · One blood culture became positive for gram negative rods on 7/21  · Repeat blood cultures drawn  · 7/21 IV cefepime was started after 1/2 periph bcx came back positive as above  · ID consulted to manage abx around carotid endarterectomy that vascular is planning to do; needs to have negative BCx before procedure

## 2021-07-22 NOTE — ASSESSMENT & PLAN NOTE
Plan:  - Continue ASA/Plavix/Statin  - Right carotid endarterectomy planned for Mon 7/26  - vascular surgery following

## 2021-07-22 NOTE — ASSESSMENT & PLAN NOTE
Past medical history of TIA, not on blood thinners, hypertension, diverticulitis presents with left-sided facial droop and slurred speech  On 7/14 in the ED, patient and family member at bedside states that patient was last seen normal at 9:00 p m on 7/13 when he went to bed  Patient woke up at 8:00 a m  And family noticed that he had left-sided facial droop and mild slurred speech  Patient states he had his first TIA six years ago which presented with left-sided weakness  Patient had another TIA four years ago with similar presentation  Patient had third TIA three years ago with right sided visual loss  Not a TPA candidate  7/14 CTA: severe noncalcified plaque in the proximal right internal carotid artery with small mobile thrombus at the distal aspect, nearly occlusive thrombus in the distal right ICA, right M1, and R A1 segments  7/14 CTH: Acute infarct in the right basal ganglia, hyperdense distal right ICA and right MCA consistent with thrombus, small chronic infarct in the right frontal lobe   Small parietal infarct also favored to be chronic  No acute hemorrhage or mass effect  7/14 MRI: Medial right temporal lobe, right corpus striatum, right corona radiata and right frontal cortical foci of acute ischemia  7/15 CTH: Stable right MCA territory infarct with vasogenic edema in the basal ganglia  No hemorrhagic conversion or significant mass effect   7/21 CTA head/neck: Ischemic edema right MCA distribution reidentified without hemorrhagic conversion  Stable unenhanced CT  Persistent high grade stenosis with complex ulcerated plaque at the origin of the right ICA  Improved filling of the distal supraclinoid right ICA, right A1 and M1 segments when compared to prior study one week earlier  Persistent, nonocclusive thrombus in these vessels as described  7/21 VAS carotid study:   RIGHT:There is <50% stenosis noted in the internal carotid artery  Plaque is heterogenous and irregular  Vertebral artery flow is antegrade  There is no significant subclavian artery disease  LEFT:  There is 70-99% stenosis noted in the internal carotid artery  Plaque is  heterogenous and irregular  Vertebral artery flow is antegrade  There is no significant subclavian artery disease  Plan:  · Neurology consulted, recommendations appreciated  · Continue aspirin and plavix for 3 months followed by aspirin monotherapy  · Continue lipitor  · Clear for discharge from a neurology standpoint  · Follow up stroke clinic outpatient  · Vascular surgery wants to do inpatient carotid endarterectomy, planning for 7/26  · ID consulted due to gram negative bacteremia  Cleared for carotid endarterectomy  · Cardiology consulted for preprocedure evaluation  Cleared for carotid endarterectomy    · Midodrine 5mg TID to reach goal MAP, >100 and SBP <180

## 2021-07-22 NOTE — ASSESSMENT & PLAN NOTE
Takes HCTZ at home  7/22 ranging 110s-140s/50s-70s without any inpatient BP meds      Plan:  · On midodrine TID for orthostatic hypotension, holding home BP meds  · Monitor blood pressures  · Avoid hypotension

## 2021-07-22 NOTE — ASSESSMENT & PLAN NOTE
Patient had a fever w/wheezing and leukopenia on 7/17 and blood cultures were drawn  · One blood culture became positive for gram negative rods on 7/21, confirmed to be bacillus, not anthracis  · 7/14 admission CXR was WNL; 7/17 CXR revealed right lower lobe subsegmental atelectasis      Plan:  · Repeat blood cultures drawn on 7/21, negative at 24 hours  · 7/21 IV cefepime was started after 1/2 periph bcx came back positive as above  · ID consulted to manage abx around carotid endarterectomy that vascular is planning to do; needs to have negative BCx before procedure  · Believed to be a contaminant due to bacillus species, clinical improvement without treatment, and only 1/2 bottles being positive at 4 days  · Cefepime discontinued 7/22  · Cleared for carotid endarterectomy, expected Mon 7/26  · Incentive spirometry  · Ambulate patient

## 2021-07-22 NOTE — PLAN OF CARE
Problem: Neurological Deficit  Goal: Neurological status is stable or improving  Description: Interventions:  - Monitor and assess patient's level of consciousness, motor function, sensory function, and level of assistance needed for ADLs  - Monitor and report changes from baseline  Collaborate with interdisciplinary team to initiate plan and implement interventions as ordered  - Provide and maintain a safe environment  - Consider seizure precautions  - Consider fall precautions  - Consider aspiration precautions  - Consider bleeding precautions  Outcome: Progressing     Problem: Activity Intolerance/Impaired Mobility  Goal: Mobility/activity is maintained at optimum level for patient  Description: Interventions:  - Assess and monitor patient  barriers to mobility and need for assistive/adaptive devices  - Assess patient's emotional response to limitations  - Collaborate with interdisciplinary team and initiate plans and interventions as ordered  - Encourage independent activity per ability   - Maintain proper body alignment  - Perform active/passive rom as tolerated/ordered    - Plan activities to conserve energy   - Turn patient as appropriate  Outcome: Progressing     Problem: PAIN - ADULT  Goal: Verbalizes/displays adequate comfort level or baseline comfort level  Description: Interventions:  - Encourage patient to monitor pain and request assistance  - Assess pain using appropriate pain scale  - Administer analgesics based on type and severity of pain and evaluate response  - Implement non-pharmacological measures as appropriate and evaluate response  - Consider cultural and social influences on pain and pain management  - Notify physician/advanced practitioner if interventions unsuccessful or patient reports new pain  Outcome: Progressing     Problem: INFECTION - ADULT  Goal: Absence or prevention of progression during hospitalization  Description: INTERVENTIONS:  - Assess and monitor for signs and symptoms of infection  - Monitor lab/diagnostic results  - Monitor all insertion sites, i e  indwelling lines, tubes, and drains  - Monitor endotracheal if appropriate and nasal secretions for changes in amount and color  - [place] appropriate cooling/warming therapies per order  - Administer medications as ordered  - Instruct and encourage patient and family to use good hand hygiene technique  - Identify and instruct in appropriate isolation precautions for identified infection/condition  Outcome: Progressing     Problem: SAFETY ADULT  Goal: Patient will remain free of falls  Description: INTERVENTIONS:  - Educate patient/family on patient safety including physical limitations  - Instruct patient to call for assistance with activity   - Consult OT/PT to assist with strengthening/mobility   - Keep Call bell within reach  - Keep bed low and locked with side rails adjusted as appropriate  - Keep care items and personal belongings within reach  - Initiate and maintain comfort rounds  - Make Fall Risk Sign visible to staff  - Offer Toileting every 2 Hours, in advance of need  - Initiate/Maintain bed alarm  - Obtain necessary fall risk management equipment:   - Apply yellow socks and bracelet for high fall risk patients  - Consider moving patient to room near nurses station  Outcome: Progressing     Problem: NEUROSENSORY - ADULT  Goal: Achieves stable or improved neurological status  Description: INTERVENTIONS  - Monitor and report changes in neurological status  - Monitor vital signs such as temperature, blood pressure, glucose, and any other labs ordered   - Initiate measures to prevent increased intracranial pressure  - Monitor for seizure activity and implement precautions if appropriate      Outcome: Progressing     Problem: Potential for Falls  Goal: Patient will remain free of falls  Description: INTERVENTIONS:  - Educate patient/family on patient safety including physical limitations  - Instruct patient to call for assistance with activity   - Consult OT/PT to assist with strengthening/mobility   - Keep Call bell within reach  - Keep bed low and locked with side rails adjusted as appropriate  - Keep care items and personal belongings within reach  - Initiate and maintain comfort rounds  - Make Fall Risk Sign visible to staff  - Offer Toileting every 2 Hours, in advance of need  - Initiate/Maintain bed alarm  - Obtain necessary fall risk management equipment:  - Apply yellow socks and bracelet for high fall risk patients  - Consider moving patient to room near nurses station  Outcome: Progressing

## 2021-07-22 NOTE — CONSULTS
Consultation - Cardiology   Augusto Davila 64 y o  male MRN: [id number]  Unit/Bed#: [facility] 726-01 Encounter: [id number]    Assessment/Plan     Principal Problem:    CVA (cerebral vascular accident) [facility])  Active Problems:    Hypertension    Hyperlipidemia    Gram-negative bacteremia    Carotid stenosis, symptomatic, with infarction ([address] )      Assessment/Plan    1  Critical right ICA stenosis with predominantly thrombogenic plaque  [place] to be source of his CVA  Cardiology consulted for preoperative risk stratification  TTE-no echocardiographic evidence of cardiac source of embolism  LV hyperdynamic, EF 75%  Mild concentric LVH  No PFO  A greater than 4 Mets patient without any symptoms to suggest cardiac ischemia  No history of heart failure, no signs of volume overload  EKG-NSR  No further cardiac testing testing prior to CEA  Currently in IV heparin, DAPT- per vascular surgery  Currently on asa/ IV heparin, I spoke with Medicine regarding their request      2  HTN-normotensive  HCTZ on hold  On midodrine to avoid hypotension  3  HLD- atorvastatin 40 mg  Cholesterol 172/triglyceride 222/HDL 31/LDL 97    4  Gram-negative bacteremia- ID consulted prior to surgical procedure  7/17-1 of 2 gram-negative rods  On cefepime   Repeat blood culture 7/21-pending      History of Present Illness   Physician Requesting Consult: Rodney Stevenson MD  Reason for Consult / Principal Problem:  Preop risk stratification    HPI: Augusto Davila is a 64y o  year old male with prior CVA,  HTN, prior smoker who presented [facility] with new onset left-sided facial droop and slurred speech  CT of the head showed acute infarct in the right basal ganglia, hyperdense distal right ICA right MCA consistent thrombus, small chronic infarct right frontal lobe    CTA demonstrated noncalcified plaque in the prox right internal carotid artery a small mobile thrombus at the distal aspect, nearly occlusive thrombus in the distal right ICA and right M1 and right A1 segment  Brain MRI-he a right temporal lobe, right corpus striatum, right corona radiata and right frontal cortical foci of acute ischemia  Not tPA candidate  Not thrombectomy candidate  Was started on heparin and transferred to [facility]  Repeat CT with improved intracranial thrombus, no neurosurgical intervention planned  Vascular surgery plan for CEA for symptomatic right ICA stenosis  Patient without any known cardiac history  He has been treated for hypertension  Took himself off statin due to pain  Recalls having a stress test many years ago unclear why  Smoker up to 15 years ago smoked 1/2 pack cigarettes a day  Father  of an MI in his later 35s  Patient is a  and heavy work loads no exertional chest pain or shortness of breath  Wife is at bedside  He feels this neurological deficits have resolved  They are unaware when he is having surgery  Inpatient consult to Cardiology  Consult performed by: GIGI Abraham  Consult ordered by: Tera Primrose, PA-C          Review of Systems   Constitutional: Negative  HENT: Negative  Eyes: Positive for visual disturbance  Respiratory: Negative  Cardiovascular: Negative  Gastrointestinal: Negative  Endocrine: Negative  Genitourinary: Negative  Musculoskeletal: Negative  Skin: Negative  Allergic/Immunologic: Negative  Neurological: Positive for weakness  Hematological: Negative  Psychiatric/Behavioral: Negative  All other systems reviewed and are negative        Historical Information   Past Medical History:   Diagnosis Date    Diverticulitis large intestine     "During Bowel Resection determined not to have Diverticulitis "- Patient    Hypertension     TIA (transient ischemic attack)      Past Surgical History:   Procedure Laterality Date    ANTERIOR CRUCIATE LIGAMENT REPAIR Right     ACL Repair    BOWEL RESECTION      FOOT SURGERY Right patient not sure procedure    SHOULDER SURGERY Bilateral     Rotator cuff/ Right shoulder bone growth removal     Social History     Substance and Sexual Activity   Alcohol Use Yes    Alcohol/week: 1 0 - 2 0 standard drinks    Types: 1 - 2 Cans of beer per week     Social History     Substance and Sexual Activity   Drug Use Never     Social History     Tobacco Use   Smoking Status Former Smoker    Types: Cigarettes   Smokeless Tobacco Former User    Types: Chew     Family History: History reviewed  No pertinent family history  Meds/Allergies   current meds:   Current Facility-Administered Medications   Medication Dose Route Frequency    acetaminophen (TYLENOL) tablet 650 mg  650 mg Oral Q6H PRN    aspirin chewable tablet 81 mg  81 mg Oral Daily    atorvastatin (LIPITOR) tablet 40 mg  40 mg Oral QPM    cefepime (MAXIPIME) 2,000 mg in dextrose 5 % 50 mL IVPB  2,000 mg Intravenous Q8H    heparin (porcine) 25,000 units in 0 45% NaCl 250 mL infusion (premix)  3-20 Units/kg/hr (Order-Specific) Intravenous Titrated    midodrine (PROAMATINE) tablet 5 mg  5 mg Oral TID AC    polyethylene glycol (MIRALAX) packet 17 g  17 g Oral Daily    and PTA meds:    Medications Prior to Admission   Medication    acetaminophen (TYLENOL) 650 mg CR tablet    hydrochlorothiazide (HYDRODIURIL) 25 mg tablet     No Known Allergies    Objective   Vitals: Blood pressure 134/79, pulse 64, temperature 97 7 °F (36 5 °C), resp  rate 16, height 5' 11" (1 803 m), weight 99 6 kg (219 lb 9 3 oz), SpO2 94 %    Orthostatic Blood Pressures      Most Recent Value   Blood Pressure  134/79 filed at 07/22/2021 2082   Patient Position - Orthostatic VS  Lying filed at 07/21/2021 2016            Intake/Output Summary (Last 24 hours) at 7/22/2021 1253  Last data filed at 7/22/2021 0900  Gross per 24 hour   Intake 290 ml   Output --   Net 290 ml       Invasive Devices     Peripheral Intravenous Line            Peripheral IV 07/21/21 Right Forearm 1 day                Physical Exam: /79   Pulse 64   Temp 97 7 °F (36 5 °C)   Resp 16   Ht 5' 11" (1 803 m)   Wt 99 6 kg (219 lb 9 3 oz)   SpO2 94%   BMI 30 62 kg/m²   General Appearance:    Alert, cooperative, no distress, appears stated age   Head:    Normocephalic, no scleral icterus   Eyes:    PERRL   Nose:   Nares normal, septum midline, mucosa normal, no drainage    Throat:   Lips, mucosa, and tongue normal   Neck:   Supple, symmetrical, trachea midline     no JVD   Back:     Symmetric   Lungs:     Clear to auscultation bilaterally, respirations unlabored   Chest Wall:    No tenderness or deformity    Heart:    Regular rate and rhythm, S1 and S2 normal, no murmur, rub   or gallop   Abdomen:     Soft, non-tender   Extremities:   Extremities normal, atraumatic, no cyanosis or edema   Pulses:   2+ and symmetric all extremities   Skin:   Skin color, texture, turgor normal, no rashes or lesions   Neurologic:   Alert and oriented to person place and time          Lab Results:   Recent Results (from the past 72 hour(s))   APTT    Collection Time: 07/19/21  4:04 PM   Result Value Ref Range    PTT 91 (H) 23 - 37 seconds   APTT    Collection Time: 07/19/21  9:24 PM   Result Value Ref Range    PTT 77 (H) 23 - 37 seconds   APTT    Collection Time: 07/20/21  1:40 AM   Result Value Ref Range    PTT 72 (H) 23 - 37 seconds   CBC and differential    Collection Time: 07/20/21  7:09 AM   Result Value Ref Range    WBC 4 02 (L) 4 31 - 10 16 Thousand/uL    RBC 3 95 3 88 - 5 62 Million/uL    Hemoglobin 12 6 12 0 - 17 0 g/dL    Hematocrit 36 7 36 5 - 49 3 %    MCV 93 82 - 98 fL    MCH 31 9 26 8 - 34 3 pg    MCHC 34 3 31 4 - 37 4 g/dL    RDW 12 9 11 6 - 15 1 %    MPV 9 2 8 9 - 12 7 fL    Platelets 035 (L) 605 - 390 Thousands/uL    nRBC 0 /100 WBCs    Neutrophils Relative 52 43 - 75 %    Immat GRANS % 1 0 - 2 %    Lymphocytes Relative 30 14 - 44 %    Monocytes Relative 10 4 - 12 %    Eosinophils Relative 6 0 - 6 % Basophils Relative 1 0 - 1 %    Neutrophils Absolute 2 13 1 85 - 7 62 Thousands/µL    Immature Grans Absolute 0 02 0 00 - 0 20 Thousand/uL    Lymphocytes Absolute 1 20 0 60 - 4 47 Thousands/µL    Monocytes Absolute 0 39 0 17 - 1 22 Thousand/µL    Eosinophils Absolute 0 24 0 00 - 0 61 Thousand/µL    Basophils Absolute 0 04 0 00 - 0 10 Thousands/µL   APTT    Collection Time: 07/21/21  4:05 AM   Result Value Ref Range    PTT 67 (H) 23 - 37 seconds   CBC and differential    Collection Time: 07/21/21  4:06 AM   Result Value Ref Range    WBC 4 82 4 31 - 10 16 Thousand/uL    RBC 4 14 3 88 - 5 62 Million/uL    Hemoglobin 13 1 12 0 - 17 0 g/dL    Hematocrit 39 2 36 5 - 49 3 %    MCV 95 82 - 98 fL    MCH 31 6 26 8 - 34 3 pg    MCHC 33 4 31 4 - 37 4 g/dL    RDW 13 2 11 6 - 15 1 %    MPV 10 0 8 9 - 12 7 fL    Platelets 581 008 - 230 Thousands/uL    nRBC 0 /100 WBCs   Manual Differential(PHLEBS Do Not Order)    Collection Time: 07/21/21  4:06 AM   Result Value Ref Range    Segmented % 55 43 - 75 %    Bands % 3 0 - 8 %    Lymphocytes % 27 14 - 44 %    Monocytes % 4 4 - 12 %    Eosinophils, % 6 0 - 6 %    Basophils % 1 0 - 1 %    Plasma Cells % 4 (H) 0 - 0 %    Absolute Neutrophils 2 80 1 85 - 7 62 Thousand/uL    Lymphocytes Absolute 1 30 0 60 - 4 47 Thousand/uL    Monocytes Absolute 0 19 0 00 - 1 22 Thousand/uL    Eosinophils Absolute 0 29 0 00 - 0 40 Thousand/uL    Basophils Absolute 0 05 0 00 - 0 10 Thousand/uL    Total Counted      RBC Morphology Present     Polychromasia Present     Platelet Estimate Adequate Adequate   Blood culture    Collection Time: 07/21/21 11:53 PM    Specimen: Hand, Left; Blood   Result Value Ref Range    Blood Culture Received in Microbiology Lab  Culture in Progress  Blood culture    Collection Time: 07/21/21 11:53 PM    Specimen: Hand, Right; Blood   Result Value Ref Range    Blood Culture Received in Microbiology Lab  Culture in Progress      CBC and differential    Collection Time: 07/22/21 5: 27 AM   Result Value Ref Range    WBC 5 52 4 31 - 10 16 Thousand/uL    RBC 4 10 3 88 - 5 62 Million/uL    Hemoglobin 13 0 12 0 - 17 0 g/dL    Hematocrit 38 9 36 5 - 49 3 %    MCV 95 82 - 98 fL    MCH 31 7 26 8 - 34 3 pg    MCHC 33 4 31 4 - 37 4 g/dL    RDW 13 1 11 6 - 15 1 %    MPV 9 4 8 9 - 12 7 fL    Platelets 460 990 - 385 Thousands/uL    nRBC 0 /100 WBCs   APTT    Collection Time: 07/22/21  5:27 AM   Result Value Ref Range    PTT 28 23 - 37 seconds     Imaging: I have personally reviewed pertinent reports  EKG: NSR  VTE Prophylaxis: Heparin    Code Status: Level 1 - Full Code  Advance Directive and Living Will:      Power of :    POLST:      Counseling / Coordination of Care  Total floor / unit time spent today 45 minutes  Greater than 50% of total time was spent with the patient and / or family counseling and / or coordination of care

## 2021-07-23 ENCOUNTER — TELEPHONE (OUTPATIENT)
Dept: VASCULAR SURGERY | Facility: CLINIC | Age: 61
End: 2021-07-23

## 2021-07-23 LAB
APTT PPP: 39 SECONDS (ref 23–37)
APTT PPP: 43 SECONDS (ref 23–37)
APTT PPP: 55 SECONDS (ref 23–37)
BACTERIA BLD CULT: ABNORMAL
BACTERIA BLD CULT: NORMAL
ERYTHROCYTE [DISTWIDTH] IN BLOOD BY AUTOMATED COUNT: 13.2 % (ref 11.6–15.1)
GRAM STN SPEC: ABNORMAL
HCT VFR BLD AUTO: 39.5 % (ref 36.5–49.3)
HGB BLD-MCNC: 13.1 G/DL (ref 12–17)
MCH RBC QN AUTO: 31.3 PG (ref 26.8–34.3)
MCHC RBC AUTO-ENTMCNC: 33.2 G/DL (ref 31.4–37.4)
MCV RBC AUTO: 95 FL (ref 82–98)
NRBC BLD AUTO-RTO: 0 /100 WBCS
PLATELET # BLD AUTO: 199 THOUSANDS/UL (ref 149–390)
PMV BLD AUTO: 9.6 FL (ref 8.9–12.7)
RBC # BLD AUTO: 4.18 MILLION/UL (ref 3.88–5.62)
WBC # BLD AUTO: 7.47 THOUSAND/UL (ref 4.31–10.16)

## 2021-07-23 PROCEDURE — 85730 THROMBOPLASTIN TIME PARTIAL: CPT | Performed by: INTERNAL MEDICINE

## 2021-07-23 PROCEDURE — 99232 SBSQ HOSP IP/OBS MODERATE 35: CPT | Performed by: PSYCHIATRY & NEUROLOGY

## 2021-07-23 PROCEDURE — 85027 COMPLETE CBC AUTOMATED: CPT | Performed by: PHYSICIAN ASSISTANT

## 2021-07-23 PROCEDURE — 99233 SBSQ HOSP IP/OBS HIGH 50: CPT | Performed by: INTERNAL MEDICINE

## 2021-07-23 PROCEDURE — 99232 SBSQ HOSP IP/OBS MODERATE 35: CPT | Performed by: SURGERY

## 2021-07-23 RX ADMIN — MIDODRINE HYDROCHLORIDE 5 MG: 5 TABLET ORAL at 17:03

## 2021-07-23 RX ADMIN — ASPIRIN 81 MG CHEWABLE TABLET 81 MG: 81 TABLET CHEWABLE at 08:17

## 2021-07-23 RX ADMIN — MIDODRINE HYDROCHLORIDE 5 MG: 5 TABLET ORAL at 06:02

## 2021-07-23 RX ADMIN — CLOPIDOGREL BISULFATE 75 MG: 75 TABLET ORAL at 08:17

## 2021-07-23 RX ADMIN — ATORVASTATIN CALCIUM 40 MG: 20 TABLET, FILM COATED ORAL at 17:03

## 2021-07-23 RX ADMIN — HEPARIN SODIUM 23.1 UNITS/KG/HR: 10000 INJECTION, SOLUTION INTRAVENOUS at 23:31

## 2021-07-23 RX ADMIN — HEPARIN SODIUM 17.1 UNITS/KG/HR: 10000 INJECTION, SOLUTION INTRAVENOUS at 08:18

## 2021-07-23 RX ADMIN — MIDODRINE HYDROCHLORIDE 5 MG: 5 TABLET ORAL at 10:54

## 2021-07-23 NOTE — PROGRESS NOTES
Progress Note - General Surgery   Tj Candelario 64 y o  male MRN: [id number]  Unit/Bed#: [facility] 726-01 Encounter: [id number]    Assessment:  61M w/ acute R MCA thromboembolic ischemic CVA in setting of high grade stenosis of R ICA, consulted to vascular surgery for consideration of carotid intervention     Patient's symptoms mostly resolved and his back to baseline  - 3rd episode of CVA on right brain, first episode 5-6 years prior with resolution, 2nd episode 2 years prior with resolution    Plan:  Continue ASA/statin/plavix  Care per primary  Cleared by cardiology for surgical carotid intervention  Plan for CEA possibly Monday      Subjective/Objective     Subjective: No acute events overnight  Pt denies any facial droop, left sided extremity weakness or numbness  Pt remains at baseline  Objective:     Blood pressure 120/71, pulse 64, temperature 98 1 °F (36 7 °C), resp  rate 18, height 5' 11" (1 803 m), weight 99 1 kg (218 lb 7 6 oz), SpO2 94 %  ,Body mass index is 30 47 kg/m²        Intake/Output Summary (Last 24 hours) at 7/23/2021 0648  Last data filed at 7/22/2021 1700  Gross per 24 hour   Intake 480 ml   Output --   Net 480 ml       Invasive Devices     Peripheral Intravenous Line            Peripheral IV 07/21/21 Right Forearm 2 days                Physical Exam: /71   Pulse 64   Temp 98 1 °F (36 7 °C)   Resp 18   Ht 5' 11" (1 803 m)   Wt 99 1 kg (218 lb 7 6 oz)   SpO2 94%   BMI 30 47 kg/m²     General Appearance:    Alert, cooperative, no distress, appears stated age   Head:   Normocephalic, without obvious abnormality, atraumatic   Eyes:    PERRL, conjunctiva/corneas clear, EOM's intact, fundi     benign, both eyes        Ears:    Normal TM's and external ear canals, both ears   Nose:   Nares normal, septum midline, mucosa normal, no drainage    or sinus tenderness   Throat:   Lips, mucosa, and tongue normal; teeth and gums normal   Neck:   Supple, symmetrical, trachea midline, no adenopathy;        thyroid:  No enlargement/tenderness/nodules; no carotid    bruit or JVD   Back:     Symmetric, no curvature, ROM normal, no CVA tenderness   Lungs:     Clear to auscultation bilaterally, respirations unlabored   Chest wall:    No tenderness or deformity   Heart:    Regular rate and rhythm, S1 and S2 normal, no murmur, rub   or gallop   Abdomen:     Soft, non-tender, bowel sounds active all four quadrants,     no masses, no organomegaly   Genitalia:    Normal male without lesion, discharge or tenderness   Rectal:    Normal tone, normal prostate, no masses or tenderness;    guaiac negative stool   Extremities:   Extremities normal, atraumatic, no cyanosis or edema   Pulses:   2+ and symmetric all extremities   Skin:   Skin color, texture, turgor normal, no rashes or lesions   Lymph nodes:   Cervical, supraclavicular, and axillary nodes normal   Neurologic:   CNII-XII intact  Normal strength, sensation and reflexes       Throughout  Asymmetric smile, slurred speech, left sided weakness  Lab, Imaging and other studies:  I have personally reviewed pertinent lab results    , CBC:   Lab Results   Component Value Date    WBC 7 47 07/23/2021    HGB 13 1 07/23/2021    HCT 39 5 07/23/2021    MCV 95 07/23/2021     07/23/2021    MCH 31 3 07/23/2021    MCHC 33 2 07/23/2021    RDW 13 2 07/23/2021    MPV 9 6 07/23/2021    NRBC 0 07/23/2021   , CMP: No results found for: SODIUM, K, CL, CO2, ANIONGAP, BUN, CREATININE, GLUCOSE, CALCIUM, AST, ALT, ALKPHOS, PROT, BILITOT, EGFR, Coagulation: No results found for: PT, INR, APTT, Urinalysis: No results found for: COLORU, CLARITYU, SPECGRAV, PHUR, LEUKOCYTESUR, NITRITE, PROTEINUA, GLUCOSEU, KETONESU, BILIRUBINUR, BLOODU, Amylase: No results found for: AMYLASE, Lipase: No results found for: LIPASE  VTE Pharmacologic Prophylaxis: Heparin  VTE Mechanical Prophylaxis: sequential compression device

## 2021-07-23 NOTE — PROGRESS NOTES
Cardiology Progress Note - Russell Houston 64 y o  male MRN: [id number]    Unit/Bed#: [facility] 726-01 Encounter: [id number]      Assessment/Recommendations:  1  Preop cardiac evaluation:  For an immediate risk vascular surgery  Good functional capacity, normal ejection fraction on echocardiogram   No significant arrhythmias noted  Stable from cardiac standpoint for proceeding with surgery without additional workup this time  2  Critical right ICA stenosis with predominantly thrombogenic plaque:  Likely to be the source of CVA  For CEA, per vascular surgery  Continued on aspirin, statin, Plavix, heparin drip  3  Hypertension:  With low blood pressures noted, on midodrine to help cerebral perfusion  4  Hyperlipidemia:  Continue on statin  5  Gram-negative bacteremia:  1 out of 2 specimens, for repeat cultures  6  CVA:  Likely secondary to 2  Continued on heparin drip, aspirin, statin, Plavix  7  Stable from cardiac standpoint, please call with questions  Subjective:   Patient seen and examined  No significant events overnight   ; pertinent negatives - chest pain, chest pressure/discomfort, dyspnea, irregular heart beat, lower extremity edema and palpitations  Objective:     Vitals: Blood pressure 120/71, pulse 64, temperature 98 1 °F (36 7 °C), resp  rate 18, height 5' 11" (1 803 m), weight 99 1 kg (218 lb 7 6 oz), SpO2 94 %  , Body mass index is 30 47 kg/m² ,   Orthostatic Blood Pressures      Most Recent Value   Blood Pressure  120/71 filed at 07/23/2021 0601   Patient Position - Orthostatic VS  Lying filed at 07/21/2021 2016            Intake/Output Summary (Last 24 hours) at 7/23/2021 0825  Last data filed at 7/22/2021 1700  Gross per 24 hour   Intake 480 ml   Output --   Net 480 ml       Physical Exam:    GEN: Russell Houston appears well, alert and oriented x 3, pleasant and cooperative   HEENT: pupils equal, round, and reactive to light; extraocular muscles intact  NECK: supple, no carotid bruits   HEART: regular rhythm, normal S1 and S2, no murmurs, clicks, gallops or rubs   LUNGS: clear to auscultation bilaterally; no wheezes, rales, or rhonchi   ABDOMEN: normal bowel sounds, soft, no tenderness, no distention  EXTREMITIES: peripheral pulses normal; no clubbing, cyanosis, or edema  NEURO: no focal findings   SKIN: normal without suspicious lesions on exposed skin    Medications:      Current Facility-Administered Medications:     acetaminophen (TYLENOL) tablet 650 mg, 650 mg, Oral, Q6H PRN, Mabel Vidal MD    aspirin chewable tablet 81 mg, 81 mg, Oral, Daily, Kaushik Freitas PA-C, 81 mg at 07/23/21 0817    atorvastatin (LIPITOR) tablet 40 mg, 40 mg, Oral, QPM, Kaushik Freitas PA-C, 40 mg at 07/22/21 1816    clopidogrel (PLAVIX) tablet 75 mg, 75 mg, Oral, Daily, Jose F Junior MD, 75 mg at 07/23/21 0817    heparin (porcine) 25,000 units in 0 45% NaCl 250 mL infusion (premix), 3-20 Units/kg/hr (Order-Specific), Intravenous, Titrated, Jose F Junior MD, Last Rate: 15 4 mL/hr at 07/23/21 0818, 17 1 Units/kg/hr at 07/23/21 0818    midodrine (PROAMATINE) tablet 5 mg, 5 mg, Oral, TID AC, Kaushik Freitas PA-C, 5 mg at 07/23/21 0602    polyethylene glycol (MIRALAX) packet 17 g, 17 g, Oral, Daily, Philul Zuniga, DO, 17 g at 07/19/21 0827     Labs & Results:        Results from last 7 days   Lab Units 07/23/21  0507 07/22/21  0527 07/21/21  0406   WBC Thousand/uL 7 47 5 52 4 82   HEMOGLOBIN g/dL 13 1 13 0 13 1   HEMATOCRIT % 39 5 38 9 39 2   PLATELETS Thousands/uL 199 186 193         Results from last 7 days   Lab Units 07/19/21  0503 07/17/21  0455   POTASSIUM mmol/L 3 5 3 6   CHLORIDE mmol/L 108 108   CO2 mmol/L 24 23   BUN mg/dL 11 10   CREATININE mg/dL 1 09 1 12   CALCIUM mg/dL 9 1 8 4     Results from last 7 days   Lab Units 07/23/21  0251 07/22/21  1856 07/22/21  1210   PTT seconds 43* 25 29     Results from last 7 days   Lab Units 07/17/21  0455   MAGNESIUM mg/dL 1 7       Echo: personally reviewed - EF 75%, mild LVH, left atrial enlargement, no PFO noted    EKG personally reviewed by Eduardo Quintana MD

## 2021-07-23 NOTE — PROGRESS NOTES
NEUROLOGY RESIDENCY PROGRESS NOTE     Name: Luis Singh   Age & Sex: 64 y o  male   MRN: [id number]  Unit/Bed#: [address] 726-01   Encounter: [id number]    ASSESSMENT & PLAN     * CVA (cerebral vascular accident) [facility])  Assessment & Plan  Assessment/Impression: 63 y/o M w/ acute ischemic strokes likely 2/2 mobile carotid thrombus (likely etiology for his prior strokes as well), currently significantly improved and overall stable    Presenting sx: left facial droop, dysarthria, LKN 2100 reportedly the night PTA (approx 20 hrs prior to initial consult), but on further discussion w/ family it appears his [de-identified] may have been the day before yesterday and that he was experiencing a right-sided headache for about 2 weeks which peaked around the time the family noticed sx   NIHSS: 2 on arrival    tPA not given 2/2 time course, non-disabling sx   CTH/CTA:  Right caudate/lentiform/BG hypodensity and right frontal focal encephalomalacia due to remote infarct, small, likely chronic infarct in the right parietal lobe; severe right ICA stenosis (noncalcified plaque at the carotid bifurcation), and nearly occlusive thrombus in the right supraclinoid ICA extending to the terminus, and into right M1 and A1 segments; hypoplastic but patent left A1   MRI: new focal ischemia in medial R T lobe, R corpus striatum, R corona radiata, R frontal lobe (all R MCA territory strokes); no susceptibility weighting imaging signal consistent w/ old or new blood products   Labs: A1c 5 3, LDL 97,    Echo: LVEF 75%, mild concentric hypertrophy, no RWMA, no PFO on bubble, trace MR, TR, ND, mild dilatation of the ascending aorta (4 2 cm)   Exam: mild left facial weakness but no residual dysarthria or asymmetric tongue protrusion   Repeat CTA w/ significant improvement in intracranial thrombus    Plan: Repeat CTA w/ improved intracranial thrombus - no neurosurgical intervention or f/u indicated, will ultimately need CEA for symptomatic R ICA stenosis   Repeat CTA w/ significant improvement in intracranial thrombus - can d/c heparin gtt and start DAPT, NSg signed off - no indication for neurosurgical intervention or    Recommend DAPT w/ ASA, plavix   Continue statin   Will ultimately need CEA/carotid intervention for symptomatic R ICA   Euglycemia, normothermia, normotension   Positive BCx likely contaminant   R ICA intervention likely Monday   Discharge plan: after CEA (tentatively scheduled for Monday)  o DAPT w/ ASA and plavix for 3 months, w/ aspirin monotherapy thereafter  o Statin   o F/u neuro stroke clinic  o Neuro will continue to follow peripherally    Carotid stenosis, symptomatic, with infarction [facility])  Assessment & Plan  Patient w/ R MCA strokes, significant intracranial ICA, M1, A1 thrombi which have improved significantly w/ a week on heparin gtt   Also demonstrated significant right ICA stenosis w/ complex ulcerated plaque at origin   Consult vascular surgery re:  CEA candidacy   Plan as above    Hypertension  Assessment & Plan  As above    Xu Rosado will need follow up in in 4 weeks with neurovascular    He will not require outpatient neurological testing at this time    SUBJECTIVE     Patient was seen and examined  No critical events overnight  Denied N/V/F/C, feels well and has been walking around well  Amenable to plan for intervention    ROS negative unless otherwise noted    OBJECTIVE     Patient ID: Xu Rosado is a 64 y o  male  Vitals:    21 1535 21 0600 21 0601 21 0830   BP: 124/77  120/71 121/72   Pulse:    64   Resp: 18   16   Temp: 98 1 °F (36 7 °C)   97 9 °F (36 6 °C)   TempSrc:    Oral   SpO2:    93%   Weight:  99 1 kg (218 lb 7 6 oz)     Height:          Temperature:   Temp (24hrs), Av °F (36 7 °C), Min:97 9 °F (36 6 °C), Max:98 1 °F (36 7 °C)    Temperature: 97 9 °F (36 6 °C)    Physical Exam  Vitals reviewed     Constitutional:       General: He is not in acute distress  Appearance: Normal appearance  He is not ill-appearing, toxic-appearing or diaphoretic  HENT:      Head: Normocephalic and atraumatic  Right Ear: External ear normal       Left Ear: External ear normal       Nose: Nose normal  No congestion or rhinorrhea  Mouth/Throat:      Mouth: Mucous membranes are moist       Pharynx: Oropharynx is clear  No oropharyngeal exudate or posterior oropharyngeal erythema  Eyes:      General: No scleral icterus  Right eye: No discharge  Left eye: No discharge  Extraocular Movements: Extraocular movements intact  Conjunctiva/sclera: Conjunctivae normal    Pulmonary:      Effort: Pulmonary effort is normal  No respiratory distress  Skin:     Coloration: Skin is not pale  Findings: No erythema or rash  Neurological:      Mental Status: He is alert and oriented to person, place, and time  Sensory: No sensory deficit  Motor: No weakness  Coordination: Coordination normal       Deep Tendon Reflexes: Reflexes abnormal    Psychiatric:         Mood and Affect: Mood normal          Speech: Speech normal          Behavior: Behavior normal      Neurologic Exam     Mental Status   Oriented to person, place, and time  Attention: normal    Speech: speech is normal   Level of consciousness: alert  Knowledge: consistent with education  Cranial Nerves     CN III, IV, VI   CN III: no CN III palsy  CN VI: no CN VI palsy    CN VII   Left facial weakness: central (flattening of nasolabial fold, left-sided weakness w/ smiling)    CN VIII   Hearing: intact    Motor Exam   Muscle bulk: normal  Overall muscle tone: normal  Right arm pronator drift: absent  Left arm pronator drift: absent  BUE/BLE strength grossly intact and symmetric     Sensory Exam   Light touch normal      Gait, Coordination, and Reflexes     Tremor   Resting tremor: absent  Action tremor: absent    LABORATORY DATA     Labs:  I have personally reviewed pertinent reports  Results from last 7 days   Lab Units 07/23/21  0507 07/22/21  0527 07/21/21  0406 07/20/21  0709 07/19/21  0504 07/18/21  0518   WBC Thousand/uL 7 47 5 52 4 82 4 02* 3 91* 3 93*   HEMOGLOBIN g/dL 13 1 13 0 13 1 12 6 12 5 12 3   HEMATOCRIT % 39 5 38 9 39 2 36 7 37 3 36 3*   PLATELETS Thousands/uL 199 186 193 144* 151 123*   NEUTROS PCT %  --   --   --  52 49 63   MONOS PCT %  --   --   --  10 13* 12   MONO PCT %  --   --  4  --   --   --       Results from last 7 days   Lab Units 07/19/21  0503 07/17/21  0455   POTASSIUM mmol/L 3 5 3 6   CHLORIDE mmol/L 108 108   CO2 mmol/L 24 23   BUN mg/dL 11 10   CREATININE mg/dL 1 09 1 12   CALCIUM mg/dL 9 1 8 4     Results from last 7 days   Lab Units 07/17/21  0455   MAGNESIUM mg/dL 1 7     Results from last 7 days   Lab Units 07/17/21  0455   PHOSPHORUS mg/dL 3 5      Results from last 7 days   Lab Units 07/23/21  0939 07/23/21  0251 07/22/21  1856   PTT seconds 39* 43* 25             ABG:No results found for: PHART, JPU6CDZ, PO2ART, QDU1OIV, C6RYGGNM, BEART, SOURCE    IMAGING & DIAGNOSTIC TESTING   Radiology Results: I have personally reviewed pertinent reports  and I have personally reviewed pertinent films in PACS  VAS carotid complete study   Final Result by Gabriel Moore MD (07/21 2226)      CTA head and neck w wo contrast   Final Result by Beverley Snow MD (07/21 1623)      Ischemic edema right MCA distribution reidentified without hemorrhagic conversion  Stable unenhanced CT  Persistent high grade stenosis with complex ulcerated plaque at the origin of the right ICA  Improved filling of the distal supraclinoid right ICA, right A1 and M1 segments when compared to prior study one week earlier  Persistent, nonocclusive thrombus in these vessels as described              Workstation performed: [id number]         XR chest portable   Final Result by Taylor Rivera MD (70/11 5334)      Right lower lobe subsegmental atelectasis  Workstation performed: [id number]         XR chest portable ICU   Final Result by Dimas Wisdom MD (07/17 3840)      No acute cardiopulmonary disease  Workstation performed: [id number]         CT head wo contrast   Final Result by zO Preciado MD (07/15 1941)         Stable right MCA territory infarct with vasogenic edema in the basal ganglia  No hemorrhagic conversion or significant mass effect  Workstation performed: [id number]         MRI brain wo contrast   Final Result by Ida Wolfe MD (07/14 4115)      Medial right temporal lobe, right corpus striatum, right corona radiata and right frontal cortical foci of acute ischemia  Findings were marked as "immediate"in Epic and will now be related to the ordering physician or covering clinical team by the radiology liaison  Workstation performed: [id number]         XR chest portable   Final Result by Pilar Scott MD (07/15 0815)      No acute cardiopulmonary disease  Workstation performed: [id number]           Other Diagnostic Testing: I have personally reviewed pertinent reports        ACTIVE MEDICATIONS     Current Facility-Administered Medications   Medication Dose Route Frequency    acetaminophen (TYLENOL) tablet 650 mg  650 mg Oral Q6H PRN    aspirin chewable tablet 81 mg  81 mg Oral Daily    atorvastatin (LIPITOR) tablet 40 mg  40 mg Oral QPM    clopidogrel (PLAVIX) tablet 75 mg  75 mg Oral Daily    heparin (porcine) 25,000 units in 0 45% NaCl 250 mL infusion (premix)  3-20 Units/kg/hr (Order-Specific) Intravenous Titrated    midodrine (PROAMATINE) tablet 5 mg  5 mg Oral TID AC    polyethylene glycol (MIRALAX) packet 17 g  17 g Oral Daily     Medication Sig   acetaminophen (TYLENOL) 650 mg CR tablet Take 1 tablet (650 mg total) by mouth every 8 (eight) hours as needed for mild pain   hydrochlorothiazide (HYDRODIURIL) 25 mg tablet Take 25 mg by mouth daily     VTE Pharmacologic Prophylaxis: Heparin Drip  VTE Mechanical Prophylaxis: sequential compression device    ==  Duc Ambrocio MD  Resident, Neurology, PGY-2  [address]

## 2021-07-23 NOTE — PROGRESS NOTES
INTERNAL MEDICINE RESIDENCY PROGRESS NOTE     Name: Sebastian Wolf   Age & Sex: 64 y o  male   MRN: [id number]  Unit/Bed#: [address] [facility] [address] 726-01   Encounter: [id number]  Team: BE SLIM    PATIENT INFORMATION     Name: Sebastian Wolf   Age & Sex: 64 y o  male   MRN: [id number]  Hospital Stay Days: 9    ASSESSMENT/PLAN     Principal Problem:    CVA (cerebral vascular accident) [facility])  Active Problems:    Gram-negative bacteremia    Carotid stenosis, symptomatic, with infarction ([facility] [address] )    Hypertension    Hyperlipidemia      * CVA (cerebral vascular accident) [facility])  Assessment & Plan  Past medical history of TIA, not on blood thinners, hypertension, diverticulitis presents with left-sided facial droop and slurred speech  On 7/14 in the ED, patient and family member at bedside states that patient was last seen normal at 9:00 p m on 7/13 when he went to bed  Patient woke up at 8:00 a m  And family noticed that he had left-sided facial droop and mild slurred speech  Patient states he had his first TIA six years ago which presented with left-sided weakness  Patient had another TIA four years ago with similar presentation  Patient had third TIA three years ago with right sided visual loss  Not a TPA candidate  7/14 CTA: severe noncalcified plaque in the proximal right internal carotid artery with small mobile thrombus at the distal aspect, nearly occlusive thrombus in the distal right ICA, right M1, and R A1 segments  7/14 CTH: Acute infarct in the right basal ganglia, hyperdense distal right ICA and right MCA consistent with thrombus, small chronic infarct in the right frontal lobe   Small parietal infarct also favored to be chronic  No acute hemorrhage or mass effect  7/14 MRI: Medial right temporal lobe, right corpus striatum, right corona radiata and right frontal cortical foci of acute ischemia  7/15 CTH: Stable right MCA territory infarct with vasogenic edema in the basal ganglia    No hemorrhagic conversion or significant mass effect   7/21 CTA head/neck: Ischemic edema right MCA distribution reidentified without hemorrhagic conversion  Stable unenhanced CT  Persistent high grade stenosis with complex ulcerated plaque at the origin of the right ICA  Improved filling of the distal supraclinoid right ICA, right A1 and M1 segments when compared to prior study one week earlier  Persistent, nonocclusive thrombus in these vessels as described  7/21 VAS carotid study:   RIGHT:There is <50% stenosis noted in the internal carotid artery  Plaque is heterogenous and irregular  Vertebral artery flow is antegrade  There is no significant subclavian artery disease  LEFT:There is 70-99% stenosis noted in the internal carotid artery  Plaque is  heterogenous and irregular  Vertebral artery flow is antegrade  There is no significant subclavian artery disease  Plan:  · Neurology consulted, recommendations appreciated  · Continue aspirin and plavix for 3 months followed by aspirin monotherapy  · Continue lipitor  · Clear for discharge from a neurology standpoint  · Follow up stroke clinic outpatient  · Vascular surgery wants to do inpatient carotid endarterectomy, planning for 7/26  · ID consulted due to gram negative bacteremia  Cleared for carotid endarterectomy  · Cardiology consulted for preop evaluation  Cleared for carotid endarterectomy  · Heparin drip with goal PTT between 60 and 90  Continue to monitor PTT    · Midodrine 5mg TID to reach goal MAP, >100 and SBP <180      Carotid stenosis, symptomatic, with infarction [facility])  Assessment & Plan  Plan:  - Continue ASA/Plavix/Statin  - Right carotid endarterectomy planned for Mon 7/26, see plan in CVA    Gram-negative bacteremia  Assessment & Plan  Patient had a fever w/wheezing and leukopenia on 7/17 and blood cultures were drawn  · One blood culture became positive for gram negative rods on 7/21, confirmed to be bacillus, not anthracis  · 7/14 admission CXR was WNL; 7/17 CXR revealed right lower lobe subsegmental atelectasis  Plan:  · Repeat blood cultures drawn on 7/21, negative at 24 hours  · 7/21 IV cefepime was started after 1/2 periph bcx came back positive as above  · ID consulted to manage abx around carotid endarterectomy that vascular is planning to do; needs to have negative BCx before procedure  · Believed to be a contaminant due to bacillus species, clinical improvement without treatment, and only 1/2 bottles being positive at 4 days  · Cefepime discontinued 7/22  · Cleared for carotid endarterectomy, expected Mon 7/26  · Monitor redrawn blood cultures, further work up needed if positive  · Incentive spirometry  · Ambulate patient    Hyperlipidemia  Assessment & Plan  Chronic issue  Plan:  -Continue atorvastatin (lipitor) 40 mg qHS    Hypertension  Assessment & Plan  Takes HCTZ at home  7/22 ranging 110s-140s/50s-70s without any inpatient BP meds  Plan:  · On midodrine 5 mg TID for orthostatic hypotension, holding home BP meds  · Monitor blood pressures  · Avoid hypotension      Disposition: Continue current level of care, plan for carotid endarterectomy on 7/26    SUBJECTIVE     Patient seen and examined  No acute events overnight  Patient reports feeling good today without weakness  He endorses no change in his bowel/urinary habits from his norm  He denies numbness/tingling in his extremities, changes in his vision/hearing/smell/taste, lightheadedness/fatigue/dizziness, chest pain/palpitations, SOB, and N/V/abdominal pain  Patient was verbally updated on antibiotic management and expectations regarding his procedure with cardiology for right carotid endarterectomy scheduled now for Monday      OBJECTIVE     Vitals:    07/22/21 1535 07/23/21 0600 07/23/21 0601 07/23/21 0830   BP: 124/77  120/71 121/72   Pulse:    64   Resp: 18   16   Temp: 98 1 °F (36 7 °C)   97 9 °F (36 6 °C)   TempSrc:    Oral   SpO2:    93%   Weight:  99 1 kg (218 lb 7 6 oz) Height:          Temperature:   Temp (24hrs), Av °F (36 7 °C), Min:97 9 °F (36 6 °C), Max:98 1 °F (36 7 °C)    Temperature: 97 9 °F (36 6 °C)  Intake & Output:  I/O        07 -  0700 701 -  07 -  0700    P  O   480 120    IV Piggyback 50      Total Intake(mL/kg) 50 (0 5) 480 (4 8) 120 (1 2)    Net +50 +480 +120               Weights:   IBW (Ideal Body Weight): 75 3 kg    Body mass index is 30 47 kg/m²  Weight (last 2 days)     Date/Time   Weight    21 06   99 1 (218 48)    21 0600   99 6 (219 58)    21 0540   101 (221 56)            Physical Exam  Constitutional:       General: He is awake  He is not in acute distress  Appearance: Normal appearance  He is obese  He is not ill-appearing or diaphoretic  HENT:      Head: Normocephalic and atraumatic  Mouth/Throat:      Mouth: Mucous membranes are moist    Eyes:      Extraocular Movements: Extraocular movements intact  Cardiovascular:      Rate and Rhythm: Normal rate and regular rhythm  Heart sounds: Normal heart sounds  Pulmonary:      Effort: Pulmonary effort is normal  No respiratory distress  Breath sounds: Normal breath sounds  Abdominal:      General: Abdomen is flat  Bowel sounds are normal       Palpations: Abdomen is soft  Tenderness: There is no abdominal tenderness  Skin:     General: Skin is warm and dry  Neurological:      Mental Status: He is alert  Cranial Nerves: Cranial nerve deficit (some left sided facial weakness while smiling) present  Sensory: Sensation is intact  Motor: Motor function is intact  No weakness (5/5 strength throughout)  Psychiatric:         Attention and Perception: Attention normal          Mood and Affect: Mood and affect normal          Speech: Speech normal          Behavior: Behavior normal  Behavior is cooperative  LABORATORY DATA     Labs: I have personally reviewed pertinent reports    Results from last 7 days   Lab Units 07/23/21  0507 07/22/21  0527 07/21/21  0406 07/20/21  0709 07/19/21  0504 07/18/21  0518   WBC Thousand/uL 7 47 5 52 4 82 4 02* 3 91* 3 93*   HEMOGLOBIN g/dL 13 1 13 0 13 1 12 6 12 5 12 3   HEMATOCRIT % 39 5 38 9 39 2 36 7 37 3 36 3*   PLATELETS Thousands/uL 199 186 193 144* 151 123*   NEUTROS PCT %  --   --   --  52 49 63   MONOS PCT %  --   --   --  10 13* 12   MONO PCT %  --   --  4  --   --   --       Results from last 7 days   Lab Units 07/19/21  0503 07/17/21  0455   POTASSIUM mmol/L 3 5 3 6   CHLORIDE mmol/L 108 108   CO2 mmol/L 24 23   BUN mg/dL 11 10   CREATININE mg/dL 1 09 1 12   CALCIUM mg/dL 9 1 8 4     Results from last 7 days   Lab Units 07/17/21  0455   MAGNESIUM mg/dL 1 7     Results from last 7 days   Lab Units 07/17/21  0455   PHOSPHORUS mg/dL 3 5      Results from last 7 days   Lab Units 07/23/21  0939 07/23/21  0251 07/22/21  1856   PTT seconds 39* 43* 25               IMAGING & DIAGNOSTIC TESTING     Radiology Results: I have personally reviewed pertinent reports  XR chest portable    Result Date: 7/19/2021  Impression: Right lower lobe subsegmental atelectasis  Workstation performed: [id number]     XR chest portable    Result Date: 7/15/2021  Impression: No acute cardiopulmonary disease  Workstation performed: [id number]     X-ray chest 1 view portable    Result Date: 7/14/2021  Impression: No acute cardiopulmonary disease  Workstation performed: [id number]     CT head wo contrast    Result Date: 7/15/2021  Impression: Stable right MCA territory infarct with vasogenic edema in the basal ganglia  No hemorrhagic conversion or significant mass effect  Workstation performed: [id number]     MRI brain wo contrast    Result Date: 7/14/2021  Impression: Medial right temporal lobe, right corpus striatum, right corona radiata and right frontal cortical foci of acute ischemia   Findings were marked as "immediate"in Epic and will now be related to the ordering physician or covering clinical team by the radiology liaison  Workstation performed: [id number]     CT stroke alert brain    Result Date: 7/14/2021  Impression: Acute infarct in the right basal ganglia  Hyperdense distal right ICA and right MCA consistent with thrombus  Small chronic infarct in the right frontal lobe  Small parietal infarct also favored to be chronic  No acute hemorrhage or mass effect  Findings were directly discussed with Dr Fabrizio Crawford on 7/14/2021 2:21 PM Workstation performed: [id number]     XR chest portable ICU    Result Date: 7/17/2021  Impression: No acute cardiopulmonary disease  Workstation performed: [id number]     CTA stroke alert (head/neck)    Result Date: 7/14/2021  Impression: Severe noncalcified plaque in the proximal right internal carotid artery with small mobile thrombus at the distal aspect  Nearly occlusive thrombus in the distal right internal carotid artery, right M1 and dominant right A1 segments  Findings were directly discussed with Dr Fabrizio Crawford on 7/14/2021 2:21 PM  Workstation performed: [id number]     Other Diagnostic Testing: I have personally reviewed pertinent reports  ACTIVE MEDICATIONS     Current Facility-Administered Medications   Medication Dose Route Frequency    acetaminophen (TYLENOL) tablet 650 mg  650 mg Oral Q6H PRN    aspirin chewable tablet 81 mg  81 mg Oral Daily    atorvastatin (LIPITOR) tablet 40 mg  40 mg Oral QPM    clopidogrel (PLAVIX) tablet 75 mg  75 mg Oral Daily    heparin (porcine) 25,000 units in 0 45% NaCl 250 mL infusion (premix)  3-20 Units/kg/hr (Order-Specific) Intravenous Titrated    midodrine (PROAMATINE) tablet 5 mg  5 mg Oral TID AC    polyethylene glycol (MIRALAX) packet 17 g  17 g Oral Daily       VTE Pharmacologic Prophylaxis: Heparin  VTE Mechanical Prophylaxis: sequential compression device    Portions of the record may have been created with voice recognition software    Occasional wrong word or "sound a like" substitutions may have occurred due to the inherent limitations of voice recognition software    Read the chart carefully and recognize, using context, where substitutions have occurred   ==  Chauncey Mendoza MD  [address]  Internal Medicine Residency PGY-1

## 2021-07-23 NOTE — PLAN OF CARE
Problem: Neurological Deficit  Goal: Neurological status is stable or improving  Description: Interventions:  - Monitor and assess patient's level of consciousness, motor function, sensory function, and level of assistance needed for ADLs  - Monitor and report changes from baseline  Collaborate with interdisciplinary team to initiate plan and implement interventions as ordered  - Provide and maintain a safe environment  - Consider seizure precautions  - Consider fall precautions  - Consider aspiration precautions  - Consider bleeding precautions  Outcome: Progressing     Problem: Activity Intolerance/Impaired Mobility  Goal: Mobility/activity is maintained at optimum level for patient  Description: Interventions:  - Assess and monitor patient  barriers to mobility and need for assistive/adaptive devices  - Assess patient's emotional response to limitations  - Collaborate with interdisciplinary team and initiate plans and interventions as ordered  - Encourage independent activity per ability   - Maintain proper body alignment  - Perform active/passive rom as tolerated/ordered  - Plan activities to conserve energy   - Turn patient as appropriate  Outcome: Progressing     Problem: Communication Impairment  Goal: Ability to express needs and understand communication  Description: Assess patient's communication skills and ability to understand information  Patient will demonstrate use of effective communication techniques, alternative methods of communication and understanding even if not able to speak  - Encourage communication and provide alternate methods of communication as needed  - Collaborate with case management/ for discharge needs  - Include patient/family/caregiver in decisions related to communication    Outcome: Progressing     Problem: Potential for Aspiration  Goal: Non-ventilated patient's risk of aspiration is minimized  Description: Assess and monitor vital signs, respiratory status, and labs (WBC)  Monitor for signs of aspiration (tachypnea, cough, rales, wheezing, cyanosis, fever)  - Assess and monitor patient's ability to swallow  - Place patient up in chair to eat if possible  - HOB up at 90 degrees to eat if unable to get patient up into chair   - Supervise patient during oral intake  - Instruct patient/ family to take small bites  - Instruct patient/ family to take small single sips when taking liquids  - Follow patient-specific strategies generated by speech pathologist   Outcome: Progressing     Problem: Nutrition  Goal: Nutrition/Hydration status is improving  Description: Monitor and assess patient's nutrition/hydration status for malnutrition (ex- brittle hair, bruises, dry skin, pale skin and conjunctiva, muscle wasting, smooth red tongue, and disorientation)  Collaborate with interdisciplinary team and initiate plan and interventions as ordered  Monitor patient's weight and dietary intake as ordered or per policy  Utilize nutrition screening tool and intervene per policy  Determine patient's food preferences and provide high-protein, high-caloric foods as appropriate  - Assist patient with eating   - Allow adequate time for meals   - Encourage patient to take dietary supplement as ordered  - Collaborate with clinical nutritionist   - Include patient/family/caregiver in decisions related to nutrition    Outcome: Progressing     Problem: PAIN - ADULT  Goal: Verbalizes/displays adequate comfort level or baseline comfort level  Description: Interventions:  - Encourage patient to monitor pain and request assistance  - Assess pain using appropriate pain scale  - Administer analgesics based on type and severity of pain and evaluate response  - Implement non-pharmacological measures as appropriate and evaluate response  - Consider cultural and social influences on pain and pain management  - Notify physician/advanced practitioner if interventions unsuccessful or patient reports new pain  Outcome: Progressing     Problem: INFECTION - ADULT  Goal: Absence or prevention of progression during hospitalization  Description: INTERVENTIONS:  - Assess and monitor for signs and symptoms of infection  - Monitor lab/diagnostic results  - Monitor all insertion sites, i e  indwelling lines, tubes, and drains  - Monitor endotracheal if appropriate and nasal secretions for changes in amount and color  - [place] appropriate cooling/warming therapies per order  - Administer medications as ordered  - Instruct and encourage patient and family to use good hand hygiene technique  - Identify and instruct in appropriate isolation precautions for identified infection/condition  Outcome: Progressing     Problem: SAFETY ADULT  Goal: Patient will remain free of falls  Description: INTERVENTIONS:  - Educate patient/family on patient safety including physical limitations  - Instruct patient to call for assistance with activity   - Consult OT/PT to assist with strengthening/mobility   - Keep Call bell within reach  - Keep bed low and locked with side rails adjusted as appropriate  - Keep care items and personal belongings within reach  - Initiate and maintain comfort rounds  - Make Fall Risk Sign visible to staff  - Apply yellow socks and bracelet for high fall risk patients  - Consider moving patient to room near nurses station  Outcome: Progressing  Goal: Maintain or return to baseline ADL function  Description: INTERVENTIONS:  -  Assess patient's ability to carry out ADLs; assess patient's baseline for ADL function and identify physical deficits which impact ability to perform ADLs (bathing, care of mouth/teeth, toileting, grooming, dressing, etc )  - Assess/evaluate cause of self-care deficits   - Assess range of motion  - Assess patient's mobility; develop plan if impaired  - Assess patient's need for assistive devices and provide as appropriate  - Encourage maximum independence but intervene and supervise when necessary  - Involve family in performance of ADLs  - Assess for home care needs following discharge   - Consider OT consult to assist with ADL evaluation and planning for discharge  - Provide patient education as appropriate  Outcome: Progressing  Goal: Maintains/Returns to pre admission functional level  Description: INTERVENTIONS:  - Perform BMAT or MOVE assessment daily    - Set and communicate daily mobility goal to care team and patient/family/caregiver  - Collaborate with rehabilitation services on mobility goals if consulted  - Out of bed for toileting  - Record patient progress and toleration of activity level   Outcome: Progressing     Problem: DISCHARGE PLANNING  Goal: Discharge to home or other facility with appropriate resources  Description: INTERVENTIONS:  - Identify barriers to discharge w/patient and caregiver  - Arrange for needed discharge resources and transportation as appropriate  - Identify discharge learning needs (meds, wound care, etc )  - Arrange for interpretive services to assist at discharge as needed  - Refer to Case Management Department for coordinating discharge planning if the patient needs post-hospital services based on physician/advanced practitioner order or complex needs related to functional status, cognitive ability, or social support system  Outcome: Progressing     Problem: Knowledge Deficit  Goal: Patient/family/caregiver demonstrates understanding of disease process, treatment plan, medications, and discharge instructions  Description: Complete learning assessment and assess knowledge base    Interventions:  - Provide teaching at level of understanding  - Provide teaching via preferred learning methods  Outcome: Progressing     Problem: Nutrition/Hydration-ADULT  Goal: Nutrient/Hydration intake appropriate for improving, restoring or maintaining nutritional needs  Description: Monitor and assess patient's nutrition/hydration status for malnutrition  Collaborate with interdisciplinary team and initiate plan and interventions as ordered  Monitor patient's weight and dietary intake as ordered or per policy  Utilize nutrition screening tool and intervene as necessary  Determine patient's food preferences and provide high-protein, high-caloric foods as appropriate       INTERVENTIONS:  - Monitor oral intake, urinary output, labs, and treatment plans  - Assess nutrition and hydration status and recommend course of action  - Evaluate amount of meals eaten  - Assist patient with eating if necessary   - Allow adequate time for meals  - Recommend/ encourage appropriate diets, oral nutritional supplements, and vitamin/mineral supplements  - Order, calculate, and assess calorie counts as needed  - Recommend, monitor, and adjust tube feedings and TPN/PPN based on assessed needs  - Assess need for intravenous fluids  - Provide specific nutrition/hydration education as appropriate  - Include patient/family/caregiver in decisions related to nutrition  Outcome: Progressing     Problem: NEUROSENSORY - ADULT  Goal: Achieves stable or improved neurological status  Description: INTERVENTIONS  - Monitor and report changes in neurological status  - Monitor vital signs such as temperature, blood pressure, glucose, and any other labs ordered   - Initiate measures to prevent increased intracranial pressure  - Monitor for seizure activity and implement precautions if appropriate      Outcome: Progressing     Problem: MOBILITY - ADULT  Goal: Maintain or return to baseline ADL function  Description: INTERVENTIONS:  -  Assess patient's ability to carry out ADLs; assess patient's baseline for ADL function and identify physical deficits which impact ability to perform ADLs (bathing, care of mouth/teeth, toileting, grooming, dressing, etc )  - Assess/evaluate cause of self-care deficits   - Assess range of motion  - Assess patient's mobility; develop plan if impaired  - Assess patient's need for assistive devices and provide as appropriate  - Encourage maximum independence but intervene and supervise when necessary  - Involve family in performance of ADLs  - Assess for home care needs following discharge   - Consider OT consult to assist with ADL evaluation and planning for discharge  - Provide patient education as appropriate  Outcome: Progressing  Goal: Maintains/Returns to pre admission functional level  Description: INTERVENTIONS:  - Perform BMAT or MOVE assessment daily    - Set and communicate daily mobility goal to care team and patient/family/caregiver     - Collaborate with rehabilitation services on mobility goals if consulted  - Out of bed for toileting  - Record patient progress and toleration of activity level   Outcome: Progressing     Problem: Potential for Falls  Goal: Patient will remain free of falls  Description: INTERVENTIONS:  - Educate patient/family on patient safety including physical limitations  - Instruct patient to call for assistance with activity   - Consult OT/PT to assist with strengthening/mobility   - Keep Call bell within reach  - Keep bed low and locked with side rails adjusted as appropriate  - Keep care items and personal belongings within reach  - Initiate and maintain comfort rounds  - Make Fall Risk Sign visible to staff  - Apply yellow socks and bracelet for high fall risk patients  - Consider moving patient to room near nurses station  Outcome: Progressing

## 2021-07-23 NOTE — RESTORATIVE TECHNICIAN NOTE
Restorative Technician Note      Patient Name: Dolly Hawkins     Note Type: Mobility  Patient Position Upon Consult: Supine  Activity Performed: Ambulated;Dangled;Stood  Assistive Device: Other (Comment) (None)  Education Provided: Yes (Educated/encouraged pt to ambulate with assistance 3-4 x's/day )  Patient Position at End of Consult: Supine; All needs within reach;Bed/Chair alarm activated    Morris NUNES, Restorative Technician, [employer]

## 2021-07-23 NOTE — PROGRESS NOTES
Progress Note - Infectious Disease   Angelina Medina 64 y o  male MRN: [id number]  Unit/Bed#: [facility] 726-01 Encounter: [id number]      Impression/Plan:    Bacillus species in blood culture  Blood culture collected  due to fever now growing bacillus species, not anthracis  Fever resolved prior to the initiation of antibiotics  This is likely a contaminant  This species is commonly a contaminant, and culture took 4 days to grow  Additionally, 1/2 bottles (only one set collected) was positive, and he improved without therapy  He has no signs of infection at this time and work up with UA and CXR was negative              -cefepime stopped, continue to monitor off antibiotics               -if repeat cultures from  collected prior to starting antibiotics are positive, he will need          further work up              -no infectious contraindications for proceeding to carotid revascularization procedure Monday     Fever  Resolved prior to initiation of antibiotics, likely not infectious  Could be related to aspiration after his stroke?              -monitor off antibiotics as above     Right MCA stroke, carotid artery stenosis  Followed by neurosurgery, neurology              -timing of revascularization procedure per vascular surgery, no ID contraindications to  surgery on Monday     I have discussed the above management plan in detail with the primary service  Antibiotics:  Off antibiotics, s/p cefepime 2 days    Subjective:  Patient feels well  He has no fever, chills, sweats; no nausea, vomiting, diarrhea; no cough, shortness of breath; no pain  No new symptoms  Afebrile without leukocytosis  Cefepime stopped yesterday      Objective:  Vitals:  Temp:  [97 9 °F (36 6 °C)-98 1 °F (36 7 °C)] 97 9 °F (36 6 °C)  HR:  [64] 64  Resp:  [16-18] 16  BP: (120-124)/(71-77) 121/72  SpO2:  [93 %] 93 %  Temp (24hrs), Av °F (36 7 °C), Min:97 9 °F (36 6 °C), Max:98 1 °F (36 7 °C)  Current: Temperature: 97 9 °F (36 6 °C)    Physical Exam:   General Appearance:  Alert, interactive, nontoxic, no acute distress  Throat/Mouth: Oropharynx moist without lesions  Poor dentition   Lungs:   Clear to auscultation bilaterally; no wheezes, rhonchi or rales; respirations unlabored   Heart:  RRR; no murmur, rub or gallop   Abdomen:   Soft, non-tender, non-distended, positive bowel sounds  Extremities: No clubbing, cyanosis or edema   Skin: No new rashes or lesions  No draining wounds noted  Neuro: no dysarthia    Labs: All pertinent labs and imaging studies were personally reviewed  Results from last 7 days   Lab Units 07/23/21  0507 07/22/21  0527 07/21/21  0406   WBC Thousand/uL 7 47 5 52 4 82   HEMOGLOBIN g/dL 13 1 13 0 13 1   PLATELETS Thousands/uL 199 186 193     Results from last 7 days   Lab Units 07/19/21  0503 07/17/21  0455   SODIUM mmol/L 139 139   POTASSIUM mmol/L 3 5 3 6   CHLORIDE mmol/L 108 108   CO2 mmol/L 24 23   BUN mg/dL 11 10   CREATININE mg/dL 1 09 1 12   EGFR ml/min/1 73sq m 73 71   CALCIUM mg/dL 9 1 8 4     Results from last 7 days   Lab Units 07/18/21  0518 07/17/21  0455   PROCALCITONIN ng/ml 0 19 0 05                   Micro:  Results from last 7 days   Lab Units 07/21/21  2353 07/17/21  2237 07/17/21  2236   BLOOD CULTURE  No Growth at 24 hrs  No Growth at 24 hrs  Bacillus species NOT anthracis* No Growth After 5 Days  GRAM STAIN RESULT   --  Gram negative rods*  --        Imaging:    I have personally reviewed pertinent imaging study reports and images in PACS       CXR 7/18/21: RLL atelectasis

## 2021-07-23 NOTE — PLAN OF CARE
Problem: Neurological Deficit  Goal: Neurological status is stable or improving  Description: Interventions:  - Monitor and assess patient's level of consciousness, motor function, sensory function, and level of assistance needed for ADLs  - Monitor and report changes from baseline  Collaborate with interdisciplinary team to initiate plan and implement interventions as ordered  - Provide and maintain a safe environment  - Consider seizure precautions  - Consider fall precautions  - Consider aspiration precautions  - Consider bleeding precautions  Outcome: Progressing     Problem: Activity Intolerance/Impaired Mobility  Goal: Mobility/activity is maintained at optimum level for patient  Description: Interventions:  - Assess and monitor patient  barriers to mobility and need for assistive/adaptive devices  - Assess patient's emotional response to limitations  - Collaborate with interdisciplinary team and initiate plans and interventions as ordered  - Encourage independent activity per ability   - Maintain proper body alignment  - Perform active/passive rom as tolerated/ordered  - Plan activities to conserve energy   - Turn patient as appropriate  Outcome: Progressing     Problem: Communication Impairment  Goal: Ability to express needs and understand communication  Description: Assess patient's communication skills and ability to understand information  Patient will demonstrate use of effective communication techniques, alternative methods of communication and understanding even if not able to speak  - Encourage communication and provide alternate methods of communication as needed  - Collaborate with case management/ for discharge needs  - Include patient/family/caregiver in decisions related to communication    Outcome: Progressing     Problem: Potential for Aspiration  Goal: Non-ventilated patient's risk of aspiration is minimized  Description: Assess and monitor vital signs, respiratory status, and labs (WBC)  Monitor for signs of aspiration (tachypnea, cough, rales, wheezing, cyanosis, fever)  - Assess and monitor patient's ability to swallow  - Place patient up in chair to eat if possible  - HOB up at 90 degrees to eat if unable to get patient up into chair   - Supervise patient during oral intake  - Instruct patient/ family to take small bites  - Instruct patient/ family to take small single sips when taking liquids  - Follow patient-specific strategies generated by speech pathologist   Outcome: Progressing     Problem: Nutrition  Goal: Nutrition/Hydration status is improving  Description: Monitor and assess patient's nutrition/hydration status for malnutrition (ex- brittle hair, bruises, dry skin, pale skin and conjunctiva, muscle wasting, smooth red tongue, and disorientation)  Collaborate with interdisciplinary team and initiate plan and interventions as ordered  Monitor patient's weight and dietary intake as ordered or per policy  Utilize nutrition screening tool and intervene per policy  Determine patient's food preferences and provide high-protein, high-caloric foods as appropriate  - Assist patient with eating   - Allow adequate time for meals   - Encourage patient to take dietary supplement as ordered  - Collaborate with clinical nutritionist   - Include patient/family/caregiver in decisions related to nutrition    Outcome: Progressing     Problem: PAIN - ADULT  Goal: Verbalizes/displays adequate comfort level or baseline comfort level  Description: Interventions:  - Encourage patient to monitor pain and request assistance  - Assess pain using appropriate pain scale  - Administer analgesics based on type and severity of pain and evaluate response  - Implement non-pharmacological measures as appropriate and evaluate response  - Consider cultural and social influences on pain and pain management  - Notify physician/advanced practitioner if interventions unsuccessful or patient reports new pain  Outcome: Progressing     Problem: INFECTION - ADULT  Goal: Absence or prevention of progression during hospitalization  Description: INTERVENTIONS:  - Assess and monitor for signs and symptoms of infection  - Monitor lab/diagnostic results  - Monitor all insertion sites, i e  indwelling lines, tubes, and drains  - Monitor endotracheal if appropriate and nasal secretions for changes in amount and color  - [place] appropriate cooling/warming therapies per order  - Administer medications as ordered  - Instruct and encourage patient and family to use good hand hygiene technique  - Identify and instruct in appropriate isolation precautions for identified infection/condition  Outcome: Progressing     Problem: SAFETY ADULT  Goal: Patient will remain free of falls  Description: INTERVENTIONS:  - Educate patient/family on patient safety including physical limitations  - Instruct patient to call for assistance with activity   - Consult OT/PT to assist with strengthening/mobility   - Keep Call bell within reach  - Keep bed low and locked with side rails adjusted as appropriate  - Keep care items and personal belongings within reach  - Initiate and maintain comfort rounds  - Make Fall Risk Sign visible to staff  - Offer Toileting every 4 Hours, in advance of need  - Initiate/Maintain bed alarm  - Obtain necessary fall risk management equipment: bed alarm   - Apply yellow socks and bracelet for high fall risk patients  - Consider moving patient to room near nurses station  Outcome: Progressing  Goal: Maintain or return to baseline ADL function  Description: INTERVENTIONS:  -  Assess patient's ability to carry out ADLs; assess patient's baseline for ADL function and identify physical deficits which impact ability to perform ADLs (bathing, care of mouth/teeth, toileting, grooming, dressing, etc )  - Assess/evaluate cause of self-care deficits   - Assess range of motion  - Assess patient's mobility; develop plan if impaired  - Assess patient's need for assistive devices and provide as appropriate  - Encourage maximum independence but intervene and supervise when necessary  - Involve family in performance of ADLs  - Assess for home care needs following discharge   - Consider OT consult to assist with ADL evaluation and planning for discharge  - Provide patient education as appropriate  Outcome: Progressing  Goal: Maintains/Returns to pre admission functional level  Description: INTERVENTIONS:  - Perform BMAT or MOVE assessment daily    - Set and communicate daily mobility goal to care team and patient/family/caregiver  - Collaborate with rehabilitation services on mobility goals if consulted  - Perform Range of Motion 4 times a day  - Reposition patient every 2 hours    - Dangle patient 4 times a day  - Stand patient 4 times a day  - Ambulate patient 4 times a day  - Out of bed to chair 4 times a day   - Out of bed for meals 4 times a day  - Out of bed for toileting  - Record patient progress and toleration of activity level   Outcome: Progressing     Problem: DISCHARGE PLANNING  Goal: Discharge to home or other facility with appropriate resources  Description: INTERVENTIONS:  - Identify barriers to discharge w/patient and caregiver  - Arrange for needed discharge resources and transportation as appropriate  - Identify discharge learning needs (meds, wound care, etc )  - Arrange for interpretive services to assist at discharge as needed  - Refer to Case Management Department for coordinating discharge planning if the patient needs post-hospital services based on physician/advanced practitioner order or complex needs related to functional status, cognitive ability, or social support system  Outcome: Progressing     Problem: Knowledge Deficit  Goal: Patient/family/caregiver demonstrates understanding of disease process, treatment plan, medications, and discharge instructions  Description: Complete learning assessment and assess knowledge base  Interventions:  - Provide teaching at level of understanding  - Provide teaching via preferred learning methods  Outcome: Progressing     Problem: Nutrition/Hydration-ADULT  Goal: Nutrient/Hydration intake appropriate for improving, restoring or maintaining nutritional needs  Description: Monitor and assess patient's nutrition/hydration status for malnutrition  Collaborate with interdisciplinary team and initiate plan and interventions as ordered  Monitor patient's weight and dietary intake as ordered or per policy  Utilize nutrition screening tool and intervene as necessary  Determine patient's food preferences and provide high-protein, high-caloric foods as appropriate       INTERVENTIONS:  - Monitor oral intake, urinary output, labs, and treatment plans  - Assess nutrition and hydration status and recommend course of action  - Evaluate amount of meals eaten  - Assist patient with eating if necessary   - Allow adequate time for meals  - Recommend/ encourage appropriate diets, oral nutritional supplements, and vitamin/mineral supplements  - Order, calculate, and assess calorie counts as needed  - Recommend, monitor, and adjust tube feedings and TPN/PPN based on assessed needs  - Assess need for intravenous fluids  - Provide specific nutrition/hydration education as appropriate  - Include patient/family/caregiver in decisions related to nutrition  Outcome: Progressing     Problem: NEUROSENSORY - ADULT  Goal: Achieves stable or improved neurological status  Description: INTERVENTIONS  - Monitor and report changes in neurological status  - Monitor vital signs such as temperature, blood pressure, glucose, and any other labs ordered   - Initiate measures to prevent increased intracranial pressure  - Monitor for seizure activity and implement precautions if appropriate      Outcome: Progressing     Problem: MOBILITY - ADULT  Goal: Maintain or return to baseline ADL function  Description: INTERVENTIONS:  -  Assess patient's ability to carry out ADLs; assess patient's baseline for ADL function and identify physical deficits which impact ability to perform ADLs (bathing, care of mouth/teeth, toileting, grooming, dressing, etc )  - Assess/evaluate cause of self-care deficits   - Assess range of motion  - Assess patient's mobility; develop plan if impaired  - Assess patient's need for assistive devices and provide as appropriate  - Encourage maximum independence but intervene and supervise when necessary  - Involve family in performance of ADLs  - Assess for home care needs following discharge   - Consider OT consult to assist with ADL evaluation and planning for discharge  - Provide patient education as appropriate  Outcome: Progressing  Goal: Maintains/Returns to pre admission functional level  Description: INTERVENTIONS:  - Perform BMAT or MOVE assessment daily    - Set and communicate daily mobility goal to care team and patient/family/caregiver  - Collaborate with rehabilitation services on mobility goals if consulted  - Perform Range of Motion 4 times a day  - Reposition patient every 2 hours    - Dangle patient 4 times a day  - Stand patient 4 times a day  - Ambulate patient 4 times a day  - Out of bed to chair 4 times a day   - Out of bed for meals 4 times a day  - Out of bed for toileting  - Record patient progress and toleration of activity level   Outcome: Progressing     Problem: Potential for Falls  Goal: Patient will remain free of falls  Description: INTERVENTIONS:  - Educate patient/family on patient safety including physical limitations  - Instruct patient to call for assistance with activity   - Consult OT/PT to assist with strengthening/mobility   - Keep Call bell within reach  - Keep bed low and locked with side rails adjusted as appropriate  - Keep care items and personal belongings within reach  - Initiate and maintain comfort rounds  - Make Fall Risk Sign visible to staff  - Offer Toileting every 4 Hours, in advance of need  - Initiate/Maintain bed alarm  - Obtain necessary fall risk management equipment: bed alarm  - Apply yellow socks and bracelet for high fall risk patients  - Consider moving patient to room near nurses station  Outcome: Progressing

## 2021-07-24 LAB
APTT PPP: 108 SECONDS (ref 23–37)
APTT PPP: 79 SECONDS (ref 23–37)
APTT PPP: 82 SECONDS (ref 23–37)
BASOPHILS # BLD MANUAL: 0.08 THOUSAND/UL (ref 0–0.1)
BASOPHILS NFR MAR MANUAL: 1 % (ref 0–1)
EOSINOPHIL # BLD MANUAL: 0.54 THOUSAND/UL (ref 0–0.4)
EOSINOPHIL NFR BLD MANUAL: 7 % (ref 0–6)
ERYTHROCYTE [DISTWIDTH] IN BLOOD BY AUTOMATED COUNT: 13.1 % (ref 11.6–15.1)
HCT VFR BLD AUTO: 40 % (ref 36.5–49.3)
HGB BLD-MCNC: 13.2 G/DL (ref 12–17)
LYMPHOCYTES # BLD AUTO: 0.92 THOUSAND/UL (ref 0.6–4.47)
LYMPHOCYTES # BLD AUTO: 12 % (ref 14–44)
MCH RBC QN AUTO: 31.2 PG (ref 26.8–34.3)
MCHC RBC AUTO-ENTMCNC: 33 G/DL (ref 31.4–37.4)
MCV RBC AUTO: 95 FL (ref 82–98)
MONOCYTES # BLD AUTO: 0.23 THOUSAND/UL (ref 0–1.22)
MONOCYTES NFR BLD: 3 % (ref 4–12)
NEUTROPHILS # BLD MANUAL: 4.31 THOUSAND/UL (ref 1.85–7.62)
NEUTS SEG NFR BLD AUTO: 56 % (ref 43–75)
NRBC BLD AUTO-RTO: 0 /100 WBCS
PLATELET # BLD AUTO: 200 THOUSANDS/UL (ref 149–390)
PLATELET BLD QL SMEAR: ADEQUATE
PMV BLD AUTO: 9.1 FL (ref 8.9–12.7)
RBC # BLD AUTO: 4.23 MILLION/UL (ref 3.88–5.62)
VARIANT LYMPHS # BLD AUTO: 21 %
WBC # BLD AUTO: 7.7 THOUSAND/UL (ref 4.31–10.16)

## 2021-07-24 PROCEDURE — 85027 COMPLETE CBC AUTOMATED: CPT | Performed by: PHYSICIAN ASSISTANT

## 2021-07-24 PROCEDURE — 85730 THROMBOPLASTIN TIME PARTIAL: CPT | Performed by: INTERNAL MEDICINE

## 2021-07-24 PROCEDURE — 85007 BL SMEAR W/DIFF WBC COUNT: CPT | Performed by: PHYSICIAN ASSISTANT

## 2021-07-24 PROCEDURE — 99232 SBSQ HOSP IP/OBS MODERATE 35: CPT | Performed by: INTERNAL MEDICINE

## 2021-07-24 PROCEDURE — 85730 THROMBOPLASTIN TIME PARTIAL: CPT

## 2021-07-24 PROCEDURE — NC001 PR NO CHARGE: Performed by: SURGERY

## 2021-07-24 RX ORDER — MIDODRINE HYDROCHLORIDE 5 MG/1
2.5 TABLET ORAL
Status: DISPENSED | OUTPATIENT
Start: 2021-07-24 | End: 2021-07-26

## 2021-07-24 RX ORDER — HEPARIN SODIUM 10000 [USP'U]/100ML
3-20 INJECTION, SOLUTION INTRAVENOUS
Status: DISCONTINUED | OUTPATIENT
Start: 2021-07-24 | End: 2021-07-26

## 2021-07-24 RX ORDER — HEPARIN SODIUM 1000 [USP'U]/ML
2000 INJECTION, SOLUTION INTRAVENOUS; SUBCUTANEOUS
Status: DISCONTINUED | OUTPATIENT
Start: 2021-07-24 | End: 2021-07-26

## 2021-07-24 RX ORDER — HEPARIN SODIUM 1000 [USP'U]/ML
4000 INJECTION, SOLUTION INTRAVENOUS; SUBCUTANEOUS
Status: DISCONTINUED | OUTPATIENT
Start: 2021-07-24 | End: 2021-07-26

## 2021-07-24 RX ADMIN — ASPIRIN 81 MG CHEWABLE TABLET 81 MG: 81 TABLET CHEWABLE at 08:37

## 2021-07-24 RX ADMIN — HEPARIN SODIUM 20 UNITS/KG/HR: 10000 INJECTION, SOLUTION INTRAVENOUS at 10:42

## 2021-07-24 RX ADMIN — CLOPIDOGREL BISULFATE 75 MG: 75 TABLET ORAL at 08:36

## 2021-07-24 RX ADMIN — ATORVASTATIN CALCIUM 40 MG: 20 TABLET, FILM COATED ORAL at 16:32

## 2021-07-24 RX ADMIN — MIDODRINE HYDROCHLORIDE 2.5 MG: 5 TABLET ORAL at 16:32

## 2021-07-24 RX ADMIN — MIDODRINE HYDROCHLORIDE 5 MG: 5 TABLET ORAL at 06:25

## 2021-07-24 RX ADMIN — MIDODRINE HYDROCHLORIDE 5 MG: 5 TABLET ORAL at 10:46

## 2021-07-24 RX ADMIN — HEPARIN SODIUM 20 UNITS/KG/HR: 10000 INJECTION, SOLUTION INTRAVENOUS at 13:25

## 2021-07-24 NOTE — QUICK NOTE
Called patient's wife Frederick Belle to update with plans for endarterectomy on Monday  All questions and concerns addressed      Kim Foreman MD  PGY-1 Internal Medicine/SLIM Service

## 2021-07-24 NOTE — PROGRESS NOTES
Progress Note - Vascular Surgery   Ryan Buck 64 y o  male MRN: [id number]  Unit/Bed#: [facility] 726-01 Encounter: [id number]    Assessment:  61M w/ acute R MCA thromboembolic ischemic CVA in setting of high grade stenosis of R ICA, consulted to vascular surgery for consideration of carotid intervention    Plan:  Plan for R CEA on 7/26  Cardiology consulted for preoperative clearance  ID consulted for positive blood cultures, off antibiotics, cleared for surgery  Continue ASA/statin/plavix  Continue heparin gtt  Rest of care per primary    Subjective/Objective     Subjective: No acute events overnight, patient remains without neurological complaints denying numbness, weakness, or speech difficulty    Objective:    Blood pressure 122/74, pulse 64, temperature 98 4 °F (36 9 °C), resp  rate 16, height 5' 11" (1 803 m), weight 99 1 kg (218 lb 7 6 oz), SpO2 93 %  ,Body mass index is 30 47 kg/m²        Intake/Output Summary (Last 24 hours) at 7/23/2021 2121  Last data filed at 7/23/2021 1359  Gross per 24 hour   Intake 360 ml   Output --   Net 360 ml       Invasive Devices     Peripheral Intravenous Line            Peripheral IV 07/21/21 Right Forearm 2 days                Physical Exam:  Gen:    NAD  CV:      warm, well-perfused  Lungs: No respiratory distress  Abd:     soft, NT/ND  Ext:      no CCE  Neuro: A&Ox3, motor and sensory intact, CN 2-12 grossly intact

## 2021-07-24 NOTE — PLAN OF CARE
Problem: Neurological Deficit  Goal: Neurological status is stable or improving  Description: Interventions:  - Monitor and assess patient's level of consciousness, motor function, sensory function, and level of assistance needed for ADLs  - Monitor and report changes from baseline  Collaborate with interdisciplinary team to initiate plan and implement interventions as ordered  - Provide and maintain a safe environment  - Consider seizure precautions  - Consider fall precautions  - Consider aspiration precautions  - Consider bleeding precautions  Outcome: Progressing     Problem: Activity Intolerance/Impaired Mobility  Goal: Mobility/activity is maintained at optimum level for patient  Description: Interventions:  - Assess and monitor patient  barriers to mobility and need for assistive/adaptive devices  - Assess patient's emotional response to limitations  - Collaborate with interdisciplinary team and initiate plans and interventions as ordered  - Encourage independent activity per ability   - Maintain proper body alignment  - Perform active/passive rom as tolerated/ordered  - Plan activities to conserve energy   - Turn patient as appropriate  Outcome: Progressing     Problem: Communication Impairment  Goal: Ability to express needs and understand communication  Description: Assess patient's communication skills and ability to understand information  Patient will demonstrate use of effective communication techniques, alternative methods of communication and understanding even if not able to speak  - Encourage communication and provide alternate methods of communication as needed  - Collaborate with case management/ for discharge needs  - Include patient/family/caregiver in decisions related to communication    Outcome: Progressing     Problem: Potential for Aspiration  Goal: Non-ventilated patient's risk of aspiration is minimized  Description: Assess and monitor vital signs, respiratory status, and labs (WBC)  Monitor for signs of aspiration (tachypnea, cough, rales, wheezing, cyanosis, fever)  - Assess and monitor patient's ability to swallow  - Place patient up in chair to eat if possible  - HOB up at 90 degrees to eat if unable to get patient up into chair   - Supervise patient during oral intake  - Instruct patient/ family to take small bites  - Instruct patient/ family to take small single sips when taking liquids  - Follow patient-specific strategies generated by speech pathologist   Outcome: Progressing     Problem: Nutrition  Goal: Nutrition/Hydration status is improving  Description: Monitor and assess patient's nutrition/hydration status for malnutrition (ex- brittle hair, bruises, dry skin, pale skin and conjunctiva, muscle wasting, smooth red tongue, and disorientation)  Collaborate with interdisciplinary team and initiate plan and interventions as ordered  Monitor patient's weight and dietary intake as ordered or per policy  Utilize nutrition screening tool and intervene per policy  Determine patient's food preferences and provide high-protein, high-caloric foods as appropriate  - Assist patient with eating   - Allow adequate time for meals   - Encourage patient to take dietary supplement as ordered  - Collaborate with clinical nutritionist   - Include patient/family/caregiver in decisions related to nutrition    Outcome: Progressing     Problem: PAIN - ADULT  Goal: Verbalizes/displays adequate comfort level or baseline comfort level  Description: Interventions:  - Encourage patient to monitor pain and request assistance  - Assess pain using appropriate pain scale  - Administer analgesics based on type and severity of pain and evaluate response  - Implement non-pharmacological measures as appropriate and evaluate response  - Consider cultural and social influences on pain and pain management  - Notify physician/advanced practitioner if interventions unsuccessful or patient reports new pain  Outcome: Progressing     Problem: INFECTION - ADULT  Goal: Absence or prevention of progression during hospitalization  Description: INTERVENTIONS:  - Assess and monitor for signs and symptoms of infection  - Monitor lab/diagnostic results  - Monitor all insertion sites, i e  indwelling lines, tubes, and drains  - Monitor endotracheal if appropriate and nasal secretions for changes in amount and color  - [place] appropriate cooling/warming therapies per order  - Administer medications as ordered  - Instruct and encourage patient and family to use good hand hygiene technique  - Identify and instruct in appropriate isolation precautions for identified infection/condition  Outcome: Progressing     Problem: SAFETY ADULT  Goal: Patient will remain free of falls  Description: INTERVENTIONS:  - Educate patient/family on patient safety including physical limitations  - Instruct patient to call for assistance with activity   - Consult OT/PT to assist with strengthening/mobility   - Keep Call bell within reach  - Keep bed low and locked with side rails adjusted as appropriate  - Keep care items and personal belongings within reach  - Initiate and maintain comfort rounds  - Make Fall Risk Sign visible to staff  - Offer Toileting every 4 Hours, in advance of need  - Initiate/Maintain bed alarm  - Obtain necessary fall risk management equipment: bed alarm   - Apply yellow socks and bracelet for high fall risk patients  - Consider moving patient to room near nurses station  Outcome: Progressing  Goal: Maintain or return to baseline ADL function  Description: INTERVENTIONS:  -  Assess patient's ability to carry out ADLs; assess patient's baseline for ADL function and identify physical deficits which impact ability to perform ADLs (bathing, care of mouth/teeth, toileting, grooming, dressing, etc )  - Assess/evaluate cause of self-care deficits   - Assess range of motion  - Assess patient's mobility; develop plan if impaired  - Assess patient's need for assistive devices and provide as appropriate  - Encourage maximum independence but intervene and supervise when necessary  - Involve family in performance of ADLs  - Assess for home care needs following discharge   - Consider OT consult to assist with ADL evaluation and planning for discharge  - Provide patient education as appropriate  Outcome: Progressing  Goal: Maintains/Returns to pre admission functional level  Description: INTERVENTIONS:  - Perform BMAT or MOVE assessment daily    - Set and communicate daily mobility goal to care team and patient/family/caregiver  - Collaborate with rehabilitation services on mobility goals if consulted  - Perform Range of Motion 4 times a day  - Reposition patient every 2 hours    - Dangle patient 4 times a day  - Stand patient 4 times a day  - Ambulate patient 4 times a day  - Out of bed to chair 4 times a day   - Out of bed for meals 4 times a day  - Out of bed for toileting  - Record patient progress and toleration of activity level   Outcome: Progressing     Problem: DISCHARGE PLANNING  Goal: Discharge to home or other facility with appropriate resources  Description: INTERVENTIONS:  - Identify barriers to discharge w/patient and caregiver  - Arrange for needed discharge resources and transportation as appropriate  - Identify discharge learning needs (meds, wound care, etc )  - Arrange for interpretive services to assist at discharge as needed  - Refer to Case Management Department for coordinating discharge planning if the patient needs post-hospital services based on physician/advanced practitioner order or complex needs related to functional status, cognitive ability, or social support system  Outcome: Progressing     Problem: Knowledge Deficit  Goal: Patient/family/caregiver demonstrates understanding of disease process, treatment plan, medications, and discharge instructions  Description: Complete learning assessment and assess knowledge base  Interventions:  - Provide teaching at level of understanding  - Provide teaching via preferred learning methods  Outcome: Progressing     Problem: Nutrition/Hydration-ADULT  Goal: Nutrient/Hydration intake appropriate for improving, restoring or maintaining nutritional needs  Description: Monitor and assess patient's nutrition/hydration status for malnutrition  Collaborate with interdisciplinary team and initiate plan and interventions as ordered  Monitor patient's weight and dietary intake as ordered or per policy  Utilize nutrition screening tool and intervene as necessary  Determine patient's food preferences and provide high-protein, high-caloric foods as appropriate       INTERVENTIONS:  - Monitor oral intake, urinary output, labs, and treatment plans  - Assess nutrition and hydration status and recommend course of action  - Evaluate amount of meals eaten  - Assist patient with eating if necessary   - Allow adequate time for meals  - Recommend/ encourage appropriate diets, oral nutritional supplements, and vitamin/mineral supplements  - Order, calculate, and assess calorie counts as needed  - Recommend, monitor, and adjust tube feedings and TPN/PPN based on assessed needs  - Assess need for intravenous fluids  - Provide specific nutrition/hydration education as appropriate  - Include patient/family/caregiver in decisions related to nutrition  Outcome: Progressing     Problem: NEUROSENSORY - ADULT  Goal: Achieves stable or improved neurological status  Description: INTERVENTIONS  - Monitor and report changes in neurological status  - Monitor vital signs such as temperature, blood pressure, glucose, and any other labs ordered   - Initiate measures to prevent increased intracranial pressure  - Monitor for seizure activity and implement precautions if appropriate      Outcome: Progressing     Problem: MOBILITY - ADULT  Goal: Maintain or return to baseline ADL function  Description: INTERVENTIONS:  -  Assess patient's ability to carry out ADLs; assess patient's baseline for ADL function and identify physical deficits which impact ability to perform ADLs (bathing, care of mouth/teeth, toileting, grooming, dressing, etc )  - Assess/evaluate cause of self-care deficits   - Assess range of motion  - Assess patient's mobility; develop plan if impaired  - Assess patient's need for assistive devices and provide as appropriate  - Encourage maximum independence but intervene and supervise when necessary  - Involve family in performance of ADLs  - Assess for home care needs following discharge   - Consider OT consult to assist with ADL evaluation and planning for discharge  - Provide patient education as appropriate  Outcome: Progressing  Goal: Maintains/Returns to pre admission functional level  Description: INTERVENTIONS:  - Perform BMAT or MOVE assessment daily    - Set and communicate daily mobility goal to care team and patient/family/caregiver  - Collaborate with rehabilitation services on mobility goals if consulted  - Perform Range of Motion 4 times a day  - Reposition patient every 2 hours    - Dangle patient 4 times a day  - Stand patient 4 times a day  - Ambulate patient 4 times a day  - Out of bed to chair 4 times a day   - Out of bed for meals 4 times a day  - Out of bed for toileting  - Record patient progress and toleration of activity level   Outcome: Progressing     Problem: Potential for Falls  Goal: Patient will remain free of falls  Description: INTERVENTIONS:  - Educate patient/family on patient safety including physical limitations  - Instruct patient to call for assistance with activity   - Consult OT/PT to assist with strengthening/mobility   - Keep Call bell within reach  - Keep bed low and locked with side rails adjusted as appropriate  - Keep care items and personal belongings within reach  - Initiate and maintain comfort rounds  - Make Fall Risk Sign visible to staff  - Offer Toileting every 4 Hours, in advance of need  - Initiate/Maintain bed alarm  - Obtain necessary fall risk management equipment: bed alarm  - Apply yellow socks and bracelet for high fall risk patients  - Consider moving patient to room near nurses station  Outcome: Progressing Implemented All Fall with Harm Risk Interventions:  [place] to call system. Call bell, personal items and telephone within reach. Instruct patient to call for assistance. Room bathroom lighting operational. Non-slip footwear when patient is off stretcher. Physically safe environment: no spills, clutter or unnecessary equipment. Stretcher in lowest position, wheels locked, appropriate side rails in place. Provide visual cue, wrist band, yellow gown, etc. Monitor gait and stability. Monitor for mental status changes and reorient to person, place, and time. Review medications for side effects contributing to fall risk. Reinforce activity limits and safety measures with patient and family. Provide visual clues: red socks.

## 2021-07-24 NOTE — PROGRESS NOTES
INTERNAL MEDICINE RESIDENCY PROGRESS NOTE     Name: Beni Wheeler   Age & Sex: 64 y o  male   MRN: [id number]  Unit/Bed#: [address] 726-01   Encounter: [id number]  Team: SLIM    PATIENT INFORMATION     Name: Beni Wheeler   Age & Sex: 64 y o  male   MRN: [id number]  Hospital Stay Days: 10    ASSESSMENT/PLAN     Principal Problem:    CVA (cerebral vascular accident) [facility])  Active Problems:    Gram-negative bacteremia    Carotid stenosis, symptomatic, with infarction ([facility] [address] )    Hypertension    Hyperlipidemia      * CVA (cerebral vascular accident) [facility])  Assessment & Plan  Past medical history of TIA, not on blood thinners, hypertension, diverticulitis presents with left-sided facial droop and slurred speech  On 7/14 in the ED, patient and family member at bedside states that patient was last seen normal at 9:00 p m on 7/13 when he went to bed  Patient woke up at 8:00 a m  And family noticed that he had left-sided facial droop and mild slurred speech  Patient states he had his first TIA six years ago which presented with left-sided weakness  Patient had another TIA four years ago with similar presentation  Patient had third TIA three years ago with right sided visual loss  Not a TPA candidate  7/14 CTA: severe noncalcified plaque in the proximal right internal carotid artery with small mobile thrombus at the distal aspect, nearly occlusive thrombus in the distal right ICA, right M1, and R A1 segments  7/14 CTH: Acute infarct in the right basal ganglia, hyperdense distal right ICA and right MCA consistent with thrombus, small chronic infarct in the right frontal lobe   Small parietal infarct also favored to be chronic  No acute hemorrhage or mass effect  7/14 MRI: Medial right temporal lobe, right corpus striatum, right corona radiata and right frontal cortical foci of acute ischemia  7/15 CTH: Stable right MCA territory infarct with vasogenic edema in the basal ganglia    No hemorrhagic conversion or significant mass effect   7/21 CTA head/neck: Ischemic edema right MCA distribution reidentified without hemorrhagic conversion  Stable unenhanced CT  Persistent high grade stenosis with complex ulcerated plaque at the origin of the right ICA  Improved filling of the distal supraclinoid right ICA, right A1 and M1 segments when compared to prior study one week earlier  Persistent, nonocclusive thrombus in these vessels as described  7/21 VAS carotid study:   RIGHT:There is <50% stenosis noted in the internal carotid artery  Plaque is heterogenous and irregular  Vertebral artery flow is antegrade  There is no significant subclavian artery disease  LEFT:There is 70-99% stenosis noted in the internal carotid artery  Plaque is  heterogenous and irregular  Vertebral artery flow is antegrade  There is no significant subclavian artery disease  Plan:  · Neurology consulted, recommendations appreciated  · Continue aspirin and plavix for 3 months followed by aspirin monotherapy  · Continue lipitor  · Clear for discharge from a neurology standpoint  · Follow up stroke clinic outpatient  · Vascular surgery wants to do inpatient carotid endarterectomy, planning for 7/26  · ID consulted due to gram negative bacteremia  Cleared for carotid endarterectomy  · Cardiology consulted for preop evaluation  Cleared for carotid endarterectomy  · Heparin drip with goal PTT between 60 and 90  Continue to monitor PTT    · Midodrine 5mg TID to reach goal MAP, >100 and SBP <180 while in ICU  · 7/24 decreased midodrine to 2 5 mg to wean off    Plan:  - 7/24 decreased midodrine to 2 5 mg to wean off    Carotid stenosis, symptomatic, with infarction [facility])  Assessment & Plan  Plan:  - Continue ASA/Plavix/Statin  - Right carotid endarterectomy planned for Mon 7/26, see plan in CVA    Gram-negative bacteremia  Assessment & Plan  Patient had a fever w/wheezing and leukopenia on 7/17 and blood cultures were drawn  · One blood culture became positive for gram negative rods on , confirmed to be bacillus, not anthracis  ·  admission CXR was WNL;  CXR revealed right lower lobe subsegmental atelectasis  Plan:  · Repeat blood cultures drawn on , negative at 24 hours  ·  IV cefepime was started after 1/2 periph bcx came back positive as above  · ID consulted to manage abx around carotid endarterectomy that vascular is planning to do; needs to have negative BCx before procedure  · Believed to be a contaminant due to bacillus species, clinical improvement without treatment, and only 1/2 bottles being positive at 4 days  · Cefepime discontinued   · Cleared for carotid endarterectomy, expected Mon   · Monitor redrawn blood cultures, further work up needed if positive  · Incentive spirometry  · Ambulate patient    Hyperlipidemia  Assessment & Plan  Chronic issue  Plan:  -Continue atorvastatin (lipitor) 40 mg qHS    Hypertension  Assessment & Plan  Takes HCTZ at home   ranging 110s-140s/50s-70s without any inpatient BP meds  Plan:  · On midodrine 5 mg TID for orthostatic hypotension, holding home BP meds  · Monitor blood pressures  · Avoid hypotension      Disposition: Floor, scheduled Monday R carotid endarterectomy and cleared by PT for d/c home  SUBJECTIVE     Patient seen and examined  No acute events overnight  Patient has no complaints this morning and is wondering what time on Monday the procedure is scheduled for  Denies: HA, CP, SOB, N/V/diarrhea, myalgias, sensory changes      OBJECTIVE     Vitals:    21 1513 21 2206 21 0600 21 0749   BP: 122/74 112/64  121/74   BP Location:  Right arm     Pulse:  65  64   Resp: 16 16  20   Temp: 98 4 °F (36 9 °C) 98 5 °F (36 9 °C)  98 1 °F (36 7 °C)   TempSrc:  Oral     SpO2:  92%  92%   Weight:   102 kg (225 lb 5 oz)    Height:          Temperature:   Temp (24hrs), Av 3 °F (36 8 °C), Min:98 1 °F (36 7 °C), Max:98 5 °F (36 9 °C)    Temperature: 98 1 °F (36 7 °C)  Intake & Output:  I/O       07/22 0701 - 07/23 0700 07/23 0701 - 07/24 0700 07/24 0701 - 07/25 0700    P  O  480 360 240    IV Piggyback       Total Intake(mL/kg) 480 (4 8) 360 (3 5) 240 (2 4)    Net +480 +360 +240           Unmeasured Urine Occurrence  3 x 2 x    Unmeasured Stool Occurrence  1 x         Weights:   IBW (Ideal Body Weight): 75 3 kg    Body mass index is 31 42 kg/m²  Weight (last 2 days)     Date/Time   Weight    07/24/21 0600   102 (225 31)    07/23/21 0600   99 1 (218 48)    07/22/21 0600   99 6 (219 58)            Physical Exam  Constitutional:       General: He is not in acute distress  HENT:      Head: Normocephalic and atraumatic  Mouth/Throat:      Mouth: Mucous membranes are moist       Pharynx: Oropharynx is clear  Eyes:      General: No scleral icterus  Extraocular Movements: Extraocular movements intact  Pupils: Pupils are equal, round, and reactive to light  Cardiovascular:      Rate and Rhythm: Normal rate and regular rhythm  Heart sounds: No murmur heard  No friction rub  No gallop  Pulmonary:      Effort: No respiratory distress  Breath sounds: No stridor  No wheezing, rhonchi or rales  Abdominal:      General: Bowel sounds are normal  There is no distension  Palpations: There is no mass  Tenderness: There is no abdominal tenderness  There is no guarding or rebound  Musculoskeletal:         General: Normal range of motion  Comments: Strength 5/5 in all extremities   Skin:     General: Skin is warm and dry  Findings: No rash  Neurological:      Mental Status: He is alert  Sensory: No sensory deficit  Motor: Weakness (left sided facial weakness with smiling) present  Psychiatric:         Mood and Affect: Mood normal          Thought Content: Thought content normal        LABORATORY DATA     Labs: I have personally reviewed pertinent reports    Results from last 7 days   Lab Units 07/24/21  0609 07/23/21  0507 07/22/21  0527 07/21/21  0406 07/20/21  0709 07/19/21  0504 07/18/21  0518   WBC Thousand/uL 7 70 7 47 5 52 4 82 4 02* 3 91* 3 93*   HEMOGLOBIN g/dL 13 2 13 1 13 0 13 1 12 6 12 5 12 3   HEMATOCRIT % 40 0 39 5 38 9 39 2 36 7 37 3 36 3*   PLATELETS Thousands/uL 200 199 186 193 144* 151 123*   NEUTROS PCT %  --   --   --   --  52 49 63   MONOS PCT %  --   --   --   --  10 13* 12   MONO PCT % 3*  --   --  4  --   --   --       Results from last 7 days   Lab Units 07/19/21  0503   POTASSIUM mmol/L 3 5   CHLORIDE mmol/L 108   CO2 mmol/L 24   BUN mg/dL 11   CREATININE mg/dL 1 09   CALCIUM mg/dL 9 1              Results from last 7 days   Lab Units 07/24/21  0609 07/24/21  0017 07/23/21  1708   PTT seconds 108* 79* 55*               IMAGING & DIAGNOSTIC TESTING     Radiology Results: I have personally reviewed pertinent reports  XR chest portable    Result Date: 7/19/2021  Impression: Right lower lobe subsegmental atelectasis  Workstation performed: [id number]     XR chest portable    Result Date: 7/15/2021  Impression: No acute cardiopulmonary disease  Workstation performed: [id number]     X-ray chest 1 view portable    Result Date: 7/14/2021  Impression: No acute cardiopulmonary disease  Workstation performed: [id number]     CT head wo contrast    Result Date: 7/15/2021  Impression: Stable right MCA territory infarct with vasogenic edema in the basal ganglia  No hemorrhagic conversion or significant mass effect  Workstation performed: [id number]     MRI brain wo contrast    Result Date: 7/14/2021  Impression: Medial right temporal lobe, right corpus striatum, right corona radiata and right frontal cortical foci of acute ischemia  Findings were marked as "immediate"in Epic and will now be related to the ordering physician or covering clinical team by the radiology liaison   Workstation performed: [id number]     CT stroke alert brain    Result Date: 7/14/2021  Impression: Acute infarct in the right basal ganglia  Hyperdense distal right ICA and right MCA consistent with thrombus  Small chronic infarct in the right frontal lobe  Small parietal infarct also favored to be chronic  No acute hemorrhage or mass effect  Findings were directly discussed with Dr Rosalinda Green on 7/14/2021 2:21 PM Workstation performed: [id number]     XR chest portable ICU    Result Date: 7/17/2021  Impression: No acute cardiopulmonary disease  Workstation performed: [id number]     CTA stroke alert (head/neck)    Result Date: 7/14/2021  Impression: Severe noncalcified plaque in the proximal right internal carotid artery with small mobile thrombus at the distal aspect  Nearly occlusive thrombus in the distal right internal carotid artery, right M1 and dominant right A1 segments  Findings were directly discussed with Dr Rosalinda Green on 7/14/2021 2:21 PM  Workstation performed: [id number]     Other Diagnostic Testing: I have personally reviewed pertinent reports  ACTIVE MEDICATIONS     Current Facility-Administered Medications   Medication Dose Route Frequency    acetaminophen (TYLENOL) tablet 650 mg  650 mg Oral Q6H PRN    aspirin chewable tablet 81 mg  81 mg Oral Daily    atorvastatin (LIPITOR) tablet 40 mg  40 mg Oral QPM    clopidogrel (PLAVIX) tablet 75 mg  75 mg Oral Daily    heparin (porcine) 25,000 units in 0 45% NaCl 250 mL infusion (premix)  3-20 Units/kg/hr (Order-Specific) Intravenous Titrated    heparin (porcine) injection 2,000 Units  2,000 Units Intravenous Q1H PRN    heparin (porcine) injection 4,000 Units  4,000 Units Intravenous Q1H PRN    midodrine (PROAMATINE) tablet 2 5 mg  2 5 mg Oral TID AC    polyethylene glycol (MIRALAX) packet 17 g  17 g Oral Daily       VTE Pharmacologic Prophylaxis: Heparin  VTE Mechanical Prophylaxis: sequential compression device    Portions of the record may have been created with voice recognition software    Occasional wrong word or "sound a like" substitutions may have occurred due to the inherent limitations of voice recognition software    Read the chart carefully and recognize, using context, where substitutions have occurred   ==  Sudarshan Farmer MD  [address]  Internal Medicine Residency PGY-1

## 2021-07-25 ENCOUNTER — ANESTHESIA EVENT (INPATIENT)
Dept: PERIOP | Facility: HOSPITAL | Age: 61
DRG: 037 | End: 2021-07-25
Payer: MEDICARE

## 2021-07-25 LAB
ABO GROUP BLD: NORMAL
ABO GROUP BLD: NORMAL
ANION GAP SERPL CALCULATED.3IONS-SCNC: 9 MMOL/L (ref 4–13)
APTT PPP: 71 SECONDS (ref 23–37)
APTT PPP: 71 SECONDS (ref 23–37)
BLD GP AB SCN SERPL QL: NEGATIVE
BUN SERPL-MCNC: 12 MG/DL (ref 5–25)
CALCIUM SERPL-MCNC: 9.1 MG/DL (ref 8.3–10.1)
CHLORIDE SERPL-SCNC: 110 MMOL/L (ref 100–108)
CO2 SERPL-SCNC: 23 MMOL/L (ref 21–32)
CREAT SERPL-MCNC: 1.02 MG/DL (ref 0.6–1.3)
ERYTHROCYTE [DISTWIDTH] IN BLOOD BY AUTOMATED COUNT: 13.2 % (ref 11.6–15.1)
GFR SERPL CREATININE-BSD FRML MDRD: 79 ML/MIN/1.73SQ M
GLUCOSE SERPL-MCNC: 99 MG/DL (ref 65–140)
HCT VFR BLD AUTO: 40.8 % (ref 36.5–49.3)
HGB BLD-MCNC: 13.5 G/DL (ref 12–17)
MCH RBC QN AUTO: 31.9 PG (ref 26.8–34.3)
MCHC RBC AUTO-ENTMCNC: 33.1 G/DL (ref 31.4–37.4)
MCV RBC AUTO: 97 FL (ref 82–98)
PLATELET # BLD AUTO: 247 THOUSANDS/UL (ref 149–390)
PMV BLD AUTO: 9.7 FL (ref 8.9–12.7)
POTASSIUM SERPL-SCNC: 4.1 MMOL/L (ref 3.5–5.3)
RBC # BLD AUTO: 4.23 MILLION/UL (ref 3.88–5.62)
RH BLD: POSITIVE
RH BLD: POSITIVE
SODIUM SERPL-SCNC: 142 MMOL/L (ref 136–145)
SPECIMEN EXPIRATION DATE: NORMAL
WBC # BLD AUTO: 7.28 THOUSAND/UL (ref 4.31–10.16)

## 2021-07-25 PROCEDURE — 99024 POSTOP FOLLOW-UP VISIT: CPT | Performed by: SURGERY

## 2021-07-25 PROCEDURE — 86901 BLOOD TYPING SEROLOGIC RH(D): CPT | Performed by: PHYSICIAN ASSISTANT

## 2021-07-25 PROCEDURE — 80048 BASIC METABOLIC PNL TOTAL CA: CPT | Performed by: PHYSICIAN ASSISTANT

## 2021-07-25 PROCEDURE — 99232 SBSQ HOSP IP/OBS MODERATE 35: CPT | Performed by: INTERNAL MEDICINE

## 2021-07-25 PROCEDURE — 86850 RBC ANTIBODY SCREEN: CPT | Performed by: PHYSICIAN ASSISTANT

## 2021-07-25 PROCEDURE — 85730 THROMBOPLASTIN TIME PARTIAL: CPT | Performed by: INTERNAL MEDICINE

## 2021-07-25 PROCEDURE — 85027 COMPLETE CBC AUTOMATED: CPT | Performed by: PHYSICIAN ASSISTANT

## 2021-07-25 PROCEDURE — 86900 BLOOD TYPING SEROLOGIC ABO: CPT | Performed by: PHYSICIAN ASSISTANT

## 2021-07-25 RX ORDER — SODIUM CHLORIDE 9 MG/ML
100 INJECTION, SOLUTION INTRAVENOUS CONTINUOUS
Status: DISCONTINUED | OUTPATIENT
Start: 2021-07-26 | End: 2021-07-27

## 2021-07-25 RX ADMIN — MIDODRINE HYDROCHLORIDE 2.5 MG: 5 TABLET ORAL at 18:02

## 2021-07-25 RX ADMIN — HEPARIN SODIUM 20 UNITS/KG/HR: 10000 INJECTION, SOLUTION INTRAVENOUS at 18:01

## 2021-07-25 RX ADMIN — ATORVASTATIN CALCIUM 40 MG: 20 TABLET, FILM COATED ORAL at 18:02

## 2021-07-25 RX ADMIN — CLOPIDOGREL BISULFATE 75 MG: 75 TABLET ORAL at 08:41

## 2021-07-25 RX ADMIN — ASPIRIN 81 MG CHEWABLE TABLET 81 MG: 81 TABLET CHEWABLE at 08:41

## 2021-07-25 RX ADMIN — HEPARIN SODIUM 20 UNITS/KG/HR: 10000 INJECTION, SOLUTION INTRAVENOUS at 03:00

## 2021-07-25 RX ADMIN — MIDODRINE HYDROCHLORIDE 2.5 MG: 5 TABLET ORAL at 06:24

## 2021-07-25 RX ADMIN — MIDODRINE HYDROCHLORIDE 2.5 MG: 5 TABLET ORAL at 12:26

## 2021-07-25 NOTE — PROGRESS NOTES
Progress Note - Vascular Surgery   Lizzie Menendez 64 y o  male MRN: [id number]  Unit/Bed#: [facility] 726-01 Encounter: [id number]    Assessment:  61M w/ acute R MCA thromboembolic ischemic CVA in setting of high grade stenosis of R ICA, consult to vascular surgery for consideration of carotid intervention    Plan:  Plan for OR for R CEA today 7/26  Cardiology consulted, cleared for surgery  ID consulted, no antibiotics indicated, cleared for surgery  Continue ASA/statin/plavix  Continue heparin drip  Rest of care per primary    Subjective/Objective     Subjective: No acute events overnight, denies neurological complaints at this time    Objective:    Blood pressure 130/81, pulse 69, temperature 97 7 °F (36 5 °C), resp  rate 18, height 5' 11" (1 803 m), weight 102 kg (225 lb 5 oz), SpO2 92 %  ,Body mass index is 31 42 kg/m²        Intake/Output Summary (Last 24 hours) at 7/25/2021 1901  Last data filed at 7/25/2021 1700  Gross per 24 hour   Intake 478 ml   Output --   Net 478 ml       Invasive Devices     Peripheral Intravenous Line            Peripheral IV 07/25/21 Left Forearm <1 day                Physical Exam:  Gen:    NAD  CV:      warm, well-perfused  Lungs: No respiratory distress  Abd:     soft, NT/ND  Ext:      no CCE  Neuro: A&Ox3, motor and sensory intact, mild L facial asymmetry but difficult to assess with beard, otherwise CN 2-12 grossly intact

## 2021-07-25 NOTE — PROGRESS NOTES
Progress Note - Vascular Surgery   Dolly Hawkins 64 y o  male MRN: [id number]  Unit/Bed#: [facility] 726-01 Encounter: [id number]    Assessment:  61M w/ acute R MCA thromboembolic ischemic CVA in setting of high grade stenosis of R ICA, consulted to vascular surgery for consideration of carotid intervention    Plan:  Plan for R CEA on 7/26  NPO at midnight/IVF  Cardiology - no further work up indicated  ID consulted - blood cultures 7/21 NGTD  Continue ASA/statin/plavix  Continue heparin gtt  Rest of care per primary    Subjective/Objective     Subjective: No acute events overnight, patient remains without neurological complaints denying numbness, weakness, or speech difficulty    Objective:    Blood pressure 122/74, pulse 60, temperature 97 9 °F (36 6 °C), temperature source Oral, resp  rate 18, height 5' 11" (1 803 m), weight 102 kg (225 lb 5 oz), SpO2 93 %  ,Body mass index is 31 42 kg/m²        Intake/Output Summary (Last 24 hours) at 7/25/2021 1108  Last data filed at 7/24/2021 1752  Gross per 24 hour   Intake 400 ml   Output --   Net 400 ml       Invasive Devices     Peripheral Intravenous Line            Peripheral IV 07/21/21 Right Forearm 4 days                Physical Exam:  Gen:    NAD  CV:      warm, well-perfused  Lungs: No respiratory distress  Abd:     soft, NT/ND  Ext:      no CCE  Neuro: A&Ox3, bilateral upper and lower extremity strength 5/5, no sensory deficits, some residual left facial drooping, no tongue deviation

## 2021-07-25 NOTE — PROGRESS NOTES
Nurse spoke with Dr Jaylin Burrlel regarding stopping patients heparin drip prior to their surgery tomorrow  Per Dr Jaylin Burrell, heparin drip is to continue until surgery

## 2021-07-25 NOTE — PLAN OF CARE
Problem: Neurological Deficit  Goal: Neurological status is stable or improving  Description: Interventions:  - Monitor and assess patient's level of consciousness, motor function, sensory function, and level of assistance needed for ADLs  - Monitor and report changes from baseline  Collaborate with interdisciplinary team to initiate plan and implement interventions as ordered  - Provide and maintain a safe environment  - Consider seizure precautions  - Consider fall precautions  - Consider aspiration precautions  - Consider bleeding precautions  Outcome: Progressing     Problem: Activity Intolerance/Impaired Mobility  Goal: Mobility/activity is maintained at optimum level for patient  Description: Interventions:  - Assess and monitor patient  barriers to mobility and need for assistive/adaptive devices  - Assess patient's emotional response to limitations  - Collaborate with interdisciplinary team and initiate plans and interventions as ordered  - Encourage independent activity per ability   - Maintain proper body alignment  - Perform active/passive rom as tolerated/ordered  - Plan activities to conserve energy   - Turn patient as appropriate  Outcome: Progressing     Problem: Communication Impairment  Goal: Ability to express needs and understand communication  Description: Assess patient's communication skills and ability to understand information  Patient will demonstrate use of effective communication techniques, alternative methods of communication and understanding even if not able to speak  - Encourage communication and provide alternate methods of communication as needed  - Collaborate with case management/ for discharge needs  - Include patient/family/caregiver in decisions related to communication    Outcome: Progressing     Problem: Potential for Aspiration  Goal: Non-ventilated patient's risk of aspiration is minimized  Description: Assess and monitor vital signs, respiratory status, and labs (WBC)  Monitor for signs of aspiration (tachypnea, cough, rales, wheezing, cyanosis, fever)  - Assess and monitor patient's ability to swallow  - Place patient up in chair to eat if possible  - HOB up at 90 degrees to eat if unable to get patient up into chair   - Supervise patient during oral intake  - Instruct patient/ family to take small bites  - Instruct patient/ family to take small single sips when taking liquids  - Follow patient-specific strategies generated by speech pathologist   Outcome: Progressing     Problem: Nutrition  Goal: Nutrition/Hydration status is improving  Description: Monitor and assess patient's nutrition/hydration status for malnutrition (ex- brittle hair, bruises, dry skin, pale skin and conjunctiva, muscle wasting, smooth red tongue, and disorientation)  Collaborate with interdisciplinary team and initiate plan and interventions as ordered  Monitor patient's weight and dietary intake as ordered or per policy  Utilize nutrition screening tool and intervene per policy  Determine patient's food preferences and provide high-protein, high-caloric foods as appropriate  - Assist patient with eating   - Allow adequate time for meals   - Encourage patient to take dietary supplement as ordered  - Collaborate with clinical nutritionist   - Include patient/family/caregiver in decisions related to nutrition    Outcome: Progressing     Problem: PAIN - ADULT  Goal: Verbalizes/displays adequate comfort level or baseline comfort level  Description: Interventions:  - Encourage patient to monitor pain and request assistance  - Assess pain using appropriate pain scale  - Administer analgesics based on type and severity of pain and evaluate response  - Implement non-pharmacological measures as appropriate and evaluate response  - Consider cultural and social influences on pain and pain management  - Notify physician/advanced practitioner if interventions unsuccessful or patient reports new pain  Outcome: Progressing     Problem: INFECTION - ADULT  Goal: Absence or prevention of progression during hospitalization  Description: INTERVENTIONS:  - Assess and monitor for signs and symptoms of infection  - Monitor lab/diagnostic results  - Monitor all insertion sites, i e  indwelling lines, tubes, and drains  - Monitor endotracheal if appropriate and nasal secretions for changes in amount and color  - Vadito appropriate cooling/warming therapies per order  - Administer medications as ordered  - Instruct and encourage patient and family to use good hand hygiene technique  - Identify and instruct in appropriate isolation precautions for identified infection/condition  Outcome: Progressing     Problem: SAFETY ADULT  Goal: Patient will remain free of falls  Description: INTERVENTIONS:  - Educate patient/family on patient safety including physical limitations  - Instruct patient to call for assistance with activity   - Consult OT/PT to assist with strengthening/mobility   - Keep Call bell within reach  - Keep bed low and locked with side rails adjusted as appropriate  - Keep care items and personal belongings within reach  - Initiate and maintain comfort rounds  - Make Fall Risk Sign visible to staff  - Offer Toileting every 4 Hours, in advance of need  - Initiate/Maintain bed alarm  - Obtain necessary fall risk management equipment: bed alarm  - Apply yellow socks and bracelet for high fall risk patients  - Consider moving patient to room near nurses station  Outcome: Progressing  Goal: Maintain or return to baseline ADL function  Description: INTERVENTIONS:  -  Assess patient's ability to carry out ADLs; assess patient's baseline for ADL function and identify physical deficits which impact ability to perform ADLs (bathing, care of mouth/teeth, toileting, grooming, dressing, etc )  - Assess/evaluate cause of self-care deficits   - Assess range of motion  - Assess patient's mobility; develop plan if impaired  - Assess patient's need for assistive devices and provide as appropriate  - Encourage maximum independence but intervene and supervise when necessary  - Involve family in performance of ADLs  - Assess for home care needs following discharge   - Consider OT consult to assist with ADL evaluation and planning for discharge  - Provide patient education as appropriate  Outcome: Progressing  Goal: Maintains/Returns to pre admission functional level  Description: INTERVENTIONS:  - Perform BMAT or MOVE assessment daily    - Set and communicate daily mobility goal to care team and patient/family/caregiver  - Collaborate with rehabilitation services on mobility goals if consulted  - Perform Range of Motion 4 times a day  - Reposition patient every 2 hours    - Dangle patient 4 times a day  - Stand patient 4 times a day  - Ambulate patient 4 times a day  - Out of bed to chair 4 times a day   - Out of bed for meals 4 times a day  - Out of bed for toileting  - Record patient progress and toleration of activity level   Outcome: Progressing     Problem: DISCHARGE PLANNING  Goal: Discharge to home or other facility with appropriate resources  Description: INTERVENTIONS:  - Identify barriers to discharge w/patient and caregiver  - Arrange for needed discharge resources and transportation as appropriate  - Identify discharge learning needs (meds, wound care, etc )  - Arrange for interpretive services to assist at discharge as needed  - Refer to Case Management Department for coordinating discharge planning if the patient needs post-hospital services based on physician/advanced practitioner order or complex needs related to functional status, cognitive ability, or social support system  Outcome: Progressing     Problem: Knowledge Deficit  Goal: Patient/family/caregiver demonstrates understanding of disease process, treatment plan, medications, and discharge instructions  Description: Complete learning assessment and assess knowledge base  Interventions:  - Provide teaching at level of understanding  - Provide teaching via preferred learning methods  Outcome: Progressing     Problem: Nutrition/Hydration-ADULT  Goal: Nutrient/Hydration intake appropriate for improving, restoring or maintaining nutritional needs  Description: Monitor and assess patient's nutrition/hydration status for malnutrition  Collaborate with interdisciplinary team and initiate plan and interventions as ordered  Monitor patient's weight and dietary intake as ordered or per policy  Utilize nutrition screening tool and intervene as necessary  Determine patient's food preferences and provide high-protein, high-caloric foods as appropriate       INTERVENTIONS:  - Monitor oral intake, urinary output, labs, and treatment plans  - Assess nutrition and hydration status and recommend course of action  - Evaluate amount of meals eaten  - Assist patient with eating if necessary   - Allow adequate time for meals  - Recommend/ encourage appropriate diets, oral nutritional supplements, and vitamin/mineral supplements  - Order, calculate, and assess calorie counts as needed  - Recommend, monitor, and adjust tube feedings and TPN/PPN based on assessed needs  - Assess need for intravenous fluids  - Provide specific nutrition/hydration education as appropriate  - Include patient/family/caregiver in decisions related to nutrition  Outcome: Progressing     Problem: NEUROSENSORY - ADULT  Goal: Achieves stable or improved neurological status  Description: INTERVENTIONS  - Monitor and report changes in neurological status  - Monitor vital signs such as temperature, blood pressure, glucose, and any other labs ordered   - Initiate measures to prevent increased intracranial pressure  - Monitor for seizure activity and implement precautions if appropriate      Outcome: Progressing     Problem: MOBILITY - ADULT  Goal: Maintain or return to baseline ADL function  Description: INTERVENTIONS:  -  Assess patient's ability to carry out ADLs; assess patient's baseline for ADL function and identify physical deficits which impact ability to perform ADLs (bathing, care of mouth/teeth, toileting, grooming, dressing, etc )  - Assess/evaluate cause of self-care deficits   - Assess range of motion  - Assess patient's mobility; develop plan if impaired  - Assess patient's need for assistive devices and provide as appropriate  - Encourage maximum independence but intervene and supervise when necessary  - Involve family in performance of ADLs  - Assess for home care needs following discharge   - Consider OT consult to assist with ADL evaluation and planning for discharge  - Provide patient education as appropriate  Outcome: Progressing  Goal: Maintains/Returns to pre admission functional level  Description: INTERVENTIONS:  - Perform BMAT or MOVE assessment daily    - Set and communicate daily mobility goal to care team and patient/family/caregiver  - Collaborate with rehabilitation services on mobility goals if consulted  - Perform Range of Motion 4 times a day  - Reposition patient every 2 hours    - Dangle patient 4 times a day  - Stand patient 4 times a day  - Ambulate patient 4 times a day  - Out of bed to chair 4 times a day   - Out of bed for meals 4 times a day  - Out of bed for toileting  - Record patient progress and toleration of activity level   Outcome: Progressing     Problem: Potential for Falls  Goal: Patient will remain free of falls  Description: INTERVENTIONS:  - Educate patient/family on patient safety including physical limitations  - Instruct patient to call for assistance with activity   - Consult OT/PT to assist with strengthening/mobility   - Keep Call bell within reach  - Keep bed low and locked with side rails adjusted as appropriate  - Keep care items and personal belongings within reach  - Initiate and maintain comfort rounds  - Make Fall Risk Sign visible to staff  - Offer Toileting every 4 Hours, in advance of need  - Initiate/Maintain bed alarm  - Obtain necessary fall risk management equipment: bed alarm  - Apply yellow socks and bracelet for high fall risk patients  - Consider moving patient to room near nurses station  Outcome: Progressing

## 2021-07-25 NOTE — PROGRESS NOTES
[facility]  Progress Note - Daysi Diallo 1960, 64 y o  male MRN: [id number]  Unit/Bed#: [facility] 726-01 Encounter: [id number]  Primary Care Provider: Natalie Montes De Oca DO   Date and time admitted to hospital: 7/14/2021  4:22 PM    * CVA (cerebral vascular accident) [facility])  Assessment & Plan  Past medical history of TIA, not on blood thinners, hypertension, diverticulitis presents with left-sided facial droop and slurred speech  On 7/14 in the ED, patient and family member at bedside states that patient was last seen normal at 9:00 p m on 7/13 when he went to bed  Patient woke up at 8:00 a m  And family noticed that he had left-sided facial droop and mild slurred speech  Patient states he had his first TIA six years ago which presented with left-sided weakness  Patient had another TIA four years ago with similar presentation  Patient had third TIA three years ago with right sided visual loss  Not a TPA candidate  7/14 CTA: severe noncalcified plaque in the proximal right internal carotid artery with small mobile thrombus at the distal aspect, nearly occlusive thrombus in the distal right ICA, right M1, and R A1 segments  7/14 CTH: Acute infarct in the right basal ganglia, hyperdense distal right ICA and right MCA consistent with thrombus, small chronic infarct in the right frontal lobe   Small parietal infarct also favored to be chronic  No acute hemorrhage or mass effect  7/14 MRI: Medial right temporal lobe, right corpus striatum, right corona radiata and right frontal cortical foci of acute ischemia  7/15 CTH: Stable right MCA territory infarct with vasogenic edema in the basal ganglia  No hemorrhagic conversion or significant mass effect   7/21 CTA head/neck: Ischemic edema right MCA distribution reidentified without hemorrhagic conversion  Stable unenhanced CT    Persistent high grade stenosis with complex ulcerated plaque at the origin of the right ICA   Improved filling of the distal supraclinoid right ICA, right A1 and M1 segments when compared to prior study one week earlier  Persistent, nonocclusive thrombus in these vessels as described  7/21 VAS carotid study:   RIGHT:There is <50% stenosis noted in the internal carotid artery  Plaque is heterogenous and irregular  Vertebral artery flow is antegrade  There is no significant subclavian artery disease  LEFT:There is 70-99% stenosis noted in the internal carotid artery  Plaque is  heterogenous and irregular  Vertebral artery flow is antegrade  There is no significant subclavian artery disease  Plan:    · Continue aspirin and plavix for 3 months followed by aspirin monotherapy  · Continue lipitor  · Follow up stroke clinic outpatient  · Vascular surgery wants to do inpatient carotid endarterectomy, planning for 7/26  · ID consulted due to gram negative bacteremia  Cleared for carotid endarterectomy  · Cardiology consulted for preop evaluation  Cleared for carotid endarterectomy  · Heparin drip with goal PTT between 60 and 90  Continue to monitor PTT  · 7/24 decreased midodrine to 2 5 mg to wean off      Carotid stenosis, symptomatic, with infarction [facility])  Assessment & Plan  Plan:  - Continue ASA/Plavix/Statin  - Right carotid endarterectomy planned for Mon 7/26  - vascular surgery following    Gram-negative bacteremia  Assessment & Plan  Patient had a fever w/wheezing and leukopenia on 7/17 and blood cultures were drawn  · One blood culture became positive for gram negative rods on 7/21, confirmed to be bacillus, not anthracis  · 7/14 admission CXR was WNL; 7/17 CXR revealed right lower lobe subsegmental atelectasis      Plan:  · Repeat blood cultures drawn on 7/21, negative at 24 hours  · 7/21 IV cefepime was started after 1/2 periph bcx came back positive as above  · ID consulted to manage abx around carotid endarterectomy that vascular is planning to do; needs to have negative BCx before procedure  · Believed to be a contaminant due to bacillus species, clinical improvement without treatment, and only 1/2 bottles being positive at 4 days  · Cefepime discontinued   · Cleared for carotid endarterectomy, expected Mon   · Incentive spirometry  · Ambulate patient    Hyperlipidemia  Assessment & Plan  Chronic issue  Plan:  -Continue atorvastatin (lipitor) 40 mg qHS    Hypertension  Assessment & Plan  Takes HCTZ at home   ranging 110s-140s/50s-70s without any inpatient BP meds  Plan:  · On midodrine TID for orthostatic hypotension, holding home BP meds  · Monitor blood pressures  · Avoid hypotension            VTE Pharmacologic Prophylaxis: VTE Score: 10 High Risk (Score >/= 5) - Pharmacological DVT Prophylaxis Ordered: heparin drip  Sequential Compression Devices Ordered  Patient Centered Rounds: I performed bedside rounds with nursing staff today  Discussions with Specialists or Other Care Team Provider:     Education and Discussions with Family / Patient: Discussed with the patient, reports he is keeping his spouse updated  Time Spent for Care: 30 minutes  More than 50% of total time spent on counseling and coordination of care as described above  Current Length of Stay: 11 day(s)  Current Patient Status: Inpatient   Certification Statement: The patient will continue to require additional inpatient hospital stay due to As outlined  Discharge Plan: To OR for carotid intervention with vascular surgery tomorrow    Code Status: Level 1 - Full Code    Subjective:     Comfortably in bed  Reports feeling okay  Encouraged Out of bed and ambulation as able    Objective:     Vitals:   Temp (24hrs), Av 9 °F (36 6 °C), Min:97 8 °F (36 6 °C), Max:97 9 °F (36 6 °C)    Temp:  [97 8 °F (36 6 °C)-97 9 °F (36 6 °C)] 97 9 °F (36 6 °C)  HR:  [60] 60  Resp:  [18] 18  BP: (122-123)/(74-76) 122/74  SpO2:  [93 %-94 %] 93 %  Body mass index is 31 42 kg/m²       Input and Output Summary (last 24 hours): Intake/Output Summary (Last 24 hours) at 7/25/2021 1300  Last data filed at 7/24/2021 1752  Gross per 24 hour   Intake 400 ml   Output --   Net 400 ml       Physical Exam:   Physical Exam     Comfortably in bed  Neck supple  Lungs diminished breath sounds bilaterally  Heart sounds S1-S2 noted  Abdomen soft  Awake obeys simple commands  Pulses noted  No rash    Additional Data:     Labs:  Results from last 7 days   Lab Units 07/25/21  0611 07/24/21  0609 07/21/21  0406 07/20/21  0709 07/20/21  0709   WBC Thousand/uL 7 28 7 70 4 82  --  4 02*   HEMOGLOBIN g/dL 13 5 13 2 13 1  --  12 6   HEMATOCRIT % 40 8 40 0 39 2  --  36 7   PLATELETS Thousands/uL 247 200 193  --  144*   BANDS PCT %  --   --  3  --   --    NEUTROS PCT %  --   --   --   --  52   LYMPHS PCT %  --   --   --   --  30   LYMPHO PCT %  --  12* 27   < >  --    MONOS PCT %  --   --   --   --  10   MONO PCT %  --  3* 4   < >  --    EOS PCT %  --  7* 6   < > 6    < > = values in this interval not displayed  Results from last 7 days   Lab Units 07/25/21  0611   SODIUM mmol/L 142   POTASSIUM mmol/L 4 1   CHLORIDE mmol/L 110*   CO2 mmol/L 23   BUN mg/dL 12   CREATININE mg/dL 1 02   ANION GAP mmol/L 9   CALCIUM mg/dL 9 1   GLUCOSE RANDOM mg/dL 99                       Lines/Drains:  Invasive Devices     Peripheral Intravenous Line            Peripheral IV 07/25/21 Left Forearm <1 day                      Imaging: No pertinent imaging reviewed  Recent Cultures (last 7 days):   Results from last 7 days   Lab Units 07/21/21  2353   BLOOD CULTURE  No Growth at 72 hrs  No Growth at 72 hrs         Last 24 Hours Medication List:   Current Facility-Administered Medications   Medication Dose Route Frequency Provider Last Rate    acetaminophen  650 mg Oral Q6H PRN Mabel Vidal MD      aspirin  81 mg Oral Daily Kaushik Freitas PA-C      atorvastatin  40 mg Oral QPM Flavio Burns PA-C      clopidogrel  75 mg Oral Daily Jose F Junior MD      heparin (porcine)  3-20 Units/kg/hr (Order-Specific) Intravenous Titrated Kimo Magaña MD 20 Units/kg/hr (07/25/21 9879)    heparin (porcine)  2,000 Units Intravenous Q1H PRN Kimo Magaña MD      heparin (porcine)  4,000 Units Intravenous Q1H PRN Kimo Magaña MD      midodrine  2 5 [facility]ID [facility] Kimo Magaña [facility]      polyethylene glycol  17 g Oral Daily Boyd Zuniga DO      [START ON 7/26/2021] sodium chloride  100 mL/hr Intravenous Continuous Karthikeyan Hernandez DPM          Today, Patient Was Seen By: Pete High MD    **Please Note: This note may have been constructed using a voice recognition system  **

## 2021-07-26 ENCOUNTER — ANESTHESIA (INPATIENT)
Dept: PERIOP | Facility: HOSPITAL | Age: 61
DRG: 037 | End: 2021-07-26
Payer: MEDICARE

## 2021-07-26 ENCOUNTER — APPOINTMENT (INPATIENT)
Dept: NON INVASIVE DIAGNOSTICS | Facility: HOSPITAL | Age: 61
DRG: 037 | End: 2021-07-26
Payer: MEDICARE

## 2021-07-26 PROBLEM — G45.9 TIA (TRANSIENT ISCHEMIC ATTACK): Status: ACTIVE | Noted: 2021-07-26

## 2021-07-26 LAB
ABO GROUP BLD: NORMAL
ANION GAP SERPL CALCULATED.3IONS-SCNC: 6 MMOL/L (ref 4–13)
APTT PPP: 80 SECONDS (ref 23–37)
BLD GP AB SCN SERPL QL: NEGATIVE
BUN SERPL-MCNC: 12 MG/DL (ref 5–25)
CALCIUM SERPL-MCNC: 9.2 MG/DL (ref 8.3–10.1)
CHLORIDE SERPL-SCNC: 110 MMOL/L (ref 100–108)
CO2 SERPL-SCNC: 25 MMOL/L (ref 21–32)
CREAT SERPL-MCNC: 1.06 MG/DL (ref 0.6–1.3)
ERYTHROCYTE [DISTWIDTH] IN BLOOD BY AUTOMATED COUNT: 13.2 % (ref 11.6–15.1)
GFR SERPL CREATININE-BSD FRML MDRD: 75 ML/MIN/1.73SQ M
GLUCOSE SERPL-MCNC: 93 MG/DL (ref 65–140)
HCT VFR BLD AUTO: 40.1 % (ref 36.5–49.3)
HGB BLD-MCNC: 13.3 G/DL (ref 12–17)
KCT BLD-ACNC: 201 SEC (ref 89–137)
MCH RBC QN AUTO: 31.7 PG (ref 26.8–34.3)
MCHC RBC AUTO-ENTMCNC: 33.2 G/DL (ref 31.4–37.4)
MCV RBC AUTO: 96 FL (ref 82–98)
PLATELET # BLD AUTO: 236 THOUSANDS/UL (ref 149–390)
PMV BLD AUTO: 9.4 FL (ref 8.9–12.7)
POTASSIUM SERPL-SCNC: 4.1 MMOL/L (ref 3.5–5.3)
RBC # BLD AUTO: 4.2 MILLION/UL (ref 3.88–5.62)
RH BLD: POSITIVE
SODIUM SERPL-SCNC: 141 MMOL/L (ref 136–145)
SPECIMEN EXPIRATION DATE: NORMAL
SPECIMEN SOURCE: ABNORMAL
WBC # BLD AUTO: 6.91 THOUSAND/UL (ref 4.31–10.16)

## 2021-07-26 PROCEDURE — 03CK0ZZ EXTIRPATION OF MATTER FROM RIGHT INTERNAL CAROTID ARTERY, OPEN APPROACH: ICD-10-PCS | Performed by: SURGERY

## 2021-07-26 PROCEDURE — 03UH0KZ SUPPLEMENT RIGHT COMMON CAROTID ARTERY WITH NONAUTOLOGOUS TISSUE SUBSTITUTE, OPEN APPROACH: ICD-10-PCS | Performed by: SURGERY

## 2021-07-26 PROCEDURE — 35301 RECHANNELING OF ARTERY: CPT | Performed by: SURGERY

## 2021-07-26 PROCEDURE — 85027 COMPLETE CBC AUTOMATED: CPT | Performed by: SURGERY

## 2021-07-26 PROCEDURE — 86850 RBC ANTIBODY SCREEN: CPT | Performed by: SURGERY

## 2021-07-26 PROCEDURE — 86901 BLOOD TYPING SEROLOGIC RH(D): CPT | Performed by: SURGERY

## 2021-07-26 PROCEDURE — C1781 MESH (IMPLANTABLE): HCPCS | Performed by: SURGERY

## 2021-07-26 PROCEDURE — G9197 ORDER FOR CEPH: HCPCS | Performed by: SURGERY

## 2021-07-26 PROCEDURE — 93882 EXTRACRANIAL UNI/LTD STUDY: CPT

## 2021-07-26 PROCEDURE — 85730 THROMBOPLASTIN TIME PARTIAL: CPT | Performed by: INTERNAL MEDICINE

## 2021-07-26 PROCEDURE — 86900 BLOOD TYPING SEROLOGIC ABO: CPT | Performed by: SURGERY

## 2021-07-26 PROCEDURE — 80048 BASIC METABOLIC PNL TOTAL CA: CPT | Performed by: SURGERY

## 2021-07-26 PROCEDURE — 99024 POSTOP FOLLOW-UP VISIT: CPT | Performed by: SURGERY

## 2021-07-26 PROCEDURE — 85347 COAGULATION TIME ACTIVATED: CPT

## 2021-07-26 DEVICE — XENOSURE BIOLOGIC PATCH, 0.8CM X 8CM, EIFU
Type: IMPLANTABLE DEVICE | Site: CAROTID | Status: FUNCTIONAL
Brand: XENOSURE BIOLOGIC PATCH

## 2021-07-26 RX ORDER — CHLORHEXIDINE GLUCONATE 0.12 MG/ML
15 RINSE ORAL ONCE
Status: COMPLETED | OUTPATIENT
Start: 2021-07-26 | End: 2021-07-26

## 2021-07-26 RX ORDER — ROCURONIUM BROMIDE 10 MG/ML
INJECTION, SOLUTION INTRAVENOUS AS NEEDED
Status: DISCONTINUED | OUTPATIENT
Start: 2021-07-26 | End: 2021-07-26

## 2021-07-26 RX ORDER — SODIUM CHLORIDE 9 MG/ML
INJECTION, SOLUTION INTRAVENOUS CONTINUOUS PRN
Status: DISCONTINUED | OUTPATIENT
Start: 2021-07-26 | End: 2021-07-26

## 2021-07-26 RX ORDER — NEOSTIGMINE METHYLSULFATE 1 MG/ML
INJECTION INTRAVENOUS AS NEEDED
Status: DISCONTINUED | OUTPATIENT
Start: 2021-07-26 | End: 2021-07-26

## 2021-07-26 RX ORDER — HEPARIN SODIUM 1000 [USP'U]/ML
INJECTION, SOLUTION INTRAVENOUS; SUBCUTANEOUS AS NEEDED
Status: DISCONTINUED | OUTPATIENT
Start: 2021-07-26 | End: 2021-07-26

## 2021-07-26 RX ORDER — HEPARIN SODIUM 5000 [USP'U]/ML
5000 INJECTION, SOLUTION INTRAVENOUS; SUBCUTANEOUS EVERY 8 HOURS SCHEDULED
Status: DISCONTINUED | OUTPATIENT
Start: 2021-07-27 | End: 2021-07-27 | Stop reason: HOSPADM

## 2021-07-26 RX ORDER — ONDANSETRON 2 MG/ML
INJECTION INTRAMUSCULAR; INTRAVENOUS AS NEEDED
Status: DISCONTINUED | OUTPATIENT
Start: 2021-07-26 | End: 2021-07-26

## 2021-07-26 RX ORDER — SODIUM CHLORIDE, SODIUM LACTATE, POTASSIUM CHLORIDE, CALCIUM CHLORIDE 600; 310; 30; 20 MG/100ML; MG/100ML; MG/100ML; MG/100ML
INJECTION, SOLUTION INTRAVENOUS CONTINUOUS PRN
Status: DISCONTINUED | OUTPATIENT
Start: 2021-07-26 | End: 2021-07-26

## 2021-07-26 RX ORDER — FENTANYL CITRATE 50 UG/ML
INJECTION, SOLUTION INTRAMUSCULAR; INTRAVENOUS AS NEEDED
Status: DISCONTINUED | OUTPATIENT
Start: 2021-07-26 | End: 2021-07-26

## 2021-07-26 RX ORDER — LIDOCAINE HYDROCHLORIDE 10 MG/ML
INJECTION, SOLUTION EPIDURAL; INFILTRATION; INTRACAUDAL; PERINEURAL AS NEEDED
Status: DISCONTINUED | OUTPATIENT
Start: 2021-07-26 | End: 2021-07-26

## 2021-07-26 RX ORDER — HYDRALAZINE HYDROCHLORIDE 20 MG/ML
15 INJECTION INTRAMUSCULAR; INTRAVENOUS
Status: DISCONTINUED | OUTPATIENT
Start: 2021-07-26 | End: 2021-07-27 | Stop reason: HOSPADM

## 2021-07-26 RX ORDER — BUPIVACAINE HYDROCHLORIDE AND EPINEPHRINE 5; 5 MG/ML; UG/ML
INJECTION, SOLUTION PERINEURAL AS NEEDED
Status: DISCONTINUED | OUTPATIENT
Start: 2021-07-26 | End: 2021-07-26 | Stop reason: HOSPADM

## 2021-07-26 RX ORDER — PROTAMINE SULFATE 10 MG/ML
INJECTION, SOLUTION INTRAVENOUS AS NEEDED
Status: DISCONTINUED | OUTPATIENT
Start: 2021-07-26 | End: 2021-07-26

## 2021-07-26 RX ORDER — DEXAMETHASONE SODIUM PHOSPHATE 10 MG/ML
INJECTION, SOLUTION INTRAMUSCULAR; INTRAVENOUS AS NEEDED
Status: DISCONTINUED | OUTPATIENT
Start: 2021-07-26 | End: 2021-07-26

## 2021-07-26 RX ORDER — METOPROLOL TARTRATE 5 MG/5ML
INJECTION INTRAVENOUS AS NEEDED
Status: DISCONTINUED | OUTPATIENT
Start: 2021-07-26 | End: 2021-07-26

## 2021-07-26 RX ORDER — GLYCOPYRROLATE 0.2 MG/ML
INJECTION INTRAMUSCULAR; INTRAVENOUS AS NEEDED
Status: DISCONTINUED | OUTPATIENT
Start: 2021-07-26 | End: 2021-07-26

## 2021-07-26 RX ORDER — ONDANSETRON 2 MG/ML
4 INJECTION INTRAMUSCULAR; INTRAVENOUS ONCE AS NEEDED
Status: DISCONTINUED | OUTPATIENT
Start: 2021-07-26 | End: 2021-07-26 | Stop reason: HOSPADM

## 2021-07-26 RX ORDER — FENTANYL CITRATE/PF 50 MCG/ML
50 SYRINGE (ML) INJECTION
Status: DISCONTINUED | OUTPATIENT
Start: 2021-07-26 | End: 2021-07-26 | Stop reason: HOSPADM

## 2021-07-26 RX ORDER — LABETALOL 20 MG/4 ML (5 MG/ML) INTRAVENOUS SYRINGE
5
Status: DISCONTINUED | OUTPATIENT
Start: 2021-07-26 | End: 2021-07-27 | Stop reason: HOSPADM

## 2021-07-26 RX ORDER — MIDAZOLAM HYDROCHLORIDE 2 MG/2ML
INJECTION, SOLUTION INTRAMUSCULAR; INTRAVENOUS AS NEEDED
Status: DISCONTINUED | OUTPATIENT
Start: 2021-07-26 | End: 2021-07-26

## 2021-07-26 RX ORDER — HYDROMORPHONE HCL IN WATER/PF 6 MG/30 ML
0.2 PATIENT CONTROLLED ANALGESIA SYRINGE INTRAVENOUS
Status: DISCONTINUED | OUTPATIENT
Start: 2021-07-26 | End: 2021-07-26 | Stop reason: HOSPADM

## 2021-07-26 RX ORDER — PROPOFOL 10 MG/ML
INJECTION, EMULSION INTRAVENOUS AS NEEDED
Status: DISCONTINUED | OUTPATIENT
Start: 2021-07-26 | End: 2021-07-26

## 2021-07-26 RX ADMIN — FENTANYL CITRATE 100 MCG: 50 INJECTION INTRAMUSCULAR; INTRAVENOUS at 17:28

## 2021-07-26 RX ADMIN — NOREPINEPHRINE BITARTRATE 3 MCG/MIN: 1 INJECTION, SOLUTION, CONCENTRATE INTRAVENOUS at 18:32

## 2021-07-26 RX ADMIN — LIDOCAINE HYDROCHLORIDE 50 MG: 10 INJECTION, SOLUTION EPIDURAL; INFILTRATION; INTRACAUDAL; PERINEURAL at 17:28

## 2021-07-26 RX ADMIN — DEXAMETHASONE SODIUM PHOSPHATE 10 MG: 10 INJECTION, SOLUTION INTRAMUSCULAR; INTRAVENOUS at 18:09

## 2021-07-26 RX ADMIN — FENTANYL CITRATE 25 MCG: 50 INJECTION INTRAMUSCULAR; INTRAVENOUS at 20:16

## 2021-07-26 RX ADMIN — FENTANYL CITRATE 25 MCG: 50 INJECTION INTRAMUSCULAR; INTRAVENOUS at 20:22

## 2021-07-26 RX ADMIN — CLOPIDOGREL BISULFATE 75 MG: 75 TABLET ORAL at 09:32

## 2021-07-26 RX ADMIN — NEOSTIGMINE METHYLSULFATE 4 MG: 1 INJECTION INTRAVENOUS at 20:13

## 2021-07-26 RX ADMIN — ONDANSETRON 4 MG: 2 INJECTION INTRAMUSCULAR; INTRAVENOUS at 17:38

## 2021-07-26 RX ADMIN — HEPARIN SODIUM 8000 UNITS: 1000 INJECTION INTRAVENOUS; SUBCUTANEOUS at 18:35

## 2021-07-26 RX ADMIN — PROPOFOL 50 MG: 10 INJECTION, EMULSION INTRAVENOUS at 20:16

## 2021-07-26 RX ADMIN — FENTANYL CITRATE 25 MCG: 50 INJECTION INTRAMUSCULAR; INTRAVENOUS at 20:18

## 2021-07-26 RX ADMIN — PROPOFOL 100 MG: 10 INJECTION, EMULSION INTRAVENOUS at 18:06

## 2021-07-26 RX ADMIN — ROCURONIUM BROMIDE 10 MG: 50 INJECTION, SOLUTION INTRAVENOUS at 17:51

## 2021-07-26 RX ADMIN — PROPOFOL 200 MG: 10 INJECTION, EMULSION INTRAVENOUS at 17:28

## 2021-07-26 RX ADMIN — SODIUM CHLORIDE 100 ML/HR: 0.9 INJECTION, SOLUTION INTRAVENOUS at 09:11

## 2021-07-26 RX ADMIN — SODIUM CHLORIDE: 0.9 INJECTION, SOLUTION INTRAVENOUS at 17:51

## 2021-07-26 RX ADMIN — SODIUM CHLORIDE, SODIUM LACTATE, POTASSIUM CHLORIDE, AND CALCIUM CHLORIDE: .6; .31; .03; .02 INJECTION, SOLUTION INTRAVENOUS at 17:13

## 2021-07-26 RX ADMIN — MIDAZOLAM 2 MG: 1 INJECTION INTRAMUSCULAR; INTRAVENOUS at 17:15

## 2021-07-26 RX ADMIN — PROTAMINE SULFATE 30 MG: 10 INJECTION, SOLUTION INTRAVENOUS at 19:53

## 2021-07-26 RX ADMIN — CHLORHEXIDINE GLUCONATE 15 ML: 1.2 SOLUTION ORAL at 09:10

## 2021-07-26 RX ADMIN — ASPIRIN 81 MG CHEWABLE TABLET 81 MG: 81 TABLET CHEWABLE at 09:32

## 2021-07-26 RX ADMIN — METOROPROLOL TARTRATE 2 MG: 5 INJECTION, SOLUTION INTRAVENOUS at 20:40

## 2021-07-26 RX ADMIN — PROPOFOL 50 MG: 10 INJECTION, EMULSION INTRAVENOUS at 20:20

## 2021-07-26 RX ADMIN — SODIUM CHLORIDE 100 ML/HR: 0.9 INJECTION, SOLUTION INTRAVENOUS at 00:01

## 2021-07-26 RX ADMIN — ROCURONIUM BROMIDE 40 MG: 50 INJECTION, SOLUTION INTRAVENOUS at 17:28

## 2021-07-26 RX ADMIN — NICARDIPINE HYDROCHLORIDE 5 MG/HR: 2.5 INJECTION, SOLUTION INTRAVENOUS at 20:41

## 2021-07-26 RX ADMIN — MIDODRINE HYDROCHLORIDE 2.5 MG: 5 TABLET ORAL at 06:05

## 2021-07-26 RX ADMIN — NOREPINEPHRINE BITARTRATE 4 MCG/KG/MIN: 1 INJECTION, SOLUTION, CONCENTRATE INTRAVENOUS at 18:15

## 2021-07-26 RX ADMIN — GLYCOPYRROLATE 0.4 MG: 0.2 INJECTION, SOLUTION INTRAMUSCULAR; INTRAVENOUS at 20:13

## 2021-07-26 RX ADMIN — FENTANYL CITRATE 25 MCG: 50 INJECTION INTRAMUSCULAR; INTRAVENOUS at 20:20

## 2021-07-26 RX ADMIN — HEPARIN SODIUM 1000 UNITS: 1000 INJECTION INTRAVENOUS; SUBCUTANEOUS at 18:52

## 2021-07-26 NOTE — ANESTHESIA PREPROCEDURE EVALUATION
Procedure:  ENDARTERECTOMY ARTERY CAROTID-- Right (Right Neck)    Patient admitted with left facial droop and slurred speech with presumed stroke possibly 2/2 and imaging that could suggest now with recovery of neurologic function for above procedure  Echo EF 75%, no valvular issues  On midodrine Goal SBP >120 per Dr Darin Rocha    Hgb 13 3, PTT 80 (Hep stopped 0900)    Relevant Problems   CARDIO   (+) Carotid stenosis, symptomatic, with infarction (HCC)   (+) Hyperlipidemia   (+) Hypertension      MUSCULOSKELETAL   (+) Primary osteoarthritis of left knee      NEURO/PSYCH   (+) CVA (cerebral vascular accident) ([address] )   (+) TIA (transient ischemic attack)        Physical Exam    Airway    Mallampati score: III  TM Distance: >3 FB  Neck ROM: full     Dental   No notable dental hx     Cardiovascular  Rhythm: regular, Rate: normal, Cardiovascular exam normal    Pulmonary  Pulmonary exam normal Breath sounds clear to auscultation,     Other Findings        Anesthesia Plan  ASA Score- 4     Anesthesia Type- general with ASA Monitors  Additional Monitors: arterial line  Airway Plan: ETT  Comment: Risks/benefits and alternatives discussed with patient including likely possibility of PONV and sore throat, as well as the rare possibilities of aspiration, dental/oropharyngeal/ocular injuries, or grave/life threatening anesthetic and surgical emergencies  Discussed with patient the recent stroke and the nature of the operative putting him at very high risk for a repeat stroke  Patient understands the risk and wishes to proceed          Plan Factors-Exercise tolerance (METS): >4 METS  Patient summary reviewed  Patient instructed to abstain from smoking on day of procedure  Patient did not smoke on day of surgery  Induction- intravenous  Postoperative Plan- Plan for postoperative opioid use  Planned trial extubation    Informed Consent- Anesthetic plan and risks discussed with patient    I personally reviewed this patient with the CRNA  Discussed and agreed on the Anesthesia Plan with the CRNA  Linda Dubon

## 2021-07-26 NOTE — RESTORATIVE TECHNICIAN NOTE
Restorative Technician Note      Patient Name: Silvino Ramirez     Note Type: Mobility  Patient Position Upon Consult: Supine  Activity Performed: Ambulated;Dangled;Stood  Assistive Device: Other (Comment) (None)  Education Provided: Yes (Educated/encouraged pt to ambulate with assistance 3-4 x's/day )  Patient Position at End of Consult: Supine; All needs within reach;Bed/Chair alarm activated      [facility] FRANK NUNES, Restorative Technician, [employer]

## 2021-07-26 NOTE — PROGRESS NOTES
INTERNAL MEDICINE RESIDENCY PROGRESS NOTE     Name: Pop Cohn   Age & Sex: 64 y o  male   MRN: [id number]  [address]   Encounter: [id number]  Team: SLIM    PATIENT INFORMATION     Name: Pop Cohn   Age & Sex: 64 y o  male   MRN: [id number]  Hospital Stay Days: 12    ASSESSMENT/PLAN     Principal Problem:    CVA (cerebral vascular accident) [facility])  Active Problems:    Gram-negative bacteremia    Carotid stenosis, symptomatic, with infarction ([facility] [address] )    Hypertension    Hyperlipidemia      * CVA (cerebral vascular accident) [facility])  Assessment & Plan  Past medical history of TIA, not on blood thinners, hypertension, diverticulitis presents with left-sided facial droop and slurred speech  On 7/14 in the ED, patient and family member at bedside states that patient was last seen normal at 9:00 p m on 7/13 when he went to bed  Patient woke up at 8:00 a m  And family noticed that he had left-sided facial droop and mild slurred speech  Patient states he had his first TIA six years ago which presented with left-sided weakness  Patient had another TIA four years ago with similar presentation  Patient had third TIA three years ago with right sided visual loss  Not a TPA candidate  7/14 CTA: severe noncalcified plaque in the proximal right internal carotid artery with small mobile thrombus at the distal aspect, nearly occlusive thrombus in the distal right ICA, right M1, and R A1 segments  7/14 CTH: Acute infarct in the right basal ganglia, hyperdense distal right ICA and right MCA consistent with thrombus, small chronic infarct in the right frontal lobe   Small parietal infarct also favored to be chronic  No acute hemorrhage or mass effect  7/14 MRI: Medial right temporal lobe, right corpus striatum, right corona radiata and right frontal cortical foci of acute ischemia  7/15 CTH: Stable right MCA territory infarct with vasogenic edema in the basal ganglia    No hemorrhagic conversion or significant mass effect   7/21 CTA head/neck: Ischemic edema right MCA distribution reidentified without hemorrhagic conversion  Stable unenhanced CT  Persistent high grade stenosis with complex ulcerated plaque at the origin of the right ICA  Improved filling of the distal supraclinoid right ICA, right A1 and M1 segments when compared to prior study one week earlier  Persistent, nonocclusive thrombus in these vessels as described  7/21 VAS carotid study:   RIGHT:There is <50% stenosis noted in the internal carotid artery  Plaque is heterogenous and irregular  Vertebral artery flow is antegrade  There is no significant subclavian artery disease  LEFT:There is 70-99% stenosis noted in the internal carotid artery  Plaque is  heterogenous and irregular  Vertebral artery flow is antegrade  There is no significant subclavian artery disease  Plan:  · ID consulted due to gram negative bacteremia - deemed a contaminant  Cefepime was discontinued  · Heparin drip with goal PTT between 60 and 90  Continue to monitor PTT  · 7/24 decreased midodrine to 2 5 mg to wean off  · 7/26 Vascular surgery completed R carotid endarterectomy  · Continue aspirin and plavix for 3 months followed by aspirin monotherapy  · Continue lipitor  · Follow up stroke clinic outpatient      Carotid stenosis, symptomatic, with infarction [facility])  Assessment & Plan  Plan:  - Continue ASA/Plavix/Statin  - Right carotid endarterectomy planned for Mon 7/26  - vascular surgery following    Gram-negative bacteremia  Assessment & Plan  Patient had a fever w/wheezing and leukopenia on 7/17 and blood cultures were drawn  · One blood culture became positive for gram negative rods on 7/21, confirmed to be bacillus, not anthracis  · 7/14 admission CXR was WNL; 7/17 CXR revealed right lower lobe subsegmental atelectasis      Plan:  · Repeat blood cultures drawn on 7/21, negative at 24 hours  · 7/21 IV cefepime was started after 1/2 periph bcx came back positive as above  · ID consulted to manage abx around carotid endarterectomy that vascular is planning to do; needs to have negative BCx before procedure  · Believed to be a contaminant due to bacillus species, clinical improvement without treatment, and only 1/2 bottles being positive at 4 days  · Cefepime discontinued   · Deemed contaminant, resolved  · Incentive spirometry  · Ambulate patient    Hyperlipidemia  Assessment & Plan  Chronic issue  Plan:  -Continue atorvastatin (lipitor) 40 mg qHS    Hypertension  Assessment & Plan  Takes HCTZ at home   ranging 110s-140s/50s-70s without any inpatient BP meds  Plan:  · On midodrine TID for orthostatic hypotension, holding home BP meds  · Monitor blood pressures  · Avoid hypotension      Disposition: OR for carotid endarterectomy, then floor for monitoring post-op    SUBJECTIVE     Patient seen and examined  No acute events overnight  He reports feeling well and has no complaints at this time  He is looking forward to completing his procedure and recovery for vascular carotid endarterectomy  He is clear for the OR  OBJECTIVE     Vitals:    21 2155 21 1420 21 0600 21 0749   BP: 122/74 130/81  139/83   BP Location: Right arm      Pulse:  69  70   Resp: 18 18  16   Temp: 97 9 °F (36 6 °C) 97 7 °F (36 5 °C)  97 9 °F (36 6 °C)   TempSrc: Oral      SpO2: 93% 92%  98%   Weight:   102 kg (224 lb 13 9 oz)    Height:          Temperature:   Temp (24hrs), Av 8 °F (36 6 °C), Min:97 7 °F (36 5 °C), Max:97 9 °F (36 6 °C)    Temperature: 97 9 °F (36 6 °C)  Intake & Output:  I/O       701 -  0700 701 -  07 -  07    P  O  640 478 0    Total Intake(mL/kg) 640 (6 3) 478 (4 7) 0 (0)    Net +640 +478 0           Unmeasured Urine Occurrence 2 x 4 x 2 x        Weights:   IBW (Ideal Body Weight): 75 3 kg    Body mass index is 31 36 kg/m²    Weight (last 2 days)     Date/Time   Weight    21 0600   102 (224 87)    07/24/21 0600   102 (225 31)            Physical Exam  Constitutional:       General: He is not in acute distress  HENT:      Head: Normocephalic and atraumatic  Mouth/Throat:      Mouth: Mucous membranes are moist       Pharynx: Oropharynx is clear  Eyes:      General: No scleral icterus  Extraocular Movements: Extraocular movements intact  Cardiovascular:      Rate and Rhythm: Normal rate and regular rhythm  Heart sounds: No murmur heard  No friction rub  No gallop  Pulmonary:      Effort: No respiratory distress  Breath sounds: No stridor  No wheezing, rhonchi or rales  Abdominal:      General: Bowel sounds are normal  There is no distension  Palpations: There is no mass  Tenderness: There is no abdominal tenderness  There is no guarding or rebound  Musculoskeletal:         General: Normal range of motion  Comments: Strength 5/5 in all extremities   Skin:     General: Skin is warm and dry  Findings: No rash  Neurological:      Mental Status: He is alert  Cranial Nerves: No cranial nerve deficit  Sensory: No sensory deficit  Motor: No weakness  Psychiatric:         Mood and Affect: Mood normal          Thought Content: Thought content normal        LABORATORY DATA     Labs: I have personally reviewed pertinent reports    Results from last 7 days   Lab Units 07/26/21  0629 07/25/21  0611 07/24/21  0609 07/21/21  0406 07/20/21  0709   WBC Thousand/uL 6 91 7 28 7 70 4 82 4 02*   HEMOGLOBIN g/dL 13 3 13 5 13 2 13 1 12 6   HEMATOCRIT % 40 1 40 8 40 0 39 2 36 7   PLATELETS Thousands/uL 236 247 200 193 144*   NEUTROS PCT %  --   --   --   --  52   MONOS PCT %  --   --   --   --  10   MONO PCT %  --   --  3* 4  --       Results from last 7 days   Lab Units 07/26/21  0629 07/25/21  0611   POTASSIUM mmol/L 4 1 4 1   CHLORIDE mmol/L 110* 110*   CO2 mmol/L 25 23   BUN mg/dL 12 12   CREATININE mg/dL 1 06 1 02   CALCIUM mg/dL 9 2 9 1 Results from last 7 days   Lab Units 07/26/21  0629 07/25/21  0611 07/25/21  0136   PTT seconds 80* 71* 71*               IMAGING & DIAGNOSTIC TESTING     Radiology Results: I have personally reviewed pertinent reports  XR chest portable    Result Date: 7/19/2021  Impression: Right lower lobe subsegmental atelectasis  Workstation performed: [id number]     XR chest portable    Result Date: 7/15/2021  Impression: No acute cardiopulmonary disease  Workstation performed: [id number]     X-ray chest 1 view portable    Result Date: 7/14/2021  Impression: No acute cardiopulmonary disease  Workstation performed: [id number]     CT head wo contrast    Result Date: 7/15/2021  Impression: Stable right MCA territory infarct with vasogenic edema in the basal ganglia  No hemorrhagic conversion or significant mass effect  Workstation performed: [id number]     MRI brain wo contrast    Result Date: 7/14/2021  Impression: Medial right temporal lobe, right corpus striatum, right corona radiata and right frontal cortical foci of acute ischemia  Findings were marked as "immediate"in Epic and will now be related to the ordering physician or covering clinical team by the radiology liaison  Workstation performed: [id number]     CT stroke alert brain    Result Date: 7/14/2021  Impression: Acute infarct in the right basal ganglia  Hyperdense distal right ICA and right MCA consistent with thrombus  Small chronic infarct in the right frontal lobe  Small parietal infarct also favored to be chronic  No acute hemorrhage or mass effect  Findings were directly discussed with Dr Alonso Tobar on 7/14/2021 2:21 PM Workstation performed: [id number]     XR chest portable ICU    Result Date: 7/17/2021  Impression: No acute cardiopulmonary disease   Workstation performed: [id number]     CTA stroke alert (head/neck)    Result Date: 7/14/2021  Impression: Severe noncalcified plaque in the proximal right internal carotid artery with small mobile thrombus at the distal aspect  Nearly occlusive thrombus in the distal right internal carotid artery, right M1 and dominant right A1 segments  Findings were directly discussed with Dr Ishaan Haley on 7/14/2021 2:21 PM  Workstation performed: [id number]     Other Diagnostic Testing: I have personally reviewed pertinent reports  ACTIVE MEDICATIONS     Current Facility-Administered Medications   Medication Dose Route Frequency    acetaminophen (TYLENOL) tablet 650 mg  650 mg Oral Q6H PRN    aspirin chewable tablet 81 mg  81 mg Oral Daily    atorvastatin (LIPITOR) tablet 40 mg  40 mg Oral QPM    clopidogrel (PLAVIX) tablet 75 mg  75 mg Oral Daily    polyethylene glycol (MIRALAX) packet 17 g  17 g Oral Daily    sodium chloride 0 9 % infusion  100 mL/hr Intravenous Continuous       VTE Pharmacologic Prophylaxis: Heparin  VTE Mechanical Prophylaxis: sequential compression device    Portions of the record may have been created with voice recognition software  Occasional wrong word or "sound a like" substitutions may have occurred due to the inherent limitations of voice recognition software    Read the chart carefully and recognize, using context, where substitutions have occurred   ==  Kathy Lagunas MD  [address]  Internal Medicine Residency PGY-1

## 2021-07-26 NOTE — ASSESSMENT & PLAN NOTE
Takes HCTZ at home  7/22 ranging 110s-140s/50s-70s without any inpatient BP meds    Stable BP  Resume home meds

## 2021-07-26 NOTE — ANESTHESIA PREPROCEDURE EVALUATION
Procedure:  ENDARTERECTOMY ARTERY CAROTID-- Right (Right Neck)    Relevant Problems   CARDIO   (+) Carotid stenosis, symptomatic, with infarction (HCC)   (+) Hyperlipidemia   (+) Hypertension      MUSCULOSKELETAL   (+) Primary osteoarthritis of left knee      NEURO/PSYCH   (+) CVA (cerebral vascular accident) ([facility] [address] )   (+) TIA (transient ischemic attack)             Anesthesia Plan  ASA Score- 4     Anesthesia Type- general with ASA Monitors  Additional Monitors: arterial line  Airway Plan: ETT  Plan Factors-Exercise tolerance (METS): >4 METS  Chart reviewed  Imaging results reviewed  Existing labs reviewed  Induction- intravenous  Postoperative Plan- Plan for postoperative opioid use       Informed Consent-

## 2021-07-26 NOTE — PLAN OF CARE
Problem: Neurological Deficit  Goal: Neurological status is stable or improving  Description: Interventions:  - Monitor and assess patient's level of consciousness, motor function, sensory function, and level of assistance needed for ADLs  - Monitor and report changes from baseline  Collaborate with interdisciplinary team to initiate plan and implement interventions as ordered  - Provide and maintain a safe environment  - Consider seizure precautions  - Consider fall precautions  - Consider aspiration precautions  - Consider bleeding precautions  Outcome: Progressing     Problem: Activity Intolerance/Impaired Mobility  Goal: Mobility/activity is maintained at optimum level for patient  Description: Interventions:  - Assess and monitor patient  barriers to mobility and need for assistive/adaptive devices  - Assess patient's emotional response to limitations  - Collaborate with interdisciplinary team and initiate plans and interventions as ordered  - Encourage independent activity per ability   - Maintain proper body alignment  - Perform active/passive rom as tolerated/ordered  - Plan activities to conserve energy   - Turn patient as appropriate  Outcome: Progressing     Problem: Communication Impairment  Goal: Ability to express needs and understand communication  Description: Assess patient's communication skills and ability to understand information  Patient will demonstrate use of effective communication techniques, alternative methods of communication and understanding even if not able to speak  - Encourage communication and provide alternate methods of communication as needed  - Collaborate with case management/ for discharge needs  - Include patient/family/caregiver in decisions related to communication    Outcome: Progressing     Problem: Potential for Aspiration  Goal: Non-ventilated patient's risk of aspiration is minimized  Description: Assess and monitor vital signs, respiratory status, and labs (WBC)  Monitor for signs of aspiration (tachypnea, cough, rales, wheezing, cyanosis, fever)  - Assess and monitor patient's ability to swallow  - Place patient up in chair to eat if possible  - HOB up at 90 degrees to eat if unable to get patient up into chair   - Supervise patient during oral intake  - Instruct patient/ family to take small bites  - Instruct patient/ family to take small single sips when taking liquids  - Follow patient-specific strategies generated by speech pathologist   Outcome: Progressing     Problem: Nutrition  Goal: Nutrition/Hydration status is improving  Description: Monitor and assess patient's nutrition/hydration status for malnutrition (ex- brittle hair, bruises, dry skin, pale skin and conjunctiva, muscle wasting, smooth red tongue, and disorientation)  Collaborate with interdisciplinary team and initiate plan and interventions as ordered  Monitor patient's weight and dietary intake as ordered or per policy  Utilize nutrition screening tool and intervene per policy  Determine patient's food preferences and provide high-protein, high-caloric foods as appropriate  - Assist patient with eating   - Allow adequate time for meals   - Encourage patient to take dietary supplement as ordered  - Collaborate with clinical nutritionist   - Include patient/family/caregiver in decisions related to nutrition    Outcome: Progressing     Problem: PAIN - ADULT  Goal: Verbalizes/displays adequate comfort level or baseline comfort level  Description: Interventions:  - Encourage patient to monitor pain and request assistance  - Assess pain using appropriate pain scale  - Administer analgesics based on type and severity of pain and evaluate response  - Implement non-pharmacological measures as appropriate and evaluate response  - Notify physician/advanced practitioner if interventions unsuccessful or patient reports new pain  Outcome: Progressing     Problem: INFECTION - ADULT  Goal: Absence or prevention of progression during hospitalization  Description: INTERVENTIONS:  - Assess and monitor for signs and symptoms of infection  - Monitor lab/diagnostic results  - Monitor all insertion sites, i e  indwelling lines, tubes, and drains  - [place] appropriate cooling/warming therapies per order  - Administer medications as ordered  - Instruct and encourage patient and family to use good hand hygiene technique  - Identify and instruct in appropriate isolation precautions for identified infection/condition  Outcome: Progressing     Problem: SAFETY ADULT  Goal: Patient will remain free of falls  Description: INTERVENTIONS:  - Educate patient/family on patient safety including physical limitations  - Instruct patient to call for assistance with activity   - Consult OT/PT to assist with strengthening/mobility   - Keep Call bell within reach  - Keep bed low and locked with side rails adjusted as appropriate  - Keep care items and personal belongings within reach  - Initiate and maintain comfort rounds  - Make Fall Risk Sign visible to staff  - Apply yellow socks and bracelet for high fall risk patients  - Consider moving patient to room near nurses station  Outcome: Progressing  Goal: Maintain or return to baseline ADL function  Description: INTERVENTIONS:  -  Assess patient's ability to carry out ADLs; assess patient's baseline for ADL function and identify physical deficits which impact ability to perform ADLs (bathing, care of mouth/teeth, toileting, grooming, dressing, etc )  - Assess/evaluate cause of self-care deficits   - Assess range of motion  - Assess patient's mobility; develop plan if impaired  - Assess patient's need for assistive devices and provide as appropriate  - Encourage maximum independence but intervene and supervise when necessary  - Involve family in performance of ADLs  - Assess for home care needs following discharge   - Consider OT consult to assist with ADL evaluation and planning for discharge  - Provide patient education as appropriate  Outcome: Progressing  Goal: Maintains/Returns to pre admission functional level  Description: INTERVENTIONS:  - Perform BMAT or MOVE assessment daily    - Set and communicate daily mobility goal to care team and patient/family/caregiver  - Collaborate with rehabilitation services on mobility goals if consulted  - Ambulate patient 3 times a day  - Out of bed to chair 3 times a day   - Out of bed for meals 3 times a day  - Out of bed for toileting  - Record patient progress and toleration of activity level   Outcome: Progressing     Problem: DISCHARGE PLANNING  Goal: Discharge to home or other facility with appropriate resources  Description: INTERVENTIONS:  - Identify barriers to discharge w/patient and caregiver  - Arrange for needed discharge resources and transportation as appropriate  - Identify discharge learning needs (meds, wound care, etc )  - Refer to Case Management Department for coordinating discharge planning if the patient needs post-hospital services based on physician/advanced practitioner order or complex needs related to functional status, cognitive ability, or social support system  Outcome: Progressing     Problem: Knowledge Deficit  Goal: Patient/family/caregiver demonstrates understanding of disease process, treatment plan, medications, and discharge instructions  Description: Complete learning assessment and assess knowledge base  Interventions:  - Provide teaching at level of understanding  - Provide teaching via preferred learning methods  Outcome: Progressing     Problem: Nutrition/Hydration-ADULT  Goal: Nutrient/Hydration intake appropriate for improving, restoring or maintaining nutritional needs  Description: Monitor and assess patient's nutrition/hydration status for malnutrition  Collaborate with interdisciplinary team and initiate plan and interventions as ordered    Monitor patient's weight and dietary intake as ordered or per policy  Utilize nutrition screening tool and intervene as necessary  Determine patient's food preferences and provide high-protein, high-caloric foods as appropriate       INTERVENTIONS:  - Monitor oral intake, urinary output, labs, and treatment plans  - Assess nutrition and hydration status and recommend course of action  - Evaluate amount of meals eaten  - Assist patient with eating if necessary   - Allow adequate time for meals  - Recommend/ encourage appropriate diets, oral nutritional supplements, and vitamin/mineral supplements  - Order, calculate, and assess calorie counts as needed  - Assess need for intravenous fluids  - Provide specific nutrition/hydration education as appropriate  - Include patient/family/caregiver in decisions related to nutrition  Outcome: Progressing     Problem: NEUROSENSORY - ADULT  Goal: Achieves stable or improved neurological status  Description: INTERVENTIONS  - Monitor and report changes in neurological status  - Monitor vital signs such as temperature, blood pressure, glucose, and any other labs ordered   - Initiate measures to prevent increased intracranial pressure  - Monitor for seizure activity and implement precautions if appropriate      Outcome: Progressing     Problem: MOBILITY - ADULT  Goal: Maintain or return to baseline ADL function  Description: INTERVENTIONS:  -  Assess patient's ability to carry out ADLs; assess patient's baseline for ADL function and identify physical deficits which impact ability to perform ADLs (bathing, care of mouth/teeth, toileting, grooming, dressing, etc )  - Assess/evaluate cause of self-care deficits   - Assess range of motion  - Assess patient's mobility; develop plan if impaired  - Assess patient's need for assistive devices and provide as appropriate  - Encourage maximum independence but intervene and supervise when necessary  - Involve family in performance of ADLs  - Assess for home care needs following discharge   - Consider OT consult to assist with ADL evaluation and planning for discharge  - Provide patient education as appropriate  Outcome: Progressing  Goal: Maintains/Returns to pre admission functional level  Description: INTERVENTIONS:  - Perform BMAT or MOVE assessment daily    - Set and communicate daily mobility goal to care team and patient/family/caregiver     - Collaborate with rehabilitation services on mobility goals if consulted  - Out of bed for toileting  - Record patient progress and toleration of activity level   Outcome: Progressing     Problem: Potential for Falls  Goal: Patient will remain free of falls  Description: INTERVENTIONS:  - Educate patient/family on patient safety including physical limitations  - Instruct patient to call for assistance with activity   - Consult OT/PT to assist with strengthening/mobility   - Keep Call bell within reach  - Keep bed low and locked with side rails adjusted as appropriate  - Keep care items and personal belongings within reach  - Initiate and maintain comfort rounds  - Make Fall Risk Sign visible to staff  - Apply yellow socks and bracelet for high fall risk patients  - Consider moving patient to room near nurses station  Outcome: Progressing

## 2021-07-26 NOTE — ASSESSMENT & PLAN NOTE
Patient had a fever w/wheezing and leukopenia on 7/17 and blood cultures were drawn  · One blood culture became positive for gram negative rods on 7/21, confirmed to be bacillus, not anthracis  · 7/14 admission CXR was WNL; 7/17 CXR revealed right lower lobe subsegmental atelectasis      Plan:  · Repeat blood cultures drawn on 7/21, negative at 24 hours  · 7/21 IV cefepime was started after 1/2 periph bcx came back positive as above  · ID consulted to manage abx around carotid endarterectomy that vascular is planning to do; needs to have negative BCx before procedure  · Believed to be a contaminant due to bacillus species, clinical improvement without treatment, and only 1/2 bottles being positive at 4 days  · Cefepime discontinued 7/22  · Deemed contaminant, resolved  · Incentive spirometry  · Ambulate patient

## 2021-07-26 NOTE — PROGRESS NOTES
Progress Note - Infectious Disease   Iraan Anthony 64 y o  male MRN: [id number]  Unit/Bed#: [facility] 726-01 Encounter: [id number]      Impression/Plan:    Bacillus cereus in blood culture   Blood culture collected  due to fever now growing bacillus cereus  Fever resolved prior to the initiation of antibiotics  This is likely a contaminant  This species is commonly a contaminant, and culture took 4 days to grow  Additionally, 1/2 bottles (only one set collected) was positive, he improved without therapy, and repeat cultures are negative  He has no signs of infection at this time and work up with UA and CXR was negative              -cefepime stopped, continue to monitor off antibiotics               -no infectious contraindications for proceeding to CEA today     Fever  Resolved prior to initiation of antibiotics, likely not infectious  Could be related to aspiration after his stroke?              -monitor off antibiotics as above     Right MCA stroke, carotid artery stenosis  Followed by neurosurgery, neurology              -plan for right CEA today     I have discussed the above management plan in detail with the primary service  ID will sign off for now, please call back with questions  Antibiotics:  Off antibiotics, s/p cefepime 2 days    Subjective:  Patient feels well  Scheduled for right CEA today  He has no fever, chills, sweats; no nausea, vomiting, diarrhea; no cough, shortness of breath; no pain  No new symptoms  Afebrile without leukocytosis  Objective:  Vitals:  Temp:  [97 7 °F (36 5 °C)-97 9 °F (36 6 °C)] 97 9 °F (36 6 °C)  HR:  [69-70] 70  Resp:  [16-18] 16  BP: (130-139)/(81-83) 139/83  SpO2:  [92 %-98 %] 98 %  Temp (24hrs), Av 8 °F (36 6 °C), Min:97 7 °F (36 5 °C), Max:97 9 °F (36 6 °C)  Current: Temperature: 97 9 °F (36 6 °C)    Physical Exam:   General Appearance:  Alert, interactive, nontoxic, no acute distress  Throat/Mouth: Oropharynx moist without lesions   Poor dentition Lungs:   Clear to auscultation bilaterally; no wheezes, rhonchi or rales; respirations unlabored   Heart:  RRR; no murmur, rub or gallop   Abdomen:   Soft, non-tender, non-distended, positive bowel sounds  Extremities: No clubbing, cyanosis or edema   Skin: No new rashes or lesions  No draining wounds noted  Neuro: no dysarthia    Labs: All pertinent labs and imaging studies were personally reviewed  Results from last 7 days   Lab Units 07/26/21  0629 07/25/21  0611 07/24/21  0609   WBC Thousand/uL 6 91 7 28 7 70   HEMOGLOBIN g/dL 13 3 13 5 13 2   PLATELETS Thousands/uL 236 247 200     Results from last 7 days   Lab Units 07/26/21  0629 07/25/21  0611   SODIUM mmol/L 141 142   POTASSIUM mmol/L 4 1 4 1   CHLORIDE mmol/L 110* 110*   CO2 mmol/L 25 23   BUN mg/dL 12 12   CREATININE mg/dL 1 06 1 02   EGFR ml/min/1 73sq m 75 79   CALCIUM mg/dL 9 2 9 1                       Micro:  Results from last 7 days   Lab Units 07/21/21  2353   BLOOD CULTURE  No Growth After 4 Days  No Growth After 4 Days  Imaging:    I have personally reviewed pertinent imaging study reports and images in PACS       CXR 7/18/21: RLL atelectasis

## 2021-07-26 NOTE — ASSESSMENT & PLAN NOTE
Past medical history of TIA, not on blood thinners, hypertension, diverticulitis presents with left-sided facial droop and slurred speech  On 7/14 in the ED, patient and family member at bedside states that patient was last seen normal at 9:00 p m on 7/13 when he went to bed  Patient woke up at 8:00 a m  And family noticed that he had left-sided facial droop and mild slurred speech  Patient states he had his first TIA six years ago which presented with left-sided weakness  Patient had another TIA four years ago with similar presentation  Patient had third TIA three years ago with right sided visual loss  Not a TPA candidate  7/14 CTA: severe noncalcified plaque in the proximal right internal carotid artery with small mobile thrombus at the distal aspect, nearly occlusive thrombus in the distal right ICA, right M1, and R A1 segments  7/14 CTH: Acute infarct in the right basal ganglia, hyperdense distal right ICA and right MCA consistent with thrombus, small chronic infarct in the right frontal lobe   Small parietal infarct also favored to be chronic  No acute hemorrhage or mass effect  7/14 MRI: Medial right temporal lobe, right corpus striatum, right corona radiata and right frontal cortical foci of acute ischemia  7/15 CTH: Stable right MCA territory infarct with vasogenic edema in the basal ganglia  No hemorrhagic conversion or significant mass effect   7/21 CTA head/neck: Ischemic edema right MCA distribution reidentified without hemorrhagic conversion  Stable unenhanced CT  Persistent high grade stenosis with complex ulcerated plaque at the origin of the right ICA  Improved filling of the distal supraclinoid right ICA, right A1 and M1 segments when compared to prior study one week earlier  Persistent, nonocclusive thrombus in these vessels as described  7/21 VAS carotid study:   RIGHT:There is <50% stenosis noted in the internal carotid artery  Plaque is heterogenous and irregular  Vertebral artery flow is antegrade  There is no significant subclavian artery disease  LEFT:There is 70-99% stenosis noted in the internal carotid artery  Plaque is  heterogenous and irregular  Vertebral artery flow is antegrade  There is no significant subclavian artery disease  Plan:  · ID consulted due to gram negative bacteremia - deemed a contaminant  Cefepime was discontinued  · Heparin drip with goal PTT between 60 and 90  Continue to monitor PTT    · 7/24 decreased midodrine to 2 5 mg to wean off  · 7/26 Vascular surgery completed R carotid endarterectomy  · Continue aspirin and plavix for 3 months followed by aspirin monotherapy  · Continue lipitor  · Follow up stroke clinic outpatient  · Patient was cleared by vascular to be discharged today

## 2021-07-26 NOTE — ASSESSMENT & PLAN NOTE
Plan:  - status post Right carotid endarterectomy 7/26  Continue aspirin, Plavix and statin  -cleared by vascular for discharge home today  Outpatient follow-up

## 2021-07-27 ENCOUNTER — DOCUMENTATION (OUTPATIENT)
Dept: VASCULAR SURGERY | Facility: CLINIC | Age: 61
End: 2021-07-27

## 2021-07-27 VITALS
HEART RATE: 97 BPM | SYSTOLIC BLOOD PRESSURE: 138 MMHG | RESPIRATION RATE: 16 BRPM | BODY MASS INDEX: 33.26 KG/M2 | HEIGHT: 71 IN | OXYGEN SATURATION: 94 % | WEIGHT: 237.6 LBS | TEMPERATURE: 97.3 F | DIASTOLIC BLOOD PRESSURE: 74 MMHG

## 2021-07-27 LAB
ANION GAP SERPL CALCULATED.3IONS-SCNC: 5 MMOL/L (ref 4–13)
APTT PPP: 25 SECONDS (ref 23–37)
BACTERIA BLD CULT: NORMAL
BACTERIA BLD CULT: NORMAL
BUN SERPL-MCNC: 12 MG/DL (ref 5–25)
CALCIUM SERPL-MCNC: 9.1 MG/DL (ref 8.3–10.1)
CHLORIDE SERPL-SCNC: 110 MMOL/L (ref 100–108)
CO2 SERPL-SCNC: 21 MMOL/L (ref 21–32)
CREAT SERPL-MCNC: 0.94 MG/DL (ref 0.6–1.3)
ERYTHROCYTE [DISTWIDTH] IN BLOOD BY AUTOMATED COUNT: 13.2 % (ref 11.6–15.1)
GFR SERPL CREATININE-BSD FRML MDRD: 87 ML/MIN/1.73SQ M
GLUCOSE SERPL-MCNC: 137 MG/DL (ref 65–140)
HCT VFR BLD AUTO: 40.9 % (ref 36.5–49.3)
HGB BLD-MCNC: 13.6 G/DL (ref 12–17)
INR PPP: 0.96 (ref 0.84–1.19)
MCH RBC QN AUTO: 31.5 PG (ref 26.8–34.3)
MCHC RBC AUTO-ENTMCNC: 33.3 G/DL (ref 31.4–37.4)
MCV RBC AUTO: 95 FL (ref 82–98)
PLATELET # BLD AUTO: 240 THOUSANDS/UL (ref 149–390)
PMV BLD AUTO: 9 FL (ref 8.9–12.7)
POTASSIUM SERPL-SCNC: 4.2 MMOL/L (ref 3.5–5.3)
PROTHROMBIN TIME: 12.8 SECONDS (ref 11.6–14.5)
RBC # BLD AUTO: 4.32 MILLION/UL (ref 3.88–5.62)
SODIUM SERPL-SCNC: 136 MMOL/L (ref 136–145)
WBC # BLD AUTO: 11.74 THOUSAND/UL (ref 4.31–10.16)

## 2021-07-27 PROCEDURE — 99239 HOSP IP/OBS DSCHRG MGMT >30: CPT | Performed by: INTERNAL MEDICINE

## 2021-07-27 PROCEDURE — 85610 PROTHROMBIN TIME: CPT | Performed by: SURGERY

## 2021-07-27 PROCEDURE — 85027 COMPLETE CBC AUTOMATED: CPT | Performed by: SURGERY

## 2021-07-27 PROCEDURE — 85730 THROMBOPLASTIN TIME PARTIAL: CPT | Performed by: SURGERY

## 2021-07-27 PROCEDURE — 97168 OT RE-EVAL EST PLAN CARE: CPT

## 2021-07-27 PROCEDURE — 99024 POSTOP FOLLOW-UP VISIT: CPT | Performed by: STUDENT IN AN ORGANIZED HEALTH CARE EDUCATION/TRAINING PROGRAM

## 2021-07-27 PROCEDURE — 99232 SBSQ HOSP IP/OBS MODERATE 35: CPT | Performed by: PSYCHIATRY & NEUROLOGY

## 2021-07-27 PROCEDURE — 80048 BASIC METABOLIC PNL TOTAL CA: CPT | Performed by: SURGERY

## 2021-07-27 RX ORDER — POLYETHYLENE GLYCOL 3350 17 G/17G
17 POWDER, FOR SOLUTION ORAL DAILY
Refills: 0
Start: 2021-07-28

## 2021-07-27 RX ORDER — ASPIRIN 81 MG/1
81 TABLET, CHEWABLE ORAL DAILY
COMMUNITY
Start: 2021-07-27

## 2021-07-27 RX ORDER — ATORVASTATIN CALCIUM 40 MG/1
40 TABLET, FILM COATED ORAL EVERY EVENING
Qty: 30 TABLET | Refills: 1 | Status: SHIPPED | OUTPATIENT
Start: 2021-07-27

## 2021-07-27 RX ORDER — CLOPIDOGREL BISULFATE 75 MG/1
75 TABLET ORAL DAILY
Qty: 30 TABLET | Refills: 3 | Status: SHIPPED | OUTPATIENT
Start: 2021-07-27 | End: 2022-06-14 | Stop reason: ALTCHOICE

## 2021-07-27 RX ADMIN — CLOPIDOGREL BISULFATE 75 MG: 75 TABLET ORAL at 09:07

## 2021-07-27 RX ADMIN — ASPIRIN 81 MG CHEWABLE TABLET 81 MG: 81 TABLET CHEWABLE at 09:07

## 2021-07-27 RX ADMIN — HEPARIN SODIUM 5000 UNITS: 5000 INJECTION INTRAVENOUS; SUBCUTANEOUS at 06:17

## 2021-07-27 NOTE — ANESTHESIA POSTPROCEDURE EVALUATION
Post-Op Assessment Note    CV Status:  Stable    Pain management: adequate     Mental Status:  Sleepy and arousable   Hydration Status:  Stable   PONV Controlled:  None   Airway Patency:  Patent      Post Op Vitals Reviewed: Yes      Staff: CRNA         No complications documented  BP   145/73   Temp   98 2   Pulse  94   Resp   14   SpO2   95% on 6LFM   Postop VS in PACU noted above, SV non-obstructed  On nicardipine gtt @ 10mg/hr, got metoprolol prior to leaving the OR  Goal SBP<160  Was able to move all extremities and strong grasp BL before leaving the OR  Denies pain or nausea in PACU

## 2021-07-27 NOTE — OP NOTE
OPERATIVE REPORT  PATIENT NAME: Sony Diaz    :  1960  MRN: [id number]  Pt Location: BE OR ROOM 05    SURGERY DATE: 2021    Surgeon(s) and Role:     * Edward Woo MD - Primary     * Heidy Romo DPM - [place] [address] Son Lore Montes MD - Fellow    Preop Diagnosis:  CVA (cerebral vascular accident) [facility]) [I63 9]  Carotid stenosis, symptomatic, with infarction ([address] ) [I63 239]    Post-Op Diagnosis Codes:     * CVA (cerebral vascular accident) ([address] ) [I63 9]     * Carotid stenosis, symptomatic, with infarction ([address] ) [I63 239]    Procedure(s) (LRB):  ENDARTERECTOMY ARTERY CAROTID-- Right (Right)    Specimen(s):  * No specimens in log *    Estimated Blood Loss:   Minimal    Drains:  Urethral Catheter Latex 16 Fr  (Active)   Site Assessment Clean;Skin intact 21   Collection Container Standard drainage bag 21   Securement Method Securing device (Describe) 21   Number of days: 0       Anesthesia Type:   General    Operative Indications:  CVA (cerebral vascular accident) ([address] ) [I63 9]  Carotid stenosis, symptomatic, with infarction ([address] ) [I63 239]      Operative Findings:  Heavily ulcerated thrombotic plaque with ectasia of the right carotid bulb          Complications:   None    Procedure and Technique:  The patient was brought to the operating room and placed on the operating table in the supine position  The patient was identified by verbal confirmation and armband identification  The patient was prepped and draped in usual sterile fashion and a formal timeout was called  A longitudinal incision was made over the previously marked right carotid bifurcation  Blunt and sharp dissection was carried through the subcutaneous tissue and platysma to the anterior border the sternocleidomastoid muscle  The Weitleaner retractor was inserted  The internal jugular vein was mobilized and retracted medially to expose the carotid in the retrojugular approach   With careful mobilization the proximal and distal carotid artery were dissected and encircled with Vesseloops  The external carotid artery was separately encircled with a vessel loop as was the superior laryngeal artery  The hypoglossal nerve was carefully mobilized and preserved  There was significant inflammatory changes in the region of the carotid bulb with dense adherence of the vagus nerve to the carotid bulb  It was carefully dissected free of the carotid bulb with meticulous sharp dissection  When adequate mobilization of the internal carotid artery was achieved the patient received 9000 units of heparin  After suitable delay traction was placed on the vessel loops to achieve vascular control  An arteriotomy was created in the CCA extending into the left ICA using 11 blade and Pott's scissors  An 10 [place] shunt was then inserted into the internal carotid artery with good backbleeding noted from the brain  The shunt was secured with a Annabelle clamp  The proximal end of the [place] shunt was placed in the common carotid  We performed an endarterectomy using [place] elevator and Hua's forceps  The distal plaque was transected to a smooth edge  The distal plaque was then tacked using 3 separate tacking sutures of 7-0 prolene  The proximal endpoint was developed by transecting the plaque at the CCA  The external carotid plaque was removed by eversion endarterectomy  The endartectomized surface was throughly flushed with heparinized saline and all debris were meticulously removed  The the proximal half of the endarterectomy involving the carotid bulb was simple suture closure with 6 0 Prolene rather than patch angioplasty due to the large size of the bulb  A bovine pericardial patch was then obtained  It was sutured using standard 4-quadrant technique using 6-0 prolene sutures  When the suture line was nearly completed the shunt was removed the vessels were flushed vigorously in both directions   The suture line was tied down and flow was restored first in the external and then the internal carotid artery  Duplex ultrasound was brought to the field and insonation of the common external and internal carotid arteries showed that there was a small intimal flap at the level of the proximal endpoint  Hence clamps were reapplied and this area was opened with a small arteriotomy and inspected and all flaps were excised and the entire endarterectomized surface was thoroughly irrigated with heparinized saline  Repeat duplex ultrasound was performed which demonstrated that there was no technical defects and there were excellent waveforms  30 mg of protamine was administered and FloSeal procoagulant was instilled in the operative field  The skin edges were infiltrated with half percent Marcaine with epinephrine and closure was then achieved with a running 3-0 Monocryl suture for the platysma and a 4-0 Monocryl subcuticular skin closure  A Histoacryl bandage was applied  The patient awoke and was moving all 4 extremities   The patient was transferred to recovery in stable condition         I was present for the entire procedure    Patient Disposition:  PACU     SIGNATURE: Antonia Mena MD  DATE: July 26, 2021  TIME: 8:09 PM    Vascular Quality Initiative - Carotid Endarterectomy     Urgency:  Urgent    Anesthesia: General Type: Conventional    Side: right    Patch Type: Bovine Pericardial     If Prosthetic, Patch : Emilia    Shunt: Yes- Routine    Skin Prep: Chlorhexidine    Drain: no  Heparin: yes    Protamine: yes   Dextran: no    Re-explore artery after closure: no    Total Procedure time: 150min    Monitoring:   EEG: no   Stump Pressure: no   Other: no    Completion Study:   Doppler: no   Duplex: yes   Arteriogram: no    Concomitant Procedure:   Proximal Endovascular: no   Distal Endovascular: no   CABG: no   Other Arterial Op: no

## 2021-07-27 NOTE — PHYSICAL THERAPY NOTE
Physical Therapy Screen         07/27/21 0910   PT Last Visit   PT Visit Date 07/27/21   Note Type   Note type Re-Evaluation;Screen     PT orders received, chart reviewed  Pt previously seen by PT this admission, recommending no rehab needs at that time  Pt seen ambulating independently without AD in [place] with OT this AM   PT to DC pt from caseload at this time, no rehab needs  Please re-consult if appropriate  Thank you      Cristy Hobson, PT, DPT

## 2021-07-27 NOTE — OCCUPATIONAL THERAPY NOTE
Occupational Therapy Evaluation     Patient Name: Val Rodrigues  GCOJB'B Date: 7/27/2021  Problem List  Principal Problem:    CVA (cerebral vascular accident) [facility])  Active Problems:    Hypertension    Hyperlipidemia    Gram-negative bacteremia    Carotid stenosis, symptomatic, with infarction ([address] )    TIA (transient ischemic attack)    Past Medical History  Past Medical History:   Diagnosis Date    Diverticulitis large intestine     "During Bowel Resection determined not to have Diverticulitis "- Patient    Hypertension     TIA (transient ischemic attack)      Past Surgical History  Past Surgical History:   Procedure Laterality Date    ANTERIOR CRUCIATE LIGAMENT REPAIR Right     ACL Repair    BOWEL RESECTION      FOOT SURGERY Right     patient not sure procedure    SHOULDER SURGERY Bilateral     Rotator cuff/ Right shoulder bone growth removal             07/27/21 0918   OT Last Visit   OT Visit Date 07/27/21   Note Type   Note type Re-Evaluation   Restrictions/Precautions   Weight Bearing Precautions Per Order No   Other Precautions Multiple lines;Telemetry; Fall Risk   Pain Assessment   Pain Assessment Tool 0-10   Pain Score No Pain   Home Living   Additional Comments Please see initial evaluation    Prior Function   Comments Please see initial evaluation    Lifestyle   Autonomy I w/ ADLS/IADLS, transfers and functional mobility PTA   Reciprocal Relationships Pt lives w/ his spouse who works during the day  Pt reports his step-daughter and three grandchildren are also within the home      Service to Others Pt works full time as a    Intrinsic Gratification Enjoyed teaching martial arts; 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   Psychosocial   Psychosocial (WDL) WDL   Subjective   Subjective "We can keep walking "    ADL   Where Assessed Chair   Eating Assistance 7  Independent   Grooming Assistance 7  [address] 7  Independent   LB [address] 5  Supervision/Setup UB Dressing Assistance 7  Independent   LB Dressing Assistance 5  Supervision/Setup   Toileting Assistance  5  Supervision/Setup   Bed Mobility   Supine to Sit 7  Independent   Additional Comments Pt supine in bed upon arrival  Pt seated OOB in chair with call bell within reach and needs met  Transfers   Sit to Stand 5  Supervision   Stand to Sit 5  Supervision   Additional Comments W/o AD, supervision for safety and line management   Functional Mobility   Functional Mobility 5  Supervision   Additional Comments Pt traveled long home distance in hallway with no LOB   Additional items   (no AD )   Balance   Static Sitting Good   Dynamic Sitting Fair +   Static Standing Fair   Dynamic Standing Fair   Ambulatory Fair   Activity Tolerance   Activity Tolerance Patient tolerated treatment well   Medical Staff Made Aware OT Sharon    Nurse Made Aware Appropriate to see per Lexie SCHNEIDER Assessment   RUE Assessment WFL   LUE Assessment   LUE Assessment WFL   Hand Function   Gross Motor Coordination Functional   Fine Motor Coordination Functional   Sensation   Light Touch No apparent deficits   Vision-Basic Assessment   Current Vision Wears glasses only for reading   Cognition   Overall Cognitive Status [facility]   Arousal/Participation Responsive; Cooperative   Attention Within functional limits   Orientation Level Oriented X4   Memory Within functional limits   Following Commands Follows all commands and directions without difficulty   Comments Pt pleasant and open to working with therapy  Pt engaged in appropriate conversations throughout session including easing back into home roles and routines  Pt with no concerns or questions for OT  Assessment   Limitation Decreased high-level ADLs   Prognosis Good   Assessment Pt is a 64 y o  male seen for OT re-evaluation at [facility] s/p R CEA with bovine patch 7/26/21 after admission on 7/14/2021 with CVA (cerebral vascular accident)   Pt  has a past medical history of Diverticulitis large intestine, Hypertension, and TIA (transient ischemic attack)  Contexts influencing pts occupational performance include living in a [facility] with first floor setup with supportive spouse who has planned time off to assist Pt PRN during recovery  PTA, pt reports full independence with ADLS, IADLs, and functional mobility w/o AD/DME  Pt drives and works full time at baseline  At time of evaluation pt is independent self-feeding, independent for grooming, independent for UB ADLs, supervision for LB ADLs, supervision for toileting, independent for bed mobility, supervision functional transfers and  functional mobility w/o AD  Pt performance appears consistent with baseline functioning and anticipated recovery with increased family support  Discharge from acute OT services is recommended at this time with continued engagement with nursing, CM,  PT, and restorative technicians during hospital stay  When medically stable, OT recommended discharge to home with family  The patient's raw score on the AM-PAC Daily Activity inpatient short form is 21, standardized score is 44 27, greater than 39 4  Patients at this level are likely to benefit from discharge to home  Please refer to the recommendation of the Occupational Therapist for safe discharge planning     Goals   Patient Goals to go home    Plan   OT Frequency Eval only   Recommendation   OT Discharge Recommendation No rehabilitation needs  (home with increased family assist/support PRN )   OT - OK to Discharge Yes  (when medically stable )   AM-PAC Daily Activity Inpatient   Lower Body Dressing 3   Bathing 3   Toileting 3   Upper Body Dressing 4   Grooming 4   Eating 4   Daily Activity Raw Score 21   Daily Activity Standardized Score (Calc for Raw Score >=11) 44 27   AM-PAC Applied Cognition Inpatient   Following a Speech/Presentation 4   Understanding Ordinary Conversation 4   Taking Medications 4   Remembering Where Things Are Placed or Put Away 4   Remembering List of 4-5 Errands 4   Taking Care of Complicated Tasks 4   Applied Cognition Raw Score 24   Applied Cognition Standardized Score 62 21   Modified Tillman Scale   Modified Tillman Scale 2     HENNY Oropeza

## 2021-07-27 NOTE — PROGRESS NOTES
NEUROLOGY RESIDENCY PROGRESS NOTE     Name: Angelina Medina   Age & Sex: 64 y o  male   MRN: [id number]  Unit/Bed#: [facility] 510-01   Encounter: [id number]    ASSESSMENT & PLAN     * CVA (cerebral vascular accident) [facility])  Assessment & Plan  Assessment/Impression: 63 y/o M w/ acute ischemic strokes likely 2/2 mobile carotid thrombus (possible to likely etiology for his prior strokes as well), currently significantly improved clinically and overall stable    Presenting sx: left facial droop, dysarthria, LKN 2100 reportedly the night PTA (approx 20 hrs prior to initial consult), but on further discussion w/ family it appears his [de-identified] may have been the day before yesterday and that he was experiencing a right-sided headache for about 2 weeks which peaked around the time the family noticed sx   NIHSS: 2 on arrival    tPA not given 2/2 time course, non-disabling sx   CTH/CTA:  Right caudate/lentiform/BG hypodensity and right frontal focal encephalomalacia due to remote infarct, small, likely chronic infarct in the right parietal lobe; severe right ICA stenosis (noncalcified plaque at the carotid bifurcation), and nearly occlusive thrombus in the right supraclinoid ICA extending to the terminus, and into right M1 and A1 segments; hypoplastic but patent left A1   MRI: new focal ischemia in medial R T lobe, R corpus striatum, R corona radiata, R frontal lobe (all R MCA territory strokes); no susceptibility weighting imaging signal consistent w/ old or new blood products   Labs: A1c 5 3, LDL 97,    Echo: LVEF 75%, mild concentric hypertrophy, no RWMA, no PFO on bubble, trace MR, TR, NC, mild dilatation of the ascending aorta (4 2 cm)   Exam: mild left facial weakness but no residual dysarthria or asymmetric tongue protrusion   Repeat CTA w/ significant improvement in intracranial thrombus    Plan: Repeat CTA w/ improved intracranial thrombus - no neurosurgical intervention or f/u indicated, will ultimately need CEA for symptomatic R ICA stenosis   Repeat CTA w/ significant improvement in intracranial thrombus - can d/c heparin gtt and start DAPT, NSg signed off - no indication for neurosurgical intervention   Recommend DAPT w/ ASA, plavix, Continue statin   Euglycemia, normothermia, normotension   Now s/p R CEA w/ vascular surgery   Discharge plan: after CEA (tentatively scheduled for Monday)  o DAPT w/ ASA and plavix for 3 months or per vascular surgery recommendations  o Statin   o F/u neuro stroke clinic    Carotid stenosis, symptomatic, with infarction [facility])  Assessment & Plan  Patient w/ R MCA strokes, significant intracranial ICA, M1, A1 thrombi which have improved significantly w/ a week on heparin gtt   Also demonstrated significant right ICA stenosis w/ complex ulcerated plaque at origin   Consult vascular surgery re:  CEA candidacy   Plan as above    Hypertension  Assessment & Plan  As above    Mckayla Fang will need follow up in in 4 weeks with neurovascular    He will not require outpatient neurological testing at this time    SUBJECTIVE     Patient was seen and examined  No critical events overnight  Denied N/V/F/C, feels well and has been walking around independently  Tolerated CEA very well w/ stable neurologic exam this morning  Clinically stable for d/c w/ close neuro f/u    ROS negative unless otherwise noted    OBJECTIVE     Patient ID: Mckayla Fang is a 64 y o  male  Vitals:    21 2350 21 0000 21 0602 21 0700   BP:  117/67 126/70 138/74   BP Location:   Right arm    Pulse: 77 67 73 97   Resp:   16 16   Temp:   97 6 °F (36 4 °C) (!) 97 3 °F (36 3 °C)   TempSrc:   Oral Oral   SpO2:    94%   Weight:   108 kg (237 lb 9 6 oz)    Height:          Temperature:   Temp (24hrs), Av 8 °F (36 6 °C), Min:97 3 °F (36 3 °C), Max:98 1 °F (36 7 °C)    Temperature: (!) 97 3 °F (36 3 °C)    Physical Exam  Vitals reviewed     Constitutional:       General: He is not in acute distress  Appearance: Normal appearance  He is not ill-appearing, toxic-appearing or diaphoretic  HENT:      Head: Normocephalic and atraumatic  Right Ear: External ear normal       Left Ear: External ear normal       Nose: Nose normal  No congestion or rhinorrhea  Mouth/Throat:      Mouth: Mucous membranes are moist       Pharynx: Oropharynx is clear  No oropharyngeal exudate or posterior oropharyngeal erythema  Eyes:      General: No scleral icterus  Right eye: No discharge  Left eye: No discharge  Extraocular Movements: Extraocular movements intact  Conjunctiva/sclera: Conjunctivae normal    Pulmonary:      Effort: Pulmonary effort is normal  No respiratory distress  Skin:     Coloration: Skin is not pale  Findings: No erythema or rash  Neurological:      Mental Status: He is alert and oriented to person, place, and time  Sensory: No sensory deficit  Motor: No weakness  Coordination: Coordination normal    Psychiatric:         Mood and Affect: Mood normal          Speech: Speech normal          Behavior: Behavior normal      Neurologic Exam     Mental Status   Oriented to person, place, and time  Attention: normal    Speech: speech is normal   Level of consciousness: alert  Knowledge: consistent with education  Cranial Nerves     CN III, IV, VI   CN III: no CN III palsy  CN VI: no CN VI palsy    CN VII   Left facial weakness: central (stubtle flattening of nasolabial fold, left-sided weakness w/ smiling)    CN VIII   Hearing: intact    Motor Exam   Muscle bulk: normal  Overall muscle tone: normal  Right arm pronator drift: absent  Left arm pronator drift: absent  BUE/BLE strength grossly intact and symmetric     Sensory Exam   Light touch normal      Gait, Coordination, and Reflexes     Tremor   Resting tremor: absent  Action tremor: absent    LABORATORY DATA     Labs: I have personally reviewed pertinent reports      Results from last 7 days   Lab Units 07/27/21  0614 07/26/21  0629 07/25/21  0611 07/24/21  0609 07/21/21  0406   WBC Thousand/uL 11 74* 6 91 7 28 7 70 4 82   HEMOGLOBIN g/dL 13 6 13 3 13 5 13 2 13 1   HEMATOCRIT % 40 9 40 1 40 8 40 0 39 2   PLATELETS Thousands/uL 240 236 247 200 193   MONO PCT %  --   --   --  3* 4      Results from last 7 days   Lab Units 07/27/21  0614 07/26/21  0629 07/25/21  0611   POTASSIUM mmol/L 4 2 4 1 4 1   CHLORIDE mmol/L 110* 110* 110*   CO2 mmol/L 21 25 23   BUN mg/dL 12 12 12   CREATININE mg/dL 0 94 1 06 1 02   CALCIUM mg/dL 9 1 9 2 9 1              Results from last 7 days   Lab Units 07/27/21  0614 07/26/21  0629 07/25/21  0611   INR  0 96  --   --    PTT seconds 25 80* 71*             ABG:No results found for: PHART, YNA1IXR, PO2ART, NDR1JWD, F3VKRSTK, BEART, SOURCE    IMAGING & DIAGNOSTIC TESTING   Radiology Results: I have personally reviewed pertinent reports  and I have personally reviewed pertinent films in PACS  VAS carotid complete study   Final Result by Edward Woo MD (07/21 2226)      CTA head and neck w wo contrast   Final Result by Shayla Fowler MD (07/21 1623)      Ischemic edema right MCA distribution reidentified without hemorrhagic conversion  Stable unenhanced CT  Persistent high grade stenosis with complex ulcerated plaque at the origin of the right ICA  Improved filling of the distal supraclinoid right ICA, right A1 and M1 segments when compared to prior study one week earlier  Persistent, nonocclusive thrombus in these vessels as described  Workstation performed: [id number]         XR chest portable   Final Result by Jann Guan MD (07/19 9188)      Right lower lobe subsegmental atelectasis  Workstation performed: [id number]         XR chest portable ICU   Final Result by Ernesto Moses MD (07/17 1635)      No acute cardiopulmonary disease                    Workstation performed: [id number]         CT head wo contrast   Final Result by Demi Brannon MD (07/15 1941)         Stable right MCA territory infarct with vasogenic edema in the basal ganglia  No hemorrhagic conversion or significant mass effect  Workstation performed: [id number]         MRI brain wo contrast   Final Result by Eduar Gómez MD (07/14 0285)      Medial right temporal lobe, right corpus striatum, right corona radiata and right frontal cortical foci of acute ischemia  Findings were marked as "immediate"in Epic and will now be related to the ordering physician or covering clinical team by the radiology liaison  Workstation performed: [id number]         XR chest portable   Final Result by Juan Pablo Fung MD (07/15 0815)      No acute cardiopulmonary disease  Workstation performed: [id number]         VAS intra-op ultrasound CEA    (Results Pending)     Other Diagnostic Testing: I have personally reviewed pertinent reports        ACTIVE MEDICATIONS     Current Facility-Administered Medications   Medication Dose Route Frequency    acetaminophen (TYLENOL) tablet 650 mg  650 mg Oral Q6H PRN    aspirin chewable tablet 81 mg  81 mg Oral Daily    atorvastatin (LIPITOR) tablet 40 mg  40 mg Oral QPM    clopidogrel (PLAVIX) tablet 75 mg  75 mg Oral Daily    heparin (porcine) subcutaneous injection 5,000 Units  5,000 Units Subcutaneous Q8H [facility] & long term    Labetalol HCl (NORMODYNE) injection 5 mg  5 mg Intravenous Q15 Min PRN    And    hydrALAZINE (APRESOLINE) injection 15 mg  15 mg Intravenous Q15 Min PRN    And    niCARdipine (CARDENE) 25 mg (STANDARD CONCENTRATION) in sodium chloride 0 9% 250 mL  1-15 mg/hr Intravenous Continuous PRN    polyethylene glycol (MIRALAX) packet 17 g  17 g Oral Daily     Medication Sig   acetaminophen (TYLENOL) 650 mg CR tablet Take 1 tablet (650 mg total) by mouth every 8 (eight) hours as needed for mild pain   hydrochlorothiazide (HYDRODIURIL) 25 mg tablet Take 25 mg by mouth daily VTE Pharmacologic Prophylaxis: Heparin Drip  VTE Mechanical Prophylaxis: sequential compression device    ==  Jasmyne Mederos MD  Resident, Neurology, PGY-2  [address]

## 2021-07-27 NOTE — PLAN OF CARE
Problem: Neurological Deficit  Goal: Neurological status is stable or improving  Description: Interventions:  - Monitor and assess patient's level of consciousness, motor function, sensory function, and level of assistance needed for ADLs  - Monitor and report changes from baseline  Collaborate with interdisciplinary team to initiate plan and implement interventions as ordered  - Provide and maintain a safe environment  - Consider seizure precautions  - Consider fall precautions  - Consider aspiration precautions  - Consider bleeding precautions  Outcome: Progressing     Problem: Activity Intolerance/Impaired Mobility  Goal: Mobility/activity is maintained at optimum level for patient  Description: Interventions:  - Assess and monitor patient  barriers to mobility and need for assistive/adaptive devices  - Assess patient's emotional response to limitations  - Collaborate with interdisciplinary team and initiate plans and interventions as ordered  - Encourage independent activity per ability   - Maintain proper body alignment  - Perform active/passive rom as tolerated/ordered  - Plan activities to conserve energy   - Turn patient as appropriate  Outcome: Progressing     Problem: Communication Impairment  Goal: Ability to express needs and understand communication  Description: Assess patient's communication skills and ability to understand information  Patient will demonstrate use of effective communication techniques, alternative methods of communication and understanding even if not able to speak  - Encourage communication and provide alternate methods of communication as needed  - Collaborate with case management/ for discharge needs  - Include patient/family/caregiver in decisions related to communication    Outcome: Progressing     Problem: Potential for Aspiration  Goal: Non-ventilated patient's risk of aspiration is minimized  Description: Assess and monitor vital signs, respiratory status, and labs (WBC)  Monitor for signs of aspiration (tachypnea, cough, rales, wheezing, cyanosis, fever)  - Assess and monitor patient's ability to swallow  - Place patient up in chair to eat if possible  - HOB up at 90 degrees to eat if unable to get patient up into chair   - Supervise patient during oral intake  - Instruct patient/ family to take small bites  - Instruct patient/ family to take small single sips when taking liquids  - Follow patient-specific strategies generated by speech pathologist   Outcome: Progressing     Problem: Nutrition  Goal: Nutrition/Hydration status is improving  Description: Monitor and assess patient's nutrition/hydration status for malnutrition (ex- brittle hair, bruises, dry skin, pale skin and conjunctiva, muscle wasting, smooth red tongue, and disorientation)  Collaborate with interdisciplinary team and initiate plan and interventions as ordered  Monitor patient's weight and dietary intake as ordered or per policy  Utilize nutrition screening tool and intervene per policy  Determine patient's food preferences and provide high-protein, high-caloric foods as appropriate  - Assist patient with eating   - Allow adequate time for meals   - Encourage patient to take dietary supplement as ordered  - Collaborate with clinical nutritionist   - Include patient/family/caregiver in decisions related to nutrition    Outcome: Progressing     Problem: PAIN - ADULT  Goal: Verbalizes/displays adequate comfort level or baseline comfort level  Description: Interventions:  - Encourage patient to monitor pain and request assistance  - Assess pain using appropriate pain scale  - Administer analgesics based on type and severity of pain and evaluate response  - Implement non-pharmacological measures as appropriate and evaluate response  - Notify physician/advanced practitioner if interventions unsuccessful or patient reports new pain  Outcome: Progressing     Problem: INFECTION - ADULT  Goal: Absence or prevention of progression during hospitalization  Description: INTERVENTIONS:  - Assess and monitor for signs and symptoms of infection  - Monitor lab/diagnostic results  - Monitor all insertion sites, i e  indwelling lines, tubes, and drains  - [place] appropriate cooling/warming therapies per order  - Administer medications as ordered  - Instruct and encourage patient and family to use good hand hygiene technique  - Identify and instruct in appropriate isolation precautions for identified infection/condition  Outcome: Progressing     Problem: SAFETY ADULT  Goal: Patient will remain free of falls  Description: INTERVENTIONS:  - Educate patient/family on patient safety including physical limitations  - Instruct patient to call for assistance with activity   - Consult OT/PT to assist with strengthening/mobility   - Keep Call bell within reach  - Keep bed low and locked with side rails adjusted as appropriate  - Keep care items and personal belongings within reach  - Initiate and maintain comfort rounds  - Make Fall Risk Sign visible to staff  - Offer Toileting every 3 Hours, in advance of need  - Apply yellow socks and bracelet for high fall risk patients  - Consider moving patient to room near nurses station  Outcome: Progressing  Goal: Maintain or return to baseline ADL function  Description: INTERVENTIONS:  -  Assess patient's ability to carry out ADLs; assess patient's baseline for ADL function and identify physical deficits which impact ability to perform ADLs (bathing, care of mouth/teeth, toileting, grooming, dressing, etc )  - Assess/evaluate cause of self-care deficits   - Assess range of motion  - Assess patient's mobility; develop plan if impaired  - Assess patient's need for assistive devices and provide as appropriate  - Encourage maximum independence but intervene and supervise when necessary  - Involve family in performance of ADLs  - Assess for home care needs following discharge   - Consider OT consult to assist with ADL evaluation and planning for discharge  - Provide patient education as appropriate  Outcome: Progressing  Goal: Maintains/Returns to pre admission functional level  Description: INTERVENTIONS:  - Perform BMAT or MOVE assessment daily    - Set and communicate daily mobility goal to care team and patient/family/caregiver  - Collaborate with rehabilitation services on mobility goals if consulted  - Perform Range of Motion 3 times a day  - Reposition patient every 3 hours  - Dangle patient 3 times a day  - Stand patient 3 times a day  - Ambulate patient 3 times a day  - Out of bed to chair 3 times a day   - Out of bed for meals 3 times a day  - Out of bed for toileting  - Record patient progress and toleration of activity level   Outcome: Progressing     Problem: DISCHARGE PLANNING  Goal: Discharge to home or other facility with appropriate resources  Description: INTERVENTIONS:  - Identify barriers to discharge w/patient and caregiver  - Arrange for needed discharge resources and transportation as appropriate  - Identify discharge learning needs (meds, wound care, etc )  - Refer to Case Management Department for coordinating discharge planning if the patient needs post-hospital services based on physician/advanced practitioner order or complex needs related to functional status, cognitive ability, or social support system  Outcome: Progressing     Problem: Knowledge Deficit  Goal: Patient/family/caregiver demonstrates understanding of disease process, treatment plan, medications, and discharge instructions  Description: Complete learning assessment and assess knowledge base    Interventions:  - Provide teaching at level of understanding  - Provide teaching via preferred learning methods  Outcome: Progressing     Problem: Nutrition/Hydration-ADULT  Goal: Nutrient/Hydration intake appropriate for improving, restoring or maintaining nutritional needs  Description: Monitor and assess patient's nutrition/hydration status for malnutrition  Collaborate with interdisciplinary team and initiate plan and interventions as ordered  Monitor patient's weight and dietary intake as ordered or per policy  Utilize nutrition screening tool and intervene as necessary  Determine patient's food preferences and provide high-protein, high-caloric foods as appropriate       INTERVENTIONS:  - Monitor oral intake, urinary output, labs, and treatment plans  - Assess nutrition and hydration status and recommend course of action  - Evaluate amount of meals eaten  - Assist patient with eating if necessary   - Allow adequate time for meals  - Recommend/ encourage appropriate diets, oral nutritional supplements, and vitamin/mineral supplements  - Order, calculate, and assess calorie counts as needed  - Assess need for intravenous fluids  - Provide specific nutrition/hydration education as appropriate  - Include patient/family/caregiver in decisions related to nutrition  Outcome: Progressing     Problem: NEUROSENSORY - ADULT  Goal: Achieves stable or improved neurological status  Description: INTERVENTIONS  - Monitor and report changes in neurological status  - Monitor vital signs such as temperature, blood pressure, glucose, and any other labs ordered   - Initiate measures to prevent increased intracranial pressure  - Monitor for seizure activity and implement precautions if appropriate      Outcome: Progressing     Problem: MOBILITY - ADULT  Goal: Maintain or return to baseline ADL function  Description: INTERVENTIONS:  -  Assess patient's ability to carry out ADLs; assess patient's baseline for ADL function and identify physical deficits which impact ability to perform ADLs (bathing, care of mouth/teeth, toileting, grooming, dressing, etc )  - Assess/evaluate cause of self-care deficits   - Assess range of motion  - Assess patient's mobility; develop plan if impaired  - Assess patient's need for assistive devices and provide as appropriate  - Encourage maximum independence but intervene and supervise when necessary  - Involve family in performance of ADLs  - Assess for home care needs following discharge   - Consider OT consult to assist with ADL evaluation and planning for discharge  - Provide patient education as appropriate  Outcome: Progressing  Goal: Maintains/Returns to pre admission functional level  Description: INTERVENTIONS:  - Perform BMAT or MOVE assessment daily    - Set and communicate daily mobility goal to care team and patient/family/caregiver  - Collaborate with rehabilitation services on mobility goals if consulted  - Perform Range of Motion 3 times a day  - Reposition patient every 3 hours    - Dangle patient 3 times a day  - Stand patient 3 times a day  - Ambulate patient 3 times a day  - Out of bed to chair 3 times a day   - Out of bed for meals 3 times a day  - Out of bed for toileting  - Record patient progress and toleration of activity level   Outcome: Progressing     Problem: Potential for Falls  Goal: Patient will remain free of falls  Description: INTERVENTIONS:  - Educate patient/family on patient safety including physical limitations  - Instruct patient to call for assistance with activity   - Consult OT/PT to assist with strengthening/mobility   - Keep Call bell within reach  - Keep bed low and locked with side rails adjusted as appropriate  - Keep care items and personal belongings within reach  - Initiate and maintain comfort rounds  - Make Fall Risk Sign visible to staff  - Apply yellow socks and bracelet for high fall risk patients  - Consider moving patient to room near nurses station  Outcome: Progressing

## 2021-07-27 NOTE — PROGRESS NOTES
Attempted to visit patient and introduced self  Cultivated a relationship of care and support  Patient declined  support       07/27/21 1100   Clinical Encounter Type   Visited With Patient   Routine Visit Introduction

## 2021-07-27 NOTE — PROGRESS NOTES
Progress Note - Vascular Surgery   Skippy Screen 64 y o  male MRN: [id number]  Unit/Bed#: [facility] 510-01 Encounter: [id number]    Assessment:  61M w/ acute R MCA thromboembolic ischemic CVA in setting of high grade stenosis of R ICA  Now s/p R CEA with bovine patch  Plan:  -Continue frequent neurovascular checks  -Continue ASA/statin/plavix  -d/c arterial line  -d/c smith catheter  -Diet as tolerated  -PRN analgesia  -Patient is stable for discharge from a vascular point of view  No further need for intervention    -Rest of care per primary    Subjective/Objective   Subjective: Patient denies new complaints  Denies acute events overnight  Complains of minimal pain to his R neck incision  Objective:     Blood pressure 116/64, pulse 98, temperature 98 1 °F (36 7 °C), resp  rate 18, height 5' 11" (1 803 m), weight 102 kg (224 lb 13 9 oz), SpO2 95 %  ,Body mass index is 31 36 kg/m²  Intake/Output Summary (Last 24 hours) at 7/26/2021 2223  Last data filed at 7/26/2021 2048  Gross per 24 hour   Intake 1700 ml   Output 700 ml   Net 1000 ml       Invasive Devices     Peripheral Intravenous Line            Peripheral IV 07/25/21 Left Forearm 1 day    Peripheral IV 07/26/21 Left Arm <1 day          Drain            Urethral Catheter Latex 16 Fr  <1 day                Physical Exam:   Gen:    NAD  CV:      warm, well-perfused  Lungs: No respiratory distress  Abd:     soft, NT/ND  Ext:      no CCE  Intact and symmetrical strength of upper and lower extremities  Skin: R neck incision with skin edges well coapted  No drainage noted  Mild edema and mild ecchymosis noted  No gross signs of infection or underlying hematoma formation  Neuro: A&Ox3, motor and sensory intact, mild L facial asymmetry at baseline, otherwise CN 2-12 grossly intact

## 2021-07-27 NOTE — DISCHARGE INSTRUCTIONS
DISCHARGE INSTRUCTIONS                       CAROTID ENDARTERECTOMY  Following discharge from the hospital, you may have some questions about your operation, your activities or your general condition  These instructions may answer some of your questions and help you adjust during the first few weeks following your operation  ACTIVITY: Resume your normal activities and exercise schedule as tolerated  If you were driving prior to surgery, you may resume driving one week following discharge from the hospital  You may ride in a car upon discharge  DIET: Resume your normal diet  Try to eat low fat and low cholesterol foods  RECURRENT SYMPTOMS: If you develop any new numbness, weakness, blindness or difficulty with speech after discharge call 911 or go to an emergency department immediately  INCISION: Your surgeon may have chosen to use a type of adhesive glue to close your incision  The glue is used to cover the incision, assist in closure, and prevent contamination  This adhesive will darken and peel away on its own within one to two weeks  You may shower the day after your surgery, but do not scrub the incision  If you do not have this adhesive glue, you may include the operated area in a shower on the third day following surgery  It is normal to have swelling or discoloration around the incision  If increasing redness or pain develops, call our office immediately  Numbness in the region of the incision may occur following the surgery  This normally resolves in six to twelve months  FOLLOW UP STUDIES: Doppler ultrasound studies are very important to your post-operative care  Your surgeon will arrange them at your first postoperative visit  The first study is due 3 months after surgery      Mary moreau/ GIGI Baptiste: 8/3/2021 at 4:30pm, [facility]  [address], [address], 210 [facility]        [address]    [phone] Lucina Almazan [facility] 65 [phone] FREE [phone]  [address] [facility] , [address], [facility], [address]  [address], [address], [place], [address] [facility]  [address], [place], [address]  [address] [facility], [address] [facility], Kya Me, [address]  [address], [address]st Floor, [address]  [address], [address] [facility], [address], [facility], 830 [facility]  1307 [facility], [address] [address], [facility], 9[address] [facility]  One [facility], 532 [facility], 30 [facility] 6  201 [facility], [facility] [address], [address]

## 2021-07-27 NOTE — DISCHARGE SUMMARY
[address] [facility]  Discharge- Ryan Buck 1960, 64 y o  male MRN: [id number]  Unit/Bed#: [facility] 510-01 Encounter: [id number]  Primary Care Provider: Nancy Black DO   Date and time admitted to hospital: 7/14/2021  4:22 PM    * CVA (cerebral vascular accident) [facility])  Assessment & Plan  Past medical history of TIA, not on blood thinners, hypertension, diverticulitis presents with left-sided facial droop and slurred speech  On 7/14 in the ED, patient and family member at bedside states that patient was last seen normal at 9:00 p m on 7/13 when he went to bed  Patient woke up at 8:00 a m  And family noticed that he had left-sided facial droop and mild slurred speech  Patient states he had his first TIA six years ago which presented with left-sided weakness  Patient had another TIA four years ago with similar presentation  Patient had third TIA three years ago with right sided visual loss  Not a TPA candidate  7/14 CTA: severe noncalcified plaque in the proximal right internal carotid artery with small mobile thrombus at the distal aspect, nearly occlusive thrombus in the distal right ICA, right M1, and R A1 segments  7/14 CTH: Acute infarct in the right basal ganglia, hyperdense distal right ICA and right MCA consistent with thrombus, small chronic infarct in the right frontal lobe   Small parietal infarct also favored to be chronic  No acute hemorrhage or mass effect  7/14 MRI: Medial right temporal lobe, right corpus striatum, right corona radiata and right frontal cortical foci of acute ischemia  7/15 CTH: Stable right MCA territory infarct with vasogenic edema in the basal ganglia  No hemorrhagic conversion or significant mass effect   7/21 CTA head/neck: Ischemic edema right MCA distribution reidentified without hemorrhagic conversion  Stable unenhanced CT    Persistent high grade stenosis with complex ulcerated plaque at the origin of the right ICA   Improved filling of the distal supraclinoid right ICA, right A1 and M1 segments when compared to prior study one week earlier  Persistent, nonocclusive thrombus in these vessels as described  7/21 VAS carotid study:   RIGHT:There is <50% stenosis noted in the internal carotid artery  Plaque is heterogenous and irregular  Vertebral artery flow is antegrade  There is no significant subclavian artery disease  LEFT:There is 70-99% stenosis noted in the internal carotid artery  Plaque is  heterogenous and irregular  Vertebral artery flow is antegrade  There is no significant subclavian artery disease  Plan:  · ID consulted due to gram negative bacteremia - deemed a contaminant  Cefepime was discontinued  · Heparin drip with goal PTT between 60 and 90  Continue to monitor PTT  · 7/24 decreased midodrine to 2 5 mg to wean off  · 7/26 Vascular surgery completed R carotid endarterectomy  · Continue aspirin and plavix for 3 months followed by aspirin monotherapy  · Continue lipitor  · Follow up stroke clinic outpatient  · Patient was cleared by vascular to be discharged today      Carotid stenosis, symptomatic, with infarction [facility])  Assessment & Plan  Plan:  - status post Right carotid endarterectomy 7/26  Continue aspirin, Plavix and statin  -cleared by vascular for discharge home today  Outpatient follow-up    Gram-negative bacteremia  Assessment & Plan  Patient had a fever w/wheezing and leukopenia on 7/17 and blood cultures were drawn  · One blood culture became positive for gram negative rods on 7/21, confirmed to be bacillus, not anthracis  · 7/14 admission CXR was WNL; 7/17 CXR revealed right lower lobe subsegmental atelectasis      Plan:  · Repeat blood cultures drawn on 7/21, negative at 24 hours  · 7/21 IV cefepime was started after 1/2 periph bcx came back positive as above  · ID consulted to manage abx around carotid endarterectomy that vascular is planning to do; needs to have negative BCx before procedure  · Believed to be a contaminant due to bacillus species, clinical improvement without treatment, and only 1/2 bottles being positive at 4 days  · Cefepime discontinued 7/22  · Deemed contaminant, resolved  · Incentive spirometry  · Ambulate patient    Hyperlipidemia  Assessment & Plan  Chronic issue  Plan:  -Continue atorvastatin (lipitor) 40 mg qHS    Hypertension  Assessment & Plan  Takes HCTZ at home  7/22 ranging 110s-140s/50s-70s without any inpatient BP meds  Stable BP  Resume home meds      Medical Problems     Resolved Problems  Date Reviewed: 7/27/2021    None              Discharging Physician / Practitioner: Meron Fong DO  PCP: Carmen Berg DO  Admission Date:   Admission Orders (From admission, onward)     Ordered        07/14/21 1639  Inpatient Admission  Once                   Discharge Date: 07/27/21    Consultations During Hospital Stay:  · Infectious disease  · Cardiology  · Vascular surgery  · Neurology  · Neurosurgery    Procedures Performed:   · Right carotid endarterectomy    Significant Findings / Test Results:   · As above    Incidental Findings:   · As above    Test Results Pending at Discharge (will require follow up):    · None     Outpatient Tests Requested:  · None    Complications:  None    Reason for Admission:  Acute right MCA ischemic stroke in the setting of high-grade stenosis of right ICA    Hospital Course:   Scott Cruz is a 64 y o  male patient who originally presented to the hospital on 7/14/2021 due to left facial droop and dysarthria   After imaging studies, patient was found to have acute right MCA infarction in the setting of right internal carotid artery stenosis  Patient underwent right carotid endarterectomy without complication  Currently he is in stable condition, cleared by vascular surgery to be discharged home on aspirin, statin and Plavix with outpatient vascular follow  Outpatient Stroke Clinic follow-up    Please see above list of diagnoses and related plan for additional information  Condition at Discharge: stable    Discharge Day Visit / Exam:   Subjective:    Patient seen and examined  Comfortable sitting in chair  No event overnight  Ambulating without difficulty  No chest pain or shortness of breath  Vitals: Blood Pressure: 138/74 (07/27/21 0700)  Pulse: 97 (07/27/21 0700)  Temperature: (!) 97 3 °F (36 3 °C) (07/27/21 0700)  Temp Source: Oral (07/27/21 0700)  Respirations: 16 (07/27/21 0700)  Height: 5' 11" (180 3 cm) (07/14/21 1729)  Weight - Scale: 108 kg (237 lb 9 6 oz) (07/27/21 0602)  SpO2: 94 % (07/27/21 0700)  Exam:   Physical Exam   Patient is awake alert in no acute distress  Right neck incision looks clean  Lung clear to auscultation bilateral  Heart positive S1-S2 no murmur  Abdomen soft nontender  Lower extremities no edema    Discussion with Family: Updated  (wife) via phone  Discharge instructions/Information to patient and family:   See after visit summary for information provided to patient and family  Provisions for Follow-Up Care:  See after visit summary for information related to follow-up care and any pertinent home health orders  Disposition:   Home    Planned Readmission: no     Discharge Statement:  I spent 45  minutes discharging the patient  This time was spent on the day of discharge  I had direct contact with the patient on the day of discharge  Greater than 50% of the total time was spent examining patient, answering all patient questions, arranging and discussing plan of care with patient as well as directly providing post-discharge instructions  Additional time then spent on discharge activities  Discharge Medications:  See after visit summary for reconciled discharge medications provided to patient and/or family        **Please Note: This note may have been constructed using a voice recognition system**

## 2021-07-27 NOTE — QUICK NOTE
Post-op Check:    Patient resting comfortably in bed  Maintaining SpO2 of 93% on 2L nasal canula  SBP in the 120s  R neck incision with skin edges well approximated, mild amount of ecchymosis and edema surrounding incision site  Neck is soft to touch  Cranial nerves 2-12 intact

## 2021-07-27 NOTE — PROGRESS NOTES
Vascular Nurse Navigator Post Op Education    Met with patient to introduce myself as Vascular Nurse Navigator and explained my role  Patient is appropriate and accepting to education  Patient was educated with Review of written materials provided, Teachback, Explanation, Demonstration and Question & Answer on expectations of post op care and recovery on Right CEA  Patient is a former smoker, quit 15 years ago, as such Smoking effects on the lungs, tobacco triggers and Smoking cessation was reviewed  Education provided to patient on infection prevention, activity limitations, when to call the office, importance of follow up, and incisional care  Discharge instruction handout provided to patient to review

## 2021-07-29 ENCOUNTER — TELEPHONE (OUTPATIENT)
Dept: VASCULAR SURGERY | Facility: CLINIC | Age: 61
End: 2021-07-29

## 2021-07-29 NOTE — TELEPHONE ENCOUNTER
Vascular Nurse Navigator Post Op Phone Call    Post-Discharge phone call attempted to assess patient recovery after vascular surgery I left a message on answering machine  Will attempt to contact at later date  Pt's chart was reviewed prior to call and leaving message  Procedure: ENDARTERECTOMY ARTERY CAROTID-- Right (Right)    Date of Procedure: 7/26/21    Surgeon:    Cinthia Sanchez MD - Primary     * Giacomo Downs, [address] Son Flavia Vincent MD - Fellow    Discharge Date: 7/27/21    Discharge Disposition:  Home      Anticoagulation pt was discharged on post op?: Aspirin and Clopidogrel (Plavix)    Statin pt was discharged on post op?: Lipitor (atorvastatin)    Reminded pt of the following in message:    NEXT OFFICE VISIT SCHEDULED:  8/3/21 at 4:30 pm with Marisa Anderson at The [address] [place] [address]    To contact [facility] with any concerns

## 2021-07-30 ENCOUNTER — EPISODE CHANGES (OUTPATIENT)
Dept: CASE MANAGEMENT | Facility: OTHER | Age: 61
End: 2021-07-30

## 2021-08-02 NOTE — TELEPHONE ENCOUNTER
Attempted to contact patient to follow up with him post procedure and left him a message to return call  Also reminded him of follow up appointment on 8/3/21 at 4:30 pm at [facility] [address] [facility]

## 2021-08-03 ENCOUNTER — OFFICE VISIT (OUTPATIENT)
Dept: VASCULAR SURGERY | Facility: CLINIC | Age: 61
End: 2021-08-03

## 2021-08-03 ENCOUNTER — TELEPHONE (OUTPATIENT)
Dept: NEUROLOGY | Facility: CLINIC | Age: 61
End: 2021-08-03

## 2021-08-03 VITALS
SYSTOLIC BLOOD PRESSURE: 140 MMHG | HEIGHT: 71 IN | BODY MASS INDEX: 30.52 KG/M2 | DIASTOLIC BLOOD PRESSURE: 90 MMHG | HEART RATE: 95 BPM | WEIGHT: 218 LBS

## 2021-08-03 DIAGNOSIS — I63.239 CAROTID STENOSIS, SYMPTOMATIC, WITH INFARCTION (HCC): Primary | ICD-10-CM

## 2021-08-03 DIAGNOSIS — I63.231 CEREBROVASCULAR ACCIDENT (CVA) DUE TO STENOSIS OF RIGHT CAROTID ARTERY (HCC): ICD-10-CM

## 2021-08-03 PROCEDURE — 99024 POSTOP FOLLOW-UP VISIT: CPT | Performed by: NURSE PRACTITIONER

## 2021-08-03 NOTE — ASSESSMENT & PLAN NOTE
66-year-old male with HTN, HLD, TIA, right hemispheric CVA secondary to high-grade right carotid stenosis status post right carotid endarterectomy by Dr Sid Porras 7/26/21  Patient presents to the office for postoperative visit   -Right neck incision well approximated  Surgical glue intact  Moderate amount of postoperative swelling  -Neurologically intact   -Continue aspirin, Plavix X 3 months, statin therapy  -He quit smoking   Maintain cessation   -Carotid duplex in 3 months and 9 months   -Long-term VQI follow-up in 9 months   -Notify the office if increased neck swelling, incisional breakdown, redness or drainage from incision

## 2021-08-03 NOTE — PATIENT INSTRUCTIONS
Right neck incision well approximated  Moderate amount of swelling which will decrease with time  If worsening swelling, you develop breakdown of the incision or drainage from the incision notify the office  Continue aspirin, Plavix (clopidogrel) and statin therapy    Will evaluate with carotid duplex at 3 months and 9 months and then return to the office for long-term follow-up

## 2021-08-03 NOTE — TELEPHONE ENCOUNTER
Post CVA Discharge Follow Up    Hospitalization: 7/14/2021 - 7/27/2021  The purpose of this phone call is to assess patient's general wellbeing or for any assistance needed with follow-up care  Called patient with no answer  Left message on voicemail box for call back

## 2021-08-03 NOTE — PROGRESS NOTES
Assessment/Plan:    Carotid stenosis, symptomatic, with infarction [facility])  70-year-old male with HTN, HLD, TIA, right hemispheric CVA secondary to high-grade right carotid stenosis status post right carotid endarterectomy by Dr Regine Golden 7/26/21  Patient presents to the office for postoperative visit   -Right neck incision well approximated  Surgical glue intact  Moderate amount of postoperative swelling  -Neurologically intact   -Continue aspirin, Plavix X 3 months, statin therapy  -He quit smoking  Maintain cessation   -Carotid duplex in 3 months and 9 months   -Long-term VQI follow-up in 9 months   -Notify the office if increased neck swelling, incisional breakdown, redness or drainage from incision       Diagnoses and all orders for this visit:    Carotid stenosis, symptomatic, with infarction ([address] )  -     VAS carotid complete study; Future  -     VAS carotid complete study; Future    Cerebrovascular accident (CVA) due to stenosis of right carotid artery (HCC)  -     VAS carotid complete study; Future  -     VAS carotid complete study; Future          Subjective:      Patient ID: Mckayla Fang is a 64 y o  male  Patient presents today s/p R CEA done 7/26/21 by Dr Regine Golden  Patient denies TIA/stroke symptoms  He denies difficulty eating/swallowing  Patient is taking ASA 81mg, Atorvastatin, and Plavix  Patient is a former smoker  HPI  70-year-old male with hypertension, hyperlipidemia, history of a TIA was not on antiplatelet therapy  He presented as a stroke alert with left-sided weakness on 7/14/21  Stroke alert  Found to have right hemispheric infarct secondary to high-grade right carotid stenosis  His course was complicated by gram negative bacteremia  He did undergo right carotid endarterectomy 7/26/21 and discharged home postoperative day 1  He presents today for for post op visit he denies any significant pain or discomfort  He does have moderate amount of swelling  Incision is intact    He is taking aspirin, Plavix and statin therapy  His hospitalization   The following portions of the patient's history were reviewed and updated as appropriate: allergies, current medications, past family history, past medical history, past social history, past surgical history and problem list   ROS reviewed     Review of Systems   Constitutional: Negative  HENT: Negative  Eyes: Negative  Respiratory: Negative  Cardiovascular: Negative  Gastrointestinal: Negative  Endocrine: Negative  Genitourinary: Negative  Musculoskeletal: Negative  Skin: Negative  Allergic/Immunologic: Negative  Neurological: Negative  Hematological: Negative  Psychiatric/Behavioral: Negative  Objective:    I have reviewed and made appropriate changes to the review of systems input by the medical assistant  Vitals:    08/03/21 1614   BP: 140/90   BP Location: Left arm   Patient Position: Sitting   Cuff Size: Standard   Pulse: 95   Weight: 98 9 kg (218 lb)   Height: 5' 11" (1 803 m)       Patient Active Problem List   Diagnosis    Carpal tunnel syndrome, bilateral    Chronic pain of left knee    Primary osteoarthritis of left knee    Hypertension    CVA (cerebral vascular accident) ([address] )    Hyperlipidemia    Gram-negative bacteremia    Carotid stenosis, symptomatic, with infarction ([address] )    TIA (transient ischemic attack)       Past Surgical History:   Procedure Laterality Date    ANTERIOR CRUCIATE LIGAMENT REPAIR Right     ACL Repair    BOWEL RESECTION      CAROTID ENDARTARECTOMY Right 7/26/2021    Procedure: ENDARTERECTOMY ARTERY CAROTID-- Right;  Surgeon: Ilan Velasquez MD;  [facility];  Service: Vascular    FOOT SURGERY Right     patient not sure procedure    SHOULDER SURGERY Bilateral     Rotator cuff/ Right shoulder bone growth removal       No family history on file      Social History     Socioeconomic History    Marital status: /Civil Union     Spouse name: Not on file    Number of children: Not on file    Years of education: Not on file    Highest education level: Not on file   Occupational History    Not on file   Tobacco Use    Smoking status: Former Smoker     Types: Cigarettes    Smokeless tobacco: Former User     Types: Chew   Vaping Use    Vaping Use: Never used   Substance and Sexual Activity    Alcohol use: Yes     Alcohol/week: 1 0 - 2 0 standard drinks     Types: 1 - 2 Cans of beer per week    Drug use: Never    Sexual activity: Not on file   Other Topics Concern    Not on file   Social History Narrative    Not on file     Social Determinants of Health     Financial Resource Strain:     Difficulty of Paying Living Expenses:    Food Insecurity:     Worried About Running Out of Food in the Last Year:     [address] in the Last Year:    Transportation Needs:     Lack of Transportation (Medical):      Lack of Transportation (Non-Medical):    Physical Activity:     Days of Exercise per Week:     Minutes of Exercise per Session:    Stress:     Feeling of Stress :    Social Connections:     Frequency of Communication with Friends and Family:     Frequency of Social Gatherings with Friends and Family:     Attends [employer] witness Services:     Active Member of Clubs or Organizations:     Attends Club or Organization Meetings:     Marital Status:    Intimate Partner Violence:     Fear of Current or Ex-Partner:     Emotionally Abused:     Physically Abused:     Sexually Abused:        No Known Allergies      Current Outpatient Medications:     acetaminophen (TYLENOL) 650 mg CR tablet, Take 1 tablet (650 mg total) by mouth every 8 (eight) hours as needed for mild pain, Disp: 30 tablet, Rfl: 0    aspirin 81 mg chewable tablet, Chew 1 tablet (81 mg total) daily, Disp: , Rfl:     atorvastatin (LIPITOR) 40 mg tablet, Take 1 tablet (40 mg total) by mouth every evening, Disp: 30 tablet, Rfl: 1    clopidogrel (PLAVIX) 75 mg tablet, Take 1 tablet (75 mg total) by mouth daily, Disp: 30 tablet, Rfl: 3    hydrochlorothiazide (HYDRODIURIL) 25 mg tablet, Take 25 mg by mouth daily, Disp: , Rfl:     polyethylene glycol (MIRALAX) 17 g packet, Take 17 g by mouth daily, Disp: , Rfl: 0    /90 (BP Location: Left arm, Patient Position: Sitting, Cuff Size: Standard)   Pulse 95   Ht 5' 11" (1 803 m)   Wt 98 9 kg (218 lb)   BMI 30 40 kg/m²          Physical Exam  Vitals and nursing note reviewed  Constitutional:       Appearance: Normal appearance  HENT:      Head: Normocephalic and atraumatic  Eyes:      Extraocular Movements: Extraocular movements intact  Neck:      Comments: Right neck incision with moderate postoperative swelling  Well approximated  Surgical glue intact   Cardiovascular:      Heart sounds: Normal heart sounds  Pulmonary:      Effort: Pulmonary effort is normal       Breath sounds: Normal breath sounds  Abdominal:      Palpations: Abdomen is soft  Musculoskeletal:         General: No swelling  Normal range of motion  Skin:     General: Skin is warm  Neurological:      General: No focal deficit present  Mental Status: He is alert and oriented to person, place, and time     Psychiatric:         Mood and Affect: Mood normal          Behavior: Behavior normal

## 2021-08-05 NOTE — TELEPHONE ENCOUNTER
Patient returned my call  I introduced myself and explained neurovascular nurse navigator role  Since discharge, he denies experiencing any new or worsening stroke-like symptoms  Patient denies the presence of any residual stroke symptoms following hospitalization and claims he has returned to baseline functioning  Ambulation / ADLs:  he is ambulating independently as well as preforming his own ADLs  Patient manages his own medications, appointments, and affairs  Appointments / Medication Review:  Reviewed appointments - patient successfully followed up with vascular  Offered to schedule stroke hospital follow up appointment, patient declines to do so  States he does not find this necessary  He has our phone number if he changes his mind  I reviewed medications with him  There have not been any medication changes since discharge from the hospital  Reports having no difficulties obtaining medications  Reports he is taking all as prescribed with no missed doses, medication side effects, or signs of bleeding  he also verbalizes understanding of DAPT instructions/plan  Risk Factors / Education:  During this call, we discussed stroke type, symptoms, personal risk factors and management, medications, and resources  Patient verbalizes understanding  As for risk factors, patient reports they have been managing modifiable risk factors in the the following ways:   BP is monitored at home  Reported average BP continues to be 130/70s  he is a non smoker  Not following a particular diet  I addressed all his questions  At the conclusion of the conversation, patient denies having any further questions or concerns

## 2021-08-09 ENCOUNTER — TELEPHONE (OUTPATIENT)
Dept: NEUROLOGY | Facility: CLINIC | Age: 61
End: 2021-08-09

## 2021-08-09 NOTE — TELEPHONE ENCOUNTER
1ST ATTEMPT LMOM for pt to call in to sched HFU appt for both PMR and NEURO     SLB/Facial Droop, Stroke Like Sx/Medicare    PMR/SLB/Medicare    NOTE FROM CHART:  Reason for Consult / Principal Problem: Facial droop/stroke-like sx  Dimitris Haro will need follow up in in 4 weeks with neurovascular      He will not require outpatient neurological testing at this time

## 2021-08-10 NOTE — TELEPHONE ENCOUNTER
2ND ATTEMPT LMOM for pt to call in to sched HFU appt for both PMR and NEURO      SLB/Facial Droop, Stroke Like Sx/Medicare     PMR/SLB/Medicare

## 2021-08-11 NOTE — TELEPHONE ENCOUNTER
3RD ATTEMPT LMOM for pt to call in to sched HFU appt for both PMR and NEURO    Mailed 3rd attempt letter to pt's home       SLB/Facial Droop, Stroke Like Sx/Medicare     PMR/SLB/Medicare

## 2021-08-17 ENCOUNTER — TELEPHONE (OUTPATIENT)
Dept: NEUROLOGY | Facility: CLINIC | Age: 61
End: 2021-08-17

## 2021-08-17 NOTE — TELEPHONE ENCOUNTER
21-30 Day Post CVA Discharge Follow Up    Hospitalization: 7/14/2021 - 7/27/2021  The purpose of this phone call is to assess patient's general wellbeing or for any assistance needed with follow-up care  Called patient with no answer  Left message on voicemail box for call back

## 2021-11-05 ENCOUNTER — TELEPHONE (OUTPATIENT)
Dept: NEUROLOGY | Facility: CLINIC | Age: 61
End: 2021-11-05

## 2021-11-08 ENCOUNTER — HOSPITAL ENCOUNTER (OUTPATIENT)
Dept: NON INVASIVE DIAGNOSTICS | Facility: HOSPITAL | Age: 61
Discharge: HOME | End: 2021-11-08
Payer: MEDICARE

## 2021-11-08 DIAGNOSIS — I63.231 CEREBROVASCULAR ACCIDENT (CVA) DUE TO STENOSIS OF RIGHT CAROTID ARTERY (HCC): ICD-10-CM

## 2021-11-08 DIAGNOSIS — I63.239 CAROTID STENOSIS, SYMPTOMATIC, WITH INFARCTION (HCC): ICD-10-CM

## 2021-11-08 PROCEDURE — 93880 EXTRACRANIAL BILAT STUDY: CPT | Performed by: SURGERY

## 2021-11-08 PROCEDURE — 93880 EXTRACRANIAL BILAT STUDY: CPT

## 2021-11-22 ENCOUNTER — TELEPHONE (OUTPATIENT)
Dept: VASCULAR SURGERY | Facility: CLINIC | Age: 61
End: 2021-11-22

## 2022-05-13 ENCOUNTER — HOSPITAL ENCOUNTER (OUTPATIENT)
Dept: NON INVASIVE DIAGNOSTICS | Facility: HOSPITAL | Age: 62
Discharge: HOME | End: 2022-05-13
Payer: COMMERCIAL

## 2022-05-13 DIAGNOSIS — I63.239 CAROTID STENOSIS, SYMPTOMATIC, WITH INFARCTION (HCC): ICD-10-CM

## 2022-05-13 DIAGNOSIS — I63.231 CEREBROVASCULAR ACCIDENT (CVA) DUE TO STENOSIS OF RIGHT CAROTID ARTERY (HCC): ICD-10-CM

## 2022-05-13 PROCEDURE — 93880 EXTRACRANIAL BILAT STUDY: CPT

## 2022-05-14 PROCEDURE — 93880 EXTRACRANIAL BILAT STUDY: CPT | Performed by: SURGERY

## 2022-06-13 NOTE — PROGRESS NOTES
Assessment/Plan:    Carotid stenosis, symptomatic, with infarction [facility])  60-year-old male with HTN, HLD, TIA,  former smoker, right hemispheric CVA secondary to high-grade right carotid stenosis s/p R CEA by Dr Valene Homans 7/26/21 patient returns to the office for long-term VQI follow-up to review CV duplex 5/13/21  -CV duplex showed right ICA widely patent left ICA less than 50% stenosis   -Continue aspirin and statin therapy  Plavix completed   -BP adequately controlled   -Repeat duplex in 1 year   -Follow up in 1 year     Hypertension  -BP adequately controlled on HCTZ and Zestril hydrochlorothiazide 25 mg daily and Zestril 20 mg daily   -Dry cough possibly due to Zestril   -Follow up PCP in 1 month as scheduled and consider alternative to zestril       Diagnoses and all orders for this visit:    Carotid stenosis, symptomatic, with infarction ([address] )  -     VAS carotid complete study; Future    Primary hypertension    Other orders  -     lisinopril (ZESTRIL) 20 mg tablet; Take 20 mg by mouth daily          Subjective:      Patient ID: Soo Wall is a 58 y o  male  Patient present to discuss CV duplex done on 5/5/22  Patient had R CEA done on 7/26/21  Patient denies any TIA CVA symptoms  Patient is currently taking ASA, Atorvastatin and Plavix  VQI CEA Long-Term Follow-Up    Myocardial Infarction - No    Neurological Event - No    Reperfusion Symptoms - No    Modified Kasi Score - 0 - No symptoms         Functional Status - Full         HPI  60-year-old male with HTN, HLD, TIA,  former smoker, right hemispheric CVA secondary to high-grade right carotid stenosis s/p R CEA by Dr Valene Homans 7/26/21 patient returns to the office for long-term VQI follow-up to review CV duplex 5/13/21  Patient returns to the office for yearly visit with his wife  He feels well  He denies any focal neurological events consistent with TIA/CVA  CV duplex showed right ICA widely patent left ICA less than 50% stenosis    Initial blood pressure reading low 89/50, inaccurate due to small blood pressure cuff  BP taken by me with large cuff with normal reading comparable to regular readings 128/70  Few ingrown hairs adjacent to neck incision  Patient with a ongoing dry cough  Occurred multiple times during visit  He felt this is due to seasonal allergies and postnasal drip  The following portions of the patient's history were reviewed and updated as appropriate: allergies, current medications, past family history, past medical history, past social history, past surgical history and problem list   Review of systems reviewed    Review of Systems   Constitutional: Negative  HENT: Negative  Eyes: Negative  Respiratory: Negative  Cardiovascular: Negative  Gastrointestinal: Negative  Endocrine: Negative  Genitourinary: Negative  Musculoskeletal: Negative  Skin: Negative  Allergic/Immunologic: Negative  Neurological: Negative  Hematological: Negative  Psychiatric/Behavioral: Negative  Objective:  I have reviewed and made appropriate changes to the review of systems input by the medical assistant      Vitals:    06/14/22 1319 06/14/22 1338   BP: (!) 89/62 128/70   BP Location: Left arm Left arm   Patient Position: Sitting    Cuff Size: Adult Large   Pulse: 98    Weight: 107 kg (236 lb 3 2 oz)    Height: 5' 9" (1 753 m)        Patient Active Problem List   Diagnosis    Carpal tunnel syndrome, bilateral    Chronic pain of left knee    Primary osteoarthritis of left knee    Hypertension    CVA (cerebral vascular accident) ([address] )    Hyperlipidemia    Gram-negative bacteremia    Carotid stenosis, symptomatic, with infarction ([address] )    TIA (transient ischemic attack)       Past Surgical History:   Procedure Laterality Date    ANTERIOR CRUCIATE LIGAMENT REPAIR Right     ACL Repair    BOWEL RESECTION      CAROTID ENDARTARECTOMY Right 7/26/2021    Procedure: ENDARTERECTOMY ARTERY CAROTID-- Right; Surgeon: Margarette Bloch, MD;  [facility]: [facility];  Service: Vascular    FOOT SURGERY Right     patient not sure procedure    SHOULDER SURGERY Bilateral     Rotator cuff/ Right shoulder bone growth removal       No family history on file  Social History     Socioeconomic History    Marital status: /Civil Union     Spouse name: Not on file    Number of children: Not on file    Years of education: Not on file    Highest education level: Not on file   Occupational History    Not on file   Tobacco Use    Smoking status: Former Smoker     Types: Cigarettes    Smokeless tobacco: Former User     Types: Chew   Vaping Use    Vaping Use: Never used   Substance and Sexual Activity    Alcohol use:  Yes     Alcohol/week: 1 0 - 2 0 standard drink     Types: 1 - 2 Cans of beer per week    Drug use: Never    Sexual activity: Not on file   Other Topics Concern    Not on file   Social History Narrative    Not on file     Social Determinants of Health     Financial Resource Strain: Not on file   Food Insecurity: Not on file   Transportation Needs: Not on file   Physical Activity: Not on file   Stress: Not on file   Social Connections: Not on file   Intimate Partner Violence: Not on file   Housing Stability: Not on file       No Known Allergies      Current Outpatient Medications:     acetaminophen (TYLENOL) 650 mg CR tablet, Take 1 tablet (650 mg total) by mouth every 8 (eight) hours as needed for mild pain, Disp: 30 tablet, Rfl: 0    aspirin 81 mg chewable tablet, Chew 1 tablet (81 mg total) daily, Disp: , Rfl:     atorvastatin (LIPITOR) 40 mg tablet, Take 1 tablet (40 mg total) by mouth every evening, Disp: 30 tablet, Rfl: 1    hydrochlorothiazide (HYDRODIURIL) 25 mg tablet, Take 25 mg by mouth daily, Disp: , Rfl:     lisinopril (ZESTRIL) 20 mg tablet, Take 20 mg by mouth daily, Disp: , Rfl:     polyethylene glycol (MIRALAX) 17 g packet, Take 17 g by mouth daily, Disp: , Rfl: 0      /70 (BP Location: Left arm, Cuff Size: Large)   Pulse 98   Ht 5' 9" (1 753 m)   Wt 107 kg (236 lb 3 2 oz)   BMI 34 88 kg/m²          Physical Exam  Vitals and nursing note reviewed  Constitutional:       Appearance: Normal appearance  HENT:      Head: Normocephalic and atraumatic  Eyes:      Extraocular Movements: Extraocular movements intact  Neck:      Vascular: No carotid bruit  Cardiovascular:      Pulses:           Radial pulses are 2+ on the right side and 2+ on the left side  Posterior tibial pulses are 2+ on the right side and 2+ on the left side  Heart sounds: Normal heart sounds  Pulmonary:      Effort: Pulmonary effort is normal       Breath sounds: Normal breath sounds  Abdominal:      General: Bowel sounds are normal       Palpations: Abdomen is soft  Musculoskeletal:         General: No swelling  Normal range of motion  Skin:     General: Skin is warm  Neurological:      General: No focal deficit present  Mental Status: He is alert and oriented to person, place, and time     Psychiatric:         Mood and Affect: Mood normal          Behavior: Behavior normal

## 2022-06-14 ENCOUNTER — OFFICE VISIT (OUTPATIENT)
Dept: VASCULAR SURGERY | Facility: CLINIC | Age: 62
End: 2022-06-14
Payer: COMMERCIAL

## 2022-06-14 VITALS
WEIGHT: 236.2 LBS | HEART RATE: 98 BPM | DIASTOLIC BLOOD PRESSURE: 70 MMHG | BODY MASS INDEX: 34.98 KG/M2 | HEIGHT: 69 IN | SYSTOLIC BLOOD PRESSURE: 128 MMHG

## 2022-06-14 DIAGNOSIS — I63.239 CAROTID STENOSIS, SYMPTOMATIC, WITH INFARCTION (HCC): Primary | ICD-10-CM

## 2022-06-14 DIAGNOSIS — I10 PRIMARY HYPERTENSION: ICD-10-CM

## 2022-06-14 PROCEDURE — 99213 OFFICE O/P EST LOW 20 MIN: CPT | Performed by: NURSE PRACTITIONER

## 2022-06-14 RX ORDER — LISINOPRIL 20 MG/1
20 TABLET ORAL DAILY
COMMUNITY
Start: 2022-06-05

## 2022-06-14 NOTE — ASSESSMENT & PLAN NOTE
-BP adequately controlled on HCTZ and Zestril hydrochlorothiazide 25 mg daily and Zestril 20 mg daily   -Dry cough possibly due to Zestril   -Follow up PCP in 1 month as scheduled and consider alternative to zestril

## 2022-06-14 NOTE — ASSESSMENT & PLAN NOTE
68-year-old male with HTN, HLD, TIA,  former smoker, right hemispheric CVA secondary to high-grade right carotid stenosis s/p R CEA by Dr Guanaco Pak 7/26/21 patient returns to the office for long-term VQI follow-up to review CV duplex 5/13/21  -CV duplex showed right ICA widely patent left ICA less than 50% stenosis   -Continue aspirin and statin therapy   Plavix completed   -BP adequately controlled   -Repeat duplex in 1 year   -Follow up in 1 year

## 2023-05-24 ENCOUNTER — TELEPHONE (OUTPATIENT)
Dept: VASCULAR SURGERY | Facility: CLINIC | Age: 63
End: 2023-05-24

## 2023-05-24 NOTE — TELEPHONE ENCOUNTER
Attempted to contact patient to schedule appointment(s) listed below  Requested patient call [phone] option 3 to schedule appointment(s)  Patient's appointment(s) are due now  Dopplers  [] Abdominal Aorta Iliac (AOIL)  [x] Carotid (CV) 6/14/23  [] Celiac and/or Mesenteric  [] Endovascular Aortic Repair (EVAR)   [] Hemodialysis Access (HD)   [] Lower Limb Arterial (MARJORIE)  [] Lower Limb Venous (LEV)  [] Lower Limb Venous Duplex with Reflux (LEVDR)  [] Renal Artery  [] Upper Limb Arterial (UEA)    [] Upper Limb Venous (UEV)              [] QUOC and Waveform analysis     Advanced Imaging   [] CTA head/neck    [] CTA abdomen    [] CTA abdomen & pelvis    [] CT abdomen with/ without contrast  [] CT abdomen with contrast  [] CT abdomen without contrast    [] CT abdomen & pelvis with/ without contrast  [] CT abdomen & pelvis with contrast  [] CT abdomen & pelvis without contrast    Office Visit   [] New patient, patient last seen over 3 years ago  [] Risk factor modification (RFM)   [x] Follow up   [] Lost to follow up (LTFU)  MBM/1year f/u/Rev CV schedule for 6/14/23  Called pt, No answer  Left VM with call back info

## 2023-09-07 DIAGNOSIS — I63.239: ICD-10-CM

## 2023-10-04 ENCOUNTER — HOSPITAL ENCOUNTER (OUTPATIENT)
Dept: NON INVASIVE DIAGNOSTICS | Facility: HOSPITAL | Age: 63
Discharge: HOME | End: 2023-10-04
Payer: COMMERCIAL

## 2023-10-04 DIAGNOSIS — I63.239: ICD-10-CM

## 2023-10-04 PROCEDURE — 93880 EXTRACRANIAL BILAT STUDY: CPT | Performed by: SURGERY

## 2023-10-04 PROCEDURE — 93880 EXTRACRANIAL BILAT STUDY: CPT

## 2024-10-22 ENCOUNTER — APPOINTMENT (EMERGENCY)
Dept: CT IMAGING | Facility: HOSPITAL | Age: 64
DRG: 854 | End: 2024-10-22
Payer: COMMERCIAL

## 2024-10-22 ENCOUNTER — APPOINTMENT (INPATIENT)
Dept: MRI IMAGING | Facility: HOSPITAL | Age: 64
DRG: 854 | End: 2024-10-22
Payer: COMMERCIAL

## 2024-10-22 ENCOUNTER — HOSPITAL ENCOUNTER (INPATIENT)
Facility: HOSPITAL | Age: 64
LOS: 5 days | Discharge: HOME HEALTH | DRG: 854 | End: 2024-10-27
Attending: EMERGENCY MEDICINE | Admitting: SURGERY
Payer: COMMERCIAL

## 2024-10-22 DIAGNOSIS — K81.0 ACUTE CHOLECYSTITIS WITH ACUTE CHOLANGITIS: ICD-10-CM

## 2024-10-22 DIAGNOSIS — K81.9 CHOLECYSTITIS: Primary | ICD-10-CM

## 2024-10-22 DIAGNOSIS — K83.09 ACUTE CHOLECYSTITIS WITH ACUTE CHOLANGITIS: ICD-10-CM

## 2024-10-22 PROBLEM — E06.3 HASHIMOTO'S THYROIDITIS: Status: ACTIVE | Noted: 2024-10-22

## 2024-10-22 PROBLEM — R10.9 ABDOMINAL PAIN: Status: ACTIVE | Noted: 2024-10-22

## 2024-10-22 PROBLEM — A41.9 SEPSIS (HCC): Status: ACTIVE | Noted: 2024-10-22

## 2024-10-22 LAB
2HR DELTA HS TROPONIN: 5 NG/L
4HR DELTA HS TROPONIN: 6 NG/L
ALBUMIN SERPL BCG-MCNC: 4.1 G/DL (ref 3.5–5)
ALBUMIN SERPL BCG-MCNC: 4.5 G/DL (ref 3.5–5)
ALP SERPL-CCNC: 110 U/L (ref 34–104)
ALP SERPL-CCNC: 79 U/L (ref 34–104)
ALT SERPL W P-5'-P-CCNC: 201 U/L (ref 7–52)
ALT SERPL W P-5'-P-CCNC: 65 U/L (ref 7–52)
ANION GAP SERPL CALCULATED.3IONS-SCNC: 11 MMOL/L (ref 4–13)
ANION GAP SERPL CALCULATED.3IONS-SCNC: 8 MMOL/L (ref 4–13)
AST SERPL W P-5'-P-CCNC: 222 U/L (ref 13–39)
AST SERPL W P-5'-P-CCNC: 97 U/L (ref 13–39)
ATRIAL RATE: 107 BPM
BACTERIA UR QL AUTO: NORMAL /HPF
BASOPHILS # BLD AUTO: 0.02 THOUSANDS/ΜL (ref 0–0.1)
BASOPHILS # BLD AUTO: 0.02 THOUSANDS/ΜL (ref 0–0.1)
BASOPHILS NFR BLD AUTO: 0 % (ref 0–1)
BASOPHILS NFR BLD AUTO: 0 % (ref 0–1)
BILIRUB DIRECT SERPL-MCNC: 0.46 MG/DL (ref 0–0.2)
BILIRUB DIRECT SERPL-MCNC: 2.1 MG/DL (ref 0–0.2)
BILIRUB SERPL-MCNC: 3.48 MG/DL (ref 0.2–1)
BILIRUB SERPL-MCNC: 5.06 MG/DL (ref 0.2–1)
BILIRUB UR QL STRIP: NEGATIVE
BNP SERPL-MCNC: 124 PG/ML (ref 0–100)
BUN SERPL-MCNC: 14 MG/DL (ref 5–25)
BUN SERPL-MCNC: 15 MG/DL (ref 5–25)
CALCIUM SERPL-MCNC: 8.9 MG/DL (ref 8.4–10.2)
CALCIUM SERPL-MCNC: 9.4 MG/DL (ref 8.4–10.2)
CARDIAC TROPONIN I PNL SERPL HS: 11 NG/L
CARDIAC TROPONIN I PNL SERPL HS: 16 NG/L
CARDIAC TROPONIN I PNL SERPL HS: 17 NG/L
CHLORIDE SERPL-SCNC: 100 MMOL/L (ref 96–108)
CHLORIDE SERPL-SCNC: 98 MMOL/L (ref 96–108)
CLARITY UR: CLEAR
CO2 SERPL-SCNC: 24 MMOL/L (ref 21–32)
CO2 SERPL-SCNC: 26 MMOL/L (ref 21–32)
COLOR UR: YELLOW
CREAT SERPL-MCNC: 0.93 MG/DL (ref 0.6–1.3)
CREAT SERPL-MCNC: 0.95 MG/DL (ref 0.6–1.3)
EOSINOPHIL # BLD AUTO: 0.01 THOUSAND/ΜL (ref 0–0.61)
EOSINOPHIL # BLD AUTO: 0.01 THOUSAND/ΜL (ref 0–0.61)
EOSINOPHIL NFR BLD AUTO: 0 % (ref 0–6)
EOSINOPHIL NFR BLD AUTO: 0 % (ref 0–6)
ERYTHROCYTE [DISTWIDTH] IN BLOOD BY AUTOMATED COUNT: 13.2 % (ref 11.6–15.1)
ERYTHROCYTE [DISTWIDTH] IN BLOOD BY AUTOMATED COUNT: 13.3 % (ref 11.6–15.1)
FLUAV AG UPPER RESP QL IA.RAPID: NEGATIVE
FLUBV AG UPPER RESP QL IA.RAPID: NEGATIVE
GFR SERPL CREATININE-BSD FRML MDRD: 84 ML/MIN/1.73SQ M
GFR SERPL CREATININE-BSD FRML MDRD: 86 ML/MIN/1.73SQ M
GLUCOSE SERPL-MCNC: 195 MG/DL (ref 65–140)
GLUCOSE SERPL-MCNC: 197 MG/DL (ref 65–140)
GLUCOSE UR STRIP-MCNC: NEGATIVE MG/DL
HCT VFR BLD AUTO: 43.3 % (ref 36.5–49.3)
HCT VFR BLD AUTO: 45.9 % (ref 36.5–49.3)
HGB BLD-MCNC: 15 G/DL (ref 12–17)
HGB BLD-MCNC: 15.9 G/DL (ref 12–17)
HGB UR QL STRIP.AUTO: ABNORMAL
IMM GRANULOCYTES # BLD AUTO: 0.08 THOUSAND/UL (ref 0–0.2)
IMM GRANULOCYTES # BLD AUTO: 0.08 THOUSAND/UL (ref 0–0.2)
IMM GRANULOCYTES NFR BLD AUTO: 1 % (ref 0–2)
IMM GRANULOCYTES NFR BLD AUTO: 1 % (ref 0–2)
KETONES UR STRIP-MCNC: NEGATIVE MG/DL
LACTATE SERPL-SCNC: 2 MMOL/L (ref 0.5–2)
LACTATE SERPL-SCNC: 2.7 MMOL/L (ref 0.5–2)
LACTATE SERPL-SCNC: 3 MMOL/L (ref 0.5–2)
LEUKOCYTE ESTERASE UR QL STRIP: NEGATIVE
LIPASE SERPL-CCNC: 10 U/L (ref 11–82)
LYMPHOCYTES # BLD AUTO: 0.51 THOUSANDS/ΜL (ref 0.6–4.47)
LYMPHOCYTES # BLD AUTO: 1.06 THOUSANDS/ΜL (ref 0.6–4.47)
LYMPHOCYTES NFR BLD AUTO: 4 % (ref 14–44)
LYMPHOCYTES NFR BLD AUTO: 7 % (ref 14–44)
MCH RBC QN AUTO: 31.1 PG (ref 26.8–34.3)
MCH RBC QN AUTO: 31.4 PG (ref 26.8–34.3)
MCHC RBC AUTO-ENTMCNC: 34.6 G/DL (ref 31.4–37.4)
MCHC RBC AUTO-ENTMCNC: 34.6 G/DL (ref 31.4–37.4)
MCV RBC AUTO: 90 FL (ref 82–98)
MCV RBC AUTO: 91 FL (ref 82–98)
MONOCYTES # BLD AUTO: 1.07 THOUSAND/ΜL (ref 0.17–1.22)
MONOCYTES # BLD AUTO: 1.2 THOUSAND/ΜL (ref 0.17–1.22)
MONOCYTES NFR BLD AUTO: 8 % (ref 4–12)
MONOCYTES NFR BLD AUTO: 9 % (ref 4–12)
NEUTROPHILS # BLD AUTO: 10.92 THOUSANDS/ΜL (ref 1.85–7.62)
NEUTROPHILS # BLD AUTO: 13.47 THOUSANDS/ΜL (ref 1.85–7.62)
NEUTS SEG NFR BLD AUTO: 84 % (ref 43–75)
NEUTS SEG NFR BLD AUTO: 86 % (ref 43–75)
NITRITE UR QL STRIP: NEGATIVE
NON-SQ EPI CELLS URNS QL MICRO: NORMAL /HPF
NRBC BLD AUTO-RTO: 0 /100 WBCS
NRBC BLD AUTO-RTO: 0 /100 WBCS
P AXIS: 35 DEGREES
PH UR STRIP.AUTO: 6.5 [PH]
PLATELET # BLD AUTO: 185 THOUSANDS/UL (ref 149–390)
PLATELET # BLD AUTO: 202 THOUSANDS/UL (ref 149–390)
PMV BLD AUTO: 9.3 FL (ref 8.9–12.7)
PMV BLD AUTO: 9.9 FL (ref 8.9–12.7)
POTASSIUM SERPL-SCNC: 3.9 MMOL/L (ref 3.5–5.3)
POTASSIUM SERPL-SCNC: 4.3 MMOL/L (ref 3.5–5.3)
PR INTERVAL: 190 MS
PROCALCITONIN SERPL-MCNC: 1.36 NG/ML
PROT SERPL-MCNC: 7.1 G/DL (ref 6.4–8.4)
PROT SERPL-MCNC: 7.8 G/DL (ref 6.4–8.4)
PROT UR STRIP-MCNC: NEGATIVE MG/DL
QRS AXIS: 10 DEGREES
QRSD INTERVAL: 84 MS
QT INTERVAL: 308 MS
QTC INTERVAL: 411 MS
RBC # BLD AUTO: 4.77 MILLION/UL (ref 3.88–5.62)
RBC # BLD AUTO: 5.11 MILLION/UL (ref 3.88–5.62)
RBC #/AREA URNS AUTO: NORMAL /HPF
SARS-COV+SARS-COV-2 AG RESP QL IA.RAPID: NEGATIVE
SODIUM SERPL-SCNC: 133 MMOL/L (ref 135–147)
SODIUM SERPL-SCNC: 134 MMOL/L (ref 135–147)
SP GR UR STRIP.AUTO: <=1.005 (ref 1–1.03)
T WAVE AXIS: 29 DEGREES
UROBILINOGEN UR QL STRIP.AUTO: 1 E.U./DL
VENTRICULAR RATE: 107 BPM
WBC # BLD AUTO: 12.61 THOUSAND/UL (ref 4.31–10.16)
WBC # BLD AUTO: 15.84 THOUSAND/UL (ref 4.31–10.16)
WBC #/AREA URNS AUTO: NORMAL /HPF

## 2024-10-22 PROCEDURE — 84145 PROCALCITONIN (PCT): CPT | Performed by: INTERNAL MEDICINE

## 2024-10-22 PROCEDURE — 74177 CT ABD & PELVIS W/CONTRAST: CPT

## 2024-10-22 PROCEDURE — 99285 EMERGENCY DEPT VISIT HI MDM: CPT | Performed by: EMERGENCY MEDICINE

## 2024-10-22 PROCEDURE — 93010 ELECTROCARDIOGRAM REPORT: CPT | Performed by: INTERNAL MEDICINE

## 2024-10-22 PROCEDURE — 83605 ASSAY OF LACTIC ACID: CPT | Performed by: EMERGENCY MEDICINE

## 2024-10-22 PROCEDURE — 85025 COMPLETE CBC W/AUTO DIFF WBC: CPT | Performed by: EMERGENCY MEDICINE

## 2024-10-22 PROCEDURE — 87804 INFLUENZA ASSAY W/OPTIC: CPT | Performed by: EMERGENCY MEDICINE

## 2024-10-22 PROCEDURE — 74181 MRI ABDOMEN W/O CONTRAST: CPT

## 2024-10-22 PROCEDURE — 82248 BILIRUBIN DIRECT: CPT | Performed by: SURGERY

## 2024-10-22 PROCEDURE — 36415 COLL VENOUS BLD VENIPUNCTURE: CPT | Performed by: EMERGENCY MEDICINE

## 2024-10-22 PROCEDURE — 96375 TX/PRO/DX INJ NEW DRUG ADDON: CPT

## 2024-10-22 PROCEDURE — 99284 EMERGENCY DEPT VISIT MOD MDM: CPT

## 2024-10-22 PROCEDURE — 85025 COMPLETE CBC W/AUTO DIFF WBC: CPT | Performed by: STUDENT IN AN ORGANIZED HEALTH CARE EDUCATION/TRAINING PROGRAM

## 2024-10-22 PROCEDURE — 82248 BILIRUBIN DIRECT: CPT | Performed by: STUDENT IN AN ORGANIZED HEALTH CARE EDUCATION/TRAINING PROGRAM

## 2024-10-22 PROCEDURE — 96361 HYDRATE IV INFUSION ADD-ON: CPT

## 2024-10-22 PROCEDURE — 96374 THER/PROPH/DIAG INJ IV PUSH: CPT

## 2024-10-22 PROCEDURE — 81001 URINALYSIS AUTO W/SCOPE: CPT | Performed by: EMERGENCY MEDICINE

## 2024-10-22 PROCEDURE — 83880 ASSAY OF NATRIURETIC PEPTIDE: CPT | Performed by: EMERGENCY MEDICINE

## 2024-10-22 PROCEDURE — 87811 SARS-COV-2 COVID19 W/OPTIC: CPT | Performed by: EMERGENCY MEDICINE

## 2024-10-22 PROCEDURE — 99222 1ST HOSP IP/OBS MODERATE 55: CPT | Performed by: INTERNAL MEDICINE

## 2024-10-22 PROCEDURE — 83605 ASSAY OF LACTIC ACID: CPT | Performed by: SURGERY

## 2024-10-22 PROCEDURE — 80053 COMPREHEN METABOLIC PANEL: CPT | Performed by: EMERGENCY MEDICINE

## 2024-10-22 PROCEDURE — 84484 ASSAY OF TROPONIN QUANT: CPT | Performed by: EMERGENCY MEDICINE

## 2024-10-22 PROCEDURE — 71260 CT THORAX DX C+: CPT

## 2024-10-22 PROCEDURE — 80053 COMPREHEN METABOLIC PANEL: CPT | Performed by: STUDENT IN AN ORGANIZED HEALTH CARE EDUCATION/TRAINING PROGRAM

## 2024-10-22 PROCEDURE — 87040 BLOOD CULTURE FOR BACTERIA: CPT | Performed by: EMERGENCY MEDICINE

## 2024-10-22 PROCEDURE — 83690 ASSAY OF LIPASE: CPT | Performed by: EMERGENCY MEDICINE

## 2024-10-22 PROCEDURE — 93005 ELECTROCARDIOGRAM TRACING: CPT

## 2024-10-22 RX ORDER — CEFTRIAXONE 2 G/50ML
2000 INJECTION, SOLUTION INTRAVENOUS EVERY 24 HOURS
Status: DISCONTINUED | OUTPATIENT
Start: 2024-10-23 | End: 2024-10-27 | Stop reason: HOSPADM

## 2024-10-22 RX ORDER — ACETAMINOPHEN 10 MG/ML
1000 INJECTION, SOLUTION INTRAVENOUS EVERY 6 HOURS PRN
Status: DISCONTINUED | OUTPATIENT
Start: 2024-10-22 | End: 2024-10-26

## 2024-10-22 RX ORDER — SODIUM CHLORIDE 9 MG/ML
100 INJECTION, SOLUTION INTRAVENOUS CONTINUOUS
Status: DISCONTINUED | OUTPATIENT
Start: 2024-10-22 | End: 2024-10-23

## 2024-10-22 RX ORDER — FENTANYL CITRATE 50 UG/ML
50 INJECTION, SOLUTION INTRAMUSCULAR; INTRAVENOUS ONCE
Status: COMPLETED | OUTPATIENT
Start: 2024-10-22 | End: 2024-10-22

## 2024-10-22 RX ORDER — CEFTRIAXONE 1 G/50ML
1000 INJECTION, SOLUTION INTRAVENOUS EVERY 24 HOURS
Status: DISCONTINUED | OUTPATIENT
Start: 2024-10-22 | End: 2024-10-22

## 2024-10-22 RX ORDER — SUCRALFATE ORAL 1 G/10ML
1000 SUSPENSION ORAL ONCE
Status: DISCONTINUED | OUTPATIENT
Start: 2024-10-22 | End: 2024-10-22

## 2024-10-22 RX ORDER — METRONIDAZOLE 500 MG/100ML
500 INJECTION, SOLUTION INTRAVENOUS EVERY 8 HOURS
Status: DISCONTINUED | OUTPATIENT
Start: 2024-10-22 | End: 2024-10-27 | Stop reason: HOSPADM

## 2024-10-22 RX ORDER — MAGNESIUM HYDROXIDE/ALUMINUM HYDROXICE/SIMETHICONE 120; 1200; 1200 MG/30ML; MG/30ML; MG/30ML
30 SUSPENSION ORAL ONCE
Status: COMPLETED | OUTPATIENT
Start: 2024-10-22 | End: 2024-10-22

## 2024-10-22 RX ORDER — ONDANSETRON 2 MG/ML
4 INJECTION INTRAMUSCULAR; INTRAVENOUS EVERY 6 HOURS PRN
Status: DISCONTINUED | OUTPATIENT
Start: 2024-10-22 | End: 2024-10-27 | Stop reason: HOSPADM

## 2024-10-22 RX ORDER — FAMOTIDINE 10 MG/ML
20 INJECTION, SOLUTION INTRAVENOUS ONCE
Status: COMPLETED | OUTPATIENT
Start: 2024-10-22 | End: 2024-10-22

## 2024-10-22 RX ORDER — CLOPIDOGREL BISULFATE 75 MG/1
75 TABLET ORAL DAILY
COMMUNITY

## 2024-10-22 RX ORDER — HYDROMORPHONE HCL/PF 1 MG/ML
0.5 SYRINGE (ML) INJECTION
Status: DISCONTINUED | OUTPATIENT
Start: 2024-10-22 | End: 2024-10-26

## 2024-10-22 RX ORDER — LABETALOL HYDROCHLORIDE 5 MG/ML
10 INJECTION, SOLUTION INTRAVENOUS EVERY 6 HOURS PRN
Status: DISCONTINUED | OUTPATIENT
Start: 2024-10-22 | End: 2024-10-27 | Stop reason: HOSPADM

## 2024-10-22 RX ORDER — SUCRALFATE 1 G/1
1 TABLET ORAL ONCE
Status: COMPLETED | OUTPATIENT
Start: 2024-10-22 | End: 2024-10-22

## 2024-10-22 RX ADMIN — FENTANYL CITRATE 50 MCG: 50 INJECTION INTRAMUSCULAR; INTRAVENOUS at 03:13

## 2024-10-22 RX ADMIN — SODIUM CHLORIDE 1000 ML: 0.9 INJECTION, SOLUTION INTRAVENOUS at 01:42

## 2024-10-22 RX ADMIN — SUCRALFATE 1 G: 1 TABLET ORAL at 03:14

## 2024-10-22 RX ADMIN — ALUMINUM HYDROXIDE, MAGNESIUM HYDROXIDE, AND DIMETHICONE 30 ML: 200; 20; 200 SUSPENSION ORAL at 01:47

## 2024-10-22 RX ADMIN — METRONIDAZOLE 500 MG: 500 INJECTION, SOLUTION INTRAVENOUS at 04:28

## 2024-10-22 RX ADMIN — ACETAMINOPHEN 1000 MG: 1000 INJECTION, SOLUTION INTRAVENOUS at 06:01

## 2024-10-22 RX ADMIN — FAMOTIDINE 20 MG: 10 INJECTION, SOLUTION INTRAVENOUS at 01:45

## 2024-10-22 RX ADMIN — HYDROMORPHONE HYDROCHLORIDE 0.5 MG: 1 INJECTION, SOLUTION INTRAMUSCULAR; INTRAVENOUS; SUBCUTANEOUS at 09:09

## 2024-10-22 RX ADMIN — SODIUM CHLORIDE 125 ML/HR: 0.9 INJECTION, SOLUTION INTRAVENOUS at 21:29

## 2024-10-22 RX ADMIN — SODIUM CHLORIDE 75 ML/HR: 0.9 INJECTION, SOLUTION INTRAVENOUS at 03:52

## 2024-10-22 RX ADMIN — METRONIDAZOLE 500 MG: 500 INJECTION, SOLUTION INTRAVENOUS at 11:48

## 2024-10-22 RX ADMIN — SODIUM CHLORIDE 125 ML/HR: 0.9 INJECTION, SOLUTION INTRAVENOUS at 12:31

## 2024-10-22 RX ADMIN — ACETAMINOPHEN 1000 MG: 1000 INJECTION, SOLUTION INTRAVENOUS at 12:31

## 2024-10-22 RX ADMIN — CEFTRIAXONE 1000 MG: 1 INJECTION, SOLUTION INTRAVENOUS at 03:54

## 2024-10-22 RX ADMIN — HYDROMORPHONE HYDROCHLORIDE 0.5 MG: 1 INJECTION, SOLUTION INTRAMUSCULAR; INTRAVENOUS; SUBCUTANEOUS at 04:34

## 2024-10-22 RX ADMIN — METRONIDAZOLE 500 MG: 500 INJECTION, SOLUTION INTRAVENOUS at 20:44

## 2024-10-22 RX ADMIN — IOHEXOL 100 ML: 350 INJECTION, SOLUTION INTRAVENOUS at 02:31

## 2024-10-22 RX ADMIN — ACETAMINOPHEN 1000 MG: 1000 INJECTION, SOLUTION INTRAVENOUS at 21:29

## 2024-10-22 NOTE — ASSESSMENT & PLAN NOTE
Patient presents with 2 days history of abdominal pain, nausea and vomiting.  Upon presentation to the emergency room was found to have abnormal transaminases and hyperbilirubinemia  CT scan of the abdomen and pelvis showsermediate density within the gallbladder likely due to sludge and/or stones. Pericholecystic fat stranding concerning for acute cholecystitis.  2.  Moderate thickening of the hepatic flexure with surrounding fat stranding which may be due to focal colitis, likely reactive from adjacent gallbladder inflammation.  3.  Hepatic steatosis.  4.  Bibasilar right greater than left linear opacities likely due to atelectasis.  Admitted under surgical service.  Plan for MR TOM to assess for choledocholithiasis.  If positive, GI consultation will be obtained for ERCP.  If negative patient will need cholecystectomy  Continue with n.p.o., IV fluids, IV antibiotics and further management per surgical team.

## 2024-10-22 NOTE — CONSULTS
Consultation - Hospitalist   Name: Hua Acevedo 64 y.o. male I MRN: [id number]  Unit/Bed#: [facility] 304-01 I Date of Admission: 10/22/2024   Date of Service: 10/22/2024 I Hospital Day: 0   Inpatient consult to Internal Medicine  Consult performed by: Mady Garcia MD  Consult ordered by: Izabel Hodges DO        Physician Requesting Evaluation: Beverley Parra DO   Reason for Evaluation / Principal Problem: Medical management    Assessment & Plan  Abdominal pain  Patient presents with 2 days history of abdominal pain, nausea and vomiting.  Upon presentation to the emergency room was found to have abnormal transaminases and hyperbilirubinemia  CT scan of the abdomen and pelvis showsermediate density within the gallbladder likely due to sludge and/or stones. Pericholecystic fat stranding concerning for acute cholecystitis.  2.  Moderate thickening of the hepatic flexure with surrounding fat stranding which may be due to focal colitis, likely reactive from adjacent gallbladder inflammation.  3.  Hepatic steatosis.  4.  Bibasilar right greater than left linear opacities likely due to atelectasis.  Admitted under surgical service.  Plan for MR CP to assess for choledocholithiasis.  If positive, GI consultation will be obtained for ERCP.  If negative patient will need cholecystectomy  Continue with n.p.o., IV fluids, IV antibiotics and further management per surgical team.    Hypertension  Maintained on hydrochlorothiazide and lisinopril.  Currently placed on hold.  Did have hypertensive urgency on admission with blood pressure of 203/95 likely secondary to pain.  Continue with IV labetalol for SBP greater than 160.  CVA (cerebral vascular accident) (HCC)  Patient has a prior history of right hemispheric CVA secondary to high-grade right carotid stenosis s/p R CEA by Dr Granger 7/26/21 .  Antiplatelets on hold currently in anticipation for possible need for OR/ERCP  Resume statins once able to tolerate  p.o.  Hyperlipidemia  Resume statins once patient able to tolerate p.o.  Carotid stenosis, symptomatic, with infarction (HCC)  Patient has history of right hemispheric CVA secondary to high-grade right carotid stenosis s/p R CEA by Dr Granger 7/26/21 Has been maintained on aspirin, Plavix.  Has not been on any statin therapy although her last vascular surgery note states that he should be on it .  Antiplatelets on hold in view of possible and need for surgery/GI procedure  Resume statin therapy when patient is able to tolerate p.o.  Hashimoto's thyroiditis  Not on any medications an outpatient.  Follow-up on TSH.  Sepsis (HCC)  As evidenced by tachycardia, tachypnea, leukocytosis likely in the setting of cholecystitis.  However cannot rule out choledocholithiasis.  Continue with IV ceftriaxone and Flagyl.  Follow-up on blood culture results.  Continue with IV fluids per sepsis protocol.  Check lactic acid and procalcitonin and trend.        VTE Pharmacologic Prophylaxis:   High Risk (Score >/= 5) - Pharmacological DVT Prophylaxis Ordered: enoxaparin (Lovenox). Sequential Compression Devices Ordered.  Code Status: Level 1 - Full Code   Discussion with family:     Anticipated Length of Stay: Patient will be admitted on an inpatient basis with an anticipated length of stay of greater than 2 midnights secondary to management as documented above.    History of Present Illness   Chief Complaint: Abdominal pain    Hua Acevedo is a 64 y.o. male with a PMH of CVA, symptomatic carotid stenosis s/p CEA, Hashimoto's thyroiditis, morbid obesity, hypertension, hyperlipidemia who presents with 2 days history of abdominal pain, nausea, vomiting, chills.  Patient states that for the last 2 days the pain has severely gotten worse initially in the left lower quadrant and subsequently in the right upper and left upper quadrant.  Also has abdominal distention and constipation and had nausea and 1 episode of bilious vomiting.  Had  "chills and felt febrile however did not check his temperature.  Denied any urinary symptoms but did notice that his urine was darker.  Wife does report patient having occasional abdominal discomfort after eating a fatty meal.  Of note patient does have history of carotid endarterectomy in 2021 and has been maintained on aspirin and Plavix.  Last dose of Plavix was yesterday.  Review of Systems   Constitutional:  Positive for activity change, appetite change, chills, fatigue and fever.   HENT: Negative.     Eyes: Negative.    Respiratory: Negative.     Gastrointestinal:  Positive for abdominal distention, abdominal pain, constipation, nausea and vomiting.   Endocrine: Negative.    Genitourinary: Negative.    Musculoskeletal: Negative.    Neurological:  Positive for weakness.   Hematological: Negative.        Historical Information   Past Medical History:   Diagnosis Date    Diverticulitis large intestine     \"During Bowel Resection determined not to have Diverticulitis.\"- Patient    Hypertension     TIA (transient ischemic attack)      Past Surgical History:   Procedure Laterality Date    ANTERIOR CRUCIATE LIGAMENT REPAIR Right     ACL Repair    BOWEL RESECTION      CAROTID ENDARTARECTOMY Right 7/26/2021    Procedure: ENDARTERECTOMY ARTERY CAROTID-- Right;  Surgeon: Jerod Granger MD;  Location: [facility];  Service: Vascular    FOOT SURGERY Right     patient not sure procedure    SHOULDER SURGERY Bilateral     Rotator cuff/ Right shoulder bone growth removal     Social History     Tobacco Use    Smoking status: Former     Types: Cigarettes    Smokeless tobacco: Former     Types: Chew   Vaping Use    Vaping status: Never Used   Substance and Sexual Activity    Alcohol use: Yes     Alcohol/week: 1.0 - 2.0 standard drink of alcohol     Types: 1 - 2 Cans of beer per week    Drug use: Never    Sexual activity: Not on file     E-Cigarette/Vaping    E-Cigarette Use Never User      E-Cigarette/Vaping Substances    Nicotine " No     THC No     CBD No     Flavoring No     Other No     Unknown No        Social History:  Marital Status: /Civil Union   O    Meds/Allergies   I have reviewed home medications with patient family member.  Prior to Admission medications    Medication Sig Start Date End Date Taking? Authorizing Provider   acetaminophen (TYLENOL) 650 mg CR tablet Take 1 tablet (650 mg total) by mouth every 8 (eight) hours as needed for mild pain 7/31/20  Yes Mark Pickard PA-C   aspirin 81 mg chewable tablet Chew 1 tablet (81 mg total) daily 7/27/21  Yes Yg Cavanaugh DO   clopidogrel (PLAVIX) 75 mg tablet Take 75 mg by mouth daily   Yes Historical Provider, MD   hydrochlorothiazide (HYDRODIURIL) 25 mg tablet Take 25 mg by mouth daily 9/26/20  Yes Historical Provider, MD   lisinopril (ZESTRIL) 20 mg tablet Take 20 mg by mouth daily 6/5/22  Yes Historical Provider, MD   polyethylene glycol (MIRALAX) 17 g packet Take 17 g by mouth daily 7/28/21  Yes Yg Cavanaugh DO   atorvastatin (LIPITOR) 40 mg tablet Take 1 tablet (40 mg total) by mouth every evening 7/27/21 10/22/24  Yg Cavanaugh DO     No Known Allergies    Objective :  Temp:  [97.7 °F (36.5 °C)-98.5 °F (36.9 °C)] 97.9 °F (36.6 °C)  HR:  [] 100  BP: (147-203)/(80-95) 170/88  Resp:  [14-24] 14  SpO2:  [91 %-96 %] 91 %  O2 Device: None (Room air)  Nasal Cannula O2 Flow Rate (L/min):  [2 L/min] 2 L/min    Physical Exam  Constitutional:       General: He is in acute distress.      Appearance: He is obese. He is ill-appearing.   HENT:      Head: Normocephalic and atraumatic.      Mouth/Throat:      Mouth: Mucous membranes are dry.   Cardiovascular:      Rate and Rhythm: Normal rate and regular rhythm.   Pulmonary:      Effort: Pulmonary effort is normal.      Breath sounds: Normal breath sounds.   Abdominal:      General: There is distension.      Comments: Tenderness and guarding in the left upper quadrant and left lower quadrant region without rebound.  Bowel  sounds audible.   Musculoskeletal:      Right lower leg: No edema.      Left lower leg: No edema.   Skin:     General: Skin is warm.      Coloration: Skin is jaundiced.   Neurological:      General: No focal deficit present.      Mental Status: He is alert and oriented to person, place, and time. Mental status is at baseline.          Lines/Drains:            Lab Results: I have reviewed the following results:  Results from last 7 days   Lab Units 10/22/24  0642   WBC Thousand/uL 12.61*   HEMOGLOBIN g/dL 15.0   HEMATOCRIT % 43.3   PLATELETS Thousands/uL 185   SEGS PCT % 86*   LYMPHO PCT % 4*   MONO PCT % 9   EOS PCT % 0     Results from last 7 days   Lab Units 10/22/24  0642   SODIUM mmol/L 134*   POTASSIUM mmol/L 3.9   CHLORIDE mmol/L 100   CO2 mmol/L 26   BUN mg/dL 14   CREATININE mg/dL 0.93   ANION GAP mmol/L 8   CALCIUM mg/dL 8.9   ALBUMIN g/dL 4.1   TOTAL BILIRUBIN mg/dL 5.06*   ALK PHOS U/L 110*   ALT U/L 201*   AST U/L 222*   GLUCOSE RANDOM mg/dL 195*             Lab Results   Component Value Date    HGBA1C 5.3 07/15/2021     Results from last 7 days   Lab Units 10/22/24  0630 10/22/24  0341 10/22/24  0136   LACTIC ACID mmol/L 2.0 3.0* 2.7*       Imaging Results Review: I reviewed radiology reports from this admission including: CT abdomen/pelvis.  Other Study Results Review: EKG was reviewed.     Administrative Statements       ** Please Note: This note has been constructed using a voice recognition system. **

## 2024-10-22 NOTE — ASSESSMENT & PLAN NOTE
Patient has a prior history of right hemispheric CVA secondary to high-grade right carotid stenosis s/p R CEA by Dr Granger 7/26/21 .  Antiplatelets on hold currently in anticipation for possible need for OR/ERCP  Resume statins once able to tolerate p.o.

## 2024-10-22 NOTE — ED PROVIDER NOTES
Time reflects when diagnosis was documented in both MDM as applicable and the Disposition within this note       Time User Action Codes Description Comment    10/22/2024  3:28 AM Bobby Bender Add [K81.9] Cholecystitis           ED Disposition       ED Disposition   Admit    Condition   Stable    Date/Time   Tue Oct 22, 2024  3:28 AM    Comment                  Assessment & Plan       Medical Decision Making  Patient presents to the emergency department with abdominal pain.      Based on the MSE and the history provided, patient may be at risk for gastritis, GERD, pancreatitis, colitis, diverticulitis, kidney stone, cholelithiasis, cholecystitis    Based on the work-up performed in the emergency department which includes physical examination, laboratory testing, imaging which may include advanced imaging as necessary such as CT scan or ultrasound, it is deemed that the patient will require admission to the hospital for treatment of acute cholecystitis.      Amount and/or Complexity of Data Reviewed  Labs: ordered. Decision-making details documented in ED Course.     Details: Leukocytosis with elevated LFTs and bilirubin  Radiology: ordered and independent interpretation performed. Decision-making details documented in ED Course.     Details: CT abdomen pelvis concerning for acute cholecystitis    Risk  OTC drugs.  Prescription drug management.             Medications   sodium chloride 0.9 % bolus 1,000 mL (0 mL Intravenous Stopped 10/22/24 0248)   Famotidine (PF) (PEPCID) injection 20 mg (20 mg Intravenous Given 10/22/24 0145)   aluminum-magnesium hydroxide-simethicone (MAALOX) oral suspension 30 mL (30 mL Oral Given 10/22/24 0147)   iohexol (OMNIPAQUE) 350 MG/ML injection (MULTI-DOSE) 100 mL (100 mL Intravenous Given 10/22/24 0231)   fentaNYL injection 50 mcg (50 mcg Intravenous Given 10/22/24 0313)   sucralfate (CARAFATE) tablet 1 g (1 g Oral Given 10/22/24 0314)       ED Risk Strat Scores        "                    SBIRT 22yo+      Flowsheet Row Most Recent Value   Initial Alcohol Screen: US AUDIT-C     1. How often do you have a drink containing alcohol? 0 Filed at: 10/22/2024 0113   2. How many drinks containing alcohol do you have on a typical day you are drinking?  0 Filed at: 10/22/2024 0113   3a. Male UNDER 65: How often do you have five or more drinks on one occasion? 0 Filed at: 10/22/2024 0113   3b. FEMALE Any Age, or MALE 65+: How often do you have 4 or more drinks on one occassion? 0 Filed at: 10/22/2024 0113   Audit-C Score 0 Filed at: 10/22/2024 0113                            History of Present Illness       Chief Complaint   Patient presents with    Abdominal Pain     LQ abdominal pain since yesterday, getting progressively worse. Pain worsens with a deep breath. No bowel movement X24hrs did take a laxative. Poor appetite. Started with cold sweats today        Past Medical History:   Diagnosis Date    Diverticulitis large intestine     \"During Bowel Resection determined not to have Diverticulitis.\"- Patient    Hypertension     TIA (transient ischemic attack)       Past Surgical History:   Procedure Laterality Date    ANTERIOR CRUCIATE LIGAMENT REPAIR Right     ACL Repair    BOWEL RESECTION      CAROTID ENDARTARECTOMY Right 7/26/2021    Procedure: ENDARTERECTOMY ARTERY CAROTID-- Right;  Surgeon: Jerod Granger MD;  Location: [facility];  Service: Vascular    FOOT SURGERY Right     patient not sure procedure    SHOULDER SURGERY Bilateral     Rotator cuff/ Right shoulder bone growth removal      History reviewed. No pertinent family history.   Social History     Tobacco Use    Smoking status: Former     Types: Cigarettes    Smokeless tobacco: Former     Types: Chew   Vaping Use    Vaping status: Never Used   Substance Use Topics    Alcohol use: Yes     Alcohol/week: 1.0 - 2.0 standard drink of alcohol     Types: 1 - 2 Cans of beer per week    Drug use: Never      E-Cigarette/Vaping    " E-Cigarette Use Never User       E-Cigarette/Vaping Substances    Nicotine No     THC No     CBD No     Flavoring No     Other No     Unknown No       I have reviewed and agree with the history as documented.     1 day epigastric and left upper quadrant abdominal pain with associated nausea.  No fever/chills.  No shortness of breath.  No diarrhea.  No chest pain.  No other complaints.  Noted to have some desaturation during triage.  Patient denies shortness of breath or cough.      History provided by:  Patient   used: No    Abdominal Pain  Pain location:  Epigastric and LUQ  Pain quality: aching and burning    Pain radiates to:  Does not radiate  Pain severity:  Moderate  Onset quality:  Gradual  Duration:  1 day  Timing:  Constant  Progression:  Unchanged  Chronicity:  New  Relieved by:  Nothing  Worsened by:  Nothing  Ineffective treatments:  None tried  Associated symptoms: nausea    Associated symptoms: no belching, no chest pain, no chills, no constipation, no cough, no diarrhea, no dysuria, no fever, no flatus, no hematemesis, no hematochezia, no hematuria, no shortness of breath, no sore throat and no vomiting        Review of Systems   Constitutional:  Negative for chills and fever.   HENT:  Negative for ear pain, hearing loss, sore throat, trouble swallowing and voice change.    Eyes:  Negative for pain and discharge.   Respiratory:  Negative for cough, shortness of breath and wheezing.    Cardiovascular:  Negative for chest pain and palpitations.   Gastrointestinal:  Positive for abdominal pain and nausea. Negative for blood in stool, constipation, diarrhea, flatus, hematemesis, hematochezia and vomiting.   Genitourinary:  Negative for dysuria, flank pain, frequency and hematuria.   Musculoskeletal:  Negative for joint swelling, neck pain and neck stiffness.   Skin:  Negative for rash and wound.   Neurological:  Negative for dizziness, seizures, syncope, facial asymmetry and headaches.    Psychiatric/Behavioral:  Negative for hallucinations, self-injury and suicidal ideas.    All other systems reviewed and are negative.          Objective       ED Triage Vitals [10/22/24 0112]   Temperature Pulse Blood Pressure Respirations SpO2 Patient Position - Orthostatic VS   98.4 °F (36.9 °C) (!) 119 (!) 172/87 18 92 % Sitting      Temp Source Heart Rate Source BP Location FiO2 (%) Pain Score    Temporal Monitor Right arm -- 8      Vitals      Date and Time Temp Pulse SpO2 Resp BP Pain Score FACES Pain Rating User   10/22/24 0313 -- -- -- -- -- 7 -- AC   10/22/24 0300 -- 106 96 % 18 174/91 7 -- AC   10/22/24 0245 -- 103 96 % 24 174/91 -- -- NM   10/22/24 0115 -- 114 91 % pt 91% room air. Pt placed on 2L NC with current pulse ox of 95%. Dr Bender made aware; no further orders at this time. 24 172/87 -- -- NM   10/22/24 0112 98.4 °F (36.9 °C) 119 92 % 18 172/87 8 -- SV            Physical Exam  Vitals and nursing note reviewed.   Constitutional:       General: He is not in acute distress.     Appearance: He is well-developed.   HENT:      Head: Normocephalic and atraumatic.      Right Ear: External ear normal.      Left Ear: External ear normal.   Eyes:      General: No scleral icterus.        Right eye: No discharge.         Left eye: No discharge.      Extraocular Movements: Extraocular movements intact.      Conjunctiva/sclera: Conjunctivae normal.   Cardiovascular:      Rate and Rhythm: Normal rate and regular rhythm.      Heart sounds: Normal heart sounds. No murmur heard.  Pulmonary:      Effort: Pulmonary effort is normal.      Breath sounds: Normal breath sounds. No wheezing or rales.   Abdominal:      General: Bowel sounds are normal. There is no distension.      Palpations: Abdomen is soft.      Tenderness: There is abdominal tenderness in the epigastric area and left upper quadrant. There is no guarding or rebound. Negative signs include Jarrell's sign and McBurney's sign.   Musculoskeletal:          General: No deformity. Normal range of motion.      Cervical back: Normal range of motion and neck supple.   Skin:     General: Skin is warm and dry.      Findings: No rash.   Neurological:      General: No focal deficit present.      Mental Status: He is alert and oriented to person, place, and time.      Cranial Nerves: No cranial nerve deficit.   Psychiatric:         Mood and Affect: Mood normal.         Behavior: Behavior normal.         Thought Content: Thought content normal.         Judgment: Judgment normal.         Results Reviewed       Procedure Component Value Units Date/Time    UA w Reflex to Microscopic w Reflex to Culture [[id number]] Collected: 10/22/24 0313    Lab Status: In process Specimen: Urine, Other Updated: 10/22/24 0324    B-Type Natriuretic Peptide(BNP) [[id number]]  (Abnormal) Collected: 10/22/24 0136    Lab Status: Final result Specimen: Blood from Arm, Right Updated: 10/22/24 0213      pg/mL     HS Troponin 0hr (reflex protocol) [[id number]]  (Normal) Collected: 10/22/24 0136    Lab Status: Final result Specimen: Blood from Arm, Right Updated: 10/22/24 0209     hs TnI 0hr 11 ng/L     HS Troponin I 2hr [[id number]]     Lab Status: No result Specimen: Blood     COVID-19/ Infleunza A/B Rapid Anitgen(30 min. TAT) [[id number]]  (Normal) Collected: 10/22/24 0136    Lab Status: Final result Specimen: Nares from Nose Updated: 10/22/24 0202     SARS COV Rapid Antigen Negative     Influenza A Rapid Antigen Negative     Influenza B Rapid Antigen Negative    Narrative:      This test has been performed using the Quidel Tosha 2 FLU+SARS Antigen test under the Emergency Use Authorization (EUA). This test has been validated by the  and verified by the performing laboratory. The Tosha uses lateral flow immunofluorescent sandwich assay to detect SARS-COV, Influenza A and Influenza B Antigen.     The Quidel Tosha 2 SARS Antigen test does not differentiate between SARS-CoV and SARS-CoV-2.      Negative results are presumptive and may be confirmed with a molecular assay, if necessary, for patient management. Negative results do not rule out SARS-CoV-2 or influenza infection and should not be used as the sole basis for treatment or patient management decisions. A negative test result may occur if the level of antigen in a sample is below the limit of detection of this test.     Positive results are indicative of the presence of viral antigens, but do not rule out bacterial infection or co-infection with other viruses.     All test results should be used as an adjunct to clinical observations and other information available to the provider.    FOR PEDIATRIC PATIENTS - copy/paste COVID Guidelines URL to browser: [email or web address]    Comprehensive metabolic panel [[id number]]  (Abnormal) Collected: 10/22/24 0136    Lab Status: Final result Specimen: Blood from Arm, Right Updated: 10/22/24 0202     Sodium 133 mmol/L      Potassium 4.3 mmol/L      Chloride 98 mmol/L      CO2 24 mmol/L      ANION GAP 11 mmol/L      BUN 15 mg/dL      Creatinine 0.95 mg/dL      Glucose 197 mg/dL      Calcium 9.4 mg/dL      AST 97 U/L      ALT 65 U/L      Alkaline Phosphatase 79 U/L      Total Protein 7.8 g/dL      Albumin 4.5 g/dL      Total Bilirubin 3.48 mg/dL      eGFR 84 ml/min/1.73sq m     Narrative:      National Kid[employer] guidelines for Chronic Kidney Disease (CKD):     Stage 1 with normal or high GFR (GFR > 90 mL/min/1.73 square meters)    Stage 2 Mild CKD (GFR = 60-89 mL/min/1.73 square meters)    Stage 3A Moderate CKD (GFR = 45-59 mL/min/1.73 square meters)    Stage 3B Moderate CKD (GFR = 30-44 mL/min/1.73 square meters)    Stage 4 Severe CKD (GFR = 15-29 mL/min/1.73 square meters)    Stage 5 End Stage CKD (GFR <15 mL/min/1.73 square meters)  Note: GFR calculation is accurate only with a steady state creatinine    Lipase [[id number]]  (Abnormal)  Collected: 10/22/24 0136    Lab Status: Final result Specimen: Blood from Arm, Right Updated: 10/22/24 0202     Lipase 10 u/L     Lactic acid, plasma (w/reflex if result > 2.0) [[id number]]  (Abnormal) Collected: 10/22/24 0136    Lab Status: Final result Specimen: Blood from Arm, Right Updated: 10/22/24 0200     LACTIC ACID 2.7 mmol/L     Narrative:      Result may be elevated if tourniquet was used during collection.    Lactic acid 2 Hours [[id number]]     Lab Status: No result Specimen: Blood     Blood culture #2 [[id number]] Collected: 10/22/24 0145    Lab Status: In process Specimen: Blood from Hand, Left Updated: 10/22/24 0149    CBC and differential [[id number]]  (Abnormal) Collected: 10/22/24 0136    Lab Status: Final result Specimen: Blood from Arm, Right Updated: 10/22/24 0146     WBC 15.84 Thousand/uL      RBC 5.11 Million/uL      Hemoglobin 15.9 g/dL      Hematocrit 45.9 %      MCV 90 fL      MCH 31.1 pg      MCHC 34.6 g/dL      RDW 13.2 %      MPV 9.9 fL      Platelets 202 Thousands/uL      nRBC 0 /100 WBCs      Segmented % 84 %      Immature Grans % 1 %      Lymphocytes % 7 %      Monocytes % 8 %      Eosinophils Relative 0 %      Basophils Relative 0 %      Absolute Neutrophils 13.47 Thousands/µL      Absolute Immature Grans 0.08 Thousand/uL      Absolute Lymphocytes 1.06 Thousands/µL      Absolute Monocytes 1.20 Thousand/µL      Eosinophils Absolute 0.01 Thousand/µL      Basophils Absolute 0.02 Thousands/µL     Blood culture #1 [[id number]] Collected: 10/22/24 0136    Lab Status: In process Specimen: Blood from Arm, Right Updated: 10/22/24 0143            CT chest abdomen pelvis w contrast   Final Interpretation by Shukri Eagle MD (10/22 0314)      1.  Intermediate density within the gallbladder likely due to sludge and/or stones. Pericholecystic fat stranding concerning for acute cholecystitis.   2.  Moderate thickening of the hepatic flexure with surrounding fat stranding which may be due to focal  colitis, likely reactive from adjacent gallbladder inflammation.   3.  Hepatic steatosis.   4.  Bibasilar right greater than left linear opacities likely due to atelectasis.      The study was marked in EPIC for immediate notification.         Workstation performed: [id number]             Procedures    ED Medication and Procedure Management   Prior to Admission Medications   Prescriptions Last Dose Informant Patient Reported? Taking?   acetaminophen (TYLENOL) 650 mg CR tablet  Self No No   Sig: Take 1 tablet (650 mg total) by mouth every 8 (eight) hours as needed for mild pain   aspirin 81 mg chewable tablet  Self No No   Sig: Chew 1 tablet (81 mg total) daily   atorvastatin (LIPITOR) 40 mg tablet  Self No No   Sig: Take 1 tablet (40 mg total) by mouth every evening   hydrochlorothiazide (HYDRODIURIL) 25 mg tablet  Self Yes No   Sig: Take 25 mg by mouth daily   lisinopril (ZESTRIL) 20 mg tablet   Yes No   Sig: Take 20 mg by mouth daily   polyethylene glycol (MIRALAX) 17 g packet  Self No No   Sig: Take 17 g by mouth daily      Facility-Administered Medications: None     Patient's Medications   Discharge Prescriptions    No medications on file     No discharge procedures on file.  ED SEPSIS DOCUMENTATION   Time reflects when diagnosis was documented in both MDM as applicable and the Disposition within this note       Time User Action Codes Description Comment    10/22/2024  3:28 AM Bobby Bender Add [K81.9] Cholecystitis                  Bobby Bender MD  10/22/24 0329

## 2024-10-22 NOTE — ASSESSMENT & PLAN NOTE
Maintained on hydrochlorothiazide and lisinopril.  Currently placed on hold.  Did have hypertensive urgency on admission with blood pressure of 203/95 likely secondary to pain.  Continue with IV labetalol for SBP greater than 160.

## 2024-10-22 NOTE — ASSESSMENT & PLAN NOTE
As evidenced by tachycardia, tachypnea, leukocytosis likely in the setting of cholecystitis.  However cannot rule out choledocholithiasis.  Continue with IV ceftriaxone and Flagyl.  Follow-up on blood culture results.  Continue with IV fluids per sepsis protocol.  Check lactic acid and procalcitonin and trend.

## 2024-10-22 NOTE — H&P
Consultation - General Surgery   Hua Acevedo 64 y.o. male MRN: [id number]  Unit/Bed#: -01 Encounter: [id number]    Assessment:  Hua Acevedo is a 64 y.o. male with past medical history significant for HTN, hx TIA, hx of carotid endarterectomy on asa and plavix, Hashimoto's thyroiditis, hx of bowel resection, who initially presented to [facility] [place] ED this am with complaints of LLQ pain, chills, nausea, one episode of bilious vomiting and constipation, with findings of elevated transaminases, inflammation near gallbladder, and leukocytosis, concerning for acute cholecystitis with possible choledocholithiasis.    CT C/A/P 10/22/2024:  1.  Intermediate density within the gallbladder likely due to sludge and/or stones. Pericholecystic fat stranding concerning for acute cholecystitis.  2.  Moderate thickening of the hepatic flexure with surrounding fat stranding which may be due to focal colitis, likely reactive from adjacent gallbladder inflammation.  3.  Hepatic steatosis.  4.  Bibasilar right greater than left linear opacities likely due to atelectasis.    Vital signs:  Afebrile, mild tachycardia with heart rate ranging  since admission, mild tachypnea respiratory rate ranging 18-24, improving hypertension BP ranging 147/82 to 203/95, pulse ox 91 to 96% on 2 L nasal cannula    Blood work:  Improving leukocytosis WBCs 12 (15)  Hemoglobin stable 15 (15.9)  Mild hyponatremia 134 (133)  Increasing transaminases:   (97)   (65)  Alkaline phosphatase 110 (79)  Total bilirubin 5.06 (3.48)  Direct bilirubin elevated at 2.10 (0.46)  eGFR 86  Lactic acid now normal at 2.0 (3.0, 2.7)  Troponin initially 11, @2 hours 16, @4 hours 17  UA negative except for small occult blood  Blood culturesX2 10/22/2024 in progress  COVID and flu swab negative  BTNP slightly elevated at 124  Lipase normal at 10        Plan:  -Will await results of MRI MRCP to check for duct dilatation which would indicate need for  ERCP given significant increase in transaminases this morning  -N.p.o.  -IV fluids while NPO  -Antiemetics/analgesics as needed  -cont to hold plavix (last dose 10/21/2024 am), will need to be mindful of Plavix washout prior to any procedures  -Will need GI consult pending results of MRI MRCP versus IR consult for percutaneous cholecystostomy tube in the setting of recent Plavix intake  -IV Rocephin and Flagyl  -Trend blood work  -Pending results of MRI MRCP patient may undergo cholecystectomy, if no evidence of stone obstruction  -Incentive spirometer and ambulation and deep breathing given finding of atelectasis on CT scan    -appreciate medical consult for presurgical evaluation    History of Present Illness   Chief Complaint: abd pain, chills, n/v, and constipation    HPI:  Hua Acevedo is a 64 y.o. male with past medical history significant for HTN, hx TIA, hx of carotid endarterectomy on asa and plavix, Hashimoto's thyroiditis, hx of bowel resection, who initially presented to [facility] ED this am with complaints of LLQ pain, chills, nausea, one episode of bilious vomiting and constipation, with findings of elevated transaminases, inflammation near gallbladder, and leukocytosis, concerning for acute cholecystitis with possible choledocholithiasis.    Patient is seen in the ICU with wife at bedside.  Patient states that approximately 2 days ago he started to have severe left lower quadrant pain.  Says this pain was radiating to his back as well.  Says he does have known back pain along with degenerative joint disease, but this felt different than that usual pain.  Says that he is also started to become sweaty and felt as though he had chills.  Patient says he did not think to check his temperature.  Had an episode of nausea and vomited to see if this would relieve the pain, which it did not.  Also complains of constipation for the past few days.  Notes that his urine has also been dark recently.  Says that he  "has had similar pains on and off for the past 30 years, and reports having undergone history of bowel resection which initially was thought secondary to diverticulitis, but patient states that when a different physician looked at the pathology from the surgery there was no evidence of diverticulitis.    Patient states that this pain that he has been experiencing does get worse after he eats a heavy or fatty meal.  According to his wife, pain became worse after he ate a steak.  Does not typically have right sided pain, reports most of his pain is on the left side.    Patient has been maintained on baby aspirin and Plavix for history of TIA and carotid endarterectomy around 2021.  Last dose of Plavix was 10/21/2020 4 AM.  Patient's daughter was going home to gather medication list so home medications could be updated accordingly.    Denies any known cardiac or pulmonary or diabetes issues.  Denies any injectable medications.      Review of Systems   Constitutional:  Positive for chills. Negative for fever.   Respiratory:  Negative for shortness of breath.    Cardiovascular:  Negative for chest pain.   Gastrointestinal:  Positive for abdominal distention, abdominal pain, constipation, nausea and vomiting. Negative for anal bleeding, blood in stool, diarrhea and rectal pain.   All other systems reviewed and are negative.    Historical Information   Past Medical History:   Diagnosis Date    Diverticulitis large intestine     \"During Bowel Resection determined not to have Diverticulitis.\"- Patient    Hypertension     TIA (transient ischemic attack)      Past Surgical History:   Procedure Laterality Date    ANTERIOR CRUCIATE LIGAMENT REPAIR Right     ACL Repair    BOWEL RESECTION      CAROTID ENDARTARECTOMY Right 7/26/2021    Procedure: ENDARTERECTOMY ARTERY CAROTID-- Right;  Surgeon: Jerod Granger MD;  Location: [facility];  Service: Vascular    FOOT SURGERY Right     patient not sure procedure    SHOULDER SURGERY " "Bilateral     Rotator cuff/ Right shoulder bone growth removal     Social History   Social History     Substance and Sexual Activity   Alcohol Use Yes    Alcohol/week: 1.0 - 2.0 standard drink of alcohol    Types: 1 - 2 Cans of beer per week     Social History     Substance and Sexual Activity   Drug Use Never     E-Cigarette/Vaping    E-Cigarette Use Never User      E-Cigarette/Vaping Substances    Nicotine No     THC No     CBD No     Flavoring No     Other No     Unknown No      Social History     Tobacco Use   Smoking Status Former    Types: Cigarettes   Smokeless Tobacco Former    Types: Chew     Family History: Family history non-contributory    Meds/Allergies   all current active meds have been reviewed  No Known Allergies    Objective   First Vitals:   Blood Pressure: (!) 172/87 (10/22/24 0112)  Pulse: (!) 119 (10/22/24 0112)  Temperature: 98.4 °F (36.9 °C) (10/22/24 0112)  Temp Source: Temporal (10/22/24 0112)  Respirations: 18 (10/22/24 0112)  Height: 5' 9\" (175.3 cm) (10/22/24 0543)  Weight - Scale: 109 kg (239 lb 6.7 oz) (10/22/24 0112)  SpO2: 92 % (10/22/24 0112)    Current Vitals:   Blood Pressure: 147/82 (10/22/24 0719)  Pulse: 97 (10/22/24 0719)  Temperature: 97.7 °F (36.5 °C) (10/22/24 0719)  Temp Source: Temporal (10/22/24 0719)  Respirations: 20 (10/22/24 0719)  Height: 5' 9\" (175.3 cm) (10/22/24 0543)  Weight - Scale: 108 kg (238 lb 8.6 oz) (10/22/24 0543)  SpO2: 94 % (10/22/24 0719)      Intake/Output Summary (Last 24 hours) at 10/22/2024 0829  Last data filed at 10/22/2024 0630  Gross per 24 hour   Intake 1280 ml   Output --   Net 1280 ml       Invasive Devices       Peripheral Intravenous Line  Duration             Peripheral IV 10/22/24 Dorsal (posterior);Right Forearm <1 day                    Physical Exam  Vitals reviewed.   Constitutional:       General: He is not in acute distress.     Appearance: Normal appearance. He is not ill-appearing, toxic-appearing or diaphoretic.   HENT:      " Head: Normocephalic and atraumatic.      Mouth/Throat:      Pharynx: No oropharyngeal exudate.   Eyes:      General: Scleral icterus present.   Cardiovascular:      Rate and Rhythm: Normal rate.      Heart sounds: Normal heart sounds.   Pulmonary:      Effort: Pulmonary effort is normal. No respiratory distress.      Breath sounds: Normal breath sounds.   Abdominal:      General: There is distension.      Palpations: Abdomen is soft. There is no mass.      Tenderness: There is abdominal tenderness. There is guarding. There is no rebound.      Hernia: No hernia is present.      Comments: Abd is soft, mildly distended, with LLQ and LUQ pain with palpation and guarding along with mild guarding and tenderness in epigastric area   Musculoskeletal:         General: Normal range of motion.   Skin:     General: Skin is warm and dry.      Coloration: Skin is jaundiced.   Neurological:      General: No focal deficit present.      Mental Status: He is alert.   Psychiatric:         Mood and Affect: Mood normal.      Comments: Falling asleep during conversation         Lab Results: I have personally reviewed pertinent lab results.  , CBC:   Lab Results   Component Value Date    WBC 12.61 (H) 10/22/2024    HGB 15.0 10/22/2024    HCT 43.3 10/22/2024    MCV 91 10/22/2024     10/22/2024    RBC 4.77 10/22/2024    MCH 31.4 10/22/2024    MCHC 34.6 10/22/2024    RDW 13.3 10/22/2024    MPV 9.3 10/22/2024    NRBC 0 10/22/2024   , CMP:   Lab Results   Component Value Date    SODIUM 134 (L) 10/22/2024    K 3.9 10/22/2024     10/22/2024    CO2 26 10/22/2024    BUN 14 10/22/2024    CREATININE 0.93 10/22/2024    CALCIUM 8.9 10/22/2024     (H) 10/22/2024     (H) 10/22/2024    ALKPHOS 110 (H) 10/22/2024    EGFR 86 10/22/2024   , Urinalysis:   Lab Results   Component Value Date    COLORU Yellow 10/22/2024    CLARITYU Clear 10/22/2024    SPECGRAV <=1.005 10/22/2024    PHUR 6.5 10/22/2024    LEUKOCYTESUR Negative  10/22/2024    NITRITE Negative 10/22/2024    GLUCOSEU Negative 10/22/2024    KETONESU Negative 10/22/2024    BILIRUBINUR Negative 10/22/2024    BLOODU Small (A) 10/22/2024   , Lipase:   Lab Results   Component Value Date    LIPASE 10 (L) 10/22/2024     Imaging: Results Review Statement: I personally reviewed the following image studies in PACS and associated radiology reports: CT chest and CT abdomen/pelvis. My interpretation of the radiology images/reports is: Bibasilar atelectasis right greater than left, with inflammation near gallbladder.  EKG, Pathology, and Other Studies: Results Review Statement: I reviewed radiology reports from this admission including: Twelve-lead EKG from this morning showing sinus tachycardia no significant change from previous EKG in 2021 per report.      Code Status: Level 1 - Full Code  Advance Directive and Living Will:      Power of :    POLST:      Counseling / Coordination of Care  Total floor / unit time spent today 30 minutes.  Greater than 50% of total time was spent with the patient and / or family counseling and / or coordination of care.  A description of the counseling / coordination of care: Potential plan of care, possible causes and courses of action.

## 2024-10-22 NOTE — ASSESSMENT & PLAN NOTE
Patient has history of right hemispheric CVA secondary to high-grade right carotid stenosis s/p R CEA by Dr Granger 7/26/21 Has been maintained on aspirin, Plavix.  Has not been on any statin therapy although her last vascular surgery note states that he should be on it .  Antiplatelets on hold in view of possible and need for surgery/GI procedure  Resume statin therapy when patient is able to tolerate p.o.

## 2024-10-23 PROBLEM — K83.09 ACUTE CHOLECYSTITIS WITH ACUTE CHOLANGITIS: Status: ACTIVE | Noted: 2024-10-23

## 2024-10-23 PROBLEM — K81.0 ACUTE CHOLECYSTITIS WITH ACUTE CHOLANGITIS: Status: ACTIVE | Noted: 2024-10-23

## 2024-10-23 LAB
ALBUMIN SERPL BCG-MCNC: 3.4 G/DL (ref 3.5–5)
ALP SERPL-CCNC: 108 U/L (ref 34–104)
ALT SERPL W P-5'-P-CCNC: 166 U/L (ref 7–52)
ANION GAP SERPL CALCULATED.3IONS-SCNC: 9 MMOL/L (ref 4–13)
AST SERPL W P-5'-P-CCNC: 114 U/L (ref 13–39)
BASOPHILS # BLD AUTO: 0.02 THOUSANDS/ΜL (ref 0–0.1)
BASOPHILS NFR BLD AUTO: 0 % (ref 0–1)
BILIRUB SERPL-MCNC: 6.39 MG/DL (ref 0.2–1)
BUN SERPL-MCNC: 9 MG/DL (ref 5–25)
CALCIUM ALBUM COR SERPL-MCNC: 8.7 MG/DL (ref 8.3–10.1)
CALCIUM SERPL-MCNC: 8.2 MG/DL (ref 8.4–10.2)
CHLORIDE SERPL-SCNC: 104 MMOL/L (ref 96–108)
CO2 SERPL-SCNC: 24 MMOL/L (ref 21–32)
CREAT SERPL-MCNC: 0.72 MG/DL (ref 0.6–1.3)
EOSINOPHIL # BLD AUTO: 0.04 THOUSAND/ΜL (ref 0–0.61)
EOSINOPHIL NFR BLD AUTO: 0 % (ref 0–6)
ERYTHROCYTE [DISTWIDTH] IN BLOOD BY AUTOMATED COUNT: 13.9 % (ref 11.6–15.1)
GFR SERPL CREATININE-BSD FRML MDRD: 98 ML/MIN/1.73SQ M
GLUCOSE SERPL-MCNC: 155 MG/DL (ref 65–140)
HCT VFR BLD AUTO: 41 % (ref 36.5–49.3)
HGB BLD-MCNC: 13.7 G/DL (ref 12–17)
IMM GRANULOCYTES # BLD AUTO: 0.14 THOUSAND/UL (ref 0–0.2)
IMM GRANULOCYTES NFR BLD AUTO: 1 % (ref 0–2)
LYMPHOCYTES # BLD AUTO: 0.59 THOUSANDS/ΜL (ref 0.6–4.47)
LYMPHOCYTES NFR BLD AUTO: 4 % (ref 14–44)
MCH RBC QN AUTO: 31.2 PG (ref 26.8–34.3)
MCHC RBC AUTO-ENTMCNC: 33.4 G/DL (ref 31.4–37.4)
MCV RBC AUTO: 93 FL (ref 82–98)
MONOCYTES # BLD AUTO: 1.15 THOUSAND/ΜL (ref 0.17–1.22)
MONOCYTES NFR BLD AUTO: 8 % (ref 4–12)
NEUTROPHILS # BLD AUTO: 13.14 THOUSANDS/ΜL (ref 1.85–7.62)
NEUTS SEG NFR BLD AUTO: 87 % (ref 43–75)
NRBC BLD AUTO-RTO: 0 /100 WBCS
PLATELET # BLD AUTO: 157 THOUSANDS/UL (ref 149–390)
PMV BLD AUTO: 9.6 FL (ref 8.9–12.7)
POTASSIUM SERPL-SCNC: 3.5 MMOL/L (ref 3.5–5.3)
PROCALCITONIN SERPL-MCNC: 1.35 NG/ML
PROT SERPL-MCNC: 6 G/DL (ref 6.4–8.4)
RBC # BLD AUTO: 4.39 MILLION/UL (ref 3.88–5.62)
SODIUM SERPL-SCNC: 137 MMOL/L (ref 135–147)
WBC # BLD AUTO: 15.08 THOUSAND/UL (ref 4.31–10.16)

## 2024-10-23 PROCEDURE — 80053 COMPREHEN METABOLIC PANEL: CPT | Performed by: NURSE PRACTITIONER

## 2024-10-23 PROCEDURE — 84145 PROCALCITONIN (PCT): CPT | Performed by: INTERNAL MEDICINE

## 2024-10-23 PROCEDURE — 85025 COMPLETE CBC W/AUTO DIFF WBC: CPT | Performed by: NURSE PRACTITIONER

## 2024-10-23 PROCEDURE — 99232 SBSQ HOSP IP/OBS MODERATE 35: CPT | Performed by: INTERNAL MEDICINE

## 2024-10-23 RX ORDER — HYDROCHLOROTHIAZIDE 25 MG/1
25 TABLET ORAL DAILY
Status: DISCONTINUED | OUTPATIENT
Start: 2024-10-23 | End: 2024-10-27 | Stop reason: HOSPADM

## 2024-10-23 RX ORDER — INDOCYANINE GREEN AND WATER 25 MG
5 KIT INJECTION ONCE
Status: COMPLETED | OUTPATIENT
Start: 2024-10-24 | End: 2024-10-24

## 2024-10-23 RX ORDER — INDOCYANINE GREEN AND WATER 25 MG
5 KIT INJECTION ONCE
Status: DISCONTINUED | OUTPATIENT
Start: 2024-10-24 | End: 2024-10-23

## 2024-10-23 RX ORDER — FUROSEMIDE 10 MG/ML
20 INJECTION INTRAMUSCULAR; INTRAVENOUS
Status: COMPLETED | OUTPATIENT
Start: 2024-10-23 | End: 2024-10-24

## 2024-10-23 RX ADMIN — FUROSEMIDE 20 MG: 10 INJECTION, SOLUTION INTRAMUSCULAR; INTRAVENOUS at 12:32

## 2024-10-23 RX ADMIN — METRONIDAZOLE 500 MG: 500 INJECTION, SOLUTION INTRAVENOUS at 03:04

## 2024-10-23 RX ADMIN — HYDROMORPHONE HYDROCHLORIDE 0.5 MG: 1 INJECTION, SOLUTION INTRAMUSCULAR; INTRAVENOUS; SUBCUTANEOUS at 02:16

## 2024-10-23 RX ADMIN — METRONIDAZOLE 500 MG: 500 INJECTION, SOLUTION INTRAVENOUS at 20:02

## 2024-10-23 RX ADMIN — ACETAMINOPHEN 1000 MG: 1000 INJECTION, SOLUTION INTRAVENOUS at 11:14

## 2024-10-23 RX ADMIN — CEFTRIAXONE 2000 MG: 2 INJECTION, SOLUTION INTRAVENOUS at 03:53

## 2024-10-23 RX ADMIN — HYDROCHLOROTHIAZIDE 25 MG: 25 TABLET ORAL at 12:32

## 2024-10-23 RX ADMIN — SODIUM CHLORIDE 125 ML/HR: 0.9 INJECTION, SOLUTION INTRAVENOUS at 05:49

## 2024-10-23 RX ADMIN — METRONIDAZOLE 500 MG: 500 INJECTION, SOLUTION INTRAVENOUS at 12:00

## 2024-10-23 RX ADMIN — HYDROMORPHONE HYDROCHLORIDE 0.5 MG: 1 INJECTION, SOLUTION INTRAMUSCULAR; INTRAVENOUS; SUBCUTANEOUS at 13:09

## 2024-10-23 RX ADMIN — ACETAMINOPHEN 1000 MG: 1000 INJECTION, SOLUTION INTRAVENOUS at 21:38

## 2024-10-23 NOTE — PLAN OF CARE
Problem: Prexisting or High Potential for Compromised Skin Integrity  Goal: Skin integrity is maintained or improved  Description: INTERVENTIONS:  - Identify patients at risk for skin breakdown  - Assess and monitor skin integrity  - Assess and monitor nutrition and hydration status  - Monitor labs   - Assess for incontinence   - Turn and reposition patient  - Assist with mobility/ambulation  - Relieve pressure over bony prominences  - Avoid friction and shearing  - Provide appropriate hygiene as needed including keeping skin clean and dry  - Evaluate need for skin moisturizer/barrier cream  - Collaborate with interdisciplinary team   - Patient/family teaching  - Consider wound care consult   10/22/2024 2119 by Cate Escalante RN  Outcome: Progressing  10/22/2024 2118 by Cate Escalante RN  Outcome: Progressing     Problem: PAIN - ADULT  Goal: Verbalizes/displays adequate comfort level or baseline comfort level  Description: Interventions:  - Encourage patient to monitor pain and request assistance  - Assess pain using appropriate pain scale  - Administer analgesics based on type and severity of pain and evaluate response  - Implement non-pharmacological measures as appropriate and evaluate response  - Consider cultural and social influences on pain and pain management  - Notify physician/advanced practitioner if interventions unsuccessful or patient reports new pain  Outcome: Progressing     Problem: INFECTION - ADULT  Goal: Absence or prevention of progression during hospitalization  Description: INTERVENTIONS:  - Assess and monitor for signs and symptoms of infection  - Monitor lab/diagnostic results  - Monitor all insertion sites, i.e. indwelling lines, tubes, and drains  - Monitor endotracheal if appropriate and nasal secretions for changes in amount and color  - [place] appropriate cooling/warming therapies per order  - Administer medications as ordered  - Instruct and encourage patient and family to  use good hand hygiene technique  - Identify and instruct in appropriate isolation precautions for identified infection/condition  Outcome: Progressing     Problem: SAFETY ADULT  Goal: Patient will remain free of falls  Description: INTERVENTIONS:  - Educate patient/family on patient safety including physical limitations  - Instruct patient to call for assistance with activity   - Consult OT/PT to assist with strengthening/mobility   - Keep Call bell within reach  - Keep bed low and locked with side rails adjusted as appropriate  - Keep care items and personal belongings within reach  - Initiate and maintain comfort rounds  - Make Fall Risk Sign visible to staff  - Offer Toileting every 2 Hours, in advance of need  - Initiate/Maintain bed alarm  - Obtain necessary fall risk management equipment: non skid socks  - Apply yellow socks and bracelet for high fall risk patients  - Consider moving patient to room near nurses station  Outcome: Progressing  Goal: Maintain or return to baseline ADL function  Description: INTERVENTIONS:  -  Assess patient's ability to carry out ADLs; assess patient's baseline for ADL function and identify physical deficits which impact ability to perform ADLs (bathing, care of mouth/teeth, toileting, grooming, dressing, etc.)  - Assess/evaluate cause of self-care deficits   - Assess range of motion  - Assess patient's mobility; develop plan if impaired  - Assess patient's need for assistive devices and provide as appropriate  - Encourage maximum independence but intervene and supervise when necessary  - Involve family in performance of ADLs  - Assess for home care needs following discharge   - Consider OT consult to assist with ADL evaluation and planning for discharge  - Provide patient education as appropriate  Outcome: Progressing  Goal: Maintains/Returns to pre admission functional level  Description: INTERVENTIONS:  - Perform AM-PAC 6 Click Basic Mobility/ Daily Activity assessment  daily.  - Set and communicate daily mobility goal to care team and patient/family/caregiver.   - Collaborate with rehabilitation services on mobility goals if consulted  - Perform Range of Motion 3 times a day.  - Reposition patient every 2 hours.  - Dangle patient 3 times a day  - Stand patient 3 times a day  - Ambulate patient 3 times a day  - Out of bed to chair 3 times a day   - Out of bed for meals 3 times a day  - Out of bed for toileting  - Record patient progress and toleration of activity level   Outcome: Progressing     Problem: DISCHARGE PLANNING  Goal: Discharge to home or other facility with appropriate resources  Description: INTERVENTIONS:  - Identify barriers to discharge w/patient and caregiver  - Arrange for needed discharge resources and transportation as appropriate  - Identify discharge learning needs (meds, wound care, etc.)  - Arrange for interpretive services to assist at discharge as needed  - Refer to Case Management Department for coordinating discharge planning if the patient needs post-hospital services based on physician/advanced practitioner order or complex needs related to functional status, cognitive ability, or social support system  Outcome: Progressing     Problem: Knowledge Deficit  Goal: Patient/family/caregiver demonstrates understanding of disease process, treatment plan, medications, and discharge instructions  Description: Complete learning assessment and assess knowledge base.  Interventions:  - Provide teaching at level of understanding  - Provide teaching via preferred learning methods  Outcome: Progressing

## 2024-10-23 NOTE — PLAN OF CARE
Problem: Prexisting or High Potential for Compromised Skin Integrity  Goal: Skin integrity is maintained or improved  Description: INTERVENTIONS:  - Identify patients at risk for skin breakdown  - Assess and monitor skin integrity  - Assess and monitor nutrition and hydration status  - Monitor labs   - Assess for incontinence   - Turn and reposition patient  - Assist with mobility/ambulation  - Relieve pressure over bony prominences  - Avoid friction and shearing  - Provide appropriate hygiene as needed including keeping skin clean and dry  - Evaluate need for skin moisturizer/barrier cream  - Collaborate with interdisciplinary team   - Patient/family teaching  - Consider wound care consult   Outcome: Progressing     Problem: PAIN - ADULT  Goal: Verbalizes/displays adequate comfort level or baseline comfort level  Description: Interventions:  - Encourage patient to monitor pain and request assistance  - Assess pain using appropriate pain scale  - Administer analgesics based on type and severity of pain and evaluate response  - Implement non-pharmacological measures as appropriate and evaluate response  - Consider cultural and social influences on pain and pain management  - Notify physician/advanced practitioner if interventions unsuccessful or patient reports new pain  Outcome: Progressing     Problem: INFECTION - ADULT  Goal: Absence or prevention of progression during hospitalization  Description: INTERVENTIONS:  - Assess and monitor for signs and symptoms of infection  - Monitor lab/diagnostic results  - Monitor all insertion sites, i.e. indwelling lines, tubes, and drains  - Monitor endotracheal if appropriate and nasal secretions for changes in amount and color  - [place] appropriate cooling/warming therapies per order  - Administer medications as ordered  - Instruct and encourage patient and family to use good hand hygiene technique  - Identify and instruct in appropriate isolation precautions for  identified infection/condition  Outcome: Progressing

## 2024-10-23 NOTE — PROGRESS NOTES
Progress Note - Surgery-General   Name: Hua Acevedo 64 y.o. male I MRN: [id number]  Unit/Bed#: [facility] 304-01 I Date of Admission: 10/22/2024   Date of Service: 10/23/2024 I Hospital Day: 1     Assessment & Plan  Acute cholecystitis with acute cholangitis  MRCP negative for common bile duct stones.  Liver function tests still indicative of a common duct obstruction.  Patient also has acute calculus cholecystitis with sepsis.  Surgical intervention is indicated.  I thoroughly discussed the procedure risks of robotic assisted laparoscopic cholecystectomy, possible open cholecystectomy.  Risks of the procedure include, but are not limited to, bleeding, infection, bowel injury, duct injury, duct leakage, poor wound healing, and/or hernias.  He understands and consent is obtained.  He is scheduled for surgery at 0930 on 10/24/2024.  All the patient's questions and concerns were addressed.  I did discuss with the patient that there will possibly be a need for an ERCP postcholecystectomy.  He understands.        Subjective   Patient states he still having significant abdominal discomfort which has not improved overnight.    Objective :  Temp:  [98.4 °F (36.9 °C)-100.5 °F (38.1 °C)] 99.6 °F (37.6 °C)  HR:  [104-116] 104  BP: (118-152)/(61-83) 118/61  Resp:  [16-18] 16  SpO2:  [90 %-93 %] 92 %  O2 Device: None (Room air)    I/O         10/21 0701  10/22 0700 10/22 0701  10/23 0700 10/23 0701  10/24 0700    P.O.  240     I.V. (mL/kg) 30 (0.3) 3200.4 (29.1) 877.1 (8)    IV Piggyback 1250 450 200    Total Intake(mL/kg) 1280 (11.9) 3890.4 (35.4) 1077.1 (9.8)    Urine (mL/kg/hr)   450 (0.4)    Total Output   450    Net +1280 +3890.4 +627.1           Unmeasured Urine Occurrence  5 x             Physical Exam  General-awake and alert, no distress  HEENT-scleral icterus  Skin-jaundiced  Heart-regular rate and rhythm  Lungs-Clear to auscultation bilaterally  Abdomen-distended, positive Jarrell sign, infraumbilical midline  cicatrix well-healed without obvious hernia  Extremity-no Homans      Lab Results: I have reviewed the following results:  Recent Labs     10/22/24  0136 10/22/24  0341 10/22/24  0630 10/22/24  0642 10/23/24  0559   WBC 15.84*  --   --    < > 15.08*   HGB 15.9  --   --    < > 13.7   HCT 45.9  --   --    < > 41.0     --   --    < > 157   SODIUM 133*  --   --    < > 137   K 4.3  --   --    < > 3.5   CL 98  --   --    < > 104   CO2 24  --   --    < > 24   BUN 15  --   --    < > 9   CREATININE 0.95  --   --    < > 0.72   GLUC 197*  --   --    < > 155*   AST 97*  --   --    < > 114*   ALT 65*  --   --    < > 166*   ALB 4.5  --   --    < > 3.4*   TBILI 3.48*  --   --    < > 6.39*   ALKPHOS 79  --   --    < > 108*   HSTNI0 11  --   --   --   --    HSTNI2  --  16  --   --   --    *  --   --   --   --    LACTICACID 2.7* 3.0* 2.0  --   --     < > = values in this interval not displayed.       Imaging Results Review: I personally reviewed the following image studies/reports in PACS and discussed pertinent findings with Radiology: CT abdomen/pelvis. My interpretation of the radiology images/reports is:  .      VTE Pharmacologic Prophylaxis: Reason for no pharmacologic prophylaxis preop-has been on Plavix  VTE Mechanical Prophylaxis: sequential compression device

## 2024-10-23 NOTE — CASE MANAGEMENT
Case Management Assessment & Discharge Planning Note    Patient name Hua Acevedo  Location /-01 MRN [id number]  : 1960 Date 10/23/2024       Current Admission Date: 10/22/2024  Current Admission Diagnosis:Abdominal pain   Patient Active Problem List    Diagnosis Date Noted Date Diagnosed    Abdominal pain 10/22/2024     Hashimoto's thyroiditis 10/22/2024     Sepsis (HCC) 10/22/2024     TIA (transient ischemic attack) 2021     Carotid stenosis, symptomatic, with infarction (HCC) 2021     Gram-negative bacteremia 2021     Hyperlipidemia 2021     Hypertension 2021     CVA (cerebral vascular accident) (HCC) 2021     Carpal tunnel syndrome, bilateral 2020     Chronic pain of left knee 2020     Primary osteoarthritis of left knee 2020       LOS (days): 1  Geometric Mean LOS (GMLOS) (days): 2.2  Days to GMLOS:0.9     OBJECTIVE:    Risk of Unplanned Readmission Score: 8.65         Current admission status: Inpatient  Referral Reason: Other (Disposition Planning)    Preferred Pharmacy:   [email or web address] [address] [place] [address] [address] [facility]  [address] [place] [address]  Phone: [phone] Fax: [phone]    Primary Care Provider: Blanka Suarez DO    Primary Insurance: [facility] RE[facility]  Secondary Insurance:     ASSESSMENT:  Active Health Care Proxies    There are no active Health Care Proxies on file.       Advance Directives  Does patient have a Health Care POA?: No  Was patient offered paperwork?: Yes (pt declined)  Does patient currently have a Health Care decision maker?: Yes, please see Health Care Proxy section  Does patient have Advance Directives?: No  Was patient offered paperwork?: Yes (pt declined)  Primary Contact: Cate Acevedo, spouse         Readmission Root Cause  30 Day Readmission: No    Patient Information  Admitted from:: Home  Mental Status: Alert  During Assessment patient was  accompanied by: Not accompanied during assessment  Assessment information provided by:: Patient  Primary Caregiver: Self  Support Systems: Spouse/significant other, Family members  County of Residence: [facility]  What city do you live in?: [place]  Home entry access options. Select all that apply.: Stairs  Number of steps to enter home.: 4  Do the steps have railings?: Yes  Type of Current Residence: [address] [place] home  Upon entering residence, is there a bedroom on the main floor (no further steps)?: Yes  Upon entering residence, is there a bathroom on the main floor (no further steps)?: Yes  Living Arrangements: Lives w/ Spouse/significant other  Is patient a ?: No    Activities of Daily Living Prior to Admission  Functional Status: Independent  Completes ADLs independently?: Yes  Ambulates independently?: Yes  Does patient use assisted devices?: No  Does patient currently own DME?: Yes  What DME does the patient currently own?: [employer]  Does patient have a history of Outpatient Therapy (PT/OT)?: Yes  Does the patient have a history of Short-Term Rehab?: No  Does patient have a history of HHC?: No  Does patient currently have HHC?: No         Patient Information Continued  Income Source: SSI/SSD  Does patient have prescription coverage?: Yes  Does patient receive dialysis treatments?: No  Does patient have a history of substance abuse?: No  Does patient have a history of Mental Health Diagnosis?: No         Means of Transportation  Means of Transport to Appts:: Family transport      Social Determinants of Health (SDOH)      Flowsheet Row Most Recent Value   Housing Stability    In the last 12 months, was there a time when you were not able to pay the mortgage or rent on time? N   In the past 12 months, how many times have you moved where you were living? 0   At any time in the past 12 months, were you homeless or living in a shelter (including now)? N   Transportation Needs    In the past 12 months, has  lack of transportation kept you from medical appointments or from getting medications? no   In the past 12 months, has lack of transportation kept you from meetings, work, or from getting things needed for daily living? No   Food Insecurity    Within the past 12 months, you worried that your food would run out before you got the money to buy more. Never true   Within the past 12 months, the food you bought just didn't last and you didn't have money to get more. Never true   [employer]    In the past 12 months has the electric, gas, oil, or water company threatened to shut off services in your home? No            DISCHARGE DETAILS:    Discharge planning discussed with:: patient  Freedom of Choice: Yes     CM contacted family/caregiver?: No- see comments (pt declined)                  CM met with patient at the bedside, baseline information was obtained. CM discussed the role of CM in helping the patient develop a discharge plan and assist the patient in carry out their plan.     Patient lives in two story home with bedroom and bathroom on first floor. Patient reports he lives with spouse and granddaughters (age 17 & 14).  Patient reports he is independent with ambulation but has cane, if needed. Patient reports granddaughters as very helpful in home.     CM to follow for CM discharge referral needs.

## 2024-10-23 NOTE — PROGRESS NOTES
Progress Note - Hospitalist   Name: Hua Acevedo 64 y.o. male I MRN: [id number]  Unit/Bed#: -01 I Date of Admission: 10/22/2024   Date of Service: 10/23/2024 I Hospital Day: 1    Assessment & Plan  Abdominal pain  Patient presents with 2 days history of abdominal pain, nausea and vomiting.  Upon presentation to the emergency room was found to have abnormal transaminases and hyperbilirubinemia  CT scan of the abdomen and pelvis showsermediate density within the gallbladder likely due to sludge and/or stones. Pericholecystic fat stranding concerning for acute cholecystitis.  2.  Moderate thickening of the hepatic flexure with surrounding fat stranding which may be due to focal colitis, likely reactive from adjacent gallbladder inflammation.  3.  Hepatic steatosis.  4.  Bibasilar right greater than left linear opacities likely due to atelectasis.  Admitted under surgical service.  Surgery versus perc trung as per primary team     Hypertension  Maintained on hydrochlorothiazide and lisinopril.  Currently placed on hold.  Did have hypertensive urgency on admission with blood pressure of 203/95 likely secondary to pain.  Continue with IV labetalol for SBP greater than 160.  BP is 151/80 for now   Will add lasix as on my assessment looks volume up  Will stop fluids but ultimately defer further NPO status versus fluids to surgery   CVA (cerebral vascular accident) (HCC)  Patient has a prior history of right hemispheric CVA secondary to high-grade right carotid stenosis s/p R CEA by Dr Granger 7/26/21 .  Antiplatelets on hold currently in anticipation for possible need for OR/ERCP  Resume statins once able to tolerate p.o.  Hyperlipidemia  Resume statins once patient able to tolerate p.o.  Carotid stenosis, symptomatic, with infarction (HCC)  Patient has history of right hemispheric CVA secondary to high-grade right carotid stenosis s/p R CEA by Dr Granger 7/26/21 Has been maintained on aspirin, Plavix.  Has not  "been on any statin therapy although her last vascular surgery note states that he should be on it .  Antiplatelets on hold in view of possible and need for surgery/GI procedure  Resume statin therapy when patient is able to tolerate p.o.  Hashimoto's thyroiditis  Not on any medications an outpatient.  Follow-up on TSH.  Sepsis (HCC)  As evidenced by tachycardia, tachypnea, leukocytosis likely in the setting of cholecystitis.  However cannot rule out choledocholithiasis.  Continue with IV ceftriaxone and Flagyl.  Follow-up on blood culture results.   Holding IVF   +3 L so far - will trial diuresis.   NPO status as per surgery     VTE Pharmacologic Prophylaxis:   Moderate Risk (Score 3-4) - Pharmacological DVT Prophylaxis Ordered: heparin.    Mobility:   Basic Mobility Inpatient Raw Score: 24  JH-HLM Goal: 8: Walk 250 feet or more  JH-HLM Achieved: 6: Walk 10 steps or more      Patient Centered Rounds: I performed bedside rounds with nursing staff today.   Discussions with Specialists or Other Care Team Provider: Discussed with nursing today.     Education and Discussions with Family / Patient: Called spouse however call unable to be completed.     Current Length of Stay: 1 day(s)  Current Patient Status: Inpatient   Certification Statement: The patient will continue to require additional inpatient hospital stay due to cholecystitis, management as per surgery.   Discharge Plan:  as per primary team.     Code Status: Level 1 - Full Code    Subjective   Patient seen and examined   Feeling \"the same\"      Objective :  Temp:  [98.4 °F (36.9 °C)-100.5 °F (38.1 °C)] 100.5 °F (38.1 °C)  HR:  [104-116] 110  BP: (144-157)/(76-83) 151/80  Resp:  [16-18] 16  SpO2:  [90 %-93 %] 92 %  O2 Device: None (Room air)    Body mass index is 35.88 kg/m².     Input and Output Summary (last 24 hours):     Intake/Output Summary (Last 24 hours) at 10/23/2024 1216  Last data filed at 10/23/2024 0549  Gross per 24 hour   Intake 3130 ml   Output " --   Net 3130 ml       Physical Exam  Constitutional:       General: He is not in acute distress.     Appearance: He is ill-appearing. He is not toxic-appearing.   HENT:      Head: Normocephalic.   Eyes:      General: No scleral icterus.        Right eye: No discharge.         Left eye: No discharge.      Pupils: Pupils are equal, round, and reactive to light.   Cardiovascular:      Rate and Rhythm: Regular rhythm. Tachycardia present.      Heart sounds: No murmur heard.     No friction rub. No gallop.   Pulmonary:      Effort: No respiratory distress.      Breath sounds: No stridor. No wheezing, rhonchi or rales.   Chest:      Chest wall: No tenderness.   Abdominal:      General: There is distension.      Palpations: There is no mass.      Tenderness: There is abdominal tenderness. There is no right CVA tenderness, left CVA tenderness, guarding or rebound.      Hernia: No hernia is present.   Musculoskeletal:      Cervical back: Normal range of motion.      Right lower leg: No edema.      Left lower leg: No edema.   Skin:     Capillary Refill: Capillary refill takes less than 2 seconds.      Coloration: Skin is pale. Skin is not jaundiced.      Findings: No bruising, erythema, lesion or rash.   Neurological:      General: No focal deficit present.      Mental Status: He is alert.   Psychiatric:         Mood and Affect: Mood normal.           Lines/Drains:              Lab Results: I have reviewed the following results:   Results from last 7 days   Lab Units 10/23/24  0559   WBC Thousand/uL 15.08*   HEMOGLOBIN g/dL 13.7   HEMATOCRIT % 41.0   PLATELETS Thousands/uL 157   SEGS PCT % 87*   LYMPHO PCT % 4*   MONO PCT % 8   EOS PCT % 0     Results from last 7 days   Lab Units 10/23/24  0559   SODIUM mmol/L 137   POTASSIUM mmol/L 3.5   CHLORIDE mmol/L 104   CO2 mmol/L 24   BUN mg/dL 9   CREATININE mg/dL 0.72   ANION GAP mmol/L 9   CALCIUM mg/dL 8.2*   ALBUMIN g/dL 3.4*   TOTAL BILIRUBIN mg/dL 6.39*   ALK PHOS U/L 108*    ALT U/L 166*   AST U/L 114*   GLUCOSE RANDOM mg/dL 155*                 Results from last 7 days   Lab Units 10/23/24  0559 10/22/24  1537 10/22/24  0630 10/22/24  0341 10/22/24  0136   LACTIC ACID mmol/L  --   --  2.0 3.0* 2.7*   PROCALCITONIN ng/ml 1.35* 1.36*  --   --   --        Recent Cultures (last 7 days):   Results from last 7 days   Lab Units 10/22/24  0145 10/22/24  0136   BLOOD CULTURE  No Growth at 24 hrs. No Growth at 24 hrs.       Imaging Results Review: No pertinent imaging studies reviewed.  Other Study Results Review: No additional pertinent studies reviewed.    Last 24 Hours Medication List:     Current Facility-Administered Medications:     acetaminophen (Ofirmev) injection 1,000 mg, Q6H PRN, Last Rate: 1,000 mg (10/23/24 1114)    cefTRIAXone (ROCEPHIN) IVPB (premix in dextrose) 2,000 mg 50 mL, Q24H, Last Rate: Stopped (10/23/24 0430)    furosemide (LASIX) injection 20 mg, BID (diuretic)    hydroCHLOROthiazide tablet 25 mg, Daily    HYDROmorphone (DILAUDID) injection 0.5 mg, Q3H PRN    labetalol (NORMODYNE) injection 10 mg, Q6H PRN    metroNIDAZOLE (FLAGYL) IVPB (premix) 500 mg 100 mL, Q8H, Last Rate: 500 mg (10/23/24 1200)    ondansetron (ZOFRAN) injection 4 mg, Q6H PRN    Administrative Statements   Today, Patient Was Seen By: Teena Espino MD  I have spent a total time of 30 minutes in caring for this patient on the day of the visit/encounter including Diagnostic results, Prognosis, Risks and benefits of tx options, Instructions for management, Importance of tx compliance, Risk factor reductions, Impressions, Counseling / Coordination of care, Documenting in the medical record, Reviewing / ordering tests, medicine, procedures  , Obtaining or reviewing history  , and Communicating with other healthcare professionals .    **Please Note: This note may have been constructed using a voice recognition system.**

## 2024-10-23 NOTE — ASSESSMENT & PLAN NOTE
Patient presents with 2 days history of abdominal pain, nausea and vomiting.  Upon presentation to the emergency room was found to have abnormal transaminases and hyperbilirubinemia  CT scan of the abdomen and pelvis showsermediate density within the gallbladder likely due to sludge and/or stones. Pericholecystic fat stranding concerning for acute cholecystitis.  2.  Moderate thickening of the hepatic flexure with surrounding fat stranding which may be due to focal colitis, likely reactive from adjacent gallbladder inflammation.  3.  Hepatic steatosis.  4.  Bibasilar right greater than left linear opacities likely due to atelectasis.  Admitted under surgical service.  Surgery versus perc trung as per primary team

## 2024-10-23 NOTE — ASSESSMENT & PLAN NOTE
Maintained on hydrochlorothiazide and lisinopril.  Currently placed on hold.  Did have hypertensive urgency on admission with blood pressure of 203/95 likely secondary to pain.  Continue with IV labetalol for SBP greater than 160.  BP is 151/80 for now   Will add lasix as on my assessment looks volume up  Will stop fluids but ultimately defer further NPO status versus fluids to surgery

## 2024-10-23 NOTE — ASSESSMENT & PLAN NOTE
MRCP negative for common bile duct stones.  Liver function tests still indicative of a common duct obstruction.  Patient also has acute calculus cholecystitis with sepsis.  Surgical intervention is indicated.  I thoroughly discussed the procedure risks of robotic assisted laparoscopic cholecystectomy, possible open cholecystectomy.  Risks of the procedure include, but are not limited to, bleeding, infection, bowel injury, duct injury, duct leakage, poor wound healing, and/or hernias.  He understands and consent is obtained.  He is scheduled for surgery at 0930 on 10/24/2024.  All the patient's questions and concerns were addressed.  I did discuss with the patient that there will possibly be a need for an ERCP postcholecystectomy.  He understands.

## 2024-10-23 NOTE — UTILIZATION REVIEW
Initial Clinical Review    Admission: Date/Time/Statement:   Admission Orders (From admission, onward)       Ordered        10/22/24 0329  INPATIENT ADMISSION  Once                          Orders Placed This Encounter   Procedures    INPATIENT ADMISSION     Standing Status:   Standing     Number of Occurrences:   1     Order Specific Question:   Level of Care     Answer:   Med Surg [16]     Order Specific Question:   Estimated length of stay     Answer:   More than 2 Midnights     Order Specific Question:   Certification     Answer:   I certify that inpatient services are medically necessary for this patient for a duration of greater than two midnights. See H&P and MD Progress Notes for additional information about the patient's course of treatment.     ED Arrival Information       Expected   -    Arrival   10/22/2024 01:12    Acuity   Urgent              Means of arrival   Ambulance    Escorted by   [facility]    Service   Surgery-General    Admission type   Emergency              Arrival complaint   -             Chief Complaint   Patient presents with    Abdominal Pain     LQ abdominal pain since yesterday, getting progressively worse. Pain worsens with a deep breath. No bowel movement X24hrs did take a laxative. Poor appetite. Started with cold sweats today        Initial Presentation: 64 y.o. male presents to the ED Via EMS for  complaints of LQ abdominal pain, chills, nausea, one episode of bilious vomiting and constipation. On arrival /87, and tachycardic with . Patient states that approximately 2 days ago he started to have severe Left lower quadrant pain. Says this pain was radiating to his back as well. Says he does have known back pain along with degenerative joint disease, but this felt different than that usual pain. Also notes dark urine and that he has had similar pain over the past 30 years, underwent bowel resection for diverticulitis,  but this is worse and is worse after eating  a fatty or heavy meal. Per wife pt does not typically have RLQ pain, is usually on the left side. Also poor po intake over the past several days d/t pain. In the ED pt given 2 L IVF's of NS, IV famotidine, IV dilaudid, IV ceftriaxone and IV metronidazole, Sucralfate and maalox. On Exam: + abdominal distention, Tenderness in the epigastric area , + guarding, and LLQ abdomen.+ jaundiced. Abnormal labs/ imaging: , , Glucose 197, , , TBILI 3.48, ALK PHOS 110, Lipase 10, Lactic Acid 2.7, WBC 12.61, CT C/A/P revealed Intermediate density within the gallbladder likely due to sludge and/or stones. Pericholecystic fat stranding concerning for acute cholecystitis.Moderate thickening of the hepatic flexure with surrounding fat stranding which may be due to focal colitis, likely reactive from adjacent gallbladder inflammation. EKG reveals sinus tachycardia.  PMH:  HTN, hx TIA, hx of carotid endarterectomy on asa and plavix, Hashimoto's thyroiditis, hx of bowel resection. Plan : Admit pt for cholecystitis and Sepsis, continue IV ABX's, Keep NPO, General surgery consult,IVF's, IV pain control/antiemetics, MRCP to r/o choledocholithiasis, trend Lactic Acid and procalc, Possible OR for Cholecystectomy.     MRCP: MRCP was completed today to rule out choledocholithiasis. There is no evidence of a stone in the common bile duct. Acute cholecystitis is present.     Hospitalist Consult: on plavix, last taken 10/21, will continue to treat conservatively with antibiotics until safe to operate off plavix, possible cholecystectomy tube or proceeding with surgical intervention with platelet transfusion.  This last option should only be necessary if the patient clinically deteriorates.  For now the patient may have a clear liquid diet, n.p.o. after midnight. Continue IVF's, IV ABX. Afebrile, mild tachycardia with heart rate ranging  since admission, mild tachypnea respiratory rate ranging 18-24, improving  hypertension BP ranging 147/82 to 203/95, pulse ox 91 to 96% on 2 L nasal cannula.     Anticipated Length of Stay/Certification Statement: The patient will continue to require additional inpatient hospital stay due to cholecystitis, management as per surgery.     Date: 10/23/24   Day 2: Hospitalist: On Exam, pt reports feeling the same.Is + tachycardic, has abdominal distention w/ left CVA tenderness. WBC 15.08, Albumin 3.4, ALK pHOS 108, , , Glucose 155. Procalc 1.35, lactic acid 3.0. on IV Ceftriaxone Q 24, IV flagyl Q 8 Received IV lasix x 1 today and ordered 2x daily. Dilaudid x 2 IV given today for pain, IV acetaminophen x 1 dose. Tmax today 100.5 F. BP remains Systolic 140's -150's.       ED Treatment-Medication Administration from 10/22/2024 0110 to 10/22/2024 0531         Date/Time Order Dose Route Action     10/22/2024 0142 sodium chloride 0.9 % bolus 1,000 mL 1,000 mL Intravenous New Bag     10/22/2024 0145 Famotidine (PF) (PEPCID) injection 20 mg 20 mg Intravenous Given     10/22/2024 0147 aluminum-magnesium hydroxide-simethicone (MAALOX) oral suspension 30 mL 30 mL Oral Given     10/22/2024 0231 iohexol (OMNIPAQUE) 350 MG/ML injection (MULTI-DOSE) 100 mL 100 mL Intravenous Given     10/22/2024 0313 fentaNYL injection 50 mcg 50 mcg Intravenous Given     10/22/2024 0314 sucralfate (CARAFATE) tablet 1 g 1 g Oral Given     10/22/2024 0352 sodium chloride 0.9 % infusion 75 mL/hr Intravenous New Bag     10/22/2024 0434 HYDROmorphone (DILAUDID) injection 0.5 mg 0.5 mg Intravenous Given     10/22/2024 0354 cefTRIAXone (ROCEPHIN) IVPB (premix in dextrose) 1,000 mg 50 mL 1,000 mg Intravenous New Bag     10/22/2024 0428 metroNIDAZOLE (FLAGYL) IVPB (premix) 500 mg 100 mL 500 mg Intravenous New Bag            Scheduled Medications:  cefTRIAXone, 2,000 mg, Intravenous, Q24H  furosemide, 20 mg, Intravenous, BID (diuretic)  hydroCHLOROthiazide, 25 mg, Oral, Daily  metroNIDAZOLE, 500 mg, Intravenous,  Q8H      Continuous IV Infusions: sodium chloride 0.9 % infusion  Rate: 100 mL/hr Dose: 100 mL/hr  Freq: Continuous Route: IV  Indications of Use: IV Hydration  Last Dose: Stopped (10/23/24 1250)  Start: 10/22/24 0345 End: 10/23/24 1213     PRN Meds:  acetaminophen, 1,000 mg, Intravenous, Q6H PRN  x 3 doses 10/22, x 1 dose 10/23   HYDROmorphone, 0.5 mg, Intravenous, Q3H PRN x2 doses 10/22 and x 2 doses 10/23 so far  labetalol, 10 mg, Intravenous, Q6H PRN  ondansetron, 4 mg, Intravenous, Q6H PRN      ED Triage Vitals [10/22/24 0112]   Temperature Pulse Respirations Blood Pressure SpO2 Pain Score   98.4 °F (36.9 °C) (!) 119 18 (!) 172/87 92 % 8     Weight (last 2 days)       Date/Time Weight    10/23/24 0600 110 (242.95)    10/22/24 0543 108 (238.54)    10/22/24 0112 109 (239.42)            Vital Signs (last 3 days)       Date/Time Temp Pulse Resp BP MAP (mmHg) SpO2 Calculated FIO2 (%) - Nasal Cannula Nasal Cannula O2 Flow Rate (L/min) O2 Device Patient Position - Orthostatic VS Pain    10/23/24 1100 -- 110 -- -- -- 92 % -- -- -- -- 5    10/23/24 0820 -- 116 -- -- -- 93 % -- -- -- -- No Pain    10/23/24 0705 100.5 °F (38.1 °C) 107 16 151/80 109 92 % -- -- None (Room air) Lying --    10/23/24 0600 -- 110 18 144/79 105 90 % -- -- None (Room air) Lying --    10/23/24 0216 -- -- -- -- -- -- -- -- -- -- 6    10/22/24 2329 98.7 °F (37.1 °C) 114 17 152/76 106 91 % -- -- None (Room air) Lying --    10/22/24 2129 -- -- -- -- -- -- -- -- -- -- 4    10/22/24 1911 98.4 °F (36.9 °C) 109 16 150/83 111 91 % -- -- None (Room air) Lying --    10/22/24 1547 98.5 °F (36.9 °C) 104 17 157/81 113 91 % -- -- None (Room air) Lying --    10/22/24 1100 97.9 °F (36.6 °C) 100 14 170/88 119 91 % -- -- None (Room air) -- --    10/22/24 0909 -- -- -- -- -- -- -- -- -- -- 9    10/22/24 0719 97.7 °F (36.5 °C) 97 20 147/82 107 94 % 28 2 L/min Nasal cannula Lying --    10/22/24 0601 -- -- -- -- -- -- -- -- -- -- 9    10/22/24 0543 98.5 °F (36.9 °C) 105  -- 149/84 110 93 % 28 2 L/min Nasal cannula Sitting 9    10/22/24 0500 -- 102 22 176/80 115 93 % 28 2 L/min Nasal cannula Lying --    10/22/24 0434 -- -- -- -- -- -- -- -- -- -- 9    10/22/24 0430 -- 106 23 203/95 122 94 % 28 2 L/min Nasal cannula Lying 9    10/22/24 0400 -- 103 22 179/86 123 94 % 28 2 L/min Nasal cannula Lying --    10/22/24 0330 -- 100 22 170/85 120 93 % 28 2 L/min Nasal cannula -- --    10/22/24 0313 -- -- -- -- -- -- -- -- -- -- 7    10/22/24 0300 -- 106 18 174/91 125 96 % 28 2 L/min Nasal cannula Lying 7    10/22/24 0245 -- 103 24 174/91 -- 96 % 28 2 L/min Nasal cannula -- --    10/22/24 0115 -- 114 24 172/87 117 91 % -- -- None (Room air) -- --    10/22/24 0112 98.4 °F (36.9 °C) 119 18 172/87 -- 92 % -- -- None (Room air) Sitting 8              Pertinent Labs/Diagnostic Test Results:   Radiology:  MRI abdomen wo contrast and mrcp   Final Interpretation by Doe Lopez MD (10/22 1540)      Findings suspicious for acute cholecystitis.      No intrahepatic/extrahepatic biliary dilation or choledocholithiasis         The study was marked in EPIC for immediate notification.      Workstation performed: [id number]         CT chest abdomen pelvis w contrast   Final Interpretation by Shukri Eagle MD (10/22 0314)      1.  Intermediate density within the gallbladder likely due to sludge and/or stones. Pericholecystic fat stranding concerning for acute cholecystitis.   2.  Moderate thickening of the hepatic flexure with surrounding fat stranding which may be due to focal colitis, likely reactive from adjacent gallbladder inflammation.   3.  Hepatic steatosis.   4.  Bibasilar right greater than left linear opacities likely due to atelectasis.      The study was marked in EPIC for immediate notification.         Workstation performed: [id number]           Cardiology:  ECG 12 lead   Final Result by Ja Theodore MD (10/22 0748)   Sinus tachycardia   Cannot rule out Anterior infarct , age  undetermined   Abnormal ECG   When compared with ECG of 14-JUL-2021 13:45,   No significant change was found   Confirmed by Ja Theodore ([id number]) on 10/22/2024 7:48:55 AM        GI:  No orders to display           Results from last 7 days   Lab Units 10/23/24  0559 10/22/24  0642 10/22/24  0136   WBC Thousand/uL 15.08* 12.61* 15.84*   HEMOGLOBIN g/dL 13.7 15.0 15.9   HEMATOCRIT % 41.0 43.3 45.9   PLATELETS Thousands/uL 157 185 202   TOTAL NEUT ABS Thousands/µL 13.14* 10.92* 13.47*         Results from last 7 days   Lab Units 10/23/24  0559 10/22/24  0642 10/22/24  0136   SODIUM mmol/L 137 134* 133*   POTASSIUM mmol/L 3.5 3.9 4.3   CHLORIDE mmol/L 104 100 98   CO2 mmol/L 24 26 24   ANION GAP mmol/L 9 8 11   BUN mg/dL 9 14 15   CREATININE mg/dL 0.72 0.93 0.95   EGFR ml/min/1.73sq m 98 86 84   CALCIUM mg/dL 8.2* 8.9 9.4     Results from last 7 days   Lab Units 10/23/24  0559 10/22/24  0642 10/22/24  0136   AST U/L 114* 222* 97*   ALT U/L 166* 201* 65*   ALK PHOS U/L 108* 110* 79   TOTAL PROTEIN g/dL 6.0* 7.1 7.8   ALBUMIN g/dL 3.4* 4.1 4.5   TOTAL BILIRUBIN mg/dL 6.39* 5.06* 3.48*   BILIRUBIN DIRECT mg/dL  --  2.10* 0.46*         Results from last 7 days   Lab Units 10/23/24  0559 10/22/24  0642 10/22/24  0136   GLUCOSE RANDOM mg/dL 155* 195* 197*                     Results from last 7 days   Lab Units 10/22/24  0555 10/22/24  0341 10/22/24  0136   HS TNI 0HR ng/L  --   --  11   HS TNI 2HR ng/L  --  16  --    HSTNI D2 ng/L  --  5  --    HS TNI 4HR ng/L 17  --   --    HSTNI D4 ng/L 6  --   --                  Results from last 7 days   Lab Units 10/23/24  0559 10/22/24  1537   PROCALCITONIN ng/ml 1.35* 1.36*     Results from last 7 days   Lab Units 10/22/24  0630 10/22/24  0341 10/22/24  0136   LACTIC ACID mmol/L 2.0 3.0* 2.7*             Results from last 7 days   Lab Units 10/22/24  0136   BNP pg/mL 124*                     Results from last 7 days   Lab Units 10/22/24  0136   LIPASE u/L 10*                 Results  "from last 7 days   Lab Units 10/22/24  0313   CLARITY UA  Clear   COLOR UA  Yellow   SPEC GRAV UA  <=1.005   PH UA  6.5   GLUCOSE UA mg/dl Negative   KETONES UA mg/dl Negative   BLOOD UA  Small*   PROTEIN UA mg/dl Negative   NITRITE UA  Negative   BILIRUBIN UA  Negative   UROBILINOGEN UA E.U./dl 1.0   LEUKOCYTES UA  Negative   WBC UA /hpf 0-1   RBC UA /hpf 0-1   BACTERIA UA /hpf None Seen   EPITHELIAL CELLS WET PREP /hpf Occasional                       Results from last 7 days   Lab Units 10/22/24  0145 10/22/24  0136   BLOOD CULTURE  No Growth at 24 hrs. No Growth at 24 hrs.                   Past Medical History:   Diagnosis Date    Diverticulitis large intestine     \"During Bowel Resection determined not to have Diverticulitis.\"- Patient    Hypertension     TIA (transient ischemic attack)      Present on Admission:   Carotid stenosis, symptomatic, with infarction (HCC)   Hypertension   CVA (cerebral vascular accident) (HCC)   Hyperlipidemia      Admitting Diagnosis: Cholecystitis [K81.9]  Abdominal pain [R10.9]  Age/Sex: 64 y.o. male    Network Utilization Review Department  ATTENTION: Please call with any questions or concerns to [phone] and carefully listen to the prompts so that you are directed to the right person. All voicemails are confidential.   For Discharge needs, contact Care Management DC Support Team at [phone] opt. 2  Send all requests for admission clinical reviews, approved or denied determinations and any other requests to dedicated fax number below belonging to the campus where the patient is receiving treatment. List of dedicated fax numbers for the Facilities:  FACILITY NAME UR FAX NUMBER   ADMISSION DENIALS (Administrative/Medical Necessity) [phone]   DISCHARGE SUPPORT TEAM (NETWORK) [phone]   PARENT CHILD HEALTH (Maternity/NICU/Pediatrics) [phone]   [facility] [phone]   [facility] [phone]   [facility]" [facility] [phone]   [facility] [phone]   [facility] [phone]   [facility] [phone]   [facility] [phone]   [facility] [phone]   [facility] [phone]   [facility] [phone]   [facility] [phone]   [facility] [phone]

## 2024-10-23 NOTE — ASSESSMENT & PLAN NOTE
As evidenced by tachycardia, tachypnea, leukocytosis likely in the setting of cholecystitis.  However cannot rule out choledocholithiasis.  Continue with IV ceftriaxone and Flagyl.  Follow-up on blood culture results.   Holding IVF   +3 L so far - will trial diuresis.   NPO status as per surgery

## 2024-10-24 ENCOUNTER — ANESTHESIA (INPATIENT)
Dept: PERIOP | Facility: HOSPITAL | Age: 64
DRG: 854 | End: 2024-10-24
Payer: COMMERCIAL

## 2024-10-24 ENCOUNTER — ANESTHESIA EVENT (INPATIENT)
Dept: PERIOP | Facility: HOSPITAL | Age: 64
DRG: 854 | End: 2024-10-24
Payer: COMMERCIAL

## 2024-10-24 LAB
ABO GROUP BLD BPU: NORMAL
ABO GROUP BLD BPU: NORMAL
ABO GROUP BLD: NORMAL
ALBUMIN SERPL BCG-MCNC: 3.6 G/DL (ref 3.5–5)
ALP SERPL-CCNC: 143 U/L (ref 34–104)
ALT SERPL W P-5'-P-CCNC: 138 U/L (ref 7–52)
ANION GAP SERPL CALCULATED.3IONS-SCNC: 10 MMOL/L (ref 4–13)
ANION GAP SERPL CALCULATED.3IONS-SCNC: 10 MMOL/L (ref 4–13)
AST SERPL W P-5'-P-CCNC: 80 U/L (ref 13–39)
BASOPHILS # BLD AUTO: 0.03 THOUSANDS/ΜL (ref 0–0.1)
BASOPHILS # BLD AUTO: 0.04 THOUSANDS/ΜL (ref 0–0.1)
BASOPHILS NFR BLD AUTO: 0 % (ref 0–1)
BASOPHILS NFR BLD AUTO: 0 % (ref 0–1)
BILIRUB SERPL-MCNC: 4.73 MG/DL (ref 0.2–1)
BLD GP AB SCN SERPL QL: NEGATIVE
BPU ID: NORMAL
BPU ID: NORMAL
BUN SERPL-MCNC: 10 MG/DL (ref 5–25)
BUN SERPL-MCNC: 12 MG/DL (ref 5–25)
CALCIUM SERPL-MCNC: 8.3 MG/DL (ref 8.4–10.2)
CALCIUM SERPL-MCNC: 8.9 MG/DL (ref 8.4–10.2)
CHLORIDE SERPL-SCNC: 100 MMOL/L (ref 96–108)
CHLORIDE SERPL-SCNC: 101 MMOL/L (ref 96–108)
CO2 SERPL-SCNC: 26 MMOL/L (ref 21–32)
CO2 SERPL-SCNC: 27 MMOL/L (ref 21–32)
CREAT SERPL-MCNC: 0.94 MG/DL (ref 0.6–1.3)
CREAT SERPL-MCNC: 1.07 MG/DL (ref 0.6–1.3)
CROSSMATCH: NORMAL
CROSSMATCH: NORMAL
EOSINOPHIL # BLD AUTO: 0.03 THOUSAND/ΜL (ref 0–0.61)
EOSINOPHIL # BLD AUTO: 0.08 THOUSAND/ΜL (ref 0–0.61)
EOSINOPHIL NFR BLD AUTO: 0 % (ref 0–6)
EOSINOPHIL NFR BLD AUTO: 1 % (ref 0–6)
ERYTHROCYTE [DISTWIDTH] IN BLOOD BY AUTOMATED COUNT: 13.8 % (ref 11.6–15.1)
ERYTHROCYTE [DISTWIDTH] IN BLOOD BY AUTOMATED COUNT: 14.3 % (ref 11.6–15.1)
GFR SERPL CREATININE-BSD FRML MDRD: 72 ML/MIN/1.73SQ M
GFR SERPL CREATININE-BSD FRML MDRD: 85 ML/MIN/1.73SQ M
GLUCOSE SERPL-MCNC: 136 MG/DL (ref 65–140)
GLUCOSE SERPL-MCNC: 158 MG/DL (ref 65–140)
GLUCOSE SERPL-MCNC: 164 MG/DL (ref 65–140)
HCT VFR BLD AUTO: 34.5 % (ref 36.5–49.3)
HCT VFR BLD AUTO: 41.3 % (ref 36.5–49.3)
HGB BLD-MCNC: 11.6 G/DL (ref 12–17)
HGB BLD-MCNC: 13.9 G/DL (ref 12–17)
IMM GRANULOCYTES # BLD AUTO: 0.13 THOUSAND/UL (ref 0–0.2)
IMM GRANULOCYTES # BLD AUTO: 0.28 THOUSAND/UL (ref 0–0.2)
IMM GRANULOCYTES NFR BLD AUTO: 1 % (ref 0–2)
IMM GRANULOCYTES NFR BLD AUTO: 2 % (ref 0–2)
LYMPHOCYTES # BLD AUTO: 0.9 THOUSANDS/ΜL (ref 0.6–4.47)
LYMPHOCYTES # BLD AUTO: 1.01 THOUSANDS/ΜL (ref 0.6–4.47)
LYMPHOCYTES NFR BLD AUTO: 6 % (ref 14–44)
LYMPHOCYTES NFR BLD AUTO: 7 % (ref 14–44)
MAGNESIUM SERPL-MCNC: 1.9 MG/DL (ref 1.9–2.7)
MCH RBC QN AUTO: 31.1 PG (ref 26.8–34.3)
MCH RBC QN AUTO: 31.4 PG (ref 26.8–34.3)
MCHC RBC AUTO-ENTMCNC: 33.6 G/DL (ref 31.4–37.4)
MCHC RBC AUTO-ENTMCNC: 33.7 G/DL (ref 31.4–37.4)
MCV RBC AUTO: 92 FL (ref 82–98)
MCV RBC AUTO: 94 FL (ref 82–98)
MONOCYTES # BLD AUTO: 0.62 THOUSAND/ΜL (ref 0.17–1.22)
MONOCYTES # BLD AUTO: 0.85 THOUSAND/ΜL (ref 0.17–1.22)
MONOCYTES NFR BLD AUTO: 5 % (ref 4–12)
MONOCYTES NFR BLD AUTO: 5 % (ref 4–12)
NEUTROPHILS # BLD AUTO: 11.51 THOUSANDS/ΜL (ref 1.85–7.62)
NEUTROPHILS # BLD AUTO: 13.62 THOUSANDS/ΜL (ref 1.85–7.62)
NEUTS SEG NFR BLD AUTO: 86 % (ref 43–75)
NEUTS SEG NFR BLD AUTO: 87 % (ref 43–75)
NRBC BLD AUTO-RTO: 0 /100 WBCS
NRBC BLD AUTO-RTO: 0 /100 WBCS
PLATELET # BLD AUTO: 171 THOUSANDS/UL (ref 149–390)
PLATELET # BLD AUTO: 171 THOUSANDS/UL (ref 149–390)
PMV BLD AUTO: 9.7 FL (ref 8.9–12.7)
PMV BLD AUTO: 9.8 FL (ref 8.9–12.7)
POTASSIUM SERPL-SCNC: 3.6 MMOL/L (ref 3.5–5.3)
POTASSIUM SERPL-SCNC: 3.7 MMOL/L (ref 3.5–5.3)
PROT SERPL-MCNC: 6.6 G/DL (ref 6.4–8.4)
RBC # BLD AUTO: 3.69 MILLION/UL (ref 3.88–5.62)
RBC # BLD AUTO: 4.47 MILLION/UL (ref 3.88–5.62)
RH BLD: POSITIVE
SODIUM SERPL-SCNC: 136 MMOL/L (ref 135–147)
SODIUM SERPL-SCNC: 138 MMOL/L (ref 135–147)
SPECIMEN EXPIRATION DATE: NORMAL
UNIT DISPENSE STATUS: NORMAL
UNIT DISPENSE STATUS: NORMAL
UNIT PRODUCT CODE: NORMAL
UNIT PRODUCT CODE: NORMAL
UNIT PRODUCT VOLUME: 350 ML
UNIT PRODUCT VOLUME: 350 ML
UNIT RH: NORMAL
UNIT RH: NORMAL
WBC # BLD AUTO: 13.38 THOUSAND/UL (ref 4.31–10.16)
WBC # BLD AUTO: 15.72 THOUSAND/UL (ref 4.31–10.16)

## 2024-10-24 PROCEDURE — 99232 SBSQ HOSP IP/OBS MODERATE 35: CPT | Performed by: HOSPITALIST

## 2024-10-24 PROCEDURE — 83735 ASSAY OF MAGNESIUM: CPT | Performed by: INTERNAL MEDICINE

## 2024-10-24 PROCEDURE — 88304 TISSUE EXAM BY PATHOLOGIST: CPT | Performed by: STUDENT IN AN ORGANIZED HEALTH CARE EDUCATION/TRAINING PROGRAM

## 2024-10-24 PROCEDURE — 80048 BASIC METABOLIC PNL TOTAL CA: CPT | Performed by: NURSE ANESTHETIST, CERTIFIED REGISTERED

## 2024-10-24 PROCEDURE — 86901 BLOOD TYPING SEROLOGIC RH(D): CPT | Performed by: NURSE ANESTHETIST, CERTIFIED REGISTERED

## 2024-10-24 PROCEDURE — 0FB44ZZ EXCISION OF GALLBLADDER, PERCUTANEOUS ENDOSCOPIC APPROACH: ICD-10-PCS | Performed by: SURGERY

## 2024-10-24 PROCEDURE — 86850 RBC ANTIBODY SCREEN: CPT | Performed by: NURSE ANESTHETIST, CERTIFIED REGISTERED

## 2024-10-24 PROCEDURE — 80053 COMPREHEN METABOLIC PANEL: CPT | Performed by: SURGERY

## 2024-10-24 PROCEDURE — 86900 BLOOD TYPING SEROLOGIC ABO: CPT | Performed by: NURSE ANESTHETIST, CERTIFIED REGISTERED

## 2024-10-24 PROCEDURE — 8E0W4CZ ROBOTIC ASSISTED PROCEDURE OF TRUNK REGION, PERCUTANEOUS ENDOSCOPIC APPROACH: ICD-10-PCS | Performed by: SURGERY

## 2024-10-24 PROCEDURE — 85025 COMPLETE CBC W/AUTO DIFF WBC: CPT | Performed by: NURSE ANESTHETIST, CERTIFIED REGISTERED

## 2024-10-24 PROCEDURE — 85025 COMPLETE CBC W/AUTO DIFF WBC: CPT | Performed by: SURGERY

## 2024-10-24 PROCEDURE — 82948 REAGENT STRIP/BLOOD GLUCOSE: CPT

## 2024-10-24 PROCEDURE — BF50200 OTHER IMAGING OF BILE DUCTS USING FLUORESCING AGENT, INDOCYANINE GREEN DYE, INTRAOPERATIVE: ICD-10-PCS | Performed by: SURGERY

## 2024-10-24 PROCEDURE — 86920 COMPATIBILITY TEST SPIN: CPT

## 2024-10-24 RX ORDER — ROCURONIUM BROMIDE 10 MG/ML
INJECTION, SOLUTION INTRAVENOUS AS NEEDED
Status: DISCONTINUED | OUTPATIENT
Start: 2024-10-24 | End: 2024-10-24

## 2024-10-24 RX ORDER — ONDANSETRON 2 MG/ML
4 INJECTION INTRAMUSCULAR; INTRAVENOUS ONCE AS NEEDED
Status: DISCONTINUED | OUTPATIENT
Start: 2024-10-24 | End: 2024-10-24 | Stop reason: HOSPADM

## 2024-10-24 RX ORDER — FENTANYL CITRATE 50 UG/ML
INJECTION, SOLUTION INTRAMUSCULAR; INTRAVENOUS AS NEEDED
Status: DISCONTINUED | OUTPATIENT
Start: 2024-10-24 | End: 2024-10-24

## 2024-10-24 RX ORDER — PHENYLEPHRINE HCL IN 0.9% NACL 1 MG/10 ML
SYRINGE (ML) INTRAVENOUS AS NEEDED
Status: DISCONTINUED | OUTPATIENT
Start: 2024-10-24 | End: 2024-10-24

## 2024-10-24 RX ORDER — FENTANYL CITRATE/PF 50 MCG/ML
50 SYRINGE (ML) INJECTION
Status: DISCONTINUED | OUTPATIENT
Start: 2024-10-24 | End: 2024-10-24 | Stop reason: HOSPADM

## 2024-10-24 RX ORDER — ONDANSETRON 2 MG/ML
INJECTION INTRAMUSCULAR; INTRAVENOUS AS NEEDED
Status: DISCONTINUED | OUTPATIENT
Start: 2024-10-24 | End: 2024-10-24

## 2024-10-24 RX ORDER — HYDROMORPHONE HCL IN WATER/PF 6 MG/30 ML
0.2 PATIENT CONTROLLED ANALGESIA SYRINGE INTRAVENOUS
Status: DISCONTINUED | OUTPATIENT
Start: 2024-10-24 | End: 2024-10-24 | Stop reason: HOSPADM

## 2024-10-24 RX ORDER — PROMETHAZINE HYDROCHLORIDE 25 MG/ML
12.5 INJECTION, SOLUTION INTRAMUSCULAR; INTRAVENOUS ONCE AS NEEDED
Status: DISCONTINUED | OUTPATIENT
Start: 2024-10-24 | End: 2024-10-24 | Stop reason: HOSPADM

## 2024-10-24 RX ORDER — SODIUM CHLORIDE, SODIUM LACTATE, POTASSIUM CHLORIDE, CALCIUM CHLORIDE 600; 310; 30; 20 MG/100ML; MG/100ML; MG/100ML; MG/100ML
INJECTION, SOLUTION INTRAVENOUS CONTINUOUS PRN
Status: DISCONTINUED | OUTPATIENT
Start: 2024-10-24 | End: 2024-10-24

## 2024-10-24 RX ORDER — DEXAMETHASONE SODIUM PHOSPHATE 10 MG/ML
INJECTION, SOLUTION INTRAMUSCULAR; INTRAVENOUS AS NEEDED
Status: DISCONTINUED | OUTPATIENT
Start: 2024-10-24 | End: 2024-10-24

## 2024-10-24 RX ORDER — ACETAMINOPHEN 10 MG/ML
1000 INJECTION, SOLUTION INTRAVENOUS ONCE
Status: COMPLETED | OUTPATIENT
Start: 2024-10-24 | End: 2024-10-24

## 2024-10-24 RX ORDER — HYDROMORPHONE HCL/PF 1 MG/ML
SYRINGE (ML) INJECTION AS NEEDED
Status: DISCONTINUED | OUTPATIENT
Start: 2024-10-24 | End: 2024-10-24

## 2024-10-24 RX ORDER — SUCCINYLCHOLINE/SOD CL,ISO/PF 100 MG/5ML
SYRINGE (ML) INTRAVENOUS AS NEEDED
Status: DISCONTINUED | OUTPATIENT
Start: 2024-10-24 | End: 2024-10-24

## 2024-10-24 RX ORDER — PROPOFOL 10 MG/ML
INJECTION, EMULSION INTRAVENOUS AS NEEDED
Status: DISCONTINUED | OUTPATIENT
Start: 2024-10-24 | End: 2024-10-24

## 2024-10-24 RX ORDER — MAGNESIUM HYDROXIDE 1200 MG/15ML
LIQUID ORAL AS NEEDED
Status: DISCONTINUED | OUTPATIENT
Start: 2024-10-24 | End: 2024-10-24 | Stop reason: HOSPADM

## 2024-10-24 RX ORDER — MIDAZOLAM HYDROCHLORIDE 2 MG/2ML
INJECTION, SOLUTION INTRAMUSCULAR; INTRAVENOUS AS NEEDED
Status: DISCONTINUED | OUTPATIENT
Start: 2024-10-24 | End: 2024-10-24

## 2024-10-24 RX ORDER — DEXMEDETOMIDINE HYDROCHLORIDE 4 UG/ML
INJECTION, SOLUTION INTRAVENOUS AS NEEDED
Status: DISCONTINUED | OUTPATIENT
Start: 2024-10-24 | End: 2024-10-24

## 2024-10-24 RX ORDER — ALBUMIN, HUMAN INJ 5% 5 %
SOLUTION INTRAVENOUS CONTINUOUS PRN
Status: DISCONTINUED | OUTPATIENT
Start: 2024-10-24 | End: 2024-10-24

## 2024-10-24 RX ORDER — SODIUM CHLORIDE, SODIUM LACTATE, POTASSIUM CHLORIDE, CALCIUM CHLORIDE 600; 310; 30; 20 MG/100ML; MG/100ML; MG/100ML; MG/100ML
75 INJECTION, SOLUTION INTRAVENOUS CONTINUOUS
Status: DISCONTINUED | OUTPATIENT
Start: 2024-10-24 | End: 2024-10-26

## 2024-10-24 RX ORDER — LIDOCAINE HYDROCHLORIDE 10 MG/ML
INJECTION, SOLUTION EPIDURAL; INFILTRATION; INTRACAUDAL; PERINEURAL AS NEEDED
Status: DISCONTINUED | OUTPATIENT
Start: 2024-10-24 | End: 2024-10-24

## 2024-10-24 RX ADMIN — ROCURONIUM BROMIDE 20 MG: 10 INJECTION, SOLUTION INTRAVENOUS at 12:29

## 2024-10-24 RX ADMIN — METRONIDAZOLE 500 MG: 500 INJECTION, SOLUTION INTRAVENOUS at 04:39

## 2024-10-24 RX ADMIN — ROCURONIUM BROMIDE 20 MG: 10 INJECTION, SOLUTION INTRAVENOUS at 10:58

## 2024-10-24 RX ADMIN — DEXAMETHASONE SODIUM PHOSPHATE 10 MG: 10 INJECTION, SOLUTION INTRAMUSCULAR; INTRAVENOUS at 09:53

## 2024-10-24 RX ADMIN — ALBUMIN (HUMAN): 12.5 INJECTION, SOLUTION INTRAVENOUS at 12:35

## 2024-10-24 RX ADMIN — ROCURONIUM BROMIDE 30 MG: 10 INJECTION, SOLUTION INTRAVENOUS at 10:42

## 2024-10-24 RX ADMIN — SUGAMMADEX 200 MG: 100 INJECTION, SOLUTION INTRAVENOUS at 13:30

## 2024-10-24 RX ADMIN — Medication 100 MCG: at 10:17

## 2024-10-24 RX ADMIN — ONDANSETRON 4 MG: 2 INJECTION INTRAMUSCULAR; INTRAVENOUS at 09:52

## 2024-10-24 RX ADMIN — ALBUMIN (HUMAN): 12.5 INJECTION, SOLUTION INTRAVENOUS at 12:44

## 2024-10-24 RX ADMIN — SODIUM CHLORIDE, SODIUM LACTATE, POTASSIUM CHLORIDE, AND CALCIUM CHLORIDE: .6; .31; .03; .02 INJECTION, SOLUTION INTRAVENOUS at 09:43

## 2024-10-24 RX ADMIN — FUROSEMIDE 20 MG: 10 INJECTION, SOLUTION INTRAMUSCULAR; INTRAVENOUS at 15:49

## 2024-10-24 RX ADMIN — HYDROCHLOROTHIAZIDE 25 MG: 25 TABLET ORAL at 17:24

## 2024-10-24 RX ADMIN — ROCURONIUM BROMIDE 40 MG: 10 INJECTION, SOLUTION INTRAVENOUS at 10:00

## 2024-10-24 RX ADMIN — ROCURONIUM BROMIDE 10 MG: 10 INJECTION, SOLUTION INTRAVENOUS at 12:57

## 2024-10-24 RX ADMIN — SODIUM CHLORIDE, SODIUM LACTATE, POTASSIUM CHLORIDE, AND CALCIUM CHLORIDE: .6; .31; .03; .02 INJECTION, SOLUTION INTRAVENOUS at 13:30

## 2024-10-24 RX ADMIN — ROCURONIUM BROMIDE 20 MG: 10 INJECTION, SOLUTION INTRAVENOUS at 11:42

## 2024-10-24 RX ADMIN — HYDROMORPHONE HYDROCHLORIDE 0.5 MG: 1 INJECTION, SOLUTION INTRAMUSCULAR; INTRAVENOUS; SUBCUTANEOUS at 19:40

## 2024-10-24 RX ADMIN — Medication 200 MCG: at 13:11

## 2024-10-24 RX ADMIN — ACETAMINOPHEN 1000 MG: 1000 INJECTION, SOLUTION INTRAVENOUS at 13:00

## 2024-10-24 RX ADMIN — ROCURONIUM BROMIDE 10 MG: 10 INJECTION, SOLUTION INTRAVENOUS at 09:53

## 2024-10-24 RX ADMIN — CEFTRIAXONE 2000 MG: 2 INJECTION, SOLUTION INTRAVENOUS at 03:40

## 2024-10-24 RX ADMIN — FENTANYL CITRATE 50 MCG: 50 INJECTION INTRAMUSCULAR; INTRAVENOUS at 10:32

## 2024-10-24 RX ADMIN — PROPOFOL 200 MG: 10 INJECTION, EMULSION INTRAVENOUS at 09:52

## 2024-10-24 RX ADMIN — METRONIDAZOLE 500 MG: 500 INJECTION, SOLUTION INTRAVENOUS at 19:31

## 2024-10-24 RX ADMIN — INDOCYANINE GREEN AND WATER 5 MG: KIT at 09:24

## 2024-10-24 RX ADMIN — FENTANYL CITRATE 50 MCG: 50 INJECTION INTRAMUSCULAR; INTRAVENOUS at 09:52

## 2024-10-24 RX ADMIN — LIDOCAINE HYDROCHLORIDE 50 MG: 10 INJECTION, SOLUTION EPIDURAL; INFILTRATION; INTRACAUDAL; PERINEURAL at 09:52

## 2024-10-24 RX ADMIN — HYDROMORPHONE HYDROCHLORIDE 0.5 MG: 1 INJECTION, SOLUTION INTRAMUSCULAR; INTRAVENOUS; SUBCUTANEOUS at 13:03

## 2024-10-24 RX ADMIN — METRONIDAZOLE: 500 INJECTION, SOLUTION INTRAVENOUS at 12:00

## 2024-10-24 RX ADMIN — PHENYLEPHRINE HYDROCHLORIDE 50 MCG/MIN: 10 INJECTION INTRAVENOUS at 10:12

## 2024-10-24 RX ADMIN — DEXMEDETOMIDINE HYDROCHLORIDE 12 MCG: 400 INJECTION INTRAVENOUS at 10:37

## 2024-10-24 RX ADMIN — MIDAZOLAM 2 MG: 1 INJECTION INTRAMUSCULAR; INTRAVENOUS at 09:43

## 2024-10-24 RX ADMIN — Medication 100 MCG: at 12:36

## 2024-10-24 RX ADMIN — SODIUM CHLORIDE, SODIUM LACTATE, POTASSIUM CHLORIDE, AND CALCIUM CHLORIDE: .6; .31; .03; .02 INJECTION, SOLUTION INTRAVENOUS at 11:04

## 2024-10-24 RX ADMIN — Medication 100 MG: at 09:52

## 2024-10-24 NOTE — ASSESSMENT & PLAN NOTE
Maintained on hydrochlorothiazide and lisinopril.  Currently placed on hold.  Did have hypertensive urgency on admission with blood pressure of 203/95 likely secondary to pain.  Continue with IV labetalol for SBP greater than 160.    He had a lot of blood loss during surgery so blood pressure is likely going to start running low    Currently BP is 132/77

## 2024-10-24 NOTE — PLAN OF CARE
Problem: Prexisting or High Potential for Compromised Skin Integrity  Goal: Skin integrity is maintained or improved  Description: INTERVENTIONS:  - Identify patients at risk for skin breakdown  - Assess and monitor skin integrity  - Assess and monitor nutrition and hydration status  - Monitor labs   - Assess for incontinence   - Turn and reposition patient  - Assist with mobility/ambulation  - Relieve pressure over bony prominences  - Avoid friction and shearing  - Provide appropriate hygiene as needed including keeping skin clean and dry  - Evaluate need for skin moisturizer/barrier cream  - Collaborate with interdisciplinary team   - Patient/family teaching  - Consider wound care consult   Outcome: Progressing     Problem: PAIN - ADULT  Goal: Verbalizes/displays adequate comfort level or baseline comfort level  Description: Interventions:  - Encourage patient to monitor pain and request assistance  - Assess pain using appropriate pain scale  - Administer analgesics based on type and severity of pain and evaluate response  - Implement non-pharmacological measures as appropriate and evaluate response  - Consider cultural and social influences on pain and pain management  - Notify physician/advanced practitioner if interventions unsuccessful or patient reports new pain  Outcome: Progressing     Problem: INFECTION - ADULT  Goal: Absence or prevention of progression during hospitalization  Description: INTERVENTIONS:  - Assess and monitor for signs and symptoms of infection  - Monitor lab/diagnostic results  - Monitor all insertion sites, i.e. indwelling lines, tubes, and drains  - Monitor endotracheal if appropriate and nasal secretions for changes in amount and color  - [place] appropriate cooling/warming therapies per order  - Administer medications as ordered  - Instruct and encourage patient and family to use good hand hygiene technique  - Identify and instruct in appropriate isolation precautions for  identified infection/condition  Outcome: Progressing     Problem: SAFETY ADULT  Goal: Patient will remain free of falls  Description: INTERVENTIONS:  - Educate patient/family on patient safety including physical limitations  - Instruct patient to call for assistance with activity   - Consult OT/PT to assist with strengthening/mobility   - Keep Call bell within reach  - Keep bed low and locked with side rails adjusted as appropriate  - Keep care items and personal belongings within reach  - Initiate and maintain comfort rounds  - Make Fall Risk Sign visible to staff  - Offer Toileting every 2 Hours, in advance of need  - Initiate/Maintain na alarm  - Obtain necessary fall risk management equipment: na  - Apply yellow socks and bracelet for high fall risk patients  - Consider moving patient to room near nurses station  Outcome: Progressing  Goal: Maintain or return to baseline ADL function  Description: INTERVENTIONS:  -  Assess patient's ability to carry out ADLs; assess patient's baseline for ADL function and identify physical deficits which impact ability to perform ADLs (bathing, care of mouth/teeth, toileting, grooming, dressing, etc.)  - Assess/evaluate cause of self-care deficits   - Assess range of motion  - Assess patient's mobility; develop plan if impaired  - Assess patient's need for assistive devices and provide as appropriate  - Encourage maximum independence but intervene and supervise when necessary  - Involve family in performance of ADLs  - Assess for home care needs following discharge   - Consider OT consult to assist with ADL evaluation and planning for discharge  - Provide patient education as appropriate  Outcome: Progressing  Goal: Maintains/Returns to pre admission functional level  Description: INTERVENTIONS:  - Perform AM-PAC 6 Click Basic Mobility/ Daily Activity assessment daily.  - Set and communicate daily mobility goal to care team and patient/family/caregiver.   - Collaborate with  rehabilitation services on mobility goals if consulted  - Perform Range of Motion 2 times a day.  - Reposition patient every 2 hours.  - Dangle patient 2 times a day  - Stand patient 2 times a day  - Ambulate patient 2 times a day  - Out of bed to chair 2 times a day   - Out of bed for meals 2 times a day  - Out of bed for toileting  - Record patient progress and toleration of activity level   Outcome: Progressing     Problem: DISCHARGE PLANNING  Goal: Discharge to home or other facility with appropriate resources  Description: INTERVENTIONS:  - Identify barriers to discharge w/patient and caregiver  - Arrange for needed discharge resources and transportation as appropriate  - Identify discharge learning needs (meds, wound care, etc.)  - Arrange for interpretive services to assist at discharge as needed  - Refer to Case Management Department for coordinating discharge planning if the patient needs post-hospital services based on physician/advanced practitioner order or complex needs related to functional status, cognitive ability, or social support system  Outcome: Progressing     Problem: Knowledge Deficit  Goal: Patient/family/caregiver demonstrates understanding of disease process, treatment plan, medications, and discharge instructions  Description: Complete learning assessment and assess knowledge base.  Interventions:  - Provide teaching at level of understanding  - Provide teaching via preferred learning methods  Outcome: Progressing

## 2024-10-24 NOTE — ASSESSMENT & PLAN NOTE
Patient had very difficult attempted cholecystectomy today.    Apparently gangrenous gallbladder, they were only able to do a partial cholecystectomy and had some blood loss    He is stable back in the ICU.  I did make ICU team aware in case he starts to get unstable    Continue current IV antibiotics

## 2024-10-24 NOTE — ANESTHESIA POSTPROCEDURE EVALUATION
Post-Op Assessment Note    CV Status:  Stable  Pain Score: 0    Pain management: adequate       Mental Status:  Sleepy   Hydration Status:  Stable   PONV Controlled:  Controlled   Airway Patency:  Patent     Post Op Vitals Reviewed: Yes    No anethesia notable event occurred.    Staff: CRNA           Last Filed PACU Vitals:  Vitals Value Taken Time   Temp 99.4    Pulse 107 10/24/24 1350   /72 10/24/24 1350   Resp 21 10/24/24 1350   SpO2 94 % 10/24/24 1350   Vitals shown include unfiled device data.    Modified Wally:  No data recorded

## 2024-10-24 NOTE — ANESTHESIA PREPROCEDURE EVALUATION
Procedure:  CHOLECYSTECTOMY LAPAROSCOPIC W/ ROBOTICS (Abdomen)    Relevant Problems   CARDIO   (+) Hyperlipidemia   (+) Hypertension      MUSCULOSKELETAL   (+) Primary osteoarthritis of left knee      NEURO/PSYCH   (+) CVA (cerebral vascular accident) (HCC)   (+) TIA (transient ischemic attack)      S/p CEA    Physical Exam    Airway    Mallampati score: II  TM Distance: >3 FB  Neck ROM: full     Dental   Comment: Poor dentition     Cardiovascular  Cardiovascular exam normal    Pulmonary  Pulmonary exam normal     Other Findings    Sinus tachycardia  Cannot rule out Anterior infarct , age undetermined  Abnormal ECG  When compared with ECG of 14-JUL-2021 13:45,  No significant change was found      LEFT VENTRICLE:  Systolic function was hyperdynamic. Ejection fraction was estimated to be 75 %.  There were no regional wall motion abnormalities.  Wall thickness was mildly increased.  There was mild concentric hypertrophy.     LEFT ATRIUM:  The atrium was mildly dilated.     ATRIAL SEPTUM:  No defect or patent foramen ovale was identified using color Doppler imaging.  Agitated saline contrast injection was performed. There was no right-to-left shunt at baseline or with provocative maneuvers (cough and Valsalva maneuver ) to increase right atrial pressure.     MITRAL VALVE:  Transmitral velocity was increased due to increased transvalvular flow.  There was trace regurgitation.     AORTIC VALVE:  Transaortic velocity was increased due to increased transvalvular flow.     TRICUSPID VALVE:  There was trace regurgitation.     PULMONIC VALVE:  There was trace regurgitation.      Anesthesia Plan  ASA Score- 3     Anesthesia Type- general with ASA Monitors.         Additional Monitors:     Airway Plan: ETT.           Plan Factors-Exercise tolerance (METS): >4 METS.    Chart reviewed. EKG reviewed. Imaging results reviewed. Existing labs reviewed. Patient summary reviewed.    Patient is not a current smoker.      Obstructive  sleep apnea risk education given perioperatively.        Induction- intravenous.    Postoperative Plan-     Perioperative Resuscitation Plan - Level 1 - Full Code.       Informed Consent- Anesthetic plan and risks discussed with patient.  I personally reviewed this patient with the CRNA. Discussed and agreed on the Anesthesia Plan with the CRNA..

## 2024-10-24 NOTE — PLAN OF CARE
Problem: Prexisting or High Potential for Compromised Skin Integrity  Goal: Skin integrity is maintained or improved  Description: INTERVENTIONS:  - Identify patients at risk for skin breakdown  - Assess and monitor skin integrity  - Assess and monitor nutrition and hydration status  - Monitor labs   - Assess for incontinence   - Turn and reposition patient  - Assist with mobility/ambulation  - Relieve pressure over bony prominences  - Avoid friction and shearing  - Provide appropriate hygiene as needed including keeping skin clean and dry  - Evaluate need for skin moisturizer/barrier cream  - Collaborate with interdisciplinary team   - Patient/family teaching  - Consider wound care consult   Outcome: Progressing     Problem: PAIN - ADULT  Goal: Verbalizes/displays adequate comfort level or baseline comfort level  Description: Interventions:  - Encourage patient to monitor pain and request assistance  - Assess pain using appropriate pain scale  - Administer analgesics based on type and severity of pain and evaluate response  - Implement non-pharmacological measures as appropriate and evaluate response  - Consider cultural and social influences on pain and pain management  - Notify physician/advanced practitioner if interventions unsuccessful or patient reports new pain  Outcome: Progressing     Problem: INFECTION - ADULT  Goal: Absence or prevention of progression during hospitalization  Description: INTERVENTIONS:  - Assess and monitor for signs and symptoms of infection  - Monitor lab/diagnostic results  - Monitor all insertion sites, i.e. indwelling lines, tubes, and drains  - Monitor endotracheal if appropriate and nasal secretions for changes in amount and color  - [place] appropriate cooling/warming therapies per order  - Administer medications as ordered  - Instruct and encourage patient and family to use good hand hygiene technique  - Identify and instruct in appropriate isolation precautions for  identified infection/condition  Outcome: Progressing     Problem: SAFETY ADULT  Goal: Patient will remain free of falls  Description: INTERVENTIONS:  - Educate patient/family on patient safety including physical limitations  - Instruct patient to call for assistance with activity   - Consult OT/PT to assist with strengthening/mobility   - Keep Call bell within reach  - Keep bed low and locked with side rails adjusted as appropriate  - Keep care items and personal belongings within reach  - Initiate and maintain comfort rounds  - Make Fall Risk Sign visible to staff  - Offer Toileting every 2 Hours, in advance of need  - Apply yellow socks and bracelet for high fall risk patients  - Consider moving patient to room near nurses station  Outcome: Progressing  Goal: Maintain or return to baseline ADL function  Description: INTERVENTIONS:  -  Assess patient's ability to carry out ADLs; assess patient's baseline for ADL function and identify physical deficits which impact ability to perform ADLs (bathing, care of mouth/teeth, toileting, grooming, dressing, etc.)  - Assess/evaluate cause of self-care deficits   - Assess range of motion  - Assess patient's mobility; develop plan if impaired  - Assess patient's need for assistive devices and provide as appropriate  - Encourage maximum independence but intervene and supervise when necessary  - Involve family in performance of ADLs  - Assess for home care needs following discharge   - Consider OT consult to assist with ADL evaluation and planning for discharge  - Provide patient education as appropriate  Outcome: Progressing  Goal: Maintains/Returns to pre admission functional level  Description: INTERVENTIONS:  - Perform AM-PAC 6 Click Basic Mobility/ Daily Activity assessment daily.  - Set and communicate daily mobility goal to care team and patient/family/caregiver.   - Collaborate with rehabilitation services on mobility goals if consulted  - Perform Range of Motion 3 times  a day.  - Reposition patient every 2 hours if unable to reposition self.    - Record patient progress and toleration of activity level   Outcome: Progressing

## 2024-10-24 NOTE — UTILIZATION REVIEW
Continued Stay Review    Date: 10/24                          Current Patient Class: Inpatient  Current Level of Care: MS    HPI:64 y.o. male initially admitted on  10/24    Assessment/Plan:     Date: 10/24  Day 3: Has surpassed a 2nd midnight with active treatments and services.  Had very difficult attempted cholecystectomy today.   Apparently gangrenous gallbladder, they were only able to do a partial cholecystectomy and had some blood loss. He is stable back in the ICU. Plan to cont IV abx .       Medications:   Scheduled Medications:  cefTRIAXone, 2,000 mg, Intravenous, Q24H  furosemide, 20 mg, Intravenous, BID (diuretic)  hydroCHLOROthiazide, 25 mg, Oral, Daily  metroNIDAZOLE, 500 mg, Intravenous, Q8H      Continuous IV Infusions:  lactated ringers, 75 mL/hr, Intravenous, Continuous      PRN Meds:  acetaminophen, 1,000 mg, Intravenous, Q6H PRN  HYDROmorphone, 0.5 mg, Intravenous, Q3H PRN  10/24 x1  labetalol, 10 mg, Intravenous, Q6H PRN  ondansetron, 4 mg, Intravenous, Q6H PRN      Discharge Plan: TBD     Vital Signs (last 3 days)       Date/Time Temp Pulse Resp BP MAP (mmHg) SpO2 Calculated FIO2 (%) - Nasal Cannula Nasal Cannula O2 Flow Rate (L/min) O2 Device Cardiac (WDL) Patient Position - Orthostatic VS [place] Coma Scale Score Pain    10/24/24 1529 99.5 °F (37.5 °C) 102 20 132/77 -- 94 % -- -- Nasal cannula -- -- -- --    10/24/24 1515 100.3 °F (37.9 °C) 101 15 122/68 89 94 % 36 4 L/min Nasal cannula X -- 15 No Pain    10/24/24 1500 97.9 °F (36.6 °C) 105 21 147/83 109 93 % 36 4 L/min Nasal cannula X -- 15 No Pain    10/24/24 1445 98.1 °F (36.7 °C) -- -- -- -- -- 36 4 L/min Nasal cannula X -- 15 No Pain    10/24/24 1437 -- -- -- -- -- 95 % 36 4 L/min Nasal cannula -- -- -- --    10/24/24 1430 98.4 °F (36.9 °C) 106 22 152/75 104 96 % 44 6 L/min Simple mask X -- -- No Pain    10/24/24 1415 98.5 °F (36.9 °C) 105 18 149/81 109 97 % 44 6 L/min Simple mask X -- -- --    10/24/24 1400 98.9 °F (37.2 °C) 109 24  162/83 116 94 % 44 6 L/min Simple mask X -- -- --    10/24/24 1345 99.7 °F (37.6 °C) 107 22 148/73 103 94 % 44 6 L/min Simple mask X -- -- --    10/24/24 0908 101.4 °F (38.6 °C) 112 28 166/84 -- 91 % -- -- -- -- -- -- 3    10/24/24 0800 -- -- -- -- -- -- -- -- -- -- -- -- 3    10/24/24 0722 99.5 °F (37.5 °C) 109 20 131/80 98 93 % -- -- None (Room air) -- Lying -- --    10/23/24 2300 98.1 °F (36.7 °C) 118 17 117/78 92 92 % -- -- None (Room air) -- Lying -- --    10/23/24 2000 -- -- -- -- -- -- -- -- -- -- -- -- 4    10/23/24 1500 -- 103 -- 118/61 84 92 % -- -- -- -- -- -- --    10/23/24 1458 99.6 °F (37.6 °C) 104 -- 118/61 84 92 % -- -- None (Room air) -- Lying -- --    10/23/24 1100 -- 110 -- -- -- 92 % -- -- -- -- -- -- 5    10/23/24 0820 -- 116 -- -- -- 93 % -- -- -- -- -- -- No Pain    10/23/24 0705 100.5 °F (38.1 °C) 107 16 151/80 109 92 % -- -- None (Room air) -- Lying -- --    10/23/24 0600 -- 110 18 144/79 105 90 % -- -- None (Room air) -- Lying -- --    10/23/24 0216 -- -- -- -- -- -- -- -- -- -- -- -- 6    10/22/24 2329 98.7 °F (37.1 °C) 114 17 152/76 106 91 % -- -- None (Room air) -- Lying -- --    10/22/24 2129 -- -- -- -- -- -- -- -- -- -- -- -- 4    10/22/24 1911 98.4 °F (36.9 °C) 109 16 150/83 111 91 % -- -- None (Room air) -- Lying -- --    10/22/24 1547 98.5 °F (36.9 °C) 104 17 157/81 113 91 % -- -- None (Room air) -- Lying -- --    10/22/24 1100 97.9 °F (36.6 °C) 100 14 170/88 119 91 % -- -- None (Room air) -- -- -- --    10/22/24 0909 -- -- -- -- -- -- -- -- -- -- -- -- 9    10/22/24 0719 97.7 °F (36.5 °C) 97 20 147/82 107 94 % 28 2 L/min Nasal cannula -- Lying -- --    10/22/24 0601 -- -- -- -- -- -- -- -- -- -- -- -- 9    10/22/24 0543 98.5 °F (36.9 °C) 105 -- 149/84 110 93 % 28 2 L/min Nasal cannula -- Sitting -- 9    10/22/24 0500 -- 102 22 176/80 115 93 % 28 2 L/min Nasal cannula -- Lying -- --    10/22/24 0434 -- -- -- -- -- -- -- -- -- -- -- -- 9    10/22/24 0430 -- 106 23 203/95 122 94 % 28  2 L/min Nasal cannula -- Lying -- 9    10/22/24 0400 -- 103 22 179/86 123 94 % 28 2 L/min Nasal cannula -- Lying -- --    10/22/24 0330 -- 100 22 170/85 120 93 % 28 2 L/min Nasal cannula -- -- -- --    10/22/24 0313 -- -- -- -- -- -- -- -- -- -- -- -- 7    10/22/24 0300 -- 106 18 174/91 125 96 % 28 2 L/min Nasal cannula -- Lying -- 7    10/22/24 0245 -- 103 24 174/91 -- 96 % 28 2 L/min Nasal cannula -- -- -- --    10/22/24 0115 -- 114 24 172/87 117 91 % -- -- None (Room air) -- -- -- --    10/22/24 0112 98.4 °F (36.9 °C) 119 18 172/87 -- 92 % -- -- None (Room air) -- Sitting -- 8          Weight (last 2 days)       Date/Time Weight    10/24/24 0500 109 (239.2)    10/23/24 0600 110 (242.95)    10/22/24 0543 108 (238.54)    10/22/24 0112 109 (239.42)            Pertinent Labs/Diagnostic Results:   Radiology:  MRI abdomen wo contrast and mrcp   Final Interpretation by Doe Lopez MD (10/22 6330)      Findings suspicious for acute cholecystitis.      No intrahepatic/extrahepatic biliary dilation or choledocholithiasis         The study was marked in EPIC for immediate notification.      Workstation performed: [id number]         CT chest abdomen pelvis w contrast   Final Interpretation by Shukri Eagle MD (10/22 0314)      1.  Intermediate density within the gallbladder likely due to sludge and/or stones. Pericholecystic fat stranding concerning for acute cholecystitis.   2.  Moderate thickening of the hepatic flexure with surrounding fat stranding which may be due to focal colitis, likely reactive from adjacent gallbladder inflammation.   3.  Hepatic steatosis.   4.  Bibasilar right greater than left linear opacities likely due to atelectasis.      The study was marked in EPIC for immediate notification.         Workstation performed: [id number]           Cardiology:  ECG 12 lead   Final Result by Ja Theodore MD (10/22 0748)   Sinus tachycardia   Cannot rule out Anterior infarct , age undetermined    Abnormal ECG   When compared with ECG of 14-JUL-2021 13:45,   No significant change was found   Confirmed by Ja Theodore ([id number]) on 10/22/2024 7:48:55 AM        GI:  No orders to display           Results from last 7 days   Lab Units 10/24/24  1356 10/24/24  0442 10/23/24  0559 10/22/24  0642 10/22/24  0136   WBC Thousand/uL 13.38* 15.72* 15.08* 12.61* 15.84*   HEMOGLOBIN g/dL 11.6* 13.9 13.7 15.0 15.9   HEMATOCRIT % 34.5* 41.3 41.0 43.3 45.9   PLATELETS Thousands/uL 171 171 157 185 202   TOTAL NEUT ABS Thousands/µL 11.51* 13.62* 13.14* 10.92* 13.47*         Results from last 7 days   Lab Units 10/24/24  1356 10/24/24  0442 10/23/24  0559 10/22/24  0642 10/22/24  0136   SODIUM mmol/L 138 136 137 134* 133*   POTASSIUM mmol/L 3.6 3.7 3.5 3.9 4.3   CHLORIDE mmol/L 101 100 104 100 98   CO2 mmol/L 27 26 24 26 24   ANION GAP mmol/L 10 10 9 8 11   BUN mg/dL 12 10 9 14 15   CREATININE mg/dL 1.07 0.94 0.72 0.93 0.95   EGFR ml/min/1.73sq m 72 85 98 86 84   CALCIUM mg/dL 8.3* 8.9 8.2* 8.9 9.4   MAGNESIUM mg/dL  --  1.9  --   --   --      Results from last 7 days   Lab Units 10/24/24  0442 10/23/24  0559 10/22/24  0642 10/22/24  0136   AST U/L 80* 114* 222* 97*   ALT U/L 138* 166* 201* 65*   ALK PHOS U/L 143* 108* 110* 79   TOTAL PROTEIN g/dL 6.6 6.0* 7.1 7.8   ALBUMIN g/dL 3.6 3.4* 4.1 4.5   TOTAL BILIRUBIN mg/dL 4.73* 6.39* 5.06* 3.48*   BILIRUBIN DIRECT mg/dL  --   --  2.10* 0.46*     Results from last 7 days   Lab Units 10/24/24  1358   POC GLUCOSE mg/dl 158*     Results from last 7 days   Lab Units 10/24/24  1356 10/24/24  0442 10/23/24  0559 10/22/24  0642 10/22/24  0136   GLUCOSE RANDOM mg/dL 164* 136 155* 195* 197*         Results from last 7 days   Lab Units 10/22/24  0555 10/22/24  0341 10/22/24  0136   HS TNI 0HR ng/L  --   --  11   HS TNI 2HR ng/L  --  16  --    HSTNI D2 ng/L  --  5  --    HS TNI 4HR ng/L 17  --   --    HSTNI D4 ng/L 6  --   --          Results from last 7 days   Lab Units 10/23/24  0559  10/22/24  1537   PROCALCITONIN ng/ml 1.35* 1.36*     Results from last 7 days   Lab Units 10/22/24  0630 10/22/24  0341 10/22/24  0136   LACTIC ACID mmol/L 2.0 3.0* 2.7*       Results from last 7 days   Lab Units 10/22/24  0136   BNP pg/mL 124*       Results from last 7 days   Lab Units 10/24/24  1353   UNIT PRODUCT CODE  [id number]  [id number]   UNIT NUMBER  [id number]-*  [id number]   UNITABO  A  A   UNITRH  POS  POS   CROSSMATCH  Compatible  Compatible   UNIT DISPENSE STATUS  Crossmatched  Crossmatched   UNIT PRODUCT VOL ml 350  350     Results from last 7 days   Lab Units 10/22/24  0136   LIPASE u/L 10*       Results from last 7 days   Lab Units 10/22/24  0313   CLARITY UA  Clear   COLOR UA  Yellow   SPEC GRAV UA  <=1.005   PH UA  6.5   GLUCOSE UA mg/dl Negative   KETONES UA mg/dl Negative   BLOOD UA  Small*   PROTEIN UA mg/dl Negative   NITRITE UA  Negative   BILIRUBIN UA  Negative   UROBILINOGEN UA E.U./dl 1.0   LEUKOCYTES UA  Negative   WBC UA /hpf 0-1   RBC UA /hpf 0-1   BACTERIA UA /hpf None Seen   EPITHELIAL CELLS WET PREP /hpf Occasional       Results from last 7 days   Lab Units 10/22/24  0145 10/22/24  0136   BLOOD CULTURE  No Growth at 48 hrs. No Growth at 48 hrs.     Network Utilization Review Department  ATTENTION: Please call with any questions or concerns to [phone] and carefully listen to the prompts so that you are directed to the right person. All voicemails are confidential.   For Discharge needs, contact Care Management DC Support Team at [phone] opt. 2  Send all requests for admission clinical reviews, approved or denied determinations and any other requests to dedicated fax number below belonging to the campus where the patient is receiving treatment. List of dedicated fax numbers for the Facilities:  FACILITY NAME UR FAX NUMBER   ADMISSION DENIALS (Administrative/Medical Necessity) [phone]   DISCHARGE SUPPORT TEAM (NETWORK) [phone]   [facility] CHILD HEALTH  (Maternity/NICU/Pediatrics) [phone]   [facility] [phone]   [facility] [phone]   [facility] [phone]   [facility] [phone]   [facility] [phone]   [facility] [phone]   [facility] [phone]   [facility] [phone]   [facility] [phone]   [facility] [phone]   [facility] [phone]   [facility] [phone]

## 2024-10-24 NOTE — OP NOTE
OPERATIVE REPORT  PATIENT NAME: Hua Acevedo    :  1960  MRN: [id number]  Pt Location: OW OR ROOM 01    SURGERY DATE: 10/24/2024    Surgeons and Role:     * Beverley Parra DO - Primary     * Aliza Maciel PA-C - Assisting    Preop diagnosis:  Acute cholecystitis with cholangitis and sepsis    Postop diagnosis:  Acute gangrenous cholecystitis    Procedure(s):  attempted CHOLECYSTECTOMY LAPAROSCOPIC W/ ROBOTICS converted to laparoscopic subtotal cholecystectomy    Specimen(s):  ID Type Source Tests Collected by Time Destination   1 : subtotal gallbladder Tissue Gallbladder TISSUE EXAM Beverley Parra DO 10/24/2024  1:03 PM        Estimated Blood Loss:   1200 mL    Drains:  Closed/Suction Drain Medial RLQ Bulb 19 Fr. (Active)   Site Description Unable to view 10/24/24 1515   Dressing Status Clean;Dry;Intact 10/24/24 1515   Drainage Appearance Bloody 10/24/24 1515   Status To bulb suction 10/24/24 1515   Output (mL) 40 mL 10/24/24 1500   Number of days: 0       Urethral Catheter Straight-tip;Non-latex 16 Fr. (Active)   Goal for Removal Remove after 48 hrs of I/O monitoring 10/24/24 1345   Site Assessment Clean;Skin intact 10/24/24 1345   Securement Method Securing device (Describe) 10/24/24 1430   Output (mL) 60 mL 10/24/24 1500   Number of days: 0       [REMOVED] NG/OG/Enteral Tube Orogastric 18 Fr Left mouth (Removed)   Number of days: 0       Anesthesia Type:   General    Operative Indications:  Patient had acute cholecystitis with transaminitis and sepsis      Operative Findings:  The gallbladder was enlarged and gangrenous with areas of necrosis.  There were dense omental adhesions to the gallbladder.  The liver was extremely friable and fatty.  There was severe necrosis and inflammation of the entire gallbladder and dense scarring  the entire gallbladder is also involved the ductal system.  The decision to do a subtotal cholecystectomy to preserve the ductal system which had significant  scarring and inflammation was made.  The robotic case was converted to a laparoscopic case due to the need for the [place] flex 60 mm stapler.        Complications:   None    Procedure and Technique:  After obtaining informed consent, the patient was brought in the operating room and placed in supine position.  SCDs were placed and he was placed under general anesthesia.  A Madsen catheter was then sterilely placed and he was prepped and draped in usual sterile fashion.  A timeout was then performed and appropriate antibiotics had been administered.  An area near Enamorado's point was infiltrated with local anesthetic.  Using a 15 blade a small incision was made and the 8 mm trocar was placed using the Visiport under direct visualization.  The obturator was removed and the area was inspected and there was no harm to any structures.  A pneumoperitoneum to 15 mmHg was then established.  Under direct laparoscopic visualization, after infiltrating with local anesthetic, a left paramedian 8 mm trocar and 2 right paramedian 8 mm trocars were placed.  The patient was then placed in the reverse Trendelenburg position to 15 degrees and banked towards the left 5 degrees.  The robotic cart was then brought into the field and the robot was docked.  I then broke scrub and proceeded to the console.  Immediately noted were omental adhesions to the fundus of the gallbladder.  Careful dissection was utilized to free up the adhesions.  The area was quite friable throughout the case.  Firefly was utilized throughout the case, but the ductal system did not fluoresce.  Once the adhesions were fully dissected free, an attempt to grasp the gallbladder at its fundus was made.  The gallbladder was quite indurated and thick-walled and butter like in consistency.  It was gangrenous and had areas of necrosis.  After multiple attempts, a portion of the gallbladder was able to be retracted cephalad.  The gallbladder was then dissected from the  subhepatic space in a a top-down approach.  I was able to place the robotic camera into a rent that occurred in the fundus of the top of the gallbladder and identify where the gallbladder narrowed down to the cystic duct confluence.  Due to the significant amount of inflammation of the ductal system, decision was made to do a subtotal cholecystectomy since any further dissection would cause harm to the ductal system.  Of note, the liver was extremely fatty and friable.  Surgi-Colin and Surgicel was utilized throughout the case to help troll oozing from the liver. The gallbladder was carefully dissected free from the fundus down.  Upon doing the lateral peritoneal dissection near the infundibular area, there was a small vessel that was bleeding.  Multiple attempts with endoclips was utilized to clip the vessel.  The surrounding tissue was very soft and the clips were not approximating well around the vessel.  Of note, at this point, the gallbladder was three quarters of the way dissected free from the liver bed.  Decision was made at this point to convert to a laparoscopic cholecystectomy.  The instruments were removed and the robot was undocked.  The robotic trocars remained.  A [place] dissector was utilized to grasp the small vessel and the clip was successfully placed on the vessel and oozing was fully controlled.  I copiously irrigated the area with saline solution.  Further dissection was continued at the infundibulum and once again it was decided that a subtotal cholecystectomy would be the safest approach due to the significant necrosis and inflammation of the tissue.  The gallbladder at this point was totally free from the liver bed. The [place] flex 60 mm stapler was then fired across the distal portion of the infundibulum of the gallbladder.  There was excellent purchase of tissue and no oozing or bleeding from the site.  The gallbladder was then placed into an Endo Catch bag.  The area was then copiously  irrigated with saline solution.  Excellent hemostasis had been maintained by the end of the case.  All sponge, needle, and instrument counts were correct.  A 19 [place] DAVID drain was then placed through the left lateral trocar site and brought out through the most lateral right paramedian trocar site.  The drain was placed in the subhepatic space.  It was sewn into place with a 2-0 silk suture.  The gallbladder was removed from the abdomen.  Due to its size, the fascia needed to be incised to accommodate removal.  The trocars were removed under direct visualization and there was no active hemorrhaging from any of the trocar sites.  Pneumoperitoneum was released from the abdominal cavity.  The fascia of the trocar site from which the gallbladder was removed and the fascia of one of other left paramedian trocar site were closed with figure-of-eight 0 Vicryl sutures.  The incisions were then injected with local anesthetic.  The skin incisions were closed with running subcuticular stitches of 4-0 Monocryl suture.  The incisions were cleansed well and benzoin, Steri-Strips, and sterile dressings were applied.  The patient was extubated in the operating room without difficulty.  The patient tolerated the procedure well and was transferred to recovery in stable and satisfactory condition.   I was present for the entire procedure.    Patient Disposition:  PACU              SIGNATURE: Beverley Parra DO  DATE: October 24, 2024  TIME: 4:13 PM

## 2024-10-24 NOTE — ANESTHESIA POSTPROCEDURE EVALUATION
Post-Op Assessment Note    Last Filed PACU Vitals:  Vitals Value Taken Time   Temp 98.4 °F (36.9 °C) 10/24/24 1430   Pulse 109 10/24/24 1434   /75 10/24/24 1430   Resp 20 10/24/24 1434   SpO2 95 % 10/24/24 1434   Vitals shown include unfiled device data.    Modified Wally:  Activity: 2 (10/24/2024  2:30 PM)  Respiration: 2 (10/24/2024  2:30 PM)  Circulation: 2 (10/24/2024  2:30 PM)  Consciousness: 1 (10/24/2024  2:30 PM)  Oxygen Saturation: 1 (10/24/2024  2:30 PM)  Modified Wally Score: 8 (10/24/2024  2:30 PM)

## 2024-10-24 NOTE — CASE MANAGEMENT
Case Management Discharge Planning Note    Patient name Hua Acevedo  Location /-01 MRN [id number]  : 1960 Date 10/24/2024       Current Admission Date: 10/22/2024  Current Admission Diagnosis:Acute cholecystitis with acute cholangitis   Patient Active Problem List    Diagnosis Date Noted Date Diagnosed    Acute cholecystitis with acute cholangitis 10/23/2024     Abdominal pain 10/22/2024     Hashimoto's thyroiditis 10/22/2024     Sepsis (HCC) 10/22/2024     TIA (transient ischemic attack) 2021     Carotid stenosis, symptomatic, with infarction (HCC) 2021     Gram-negative bacteremia 2021     Hyperlipidemia 2021     Hypertension 2021     CVA (cerebral vascular accident) ([facility]) 2021     Carpal tunnel syndrome, bilateral 2020     Chronic pain of left knee 2020     Primary osteoarthritis of left knee 2020       LOS (days): 2  Geometric Mean LOS (GMLOS) (days): 3.5  Days to GMLOS:1     OBJECTIVE:  Risk of Unplanned Readmission Score: 10.2         Current admission status: Inpatient   Preferred Pharmacy:   [facility] [address] [place] [address] - [address] [facility]  [address] [facility] [address]  Phone: [phone] Fax: [phone]    Primary Care Provider: Blanka Suarez DO    Primary Insurance: [employer] [facility]  Secondary Insurance:     DISCHARGE DETAILS:    POD 0 CHOLECYSTECTOMY LAPAROSCOPIC W/ ROBOTICS   CM will follow for dc needs.

## 2024-10-24 NOTE — PROGRESS NOTES
Progress Note - Hospitalist   Name: Hua Acevedo 64 y.o. male I MRN: [id number]  Unit/Bed#: [facility] 304-01 I Date of Admission: 10/22/2024   Date of Service: 10/24/2024 I Hospital Day: 2    Assessment & Plan  Abdominal pain  Patient presents with 2 days history of abdominal pain, nausea and vomiting.  Upon presentation to the emergency room was found to have abnormal transaminases and hyperbilirubinemia  CT scan of the abdomen and pelvis showsermediate density within the gallbladder likely due to sludge and/or stones. Pericholecystic fat stranding concerning for acute cholecystitis.  2.  Moderate thickening of the hepatic flexure with surrounding fat stranding which may be due to focal colitis, likely reactive from adjacent gallbladder inflammation.  3.  Hepatic steatosis.  4.  Bibasilar right greater than left linear opacities likely due to atelectasis.  Admitted under surgical service.  Surgery versus perc trung as per primary team     Hypertension  Maintained on hydrochlorothiazide and lisinopril.  Currently placed on hold.  Did have hypertensive urgency on admission with blood pressure of 203/95 likely secondary to pain.  Continue with IV labetalol for SBP greater than 160.    He had a lot of blood loss during surgery so blood pressure is likely going to start running low    Currently BP is 132/77  CVA (cerebral vascular accident) (HCC)  Patient has a prior history of right hemispheric CVA secondary to high-grade right carotid stenosis s/p R CEA by Dr Granger 7/26/21 .  Antiplatelets on hold currently in anticipation for possible need for OR/ERCP  Resume statins once able to tolerate p.o.  Hyperlipidemia  Resume statins once patient able to tolerate p.o.  Carotid stenosis, symptomatic, with infarction (HCC)  Patient has history of right hemispheric CVA secondary to high-grade right carotid stenosis s/p R CEA by Dr Granger 7/26/21 Has been maintained on aspirin, Plavix.  Has not been on any statin therapy  although her last vascular surgery note states that he should be on it .  Antiplatelets on hold in view of possible and need for surgery/GI procedure  Resume statin therapy when patient is able to tolerate p.o.  Hashimoto's thyroiditis  Not on any medications an outpatient.  Follow-up on TSH.  Sepsis (HCC)  As evidenced by tachycardia, tachypnea, leukocytosis likely in the setting of cholecystitis.  However cannot rule out choledocholithiasis.  Continue with IV ceftriaxone and Flagyl.  Follow-up on blood culture results.   Holding IVF   +3 L so far - will trial diuresis.   NPO status as per surgery   Acute cholecystitis with acute cholangitis  Patient had very difficult attempted cholecystectomy today.    Apparently gangrenous gallbladder, they were only able to do a partial cholecystectomy and had some blood loss    He is stable back in the ICU.  I did make ICU team aware in case he starts to get unstable    Continue current IV antibiotics    VTE Pharmacologic Prophylaxis:                 Current Length of Stay: 2 day(s)  Current Patient Status: Inpatient   Certification Statement:     Discharge Plan:         Subjective   Patient seen after surgery  Still has some sedation from anesthesia, but opens eyes to voice and is able to shake yes no to questions    He is not complaining of much abdominal pain right now    No lightheadedness    Objective   Vitals:   Temp (24hrs), Av.1 °F (37.3 °C), Min:97.9 °F (36.6 °C), Max:101.4 °F (38.6 °C)    Temp:  [97.9 °F (36.6 °C)-101.4 °F (38.6 °C)] 99.5 °F (37.5 °C)  HR:  [101-118] 102  Resp:  [15-28] 20  BP: (117-166)/(68-84) 132/77  SpO2:  [91 %-97 %] 94 %  Body mass index is 35.32 kg/m².         Physical Exam  Vitals and nursing note reviewed.   Constitutional:       General: He is not in acute distress.     Appearance: He is well-developed.   HENT:      Head: Normocephalic and atraumatic.   Eyes:      Conjunctiva/sclera: Conjunctivae normal.   Cardiovascular:      Rate and  Rhythm: Normal rate and regular rhythm.      Heart sounds: No murmur heard.  Pulmonary:      Effort: Pulmonary effort is normal. No respiratory distress.      Breath sounds: Normal breath sounds.   Abdominal:      Palpations: Abdomen is soft.      Tenderness: There is abdominal tenderness (minimal tenderness around incision). There is no guarding or rebound.   Musculoskeletal:         General: No swelling.      Cervical back: Neck supple.      Right lower leg: No edema.      Left lower leg: No edema.   Skin:     General: Skin is warm and dry.      Capillary Refill: Capillary refill takes less than 2 seconds.   Neurological:      Mental Status: He is alert.   Psychiatric:         Mood and Affect: Mood normal.           Lab results  Results from last 7 days   Lab Units 10/24/24  1356 10/24/24  0442 10/23/24  0559   WBC Thousand/uL 13.38* 15.72* 15.08*   HEMOGLOBIN g/dL 11.6* 13.9 13.7   PLATELETS Thousands/uL 171 171 157   MCV fL 94 92 93     Results from last 7 days   Lab Units 10/24/24  1356 10/24/24  0442 10/23/24  0559 10/22/24  0642   SODIUM mmol/L 138 136 137 134*   POTASSIUM mmol/L 3.6 3.7 3.5 3.9   CHLORIDE mmol/L 101 100 104 100   CO2 mmol/L 27 26 24 26   ANION GAP mmol/L 10 10 9 8   BUN mg/dL 12 10 9 14   CREATININE mg/dL 1.07 0.94 0.72 0.93   CALCIUM mg/dL 8.3* 8.9 8.2* 8.9   ALBUMIN g/dL  --  3.6 3.4* 4.1   TOTAL BILIRUBIN mg/dL  --  4.73* 6.39* 5.06*   ALK PHOS U/L  --  143* 108* 110*   ALT U/L  --  138* 166* 201*   AST U/L  --  80* 114* 222*   EGFR ml/min/1.73sq m 72 85 98 86   GLUCOSE RANDOM mg/dL 164* 136 155* 195*     Results from last 7 days   Lab Units 10/24/24  0442   MAGNESIUM mg/dL 1.9             Last 24 Hours Medication List:     Current Facility-Administered Medications:     acetaminophen (Ofirmev) injection 1,000 mg, Q6H PRN, Last Rate: Stopped (10/23/24 2153)    cefTRIAXone (ROCEPHIN) IVPB (premix in dextrose) 2,000 mg 50 mL, Q24H, Last Rate: Stopped (10/24/24 3815)    hydroCHLOROthiazide  tablet 25 mg, Daily    HYDROmorphone (DILAUDID) injection 0.5 mg, Q3H PRN    labetalol (NORMODYNE) injection 10 mg, Q6H PRN    lactated ringers infusion, Continuous    metroNIDAZOLE (FLAGYL) IVPB (premix) 500 mg 100 mL, Q8H, Last Rate: Stopped (10/24/24 1205)    ondansetron (ZOFRAN) injection 4 mg, Q6H PRN

## 2024-10-25 LAB
ALBUMIN SERPL BCG-MCNC: 3.3 G/DL (ref 3.5–5)
ALP SERPL-CCNC: 114 U/L (ref 34–104)
ALT SERPL W P-5'-P-CCNC: 99 U/L (ref 7–52)
ANION GAP SERPL CALCULATED.3IONS-SCNC: 7 MMOL/L (ref 4–13)
AST SERPL W P-5'-P-CCNC: 65 U/L (ref 13–39)
BASOPHILS # BLD AUTO: 0.01 THOUSANDS/ΜL (ref 0–0.1)
BASOPHILS NFR BLD AUTO: 0 % (ref 0–1)
BILIRUB SERPL-MCNC: 1.7 MG/DL (ref 0.2–1)
BUN SERPL-MCNC: 18 MG/DL (ref 5–25)
CALCIUM ALBUM COR SERPL-MCNC: 9 MG/DL (ref 8.3–10.1)
CALCIUM SERPL-MCNC: 8.4 MG/DL (ref 8.4–10.2)
CHLORIDE SERPL-SCNC: 102 MMOL/L (ref 96–108)
CO2 SERPL-SCNC: 31 MMOL/L (ref 21–32)
CREAT SERPL-MCNC: 0.85 MG/DL (ref 0.6–1.3)
EOSINOPHIL # BLD AUTO: 0 THOUSAND/ΜL (ref 0–0.61)
EOSINOPHIL NFR BLD AUTO: 0 % (ref 0–6)
ERYTHROCYTE [DISTWIDTH] IN BLOOD BY AUTOMATED COUNT: 14.2 % (ref 11.6–15.1)
GFR SERPL CREATININE-BSD FRML MDRD: 92 ML/MIN/1.73SQ M
GLUCOSE SERPL-MCNC: 167 MG/DL (ref 65–140)
HCT VFR BLD AUTO: 33.9 % (ref 36.5–49.3)
HGB BLD-MCNC: 11.4 G/DL (ref 12–17)
IMM GRANULOCYTES # BLD AUTO: 0.13 THOUSAND/UL (ref 0–0.2)
IMM GRANULOCYTES NFR BLD AUTO: 1 % (ref 0–2)
LYMPHOCYTES # BLD AUTO: 0.64 THOUSANDS/ΜL (ref 0.6–4.47)
LYMPHOCYTES NFR BLD AUTO: 6 % (ref 14–44)
MAGNESIUM SERPL-MCNC: 2 MG/DL (ref 1.9–2.7)
MCH RBC QN AUTO: 31.5 PG (ref 26.8–34.3)
MCHC RBC AUTO-ENTMCNC: 33.6 G/DL (ref 31.4–37.4)
MCV RBC AUTO: 94 FL (ref 82–98)
MONOCYTES # BLD AUTO: 0.73 THOUSAND/ΜL (ref 0.17–1.22)
MONOCYTES NFR BLD AUTO: 6 % (ref 4–12)
NEUTROPHILS # BLD AUTO: 10.1 THOUSANDS/ΜL (ref 1.85–7.62)
NEUTS SEG NFR BLD AUTO: 87 % (ref 43–75)
NRBC BLD AUTO-RTO: 0 /100 WBCS
PLATELET # BLD AUTO: 179 THOUSANDS/UL (ref 149–390)
PMV BLD AUTO: 9.7 FL (ref 8.9–12.7)
POTASSIUM SERPL-SCNC: 3.2 MMOL/L (ref 3.5–5.3)
PROT SERPL-MCNC: 5.8 G/DL (ref 6.4–8.4)
RBC # BLD AUTO: 3.62 MILLION/UL (ref 3.88–5.62)
SODIUM SERPL-SCNC: 140 MMOL/L (ref 135–147)
WBC # BLD AUTO: 11.61 THOUSAND/UL (ref 4.31–10.16)

## 2024-10-25 PROCEDURE — 97116 GAIT TRAINING THERAPY: CPT

## 2024-10-25 PROCEDURE — 83735 ASSAY OF MAGNESIUM: CPT | Performed by: HOSPITALIST

## 2024-10-25 PROCEDURE — 85025 COMPLETE CBC W/AUTO DIFF WBC: CPT | Performed by: SURGERY

## 2024-10-25 PROCEDURE — 80053 COMPREHEN METABOLIC PANEL: CPT | Performed by: SURGERY

## 2024-10-25 PROCEDURE — 97166 OT EVAL MOD COMPLEX 45 MIN: CPT

## 2024-10-25 PROCEDURE — 97163 PT EVAL HIGH COMPLEX 45 MIN: CPT

## 2024-10-25 PROCEDURE — 99232 SBSQ HOSP IP/OBS MODERATE 35: CPT | Performed by: INTERNAL MEDICINE

## 2024-10-25 RX ADMIN — HYDROCHLOROTHIAZIDE 25 MG: 25 TABLET ORAL at 08:55

## 2024-10-25 RX ADMIN — ACETAMINOPHEN 1000 MG: 1000 INJECTION, SOLUTION INTRAVENOUS at 04:09

## 2024-10-25 RX ADMIN — METRONIDAZOLE 500 MG: 500 INJECTION, SOLUTION INTRAVENOUS at 04:48

## 2024-10-25 RX ADMIN — ACETAMINOPHEN 1000 MG: 1000 INJECTION, SOLUTION INTRAVENOUS at 18:50

## 2024-10-25 RX ADMIN — HYDROMORPHONE HYDROCHLORIDE 0.5 MG: 1 INJECTION, SOLUTION INTRAMUSCULAR; INTRAVENOUS; SUBCUTANEOUS at 19:55

## 2024-10-25 RX ADMIN — METRONIDAZOLE 500 MG: 500 INJECTION, SOLUTION INTRAVENOUS at 19:53

## 2024-10-25 RX ADMIN — METRONIDAZOLE 500 MG: 500 INJECTION, SOLUTION INTRAVENOUS at 13:29

## 2024-10-25 RX ADMIN — HYDROMORPHONE HYDROCHLORIDE 0.5 MG: 1 INJECTION, SOLUTION INTRAMUSCULAR; INTRAVENOUS; SUBCUTANEOUS at 16:49

## 2024-10-25 RX ADMIN — CEFTRIAXONE 2000 MG: 2 INJECTION, SOLUTION INTRAVENOUS at 04:11

## 2024-10-25 NOTE — PROGRESS NOTES
Progress Note - Surgery-General   Name: Hua Acevedo 64 y.o. male I MRN: [id number]  Unit/Bed#: -01 I Date of Admission: 10/22/2024   Date of Service: 10/25/2024 I Hospital Day: 3    Assessment & Plan  Acute cholecystitis with acute cholangitis  Assessment: 64-year-old male admitted with acute cholecystitis, sepsis and likely transient choledocholithiasis with acute cholangitis.    -POD 1 s/p laparoscopic subtotal cholecystectomy  -Operative findings:The gallbladder was enlarged and gangrenous with areas of necrosis. There were dense omental adhesions to the gallbladder. The liver was extremely friable and fatty. There was severe necrosis and inflammation of the entire gallbladder and dense scarring the entire gallbladder is also involved the ductal system. The decision to do a subtotal cholecystectomy to preserve the ductal system which had significant scarring and inflammation was made. The robotic case was converted to a laparoscopic case due to the need for the [place] flex 60 mm stapler.   -WBC 11.6 (13, 15, 15, 12, 15)  -Hemoglobin stable 11.4 (11.6, 13.9, 13.7, 15)  -LFTs improving, total bilirubin 1.7 (4.7, 6.3, 5.0, 3.4)  -AST 65 (80, 114, 222), ALT 99 (138, 166, 201)  -Alk phos 114 (143, 108, 110)  -Afebrile, VSS and WNL on 3L NC  -RUQ DAVID drain: 165 mL SS output  -UOP 1.9 mL in past 24 hours    Plan:  Advance diet to sips of clears, likely further advancement later today  Discontinue Madsen catheter  Hold off on subcu heparin DVT prophylaxis for now  SCDs  Continue DAVID drain, monitor output  Analgesics/antiemetics as needed  OOB today, ambulate as tolerated  IV antibiotics, Rocephin and Flagyl  IV fluids  Encourage incentive spirometry  Appreciate SLIM assistance     Hypertension    CVA (cerebral vascular accident) (HCC)    Hyperlipidemia    Carotid stenosis, symptomatic, with infarction (HCC)    Abdominal pain    Hashimoto's thyroiditis    Sepsis (HCC)          Subjective   He is feeling  well.  He is seen sitting up in bed, has only complaint of mild abdominal soreness.  Denies nausea, fever, chills, bloating, CP, SOB, dizziness.  No BM or flatus since surgery.     Objective :  Temp:  [97.5 °F (36.4 °C)-100.3 °F (37.9 °C)] 97.5 °F (36.4 °C)  HR:  [] 91  BP: (108-171)/(55-95) 144/68  Resp:  [15-24] 19  SpO2:  [89 %-97 %] 93 %  O2 Device: Nasal cannula  Nasal Cannula O2 Flow Rate (L/min):  [4 L/min-6 L/min] 4 L/min    I/O         10/23 0701  10/24 0700 10/24 0701  10/25 0700 10/25 0701  10/26 0700    P.O.       I.V. (mL/kg) 937.1 (8.7) 2120 (19.6)     IV Piggyback 550 950     Total Intake(mL/kg) 1487.1 (13.8) 3070 (28.4)     Urine (mL/kg/hr) 1350 (0.5) 1950 (0.8) 350 (0.9)    Emesis/NG output  600     Drains  165     Blood  1200     Total Output 1350 3915 350    Net +137.1 -845 -350           Unmeasured Urine Occurrence 1 x            Lines/Drains/Airways       Active Status       Name Placement date Placement time Site Days    Closed/Suction Drain Medial RLQ Bulb 19 Fr. 10/24/24  1334  RLQ  less than 1    Urethral Catheter Straight-tip;Non-latex 16 Fr. 10/24/24  1035  Straight-tip;Non-latex  less than 1                  Physical Exam  Vitals and nursing note reviewed.   Constitutional:       General: He is not in acute distress.     Appearance: Normal appearance. He is well-developed. He is obese. He is not ill-appearing.   HENT:      Head: Normocephalic and atraumatic.      Mouth/Throat:      Mouth: Mucous membranes are moist.   Eyes:      Conjunctiva/sclera: Conjunctivae normal.   Cardiovascular:      Rate and Rhythm: Normal rate and regular rhythm.      Heart sounds: Normal heart sounds. No murmur heard.  Pulmonary:      Effort: Pulmonary effort is normal. No respiratory distress.      Breath sounds: Normal breath sounds. No rales.   Abdominal:      General: Abdomen is flat. Bowel sounds are normal. There is no distension.      Palpations: Abdomen is soft. There is no mass.      Tenderness:  There is abdominal tenderness. There is no guarding or rebound.      Hernia: No hernia is present.      Comments: Surgical incisions covered with Steri-Strips which are dry and intact.  Right mid abdomen with DAVID drain, no surrounding erythema edema or drainage.  DAVID bulb has small amount of thin serosanguineous fluid.  Abdomen is appropriately tender near incisions.   Musculoskeletal:         General: No swelling.      Cervical back: Neck supple.   Skin:     General: Skin is warm and dry.      Capillary Refill: Capillary refill takes less than 2 seconds.   Neurological:      Mental Status: He is alert.   Psychiatric:         Mood and Affect: Mood normal.         Lab Results: I have reviewed the following results:  Recent Labs     10/25/24  0449   WBC 11.61*   HGB 11.4*   HCT 33.9*      SODIUM 140   K 3.2*      CO2 31   BUN 18   CREATININE 0.85   GLUC 167*   MG 2.0   AST 65*   ALT 99*   ALB 3.3*   TBILI 1.70*   ALKPHOS 114*             VTE Pharmacologic Prophylaxis: VTE covered by:    None     VTE Mechanical Prophylaxis: sequential compression device

## 2024-10-25 NOTE — ASSESSMENT & PLAN NOTE
Patient presents with 2 days history of abdominal pain, nausea and vomiting.  Upon presentation to the emergency room was found to have abnormal transaminases and hyperbilirubinemia  CT scan of the abdomen and pelvis showsermediate density within the gallbladder likely due to sludge and/or stones. Pericholecystic fat stranding concerning for acute cholecystitis.  Moderate thickening of the hepatic flexure with surrounding fat stranding which may be due to focal colitis, likely reactive from adjacent gallbladder inflammation.  MRCP showed distended gallbladder with numerous gallstones, cystic duct calculi, acute cholecystitis with no intra and extrahepatic biliary ductal dilatation or choledocholithiasis  Patient also had superimposed cholangitis and was treated with IV antibiotics and hydration.  Underwent laparoscopic subtotal cholecystectomy with postoperative day #1 today.  Admitted under surgical service.  Operative findings reviewed:The gallbladder was enlarged and gangrenous with areas of necrosis. There were dense omental adhesions to the gallbladder. The liver was extremely friable and fatty. There was severe necrosis and inflammation of the entire gallbladder and dense scarring the entire gallbladder is also involved the ductal system. The decision to do a subtotal cholecystectomy to preserve the ductal system which had significant scarring and inflammation was made. The robotic case was converted to a laparoscopic case due to the need for the [place] flex 60 mm stapler.     Postoperatively patient doing well and has minimal discomfort at the surgical site.  Right upper quadrant DAVID drain in place with 165 mL of serosanguineous output.  Further postoperative care per primary team  Started on sips of clears with advancement as tolerated.  Surgery wants to hold off on DVT prophylaxis and resuming antiplatelet therapy for today.  Madsen catheter to be discontinued today  Encourage out of bed, incentive  spirometry, PT OT evaluation and ambulation as tolerated.  Continue with as needed analgesics and antiemetics.

## 2024-10-25 NOTE — PHYSICAL THERAPY NOTE
"   PHYSICAL THERAPY EVALUATION  NAME:  Hua Acevedo  DATE: 10/25/24    AGE:   64 y.o.  Mrn:   [id number]  ADMIT DX:  Cholecystitis [K81.9]  Abdominal pain [R10.9]    Past Medical History:   Diagnosis Date    Diverticulitis large intestine     \"During Bowel Resection determined not to have Diverticulitis.\"- Patient    Hypertension     TIA (transient ischemic attack)      Length Of Stay: 3  Performed at least 2 patient identifiers during session: Name and Birthday  PHYSICAL THERAPY EVALUATION :    10/25/24 1151   PT Last Visit   PT Visit Date 10/25/24   Note Type   Note type Evaluation   Pain Assessment   Pain Assessment Tool 0-10   Pain Score 2   Pain Location/Orientation Location: Abdomen   Restrictions/Precautions   Other Precautions Chair Alarm;Fall Risk;Pain;O2  (3 lpm NC start of session)   Home Living   Type of Home House  (4-5STE L HR)   Home Layout Two level;Performs ADLs on one level;Able to live on main level with bedroom/bathroom   Bathroom Shower/Tub Tub/shower unit   Bathroom Toilet Raised   Bathroom Equipment Grab bars in shower   Home Equipment Cane  (wasn't using)   Additional Comments Reports living in a 2SH with TAO and 1st floor set up. Not using an AD PTA.   Prior Function   Level of [place] Independent with ADLs;Independent with functional mobility   Lives With Spouse   Receives Help From Family   IADLs Independent with driving;Family/Friend/Other provides meals;Independent with medication management   Falls in the last 6 months 0   Vocational On disability   Comments Reports being independent with mobility, ADLs and IADLs.   General   Additional Pertinent History Pt is s/p CHOLECYSTECTOMY LAPAROSCOPIC W/ ROBOTICS converted to laparoscopic subtotal cholecystectomy   Cognition   Orientation Level Oriented X4   Following Commands Follows one step commands without difficulty   Subjective   Subjective \"I was very shaan after ym stroke.\"   RLE Assessment   RLE Assessment WFL  (4/5 except " hip flexion 3-/5)   LLE Assessment   LLE Assessment WFL  (4/5 except knee flexion 4-/5 and hip flexion 3-/5)   Coordination   Rapid Alternating Movements Intact   Light Touch   RLE Light Touch Grossly intact   LLE Light Touch Grossly intact   Bed Mobility   Rolling R 4  Minimal assistance   Additional items Assist x 1;Increased time required;Verbal cues   Supine to Sit 4  Minimal assistance   Additional items Assist x 1;Increased time required;Verbal cues   Additional Comments HOB flat without bedrail. minAx1 to achieve sitting EOB with verbal cues for sequence, technique and breathing throughout.   Transfers   Sit to Stand   (SBA)   Additional items Increased time required;Verbal cues   Stand to Sit   (SBA)   Additional items Increased time required;Verbal cues   Stand pivot 4  Minimal assistance   Additional items Assist x 1;Increased time required;Verbal cues   Additional Comments no AD. sit<>stand with SBA with inc time. spt with minAx1 with manual cues for wt shifting and verbal cues for turning completely prior to sitting. with with left foot drag.   Ambulation/Elevation   Gait pattern Wide KHADAR;Improper Weight shift;L Foot drag;Short stride;Excessively slow;Decreased foot clearance   Gait Assistance 4  Minimal assist   Additional items Assist x 1;Verbal cues   Assistive Device None   Distance ambulated 60'x1 without AD with miNAx1 with manual cues for wt shifting and balance. pt with left foot drag, decreased step length. verbal cues for inc foot clearance and step length.   Balance   Static Sitting Good   Dynamic Sitting Fair +   Static Standing Fair   Dynamic Standing Fair -   Ambulatory Poor +   Endurance Deficit   Endurance Deficit Yes   Endurance Deficit Description fatigue. Spo2 at rest on 3 lpm 95%. trialed room air. SpO2 at rest and with activity on room air >/= 90%.   Activity Tolerance   Activity Tolerance Patient limited by fatigue;Patient limited by pain   Medical Staff Made Aware Leigh RICHARDSON  "  Nurse Made Aware Alejandra SCHNEIDER   Assessment   Prognosis Good   Problem List Decreased strength;Decreased endurance;Impaired balance;Decreased mobility;Decreased safety awareness;Obesity;Pain   Barriers to Discharge Comments requires assistance to complete mobility without AD. pain, inc fall risk.   Goals   Patient Goals \"Go home\"   STG Expiration Date 11/08/24   PT Treatment Day 1   Plan   Treatment/Interventions ADL retraining;Functional transfer training;LE strengthening/ROM;Elevations;Therapeutic exercise;Endurance training;Patient/family training;Equipment eval/education;Bed mobility;Gait training;Compensatory technique education;Spoke to nursing;Spoke to case management;OT   PT Frequency 3-5x/wk   Discharge Recommendation   Rehab Resource Intensity Level, PT III (Minimum Resource Intensity)   Equipment Recommended Walker   Walker Package Recommended Wheeled walker   Change/add to Walker Package? No   Additional Comments recommend RW at this time. pending progression of mobility may not require RW. recommending HHC upon discharge. pt hesitant.   AM-PAC Basic Mobility Inpatient   Turning in Flat Bed Without Bedrails 3   Lying on Back to Sitting on Edge of Flat Bed Without Bedrails 3   Moving Bed to Chair 3   Standing Up From Chair Using Arms 3   Walk in Room 3   Climb 3-5 Stairs With Railing 3   Basic Mobility Inpatient Raw Score 18   Basic Mobility Standardized Score 41.05   [facility] Highest Level Of Mobility   -HLM Goal 6: Walk 10 steps or more   -HLM Achieved 7: Walk 25 feet or more   Additional Treatment Session   Start Time 1204   End Time 1213   Treatment Assessment pt tolerated session well. he was able to ambulate increased distance with decreased assistance with RW compared to no AD. He was able to trial stairs. He is limited by decreased strength, balance, endurance. He will continue to benefit form PT services to maximize LOF.   Equipment Use use of RW. verbal cues for hand placement for safety " with RW. sit<>stand with SBA. spt with RW with SBA with min cues for turig compeltely with RW and keeping RW incontact with ground. ambulated 95'x1 and 40'x1 with RW with SBA with cues for inc step length and foot clearance. trialed 3 steps with B hands on elft HR with step to pattern and SBA. descended with B hands on right HR step to pattern. inc time to complete. discussed HHC upon discharge as well as RW. discussed progression of mobility with nursing and frequent ambulaiton and ankle DF/PF. pt verbalized understanding. further discussed log rolling tehcnique and breathing throughout mobility. pt verbalized understanding.   End of Consult   Patient Position at End of Consult Bedside chair;Bed/Chair alarm activated;All needs within reach       Pt requires PT/OT co-eval due to medical complexity, safety concerns, fall risk, significant assistance with mobility and/or cognitive-behavioral impairments.    (Please find full objective findings from PT assessment regarding body systems outlined above).     Assessment: Pt is a 64 y.o. male seen for PT evaluation s/p admission to [facility] on 10/22/2024 with Acute cholecystitis with acute cholangitis.  Order placed for PT services.  Upon evaluation: Pt is presenting with impaired functional mobility due to pain, decreased strength, decreased endurance, impaired balance, gait deviations, decreased safety awareness, fall risk, and impaired skin integrity requiring  minimal assistance for bed mobility, stand by to minimal assistance for transfers, and minimal assistance for ambulation with out AD . Pt's clinical presentation is currently unpredictable given the functional mobility deficits above, especially weakness, decreased endurance, impaired balance, gait deviations, pain, decreased activity tolerance, decreased functional mobility tolerance, and decreased safety awareness, coupled with fall risks as indicated by AM-PAC 6-Clicks: 18/24 as well as  impaired balance, polypharmacy, and decreased safety awareness and combined with medical complications of abnormal H&H, abnormal WBCs, abnormal blood sugars, abnormal potassium values, need for input for mobility technique/safety, and abnormal lactic acid, acute cholecystitis s/p cholecystectomy, sepsis .  Pt's PMHx and comorbidities that may affect physical performance and progress include: CVA, HTN, and carotid stenosis, carotid stenosis, hashimoto's thyroiditis . Personal factors affecting pt at time of IE include: inaccessible home environment, step(s) to enter environment, inability to perform IADLs, inability to perform ADLs, inability to navigate level surfaces without external assistance, and inability to ambulate household distances. Pt will benefit from continued skilled PT services to address deficits as defined above and to maximize level of functional mobility to facilitate return toward PLOF and improved QOL. From PT/mobility standpoint, recommendation at time of d/c would be Level III (Minimum Resource Intensity), with family and/or caregiver support, and with walker in order to reduce fall risk and maximize pt's functional independence and consistency with mobility. Recommend trial with walker next 1-2 sessions and ther ex next 1-2 sessions.       The patient's AM-PAC Basic Mobility Inpatient Short Form Raw Score is 18. A Raw score of greater than 16 suggests the patient may benefit from discharge to home. Please also refer to the recommendation of the Physical Therapist for safe discharge planning.       Goals: Pt will: Perform bed mobility tasks with modified Independent to reposition in bed and prepare for transfers. Pt will perform transfers with modified Independent to decrease burden of care, decrease risk for falls, and improve activity tolerance and prepare for ambulation. Pt will ambulate with LRAD for >/= 250' with  modified Independent  to decrease burden of care, decrease risk for falls,  improve activity tolerance, and improve gait quality and to access home environment. Pt will complete 1 step with LRAD and >/= 4 steps with unilateral handrail with modified Independent to decrease burden of care, decrease risk for falls, improve activity tolerance, and improve gait quality. Pt will participate in objective balance assessment to determine baseline fall risk. Pt will participate in SSWS assessment to determine level of mobility. Pt will increase B LE strength >/= 1/2 MMT grade to facilitate functional mobility.      Mandy Lamar, PT,DPT

## 2024-10-25 NOTE — PLAN OF CARE
Problem: Prexisting or High Potential for Compromised Skin Integrity  Goal: Skin integrity is maintained or improved  Description: INTERVENTIONS:  - Identify patients at risk for skin breakdown  - Assess and monitor skin integrity  - Assess and monitor nutrition and hydration status  - Monitor labs   - Assess for incontinence   - Turn and reposition patient  - Assist with mobility/ambulation  - Relieve pressure over bony prominences  - Avoid friction and shearing  - Provide appropriate hygiene as needed including keeping skin clean and dry  - Evaluate need for skin moisturizer/barrier cream  - Collaborate with interdisciplinary team   - Patient/family teaching  - Consider wound care consult   Outcome: Progressing     Problem: PAIN - ADULT  Goal: Verbalizes/displays adequate comfort level or baseline comfort level  Description: Interventions:  - Encourage patient to monitor pain and request assistance  - Assess pain using appropriate pain scale  - Administer analgesics based on type and severity of pain and evaluate response  - Implement non-pharmacological measures as appropriate and evaluate response  - Consider cultural and social influences on pain and pain management  - Notify physician/advanced practitioner if interventions unsuccessful or patient reports new pain  Outcome: Progressing     Problem: INFECTION - ADULT  Goal: Absence or prevention of progression during hospitalization  Description: INTERVENTIONS:  - Assess and monitor for signs and symptoms of infection  - Monitor lab/diagnostic results  - Monitor all insertion sites, i.e. indwelling lines, tubes, and drains  - Monitor endotracheal if appropriate and nasal secretions for changes in amount and color  - [place] appropriate cooling/warming therapies per order  - Administer medications as ordered  - Instruct and encourage patient and family to use good hand hygiene technique  - Identify and instruct in appropriate isolation precautions for  identified infection/condition  Outcome: Progressing     Problem: SAFETY ADULT  Goal: Patient will remain free of falls  Description: INTERVENTIONS:  - Educate patient/family on patient safety including physical limitations  - Instruct patient to call for assistance with activity   - Consult OT/PT to assist with strengthening/mobility   - Keep Call bell within reach  - Keep bed low and locked with side rails adjusted as appropriate  - Keep care items and personal belongings within reach  - Initiate and maintain comfort rounds  - Make Fall Risk Sign visible to staff  - Offer Toileting every 2  Problem: DISCHARGE PLANNING  Goal: Discharge to home or other facility with appropriate resources  Description: INTERVENTIONS:  - Identify barriers to discharge w/patient and caregiver  - Arrange for needed discharge resources and transportation as appropriate  - Identify discharge learning needs (meds, wound care, etc.)  - Arrange for interpretive services to assist at discharge as needed  - Refer to Case Management Department for coordinating discharge planning if the patient needs post-hospital services based on physician/advanced practitioner order or complex needs related to functional status, cognitive ability, or social support system  Outcome: Progressing     Problem: Knowledge Deficit  Goal: Patient/family/caregiver demonstrates understanding of disease process, treatment plan, medications, and discharge instructions  Description: Complete learning assessment and assess knowledge base.  Interventions:  - Provide teaching at level of understanding  - Provide teaching via preferred learning methods  Outcome: Progressing    Hours, in advance of need  - Initiate/Maintain bed/ chair alarm  - Apply yellow socks and bracelet for high fall risk patients  - Consider moving patient to room near nurses station  Outcome: Progressing  Goal: Maintain or return to baseline ADL function  Description: INTERVENTIONS:  -  Assess  patient's ability to carry out ADLs; assess patient's baseline for ADL function and identify physical deficits which impact ability to perform ADLs (bathing, care of mouth/teeth, toileting, grooming, dressing, etc.)  - Assess/evaluate cause of self-care deficits   - Assess range of motion  - Assess patient's mobility; develop plan if impaired  - Assess patient's need for assistive devices and provide as appropriate  - Encourage maximum independence but intervene and supervise when necessary  - Involve family in performance of ADLs  - Assess for home care needs following discharge   - Consider OT consult to assist with ADL evaluation and planning for discharge  - Provide patient education as appropriate  Outcome: Progressing  Goal: Maintains/Returns to pre admission functional level  Description: INTERVENTIONS:  - Perform AM-PAC 6 Click Basic Mobility/ Daily Activity assessment daily.  - Set and communicate daily mobility goal to care team and patient/family/caregiver.   - Collaborate with rehabilitation services on mobility goals if consulted  - Perform Range of Motion 3  Problem: DISCHARGE PLANNING  Goal: Discharge to home or other facility with appropriate resources  Description: INTERVENTIONS:  - Identify barriers to discharge w/patient and caregiver  - Arrange for needed discharge resources and transportation as appropriate  - Identify discharge learning needs (meds, wound care, etc.)  - Arrange for interpretive services to assist at discharge as needed  - Refer to Case Management Department for coordinating discharge planning if the patient needs post-hospital services based on physician/advanced practitioner order or complex needs related to functional status, cognitive ability, or social support system  Outcome: Progressing    times a day.  - Reposition patient every 2 hours.    - Record patient progress and toleration of activity level   Outcome: Progressing

## 2024-10-25 NOTE — PLAN OF CARE
Problem: OCCUPATIONAL THERAPY ADULT  Goal: Performs self-care activities at highest level of function for planned discharge setting.  See evaluation for individualized goals.  Description: Treatment Interventions: ADL retraining, Visual perceptual retraining, Functional transfer training, UE strengthening/ROM, Endurance training, Patient/family training, Cognitive reorientation, Fine motor coordination activities, Compensatory technique education, Equipment evaluation/education, Neuromuscular reeducation, UE splinting, Cardiac education, Energy conservation, Activityengagement, Continued evaluation          See flowsheet documentation for full assessment, interventions and recommendations.   Note: Limitation: Decreased ADL status, Decreased UE strength, Decreased endurance, Decreased high-level ADLs, Decreased self-care trans  Prognosis: Good  Assessment: Pt is a 64 y.o. male, admitted to [facility] 10/22/2024 d/t experiencing episode of bilious vomiting. Dx: acute cholecystitis with acute cholangitis. Pt POD1 cholecystecomy. Pt with PMHx impacting their performance during ADL tasks, including: diverticulitis large intestine, hypertension, TIA. Prior to admission to the hospital Pt was performing ADLs without physical assistance. IADLs without physical assistance. Functional transfers/ambulation without physical assistance. Cognitive status was PTA was   Intact. OT order placed to assess Pt's ADLs, cognitive status, and performance during functional tasks in order to maximize safety and independence while making most appropriate d/c recommendations. Pt's clinical presentation is currently evolving given new onset deficits that effect Pt's occupational performance and ability to safely return to PLOF including decrease activity tolerance, decrease standing balance, decrease performance during ADL tasks, decrease generalized strength, and decrease performance during functional transfers combined with medical complications  of abnormal H&H, abnormal CBC, abnormal blood sugars, abnormal potassium values, and need for input for mobility technique/safety. Personal factors affecting Pt at time of initial evaluation include: step(s) to enter environment and new need for AD. Pt will benefit from continued skilled OT services to address deficits as defined above and to maximize level independence/participation during ADLs and functional tasks to facilitate return toward PLOF and improved quality of life. From an occupational therapy standpoint, recommendation at time of d/c would be  Level III: Minimum Resource Intensity Therapy.     Rehab Resource Intensity Level, OT: III (Minimum Resource Intensity)

## 2024-10-25 NOTE — ASSESSMENT & PLAN NOTE
Patient has history of right hemispheric CVA secondary to high-grade right carotid stenosis s/p R CEA by Dr Granger 7/26/21 Has been maintained on aspirin, Plavix.  Has not been on any statin therapy although her last vascular surgery note states that he should be on it .  Antiplatelets on hold in view of surgical procedure.  Resume once cleared by surgical team  resume statin therapy when patient is able to tolerate p.o.

## 2024-10-25 NOTE — ASSESSMENT & PLAN NOTE
As evidenced by tachycardia, tachypnea, leukocytosis likely in the setting of cholecystitis.  However cannot rule out choledocholithiasis.  Continue with IV ceftriaxone and Flagyl.   Blood cultures have remained negative to date.  Continue with ceftriaxone and Flagyl

## 2024-10-25 NOTE — DISCHARGE INSTR - AVS FIRST PAGE
Laparoscopic Cholecystectomy   WHAT YOU NEED TO KNOW:   Laparoscopic cholecystectomy is surgery to remove your gallbladder.     DISCHARGE INSTRUCTIONS:   Call Dr. Parra's office at [phone] with any questions or concerns    Medicines:  You may need any of the following:  Prescription pain medicine - Take as directed    You may also take tylenol as needed    Take your medicine as directed.  Contact your healthcare provider if you think your medicine is not helping or if you have side effects. Tell her if you are allergic to any medicine. Keep a list of the medicines, vitamins, and herbs you take. Include the amounts, and when and why you take them. Bring the list or the pill bottles to follow-up visits. Carry your medicine list with you in case of an emergency.    Follow up with your healthcare provider 1-2 weeks after surgery, or as directed:  Write down your questions so you remember to ask them during your visits.  Follow-up with your primary care physician within 1 week of discharge and have them give you a prescription to check your potassium within the week of discharge  Wound care:  Steri strips will peel up and fall off on their own. Do not scrub at incision sites. Pat dry. Do not submerge in water until cleared by the outpatient office.  What to eat after surgery:  Follow a bland diet for the first few days.  You may notice diarrhea with fatty foods. Drink plenty of liquids.   When to return to work and other activities:  No lifting, pushing, or pulling greater then 10lb for 2 weeks.  Walk as much as possible.  No driving for 2 weeks.    Contact your healthcare provider if:   You have a fever over 101°F (38°C) or chills.     You have pain or nausea that is not relieved by medicine.     You have redness and swelling around your incisions, or blood or pus is leaking from your incisions.     You are constipated or have diarrhea.     Your skin or eyes are yellow, or your bowel movements are pale.     You  have questions or concerns about your surgery, condition, or care.  Seek care immediately or call 911 if:   You cannot stop vomiting.     Your bowel movements are black or bloody.     You have pain in your abdomen and it is swollen or hard.     Your arm or leg feels warm, tender, and painful. It may look swollen and red.    You feel lightheaded, short of breath, and have chest pain.     You cough up blood.    © 2017 [email or web address] Information is for End User's use only and may not be sold, redistributed or otherwise used for commercial purposes. All illustrations and images included in CareNotes® are the copyrighted property of [employer], [employer] or [employer].  The above information is an  only. It is not intended as medical advice for individual conditions or treatments. Talk to your doctor, nurse or pharmacist before following any medical regimen to see if it is safe and effective for you.

## 2024-10-25 NOTE — ASSESSMENT & PLAN NOTE
Patient had very difficult attempted cholecystectomy yesterday.    Apparently gangrenous gallbladder, they were only able to do a partial cholecystectomy and had some blood loss    Continue current IV antibiotics

## 2024-10-25 NOTE — PLAN OF CARE
Problem: PHYSICAL THERAPY ADULT  Goal: Performs mobility at highest level of function for planned discharge setting.  See evaluation for individualized goals.  Description: Treatment/Interventions: ADL retraining, Functional transfer training, LE strengthening/ROM, Elevations, Therapeutic exercise, Endurance training, Patient/family training, Equipment eval/education, Bed mobility, Gait training, Compensatory technique education, Spoke to nursing, Spoke to case management, OT  Equipment Recommended: Walker       See flowsheet documentation for full assessment, interventions and recommendations.  Note: Prognosis: Good  Problem List: Decreased strength, Decreased endurance, Impaired balance, Decreased mobility, Decreased safety awareness, Obesity, Pain  Assessment: Pt is a 64 y.o. male seen for PT evaluation s/p admission to [facility] on 10/22/2024 with Acute cholecystitis with acute cholangitis.  Order placed for PT services.  Upon evaluation: Pt is presenting with impaired functional mobility due to pain, decreased strength, decreased endurance, impaired balance, gait deviations, decreased safety awareness, fall risk, and impaired skin integrity requiring  minimal assistance for bed mobility, stand by to minimal assistance for transfers, and minimal assistance for ambulation with out AD . Pt's clinical presentation is currently unpredictable given the functional mobility deficits above, especially weakness, decreased endurance, impaired balance, gait deviations, pain, decreased activity tolerance, decreased functional mobility tolerance, and decreased safety awareness, coupled with fall risks as indicated by AM-PAC 6-Clicks: 18/24 as well as impaired balance, polypharmacy, and decreased safety awareness and combined with medical complications of abnormal H&H, abnormal WBCs, abnormal blood sugars, abnormal potassium values, need for input for mobility technique/safety, and abnormal lactic acid,  acute cholecystitis s/p cholecystectomy, sepsis .  Pt's PMHx and comorbidities that may affect physical performance and progress include: CVA, HTN, and carotid stenosis, carotid stenosis, hashimoto's thyroiditis . Personal factors affecting pt at time of IE include: inaccessible home environment, step(s) to enter environment, inability to perform IADLs, inability to perform ADLs, inability to navigate level surfaces without external assistance, and inability to ambulate household distances. Pt will benefit from continued skilled PT services to address deficits as defined above and to maximize level of functional mobility to facilitate return toward PLOF and improved QOL. From PT/mobility standpoint, recommendation at time of d/c would be Level III (Minimum Resource Intensity), with family and/or caregiver support, and with walker in order to reduce fall risk and maximize pt's functional independence and consistency with mobility. Recommend trial with walker next 1-2 sessions and ther ex next 1-2 sessions.     Barriers to Discharge Comments: requires assistance to complete mobility without AD. pain, inc fall risk.  Rehab Resource Intensity Level, PT: III (Minimum Resource Intensity)    See flowsheet documentation for full assessment.

## 2024-10-25 NOTE — PROGRESS NOTES
Progress Note - Hospitalist   Name: Hua Acevedo 64 y.o. male I MRN: [id number]  Unit/Bed#: [facility] 304-01 I Date of Admission: 10/22/2024   Date of Service: 10/25/2024 I Hospital Day: 3    Assessment & Plan  Acute cholecystitis s/p laparoscopic subtotal cholecystectomy  Patient presents with 2 days history of abdominal pain, nausea and vomiting.  Upon presentation to the emergency room was found to have abnormal transaminases and hyperbilirubinemia  CT scan of the abdomen and pelvis showsermediate density within the gallbladder likely due to sludge and/or stones. Pericholecystic fat stranding concerning for acute cholecystitis.  Moderate thickening of the hepatic flexure with surrounding fat stranding which may be due to focal colitis, likely reactive from adjacent gallbladder inflammation.  MRCP showed distended gallbladder with numerous gallstones, cystic duct calculi, acute cholecystitis with no intra and extrahepatic biliary ductal dilatation or choledocholithiasis  Patient also had superimposed cholangitis and was treated with IV antibiotics and hydration.  Underwent laparoscopic subtotal cholecystectomy with postoperative day #1 today.  Admitted under surgical service.  Operative findings reviewed:The gallbladder was enlarged and gangrenous with areas of necrosis. There were dense omental adhesions to the gallbladder. The liver was extremely friable and fatty. There was severe necrosis and inflammation of the entire gallbladder and dense scarring the entire gallbladder is also involved the ductal system. The decision to do a subtotal cholecystectomy to preserve the ductal system which had significant scarring and inflammation was made. The robotic case was converted to a laparoscopic case due to the need for the [place] flex 60 mm stapler.     Postoperatively patient doing well and has minimal discomfort at the surgical site.  Right upper quadrant DAVID drain in place with 165 mL of serosanguineous  output.  Further postoperative care per primary team  Started on sips of clears with advancement as tolerated.  Surgery wants to hold off on DVT prophylaxis and resuming antiplatelet therapy for today.  Madsen catheter to be discontinued today  Encourage out of bed, incentive spirometry, PT OT evaluation and ambulation as tolerated.  Continue with as needed analgesics and antiemetics.  Hypertension  Maintained on hydrochlorothiazide and lisinopril.  Currently placed on hold.  Did have hypertensive urgency on admission with blood pressure of 203/95 likely secondary to pain.  Continue with IV labetalol for SBP greater than 160.    He had a lot of blood loss during surgery so blood pressure is likely going to start running low    Currently BP is 132/77  CVA (cerebral vascular accident) (HCC)  Patient has a prior history of right hemispheric CVA secondary to high-grade right carotid stenosis s/p R CEA by Dr Granger 7/26/21 .  Antiplatelets on hold currently in anticipation for possible need for OR/ERCP  Resume statins once able to tolerate p.o.  Hyperlipidemia  Resume statins once patient able to tolerate p.o.  Carotid stenosis, symptomatic, with infarction (HCC)  Patient has history of right hemispheric CVA secondary to high-grade right carotid stenosis s/p R CEA by Dr Granger 7/26/21 Has been maintained on aspirin, Plavix.  Has not been on any statin therapy although her last vascular surgery note states that he should be on it .  Antiplatelets on hold in view of surgical procedure.  Resume once cleared by surgical team  resume statin therapy when patient is able to tolerate p.o.  Hashimoto's thyroiditis  Not on any medications an outpatient.   Sepsis (HCC)  As evidenced by tachycardia, tachypnea, leukocytosis likely in the setting of cholecystitis.  However cannot rule out choledocholithiasis.  Continue with IV ceftriaxone and Flagyl.   Blood cultures have remained negative to date.  Continue with ceftriaxone and  Flagyl  Acute cholecystitis with acute cholangitis  Patient had very difficult attempted cholecystectomy yesterday.    Apparently gangrenous gallbladder, they were only able to do a partial cholecystectomy and had some blood loss    Continue current IV antibiotics    VTE Pharmacologic Prophylaxis:   High Risk (Score >/= 5) - Pharmacological DVT Prophylaxis Contraindicated. Sequential Compression Devices Ordered.    Mobility:   Basic Mobility Inpatient Raw Score: 23  JH-HL Goal: 7: Walk 25 feet or more  JH-HLM Achieved: 2: Bed activities/Dependent transfer  JH-HLM Goal achieved. Continue to encourage appropriate mobility.    Patient Centered Rounds: I performed bedside rounds with nursing staff today.   Discussions with Specialists or Other Care Team Provider: S with surgical team    Education and Discussions with Family / Patient: Patient declined call to .     Current Length of Stay: 3 day(s)  Current Patient Status: Inpatient   Certification Statement: The patient will continue to require additional inpatient hospital stay due to postoperative management per primary team      Code Status: Level 1 - Full Code    Subjective   Patient seen and examined at bedside.  Complains of some discomfort at the surgical site.  Denies any nausea, vomiting.  Remains afebrile.  Denies any chest pain or shortness of breath.    Objective :  Temp:  [97.5 °F (36.4 °C)-100.3 °F (37.9 °C)] 97.9 °F (36.6 °C)  HR:  [] 91  BP: (108-171)/(55-95) 144/68  Resp:  [15-24] 16  SpO2:  [89 %-97 %] 94 %  O2 Device: Nasal cannula  Nasal Cannula O2 Flow Rate (L/min):  [3 L/min-6 L/min] 3 L/min    Body mass index is 35.32 kg/m².     Input and Output Summary (last 24 hours):     Intake/Output Summary (Last 24 hours) at 10/25/2024 1147  Last data filed at 10/25/2024 1023  Gross per 24 hour   Intake 2070 ml   Output 3865 ml   Net -1795 ml       Physical Exam  Constitutional:       Appearance: He is obese.   HENT:      Head:  Normocephalic and atraumatic.   Eyes:      Pupils: Pupils are equal, round, and reactive to light.   Cardiovascular:      Rate and Rhythm: Normal rate and regular rhythm.   Pulmonary:      Comments: Diminished breath sounds at bases.  Abdominal:      General: Bowel sounds are normal. There is distension.      Palpations: Abdomen is soft.      Tenderness: There is abdominal tenderness.      Comments: Right upper quadrant DAVID drain in place.  Incisions clear without drainage or discharge.   Musculoskeletal:      Cervical back: Neck supple.      Right lower leg: No edema.      Left lower leg: No edema.   Skin:     General: Skin is warm.   Neurological:      General: No focal deficit present.      Mental Status: He is alert and oriented to person, place, and time. Mental status is at baseline.           Lines/Drains:  Lines/Drains/Airways       Active Status       Name Placement date Placement time Site Days    Closed/Suction Drain Medial RLQ Bulb 19 Fr. 10/24/24  1334  RLQ  less than 1                            Lab Results: I have reviewed the following results:   Results from last 7 days   Lab Units 10/25/24  0449   WBC Thousand/uL 11.61*   HEMOGLOBIN g/dL 11.4*   HEMATOCRIT % 33.9*   PLATELETS Thousands/uL 179   SEGS PCT % 87*   LYMPHO PCT % 6*   MONO PCT % 6   EOS PCT % 0     Results from last 7 days   Lab Units 10/25/24  0449   SODIUM mmol/L 140   POTASSIUM mmol/L 3.2*   CHLORIDE mmol/L 102   CO2 mmol/L 31   BUN mg/dL 18   CREATININE mg/dL 0.85   ANION GAP mmol/L 7   CALCIUM mg/dL 8.4   ALBUMIN g/dL 3.3*   TOTAL BILIRUBIN mg/dL 1.70*   ALK PHOS U/L 114*   ALT U/L 99*   AST U/L 65*   GLUCOSE RANDOM mg/dL 167*         Results from last 7 days   Lab Units 10/24/24  1358   POC GLUCOSE mg/dl 158*         Results from last 7 days   Lab Units 10/23/24  0559 10/22/24  1537 10/22/24  0630 10/22/24  0341 10/22/24  0136   LACTIC ACID mmol/L  --   --  2.0 3.0* 2.7*   PROCALCITONIN ng/ml 1.35* 1.36*  --   --   --        Recent  Cultures (last 7 days):   Results from last 7 days   Lab Units 10/22/24  0145 10/22/24  0136   BLOOD CULTURE  No Growth at 72 hrs. No Growth at 72 hrs.             Last 24 Hours Medication List:     Current Facility-Administered Medications:     acetaminophen (Ofirmev) injection 1,000 mg, Q6H PRN, Last Rate: Stopped (10/25/24 0424)    cefTRIAXone (ROCEPHIN) IVPB (premix in dextrose) 2,000 mg 50 mL, Q24H, Last Rate: Stopped (10/25/24 0441)    hydroCHLOROthiazide tablet 25 mg, Daily    HYDROmorphone (DILAUDID) injection 0.5 mg, Q3H PRN    labetalol (NORMODYNE) injection 10 mg, Q6H PRN    lactated ringers infusion, Continuous    metroNIDAZOLE (FLAGYL) IVPB (premix) 500 mg 100 mL, Q8H, Last Rate: Stopped (10/25/24 0518)    ondansetron (ZOFRAN) injection 4 mg, Q6H PRN    Administrative Statements   Today, Patient Was Seen By: Mady Garcia MD      **Please Note: This note may have been constructed using a voice recognition system.**

## 2024-10-25 NOTE — OCCUPATIONAL THERAPY NOTE
"    Occupational Therapy Evaluation     Patient Name: Hua Acevedo  Today's Date: 10/25/2024  Problem List  Principal Problem:    Acute cholecystitis with acute cholangitis  Active Problems:    Hypertension    CVA (cerebral vascular accident) (HCC)    Hyperlipidemia    Carotid stenosis, symptomatic, with infarction (HCC)    Acute cholecystitis s/p laparoscopic subtotal cholecystectomy    Hashimoto's thyroiditis    Sepsis (HCC)    Past Medical History  Past Medical History:   Diagnosis Date    Diverticulitis large intestine     \"During Bowel Resection determined not to have Diverticulitis.\"- Patient    Hypertension     TIA (transient ischemic attack)      Past Surgical History  Past Surgical History:   Procedure Laterality Date    ANTERIOR CRUCIATE LIGAMENT REPAIR Right     ACL Repair    BOWEL RESECTION      CAROTID ENDARTARECTOMY Right 7/26/2021    Procedure: ENDARTERECTOMY ARTERY CAROTID-- Right;  Surgeon: Jerod Granger MD;  Location: [facility];  Service: Vascular    FOOT SURGERY Right     patient not sure procedure    WA LAPAROSCOPY SURG CHOLECYSTECTOMY N/A 10/24/2024    Procedure: attempted CHOLECYSTECTOMY LAPAROSCOPIC W/ ROBOTICS converted to laparoscopic subtotal cholecystectomy;  Surgeon: Beverley Parra DO;  Location: [facility];  Service: General    SHOULDER SURGERY Bilateral     Rotator cuff/ Right shoulder bone growth removal           10/25/24 1150   Note Type   Note type Evaluation   Pain Assessment   Pain Assessment Tool 0-10   Pain Score 2   Pain Location/Orientation Location: Abdomen   Restrictions/Precautions   Other Precautions Fall Risk;O2  (3lpm -> RA)   Home Living   Type of Home House  (4-5 TAO LHR)   Home Layout Two level;Able to live on main level with bedroom/bathroom;Performs ADLs on one level   Bathroom Shower/Tub Tub/shower unit   Bathroom Toilet Raised   Bathroom Equipment Grab bars in shower   Home Equipment Cane   Additional Comments Pt reports living in a 2SH [facility] with " spouse. No AD PTA for mobility.   Prior Function   Level of [place] Independent with ADLs;Independent with functional mobility   Lives With Spouse   Receives Help From Family   IADLs Independent with driving;Family/Friend/Other provides meals;Independent with medication management   Falls in the last 6 months 0   Vocational On disability   ADL   UB Dressing Assistance 5  Supervision/Setup   UB Dressing Deficit Setup;Verbal cueing;Increased time to complete   LB Dressing Assistance 4  Minimal Assistance   LB Dressing Deficit Setup;Requires assistive device for steadying;Verbal cueing;Increased time to complete   Additional Comments UB ADLs @ S/set-up while seated at EOB. LB ADLs @ Min A. Pt required assist to don socks while seated at EOB, SBA for standing balance during clothing management without AD.   Bed Mobility   Supine to Sit 4  Minimal assistance   Additional items Assist x 1;Increased time required;Verbal cues   Additional Comments Pt supine in bed at beginning of session. Supine to sit @ Min A for trunk management.   Transfers   Sit to Stand   (SBA)   Additional items Verbal cues   Stand to Sit   (SBA)   Additional items Verbal cues   Stand pivot 4  Minimal assistance   Additional items Assist x 1;Increased time required;Verbal cues   Additional Comments Without AD   Functional Mobility   Additional Comments Pt able to complete short distance functional mobility without AD @ Min A. See PT note for details regarding RW use.   Balance   Static Sitting Normal   Dynamic Sitting Good   Static Standing Fair   Dynamic Standing Fair -   Activity Tolerance   Activity Tolerance Patient tolerated treatment well;Patient limited by fatigue;Patient limited by pain   Medical Staff Made Aware Spoke with PT Mandy MARQUIS Assessment   RUE Assessment WFL   LUE Assessment   LUE Assessment WFL   Hand Function   Gross Motor Coordination Functional   Fine Motor Coordination Functional   Hand Function Comments Pt reports no  deficits from prior CVA. Decreased LUE shoulder strength compared to RUE and increased time required to complete L thumb opposition compared to R.   Cognition   Overall Cognitive Status WFL   Arousal/Participation Alert;Responsive;Cooperative   Attention Within functional limits   Orientation Level Oriented X4   Memory Within functional limits   Following Commands Follows one step commands without difficulty   Assessment   Limitation Decreased ADL status;Decreased UE strength;Decreased endurance;Decreased high-level ADLs;Decreased self-care trans   Prognosis Good   Assessment Pt is a 64 y.o. male, admitted to [facility] 10/22/2024 d/t experiencing episode of bilious vomiting. Dx: acute cholecystitis with acute cholangitis. Pt POD1 cholecystecomy. Pt with PMHx impacting their performance during ADL tasks, including: diverticulitis large intestine, hypertension, TIA. Prior to admission to the hospital Pt was performing ADLs without physical assistance. IADLs without physical assistance. Functional transfers/ambulation without physical assistance. Cognitive status was PTA was   Intact. OT order placed to assess Pt's ADLs, cognitive status, and performance during functional tasks in order to maximize safety and independence while making most appropriate d/c recommendations. Pt's clinical presentation is currently evolving given new onset deficits that effect Pt's occupational performance and ability to safely return to PLOF including decrease activity tolerance, decrease standing balance, decrease performance during ADL tasks, decrease generalized strength, and decrease performance during functional transfers combined with medical complications of abnormal H&H, abnormal CBC, abnormal blood sugars, abnormal potassium values, and need for input for mobility technique/safety. Personal factors affecting Pt at time of initial evaluation include: step(s) to enter environment and new need for AD. Pt will benefit from continued  skilled OT services to address deficits as defined above and to maximize level independence/participation during ADLs and functional tasks to facilitate return toward PLOF and improved quality of life. From an occupational therapy standpoint, recommendation at time of d/c would be  Level III: Minimum Resource Intensity Therapy.   Plan   Treatment Interventions ADL retraining;Visual perceptual retraining;Functional transfer training;UE strengthening/ROM;Endurance training;Patient/family training;Cognitive reorientation;Fine motor coordination activities;Compensatory technique education;Equipment evaluation/education;Neuromuscular reeducation;UE splinting;Cardiac education;Energy conservation;Activityengagement;Continued evaluation   Goal Expiration Date 11/08/24   OT Frequency 2-3x/wk   Discharge Recommendation   Rehab Resource Intensity Level, OT III (Minimum Resource Intensity)   AM-PAC Daily Activity Inpatient   Lower Body Dressing 3   Bathing 3   Toileting 3   Upper Body Dressing 3   Grooming 3   Eating 4   Daily Activity Raw Score 19   Daily Activity Standardized Score (Calc for Raw Score >=11) 40.22   AM-PAC Applied Cognition Inpatient   Following a Speech/Presentation 3   Understanding Ordinary Conversation 4   Taking Medications 3   Remembering Where Things Are Placed or Put Away 4   Remembering List of 4-5 Errands 3   Taking Care of Complicated Tasks 3   Applied Cognition Raw Score 20   Applied Cognition Standardized Score 41.76   End of Consult   Patient Position at End of Consult Bedside chair;Bed/Chair alarm activated;All needs within reach     The patient's raw score on the AM-PAC Daily Activity Inpatient Short Form is 19. A raw score of greater than or equal to 19 suggests the patient may benefit from discharge to home. Please refer to the recommendation of the Occupational Therapist for safe discharge planning.    Pt goals to be met by 11/8/2024    Pt will demonstrate ability to complete  grooming/hygiene tasks @ IND after set-up.  Pt will demonstrate ability to complete supine<>sit @ Mod I in order to increase safety and independence during ADL tasks.  Pt will demonstrate ability to complete UB ADLs including washing/dressing @ IND in order to increase performance and participation during meaningful tasks  Pt will demonstrate ability to complete LB dressing @ Mod I in order to increase safety and independence during meaningful tasks.   Pt will demonstrate ability to romel/doff socks/shoes while sitting EOB @ Mod I in order to increase safety and independence during meaningful tasks.   Pt will demonstrate ability to complete toileting tasks including CM and pericare @ IND in order to increase safety and independence during meaningful tasks.  Pt will demonstrate ability to complete EOB, chair, toilet/commode transfers @ IND in order to increase performance and participation during functional tasks.  Pt will demonstrate ability to stand for 5-8 minutes while maintaining Fair + balance with use of LRAD for UB support PRN.  Pt will demonstrate ability to tolerate 30-35 minute OT session with no vc'ing for deep breathing or use of energy conservation techniques in order to increase activity tolerance during functional tasks.   Pt will demonstrate Good carryover of use of energy conservation/compensatory strategies during ADLs and functional tasks in order to increase safety and reduce risk for falls.   Pt will demonstrate Good attention and participation in continued evaluation of functional ambulation house hold distances in order to assist with safe d/c planning.  Pt will attend to continued cognitive assessments 100% of the time in order to provide most appropriate d/c recommendations.   Pt will follow 100% simple 2-step commands and be A&O x4 consistently with environmental cues to increase participation in functional activities.   Pt will identify 3 areas of interest/hobbies and 1 intervention on how  to incorporate into daily life in order to increase interaction with environment and peers as well as increase participation in meaningful tasks.   Pt will demonstrate 100% carryover of BUE HEP in order to increase BUE MS and increase performance during functional tasks upon d/c home.    Leigh Chong, OTR/L

## 2024-10-25 NOTE — PLAN OF CARE
Problem: PHYSICAL THERAPY ADULT  Goal: Performs mobility at highest level of function for planned discharge setting.  See evaluation for individualized goals.  Description: Treatment/Interventions: ADL retraining, Functional transfer training, LE strengthening/ROM, Elevations, Therapeutic exercise, Endurance training, Patient/family training, Equipment eval/education, Bed mobility, Gait training, Compensatory technique education, Spoke to nursing, Spoke to case management, OT  Equipment Recommended: Walker       See flowsheet documentation for full assessment, interventions and recommendations.  10/25/2024 1442 by Mandy Lamar, PT  Note: Prognosis: Good  Problem List: Decreased strength, Decreased endurance, Impaired balance, Decreased mobility, Decreased safety awareness, Obesity, Pain  pt tolerated session well. he was able to ambulate increased distance with decreased assistance with RW compared to no AD. He was able to trial stairs. He is limited by decreased strength, balance, endurance. He will continue to benefit form PT services to maximize LOF.        Barriers to Discharge Comments: requires assistance to complete mobility without AD. pain, inc fall risk.  Rehab Resource Intensity Level, PT: III (Minimum Resource Intensity)    See flowsheet documentation for full assessment.

## 2024-10-25 NOTE — ASSESSMENT & PLAN NOTE
Assessment: 64-year-old male admitted with acute cholecystitis, sepsis and likely transient choledocholithiasis with acute cholangitis.    -POD 1 s/p laparoscopic subtotal cholecystectomy  -Operative findings:The gallbladder was enlarged and gangrenous with areas of necrosis. There were dense omental adhesions to the gallbladder. The liver was extremely friable and fatty. There was severe necrosis and inflammation of the entire gallbladder and dense scarring the entire gallbladder is also involved the ductal system. The decision to do a subtotal cholecystectomy to preserve the ductal system which had significant scarring and inflammation was made. The robotic case was converted to a laparoscopic case due to the need for the [place] flex 60 mm stapler.   -WBC 11.6 (13, 15, 15, 12, 15)  -Hemoglobin stable 11.4 (11.6, 13.9, 13.7, 15)  -LFTs improving, total bilirubin 1.7 (4.7, 6.3, 5.0, 3.4)  -AST 65 (80, 114, 222), ALT 99 (138, 166, 201)  -Alk phos 114 (143, 108, 110)  -Afebrile, VSS and WNL on 3L NC  -RUQ DAVID drain: 165 mL SS output  -UOP 1.9 mL in past 24 hours    Plan:  Advance diet to sips of clears, likely further advancement later today  Discontinue Madsen catheter  Hold off on subcu heparin DVT prophylaxis for now  SCDs  Continue DAVID drain, monitor output  Analgesics/antiemetics as needed  OOB today, ambulate as tolerated  IV antibiotics, Rocephin and Flagyl  IV fluids  Encourage incentive spirometry  Appreciate SLIM assistance

## 2024-10-26 PROBLEM — E87.6 HYPOKALEMIA: Status: ACTIVE | Noted: 2024-10-26

## 2024-10-26 LAB
ALBUMIN SERPL BCG-MCNC: 3.2 G/DL (ref 3.5–5)
ALP SERPL-CCNC: 94 U/L (ref 34–104)
ALT SERPL W P-5'-P-CCNC: 68 U/L (ref 7–52)
ANION GAP SERPL CALCULATED.3IONS-SCNC: 5 MMOL/L (ref 4–13)
AST SERPL W P-5'-P-CCNC: 27 U/L (ref 13–39)
BILIRUB SERPL-MCNC: 1.05 MG/DL (ref 0.2–1)
BUN SERPL-MCNC: 20 MG/DL (ref 5–25)
CALCIUM ALBUM COR SERPL-MCNC: 8.7 MG/DL (ref 8.3–10.1)
CALCIUM SERPL-MCNC: 8.1 MG/DL (ref 8.4–10.2)
CHLORIDE SERPL-SCNC: 100 MMOL/L (ref 96–108)
CO2 SERPL-SCNC: 33 MMOL/L (ref 21–32)
CREAT SERPL-MCNC: 0.86 MG/DL (ref 0.6–1.3)
ERYTHROCYTE [DISTWIDTH] IN BLOOD BY AUTOMATED COUNT: 14.3 % (ref 11.6–15.1)
GFR SERPL CREATININE-BSD FRML MDRD: 91 ML/MIN/1.73SQ M
GLUCOSE SERPL-MCNC: 167 MG/DL (ref 65–140)
HCT VFR BLD AUTO: 29.6 % (ref 36.5–49.3)
HGB BLD-MCNC: 10.1 G/DL (ref 12–17)
MCH RBC QN AUTO: 31.1 PG (ref 26.8–34.3)
MCHC RBC AUTO-ENTMCNC: 34.1 G/DL (ref 31.4–37.4)
MCV RBC AUTO: 91 FL (ref 82–98)
PLATELET # BLD AUTO: 206 THOUSANDS/UL (ref 149–390)
PMV BLD AUTO: 9.6 FL (ref 8.9–12.7)
POTASSIUM SERPL-SCNC: 2.9 MMOL/L (ref 3.5–5.3)
PROT SERPL-MCNC: 5.6 G/DL (ref 6.4–8.4)
RBC # BLD AUTO: 3.25 MILLION/UL (ref 3.88–5.62)
SODIUM SERPL-SCNC: 138 MMOL/L (ref 135–147)
WBC # BLD AUTO: 10.25 THOUSAND/UL (ref 4.31–10.16)

## 2024-10-26 PROCEDURE — 85027 COMPLETE CBC AUTOMATED: CPT

## 2024-10-26 PROCEDURE — 99232 SBSQ HOSP IP/OBS MODERATE 35: CPT | Performed by: INTERNAL MEDICINE

## 2024-10-26 PROCEDURE — 80053 COMPREHEN METABOLIC PANEL: CPT

## 2024-10-26 RX ORDER — CLOPIDOGREL BISULFATE 75 MG/1
75 TABLET ORAL DAILY
Status: DISCONTINUED | OUTPATIENT
Start: 2024-10-26 | End: 2024-10-27 | Stop reason: HOSPADM

## 2024-10-26 RX ORDER — OXYCODONE HYDROCHLORIDE 5 MG/1
5 TABLET ORAL EVERY 6 HOURS PRN
Status: DISCONTINUED | OUTPATIENT
Start: 2024-10-26 | End: 2024-10-27 | Stop reason: HOSPADM

## 2024-10-26 RX ORDER — POTASSIUM CHLORIDE 1500 MG/1
20 TABLET, EXTENDED RELEASE ORAL DAILY
Status: DISCONTINUED | OUTPATIENT
Start: 2024-10-27 | End: 2024-10-27

## 2024-10-26 RX ORDER — ACETAMINOPHEN 325 MG/1
650 TABLET ORAL EVERY 6 HOURS SCHEDULED
Status: DISCONTINUED | OUTPATIENT
Start: 2024-10-26 | End: 2024-10-27 | Stop reason: HOSPADM

## 2024-10-26 RX ORDER — POTASSIUM CHLORIDE 14.9 MG/ML
20 INJECTION INTRAVENOUS
Status: COMPLETED | OUTPATIENT
Start: 2024-10-26 | End: 2024-10-26

## 2024-10-26 RX ORDER — MAGNESIUM SULFATE HEPTAHYDRATE 40 MG/ML
2 INJECTION, SOLUTION INTRAVENOUS ONCE
Status: COMPLETED | OUTPATIENT
Start: 2024-10-26 | End: 2024-10-26

## 2024-10-26 RX ORDER — POTASSIUM CHLORIDE 1500 MG/1
40 TABLET, EXTENDED RELEASE ORAL ONCE
Status: COMPLETED | OUTPATIENT
Start: 2024-10-26 | End: 2024-10-26

## 2024-10-26 RX ORDER — HYDROMORPHONE HCL/PF 1 MG/ML
0.5 SYRINGE (ML) INJECTION
Status: DISCONTINUED | OUTPATIENT
Start: 2024-10-26 | End: 2024-10-27 | Stop reason: HOSPADM

## 2024-10-26 RX ADMIN — CLOPIDOGREL 75 MG: 75 TABLET ORAL at 12:24

## 2024-10-26 RX ADMIN — POTASSIUM CHLORIDE 40 MEQ: 1500 TABLET, EXTENDED RELEASE ORAL at 05:37

## 2024-10-26 RX ADMIN — CEFTRIAXONE 2000 MG: 2 INJECTION, SOLUTION INTRAVENOUS at 04:45

## 2024-10-26 RX ADMIN — METRONIDAZOLE 500 MG: 500 INJECTION, SOLUTION INTRAVENOUS at 12:24

## 2024-10-26 RX ADMIN — POTASSIUM CHLORIDE 20 MEQ: 14.9 INJECTION, SOLUTION INTRAVENOUS at 07:08

## 2024-10-26 RX ADMIN — POTASSIUM CHLORIDE 20 MEQ: 14.9 INJECTION, SOLUTION INTRAVENOUS at 05:32

## 2024-10-26 RX ADMIN — HYDROCHLOROTHIAZIDE 25 MG: 25 TABLET ORAL at 08:45

## 2024-10-26 RX ADMIN — ACETAMINOPHEN 325MG 650 MG: 325 TABLET ORAL at 17:21

## 2024-10-26 RX ADMIN — METRONIDAZOLE 500 MG: 500 INJECTION, SOLUTION INTRAVENOUS at 04:14

## 2024-10-26 RX ADMIN — MAGNESIUM SULFATE HEPTAHYDRATE 2 G: 40 INJECTION, SOLUTION INTRAVENOUS at 05:36

## 2024-10-26 RX ADMIN — METRONIDAZOLE 500 MG: 500 INJECTION, SOLUTION INTRAVENOUS at 20:03

## 2024-10-26 NOTE — PROGRESS NOTES
Progress Note - Surgery-General   Name: Hua Acevedo 64 y.o. male I MRN: [id number]  Unit/Bed#: -01 I Date of Admission: 10/22/2024   Date of Service: 10/26/2024 I Hospital Day: 4     Assessment & Plan  Acute cholecystitis with acute cholangitis  Assessment: 64-year-old male admitted with acute cholecystitis, sepsis and likely transient choledocholithiasis with acute cholangitis.      - POD 2 s/p laparoscopic subtotal cholecystectomy  - Operative findings:The gallbladder was enlarged and gangrenous with areas of necrosis. There were dense omental adhesions to the gallbladder. The liver was extremely friable and fatty. There was severe necrosis and inflammation of the entire gallbladder and dense scarring the entire gallbladder is also involved the ductal system. The decision to do a subtotal cholecystectomy to preserve the ductal system which had significant scarring and inflammation was made. The robotic case was converted to a laparoscopic case due to the need for the [place] flex 60 mm stapler.     - Afebrile, VSS on room air at time of visit - O2 varying between 91 - 95%   - Leukocytosis improving - 10 (11, 13, 15)  - Hgb stable 10 (11, 11, 13)  - Hypokalemia - 2.9  - LFTs continue to improve - AST 27 (65, 80, 114), ALT 68 (99, 138, 166), Alk Phos 94 (114, 143, 108)  - Tbili down trending 1.05 (1.70, 4.73, 6.39)    - Urinating appropriately, BM this AM    - RUQ DAVID drain: 80 mL serosang (165)    -RUQ DAVID drain: 165 mL SS output  -UOP 1.9 mL in past 24 hours    Plan:  - Continue surgical soft diet   - Plavix reordered today  - Pain/nausea control PRN  - IV abx - Ceftriaxone/Flagyl  - Monitor I/Os - DAVID output   - Encourage ambulation  - Incentive spirometer Q hour awake  - Appreciate SLIM assistance w/ co-morbid conditions    - Likely discharge plan for tomorrow, will follow up with CM regarding needs for home    Subjective   Patient states he is doing well today and offers no complaints. He has been  able to tolerate a regular diet without any nausea, vomiting, or increased pain. He denies fevers or chills, shortness of breath or chest tightness. At time of arrival to room O2 sat is 91-92% but patient uses incentive spirometer several times and improvement is noted to 95% on room air. He continues to move his bowels and urinate without concern.     Objective :  Temp:  [97.1 °F (36.2 °C)-98.5 °F (36.9 °C)] 98.2 °F (36.8 °C)  HR:  [68-98] 81  BP: (127-158)/(75-76) 138/76  Resp:  [15-20] 15  SpO2:  [90 %-94 %] 91 %  O2 Device: Nasal cannula  Nasal Cannula O2 Flow Rate (L/min):  [1 L/min-2 L/min] 2 L/min    I/O         10/24 0701  10/25 0700 10/25 0701  10/26 0700 10/26 0701  10/27 0700    P.O.  820     I.V. (mL/kg) 2120 (19.6) 30 (0.3) 30 (0.3)    IV Piggyback 950 450 100    Total Intake(mL/kg) 3070 (28.4) 1300 (12) 130 (1.2)    Urine (mL/kg/hr) 1950 (0.8) 950 (0.4) 475 (0.9)    Emesis/NG output 600      Drains 165 80     Stool  0     Blood 1200      Total Output 3915 1030 475    Net -845 +270 -345           Unmeasured Urine Occurrence  2 x     Unmeasured Stool Occurrence  2 x           Lines/Drains/Airways       Active Status       Name Placement date Placement time Site Days    Closed/Suction Drain Medial RLQ Bulb 19 Fr. 10/24/24  1334  RLQ  1                  Physical Exam  General: no acute distress, pt appears well and comfortable, resting awake in bed watching TV  Skin: warm and dry to touch  Pulmonary: normal effort, use of incentive spirometer while in room improves O2 from 91-92 up to 94-95%  Abdominal: soft, non-distended abdomen, non-tender to palpation on exam at this time, incisions are noted to be clean/dry/intact with steri-strips in place, drain site of RUQ with serosanguinous drainage appreciated   Musculoskeletal: no LE edema present  Neuro: alert and oriented       Lab Results: I have reviewed the following results:  Recent Labs     10/25/24  0449 10/26/24  0418   WBC 11.61* 10.25*   HGB 11.4*  10.1*   HCT 33.9* 29.6*    206   SODIUM 140 138   K 3.2* 2.9*    100   CO2 31 33*   BUN 18 20   CREATININE 0.85 0.86   GLUC 167* 167*   MG 2.0  --    AST 65* 27   ALT 99* 68*   ALB 3.3* 3.2*   TBILI 1.70* 1.05*   ALKPHOS 114* 94     VTE Pharmacologic Prophylaxis: Re-started plavix today  VTE Mechanical Prophylaxis: sequential compression device    Katrina Elizabeth Billig, PA-C  10/26/2024

## 2024-10-26 NOTE — CASE MANAGEMENT
Case Management Discharge Planning Note    Patient name Hua Acevedo  Location /-01 MRN [id number]  : 1960 Date 10/26/2024       Current Admission Date: 10/22/2024  Current Admission Diagnosis:Acute cholecystitis with acute cholangitis   Patient Active Problem List    Diagnosis Date Noted Date Diagnosed    Acute cholecystitis with acute cholangitis 10/23/2024     Acute cholecystitis s/p laparoscopic subtotal cholecystectomy 10/22/2024     Hashimoto's thyroiditis 10/22/2024     Sepsis (HCC) 10/22/2024     TIA (transient ischemic attack) 2021     Carotid stenosis, symptomatic, with infarction (HCC) 2021     Gram-negative bacteremia 2021     Hyperlipidemia 2021     Hypertension 2021     CVA (cerebral vascular accident) (HCC) 2021     Carpal tunnel syndrome, bilateral 2020     Chronic pain of left knee 2020     Primary osteoarthritis of left knee 2020       LOS (days): 4  Geometric Mean LOS (GMLOS) (days): 5  Days to GMLOS:0.8     OBJECTIVE:  Risk of Unplanned Readmission Score: 8.69         Current admission status: Inpatient   Preferred Pharmacy:   [facility] [address] [place] [address] [address] [facility]  [address] [facility] [address]  Phone: [phone] Fax: [phone]    Primary Care Provider: Blanka Suarez DO    Primary Insurance: [facility]  Secondary Insurance:     DISCHARGE DETAILS:        CM submitted [facility] referral for HHC based on therapy recommendation.  CM to follow up with patient with patient and patient will also require RW.

## 2024-10-26 NOTE — ASSESSMENT & PLAN NOTE
Patient has history of right hemispheric CVA secondary to high-grade right carotid stenosis s/p R CEA by Dr Granger 7/26/21 Has been maintained on aspirin, Plavix.  Has not been on any statin therapy although her last vascular surgery note states that he should be on it .  Antiplatelets on hold in view of surgical procedure.  Resume once cleared by surgical team  resume statin therapy when patient is able to tolerate p.o.  Plavix resumed today.

## 2024-10-26 NOTE — CASE MANAGEMENT
Case Management Discharge Planning Note    Patient name Hua Acevedo  Location /-01 MRN [id number]  : 1960 Date 10/26/2024       Current Admission Date: 10/22/2024  Current Admission Diagnosis:Acute cholecystitis with acute cholangitis   Patient Active Problem List    Diagnosis Date Noted Date Diagnosed    Hypokalemia 10/26/2024     Acute cholecystitis with acute cholangitis 10/23/2024     Acute cholecystitis s/p laparoscopic subtotal cholecystectomy 10/22/2024     Hashimoto's thyroiditis 10/22/2024     Sepsis (HCC) 10/22/2024     TIA (transient ischemic attack) 2021     Carotid stenosis, symptomatic, with infarction (HCC) 2021     Gram-negative bacteremia 2021     Hyperlipidemia 2021     Hypertension 2021     CVA (cerebral vascular accident) ([facility]) 2021     Carpal tunnel syndrome, bilateral 2020     Chronic pain of left knee 2020     Primary osteoarthritis of left knee 2020       LOS (days): 4  Geometric Mean LOS (GMLOS) (days): 5  Days to GMLOS:0.5     OBJECTIVE:  Risk of Unplanned Readmission Score: 10.5         Current admission status: Inpatient   Preferred Pharmacy:   [facility] [address] [place] [address] - [address] [facility]  [address] [facility] [address]  Phone: [phone] Fax: [phone]    Primary Care Provider: Blanka Suarez DO    Primary Insurance: [employer] [facility]  Secondary Insurance:     DISCHARGE DETAILS:    Discharge planning discussed with:: patient  Freedom of Choice: Yes  Comments - Freedom of Choice: [facility] referral for HHC     Were Treatment Team discharge recommendations reviewed with patient/caregiver?: Yes  Did patient/caregiver verbalize understanding of patient care needs?: Yes            Requested Home Health Care         Is the patient interested in HHC at discharge?: Yes  Home Health Discipline requested:: Occupational Therapy, Physical Therapy, Nursing  Home Health Follow-Up  Provider:: PCP  Home Health Services Needed:: Evaluate Functional Status and Safety, Gait/ADL Training, Strengthening/Theraputic Exercises to Improve Function, Other (comment) (Medication Management)  Homebound Criteria Met:: Uses an Assist Device (i.e. cane, walker, etc)  Supporting Clincal Findings:: Limited Endurance    DME Referral Provided  Referral made for DME?: Yes  DME referral completed for the following items:: Walker  DME Supplier Name:: [employer]    Other Referral/Resources/Interventions Provided:  Interventions: HHC, DME  Referral Comments: Evernote for RW and HHC referral         Treatment Team Recommendation: Home with Home Health Care  Discharge Destination Plan:: Home with Home Health Care  Transport at Discharge : Family                             IMM Given (Date):: 10/26/24  IMM Given to:: Patient  Family notified:: appeal rights provided to patient              CM met with patient to discuss potential discharge home tomorrow. CM provided patient with RW at bedside.  CM discussed therapy recommendation for HHC.  CM provided patient with [facility] Provider Choice List for HHC.  Patient desires to review options with spouse.  CM to follow.     IMM reviewed with patient, patient agrees with discharge determination. IMM placed in bin for filing.

## 2024-10-26 NOTE — PLAN OF CARE
Problem: Prexisting or High Potential for Compromised Skin Integrity  Goal: Skin integrity is maintained or improved  Description: INTERVENTIONS:  - Identify patients at risk for skin breakdown  - Assess and monitor skin integrity  - Assess and monitor nutrition and hydration status  - Monitor labs   - Assess for incontinence   - Turn and reposition patient  - Assist with mobility/ambulation  - Relieve pressure over bony prominences  - Avoid friction and shearing  - Provide appropriate hygiene as needed including keeping skin clean and dry  - Evaluate need for skin moisturizer/barrier cream  - Collaborate with interdisciplinary team   - Patient/family teaching  - Consider wound care consult   Outcome: Progressing     Problem: PAIN - ADULT  Goal: Verbalizes/displays adequate comfort level or baseline comfort level  Description: Interventions:  - Encourage patient to monitor pain and request assistance  - Assess pain using appropriate pain scale  - Administer analgesics based on type and severity of pain and evaluate response  - Implement non-pharmacological measures as appropriate and evaluate response  - Consider cultural and social influences on pain and pain management  - Notify physician/advanced practitioner if interventions unsuccessful or patient reports new pain  Outcome: Progressing     Problem: INFECTION - ADULT  Goal: Absence or prevention of progression during hospitalization  Description: INTERVENTIONS:  - Assess and monitor for signs and symptoms of infection  - Monitor lab/diagnostic results  - Monitor all insertion sites, i.e. indwelling lines, tubes, and drains  - Monitor endotracheal if appropriate and nasal secretions for changes in amount and color  - [place] appropriate cooling/warming therapies per order  - Administer medications as ordered  - Instruct and encourage patient and family to use good hand hygiene technique  - Identify and instruct in appropriate isolation precautions for  identified infection/condition  Outcome: Progressing     Problem: SAFETY ADULT  Goal: Patient will remain free of falls  Description: INTERVENTIONS:  - Educate patient/family on patient safety including physical limitations  - Instruct patient to call for assistance with activity   - Consult OT/PT to assist with strengthening/mobility   - Keep Call bell within reach  - Keep bed low and locked with side rails adjusted as appropriate  - Keep care items and personal belongings within reach  - Initiate and maintain comfort rounds  - Make Fall Risk Sign visible to staff  - Offer Toileting every 2 Hours, in advance of need  - Initiate/Maintain bed alarm  - Apply yellow socks and bracelet for high fall risk patients  - Consider moving patient to room near nurses station  Outcome: Progressing  Goal: Maintain or return to baseline ADL function  Description: INTERVENTIONS:  -  Assess patient's ability to carry out ADLs; assess patient's baseline for ADL function and identify physical deficits which impact ability to perform ADLs (bathing, care of mouth/teeth, toileting, grooming, dressing, etc.)  - Assess/evaluate cause of self-care deficits   - Assess range of motion  - Assess patient's mobility; develop plan if impaired  - Assess patient's need for assistive devices and provide as appropriate  - Encourage maximum independence but intervene and supervise when necessary  - Involve family in performance of ADLs  - Assess for home care needs following discharge   - Consider OT consult to assist with ADL evaluation and planning for discharge  - Provide patient education as appropriate  Outcome: Progressing  Goal: Maintains/Returns to pre admission functional level  Description: INTERVENTIONS:  - Perform AM-PAC 6 Click Basic Mobility/ Daily Activity assessment daily.  - Set and communicate daily mobility goal to care team and patient/family/caregiver.   - Collaborate with rehabilitation services on mobility goals if consulted  -  Perform Range of Motion 2 times a day.  - Reposition patient every 2 hours.  - Dangle patient 2 times a day  - Stand patient 2 times a day  - Ambulate patient 2 times a day  - Out of bed to chair 2 times a day   - Out of bed for meals 2 times a day  - Out of bed for toileting  - Record patient progress and toleration of activity level   Outcome: Progressing     Problem: DISCHARGE PLANNING  Goal: Discharge to home or other facility with appropriate resources  Description: INTERVENTIONS:  - Identify barriers to discharge w/patient and caregiver  - Arrange for needed discharge resources and transportation as appropriate  - Identify discharge learning needs (meds, wound care, etc.)  - Arrange for interpretive services to assist at discharge as needed  - Refer to Case Management Department for coordinating discharge planning if the patient needs post-hospital services based on physician/advanced practitioner order or complex needs related to functional status, cognitive ability, or social support system  Outcome: Progressing     Problem: Knowledge Deficit  Goal: Patient/family/caregiver demonstrates understanding of disease process, treatment plan, medications, and discharge instructions  Description: Complete learning assessment and assess knowledge base.  Interventions:  - Provide teaching at level of understanding  - Provide teaching via preferred learning methods  Outcome: Progressing     Problem: Nutrition/Hydration-ADULT  Goal: Nutrient/Hydration intake appropriate for improving, restoring or maintaining nutritional needs  Description: Monitor and assess patient's nutrition/hydration status for malnutrition. Collaborate with interdisciplinary team and initiate plan and interventions as ordered.  Monitor patient's weight and dietary intake as ordered or per policy. Utilize nutrition screening tool and intervene as necessary. Determine patient's food preferences and provide high-protein, high-caloric foods as  appropriate.     INTERVENTIONS:  - Monitor oral intake, urinary output, labs, and treatment plans  - Assess nutrition and hydration status and recommend course of action  - Evaluate amount of meals eaten  - Assist patient with eating if necessary   - Allow adequate time for meals  - Recommend/ encourage appropriate diets, oral nutritional supplements, and vitamin/mineral supplements  - Order, calculate, and assess calorie counts as needed  - Recommend, monitor, and adjust tube feedings and TPN/PPN based on assessed needs  - Assess need for intravenous fluids  - Provide specific nutrition/hydration education as appropriate  - Include patient/family/caregiver in decisions related to nutrition  Outcome: Progressing

## 2024-10-26 NOTE — ASSESSMENT & PLAN NOTE
Maintained on hydrochlorothiazide and lisinopril.  Currently lisinopril placed on hold.  Did have hypertensive urgency on admission with blood pressure of 203/95 likely secondary to pain.  Continue with IV labetalol for SBP greater than 160.  Likely can resume lisinopril starting 10/27.    He had a lot of blood loss during surgery so blood pressure is likely going to start running low    Currently BP is 132/77

## 2024-10-26 NOTE — ASSESSMENT & PLAN NOTE
Assessment: 64-year-old male admitted with acute cholecystitis, sepsis and likely transient choledocholithiasis with acute cholangitis.      - POD 2 s/p laparoscopic subtotal cholecystectomy  - Operative findings:The gallbladder was enlarged and gangrenous with areas of necrosis. There were dense omental adhesions to the gallbladder. The liver was extremely friable and fatty. There was severe necrosis and inflammation of the entire gallbladder and dense scarring the entire gallbladder is also involved the ductal system. The decision to do a subtotal cholecystectomy to preserve the ductal system which had significant scarring and inflammation was made. The robotic case was converted to a laparoscopic case due to the need for the [place] flex 60 mm stapler.     - Afebrile, VSS on room air at time of visit - O2 varying between 91 - 95%   - Leukocytosis improving - 10 (11, 13, 15)  - Hgb stable 10 (11, 11, 13)  - Hypokalemia - 2.9  - LFTs continue to improve - AST 27 (65, 80, 114), ALT 68 (99, 138, 166), Alk Phos 94 (114, 143, 108)  - Tbili down trending 1.05 (1.70, 4.73, 6.39)    - Urinating appropriately, BM this AM    - RUQ DAVID drain: 80 mL serosang (165)    -RUQ DAVID drain: 165 mL SS output  -UOP 1.9 mL in past 24 hours    Plan:  - Continue surgical soft diet   - Plavix reordered today  - Pain/nausea control PRN  - IV abx - Ceftriaxone/Flagyl  - Monitor I/Os - DAVID output   - Encourage ambulation  - Incentive spirometer Q hour awake  - Appreciate SLIM assistance w/ co-morbid conditions    - Likely discharge plan for tomorrow, will follow up with CM regarding needs for home

## 2024-10-26 NOTE — PROGRESS NOTES
Progress Note - Hospitalist   Name: Hua Acevedo 64 y.o. male I MRN: [id number]  Unit/Bed#: [facility] 304-01 I Date of Admission: 10/22/2024   Date of Service: 10/26/2024 I Hospital Day: 4    Assessment & Plan  Acute cholecystitis s/p laparoscopic subtotal cholecystectomy  Patient presents with 2 days history of abdominal pain, nausea and vomiting.  Upon presentation to the emergency room was found to have abnormal transaminases and hyperbilirubinemia  CT scan of the abdomen and pelvis showsermediate density within the gallbladder likely due to sludge and/or stones. Pericholecystic fat stranding concerning for acute cholecystitis.  Moderate thickening of the hepatic flexure with surrounding fat stranding which may be due to focal colitis, likely reactive from adjacent gallbladder inflammation.  MRCP showed distended gallbladder with numerous gallstones, cystic duct calculi, acute cholecystitis with no intra and extrahepatic biliary ductal dilatation or choledocholithiasis  Patient also had superimposed cholangitis and was treated with IV antibiotics and hydration.  Underwent laparoscopic subtotal cholecystectomy with postoperative day #1 today.  Admitted under surgical service.  Operative findings reviewed:The gallbladder was enlarged and gangrenous with areas of necrosis. There were dense omental adhesions to the gallbladder. The liver was extremely friable and fatty. There was severe necrosis and inflammation of the entire gallbladder and dense scarring the entire gallbladder is also involved the ductal system. The decision to do a subtotal cholecystectomy to preserve the ductal system which had significant scarring and inflammation was made. The robotic case was converted to a laparoscopic case due to the need for the [place] flex 60 mm stapler.     Postoperatively patient doing well and has minimal discomfort at the surgical site.  Right upper quadrant DAVID drain in place with 80mL of serosanguineous  output.  Further postoperative care per primary team  Started on sips of clears with advancement to surgical soft diet as tolerated surgery wants to hold off on DVT prophylaxis and Plavix resumed today   Madsen catheter  discontinued yesterday   encourage out of bed, incentive spirometry, PT OT evaluation and ambulation as tolerated.  Continue with as needed analgesics and antiemetics.  Hypertension  Maintained on hydrochlorothiazide and lisinopril.  Currently lisinopril placed on hold.  Did have hypertensive urgency on admission with blood pressure of 203/95 likely secondary to pain.  Continue with IV labetalol for SBP greater than 160.  Likely can resume lisinopril starting 10/27.    He had a lot of blood loss during surgery so blood pressure is likely going to start running low    Currently BP is 132/77  CVA (cerebral vascular accident) (HCC)  Patient has a prior history of right hemispheric CVA secondary to high-grade right carotid stenosis s/p R CEA by Dr Granger 7/26/21 .  Antiplatelets on hold currently in anticipation for possible need for OR/ERCP  Resume statins once able to tolerate p.o.  Hyperlipidemia  Resume statins once patient able to tolerate p.o.  Carotid stenosis, symptomatic, with infarction (HCC)  Patient has history of right hemispheric CVA secondary to high-grade right carotid stenosis s/p R CEA by Dr Granger 7/26/21 Has been maintained on aspirin, Plavix.  Has not been on any statin therapy although her last vascular surgery note states that he should be on it .  Antiplatelets on hold in view of surgical procedure.  Resume once cleared by surgical team  resume statin therapy when patient is able to tolerate p.o.  Plavix resumed today.  Hashimoto's thyroiditis  Not on any medications an outpatient.   Sepsis (HCC)  As evidenced by tachycardia, tachypnea, leukocytosis likely in the setting of cholecystitis.  However cannot rule out choledocholithiasis.  Continue with IV ceftriaxone and Flagyl.   Blood  cultures have remained negative to date.  Continue with ceftriaxone and Flagyl  Acute cholecystitis with acute cholangitis  Patient had very difficult attempted cholecystectomy yesterday.    Apparently gangrenous gallbladder, they were only able to do a partial cholecystectomy and had some blood loss    Continue current IV antibiotics  Hypokalemia  Potassium at 2.9.  Replace.  Start low-dose potassium in view of patient being on hydrochlorothiazide.  Follow BMP in AM.    VTE Pharmacologic Prophylaxis:   High Risk (Score >/= 5) - Pharmacological DVT Prophylaxis Contraindicated. Sequential Compression Devices Ordered.    Mobility:   Basic Mobility Inpatient Raw Score: 18  JH-HLM Goal: 6: Walk 10 steps or more  JH-HLM Achieved: 7: Walk 25 feet or more  JH-HLM Goal achieved. Continue to encourage appropriate mobility.    Patient Centered Rounds: I performed bedside rounds with nursing staff today.   Discussions with Specialists or Other Care Team Provider: Discussed with nursing    Education and Discussions with Family / Patient:     Current Length of Stay: 4 day(s)  Current Patient Status: Inpatient     Code Status: Level 1 - Full Code    Subjective   Seen and examined at bedside.  Nursing reported 1 bowel movement with some blood overnight likely secondary to hemorrhoids.  Denies any worsening abdominal discomfort nausea or vomiting.  Tolerating current diet well.    Objective :  Temp:  [97.1 °F (36.2 °C)-98.5 °F (36.9 °C)] 98.2 °F (36.8 °C)  HR:  [68-98] 81  BP: (127-158)/(75-76) 138/76  Resp:  [15-20] 15  SpO2:  [90 %-94 %] 91 %  O2 Device: Nasal cannula  Nasal Cannula O2 Flow Rate (L/min):  [1 L/min-2 L/min] 2 L/min    Body mass index is 35.32 kg/m².     Input and Output Summary (last 24 hours):     Intake/Output Summary (Last 24 hours) at 10/26/2024 1256  Last data filed at 10/26/2024 0908  Gross per 24 hour   Intake 1430 ml   Output 1155 ml   Net 275 ml       Physical Exam  Constitutional:       General: He is  not in acute distress.  HENT:      Head: Normocephalic.      Mouth/Throat:      Mouth: Mucous membranes are moist.   Cardiovascular:      Rate and Rhythm: Normal rate and regular rhythm.      Pulses: Normal pulses.   Pulmonary:      Effort: Pulmonary effort is normal.      Breath sounds: Normal breath sounds.   Abdominal:      General: Bowel sounds are normal. There is no distension.      Palpations: Abdomen is soft.      Tenderness: There is abdominal tenderness. There is no guarding.   Musculoskeletal:      Right lower leg: No edema.      Left lower leg: No edema.   Skin:     General: Skin is warm.   Neurological:      General: No focal deficit present.      Mental Status: He is alert and oriented to person, place, and time. Mental status is at baseline.           Lines/Drains:  Lines/Drains/Airways       Active Status       Name Placement date Placement time Site Days    Closed/Suction Drain Medial RLQ Bulb 19 Fr. 10/24/24  1334  RLQ  1                      Telemetry:  Telemetry Orders (From admission, onward)               24 Hour Telemetry Monitoring  Continuous x 24 Hours (Telem)        Question:  Reason for 24 Hour Telemetry  Answer:  Metabolic/electrolyte disturbance with high probability of dysrhythmia. K level <3 or >6 OR KCL infusion >10mEq/hr                     Telemetry Reviewed: Normal Sinus Rhythm  Indication for Continued Telemetry Use: No indication for continued use. Will discontinue.                Lab Results: I have reviewed the following results:   Results from last 7 days   Lab Units 10/26/24  0418 10/25/24  0449   WBC Thousand/uL 10.25* 11.61*   HEMOGLOBIN g/dL 10.1* 11.4*   HEMATOCRIT % 29.6* 33.9*   PLATELETS Thousands/uL 206 179   SEGS PCT %  --  87*   LYMPHO PCT %  --  6*   MONO PCT %  --  6   EOS PCT %  --  0     Results from last 7 days   Lab Units 10/26/24  0418   SODIUM mmol/L 138   POTASSIUM mmol/L 2.9*   CHLORIDE mmol/L 100   CO2 mmol/L 33*   BUN mg/dL 20   CREATININE mg/dL 0.86    ANION GAP mmol/L 5   CALCIUM mg/dL 8.1*   ALBUMIN g/dL 3.2*   TOTAL BILIRUBIN mg/dL 1.05*   ALK PHOS U/L 94   ALT U/L 68*   AST U/L 27   GLUCOSE RANDOM mg/dL 167*         Results from last 7 days   Lab Units 10/24/24  1358   POC GLUCOSE mg/dl 158*         Results from last 7 days   Lab Units 10/23/24  0559 10/22/24  1537 10/22/24  0630 10/22/24  0341 10/22/24  0136   LACTIC ACID mmol/L  --   --  2.0 3.0* 2.7*   PROCALCITONIN ng/ml 1.35* 1.36*  --   --   --        Recent Cultures (last 7 days):   Results from last 7 days   Lab Units 10/22/24  0145 10/22/24  0136   BLOOD CULTURE  No Growth After 4 Days. No Growth After 4 Days.             Last 24 Hours Medication List:     Current Facility-Administered Medications:     acetaminophen (Ofirmev) injection 1,000 mg, Q6H PRN, Last Rate: Stopped (10/25/24 1905)    cefTRIAXone (ROCEPHIN) IVPB (premix in dextrose) 2,000 mg 50 mL, Q24H, Last Rate: Stopped (10/26/24 0515)    clopidogrel (PLAVIX) tablet 75 mg, Daily    hydroCHLOROthiazide tablet 25 mg, Daily    HYDROmorphone (DILAUDID) injection 0.5 mg, Q3H PRN    labetalol (NORMODYNE) injection 10 mg, Q6H PRN    metroNIDAZOLE (FLAGYL) IVPB (premix) 500 mg 100 mL, Q8H, Last Rate: 500 mg (10/26/24 1224)    ondansetron (ZOFRAN) injection 4 mg, Q6H PRN    [START ON 10/27/2024] potassium chloride (Klor-Con M20) CR tablet 20 mEq, Daily    Administrative Statements   Today, Patient Was Seen By: Mady Garcia MD      **Please Note: This note may have been constructed using a voice recognition system.**

## 2024-10-26 NOTE — ASSESSMENT & PLAN NOTE
Potassium at 2.9.  Replace.  Start low-dose potassium in view of patient being on hydrochlorothiazide.  Follow BMP in AM.

## 2024-10-26 NOTE — ASSESSMENT & PLAN NOTE
Patient presents with 2 days history of abdominal pain, nausea and vomiting.  Upon presentation to the emergency room was found to have abnormal transaminases and hyperbilirubinemia  CT scan of the abdomen and pelvis showsermediate density within the gallbladder likely due to sludge and/or stones. Pericholecystic fat stranding concerning for acute cholecystitis.  Moderate thickening of the hepatic flexure with surrounding fat stranding which may be due to focal colitis, likely reactive from adjacent gallbladder inflammation.  MRCP showed distended gallbladder with numerous gallstones, cystic duct calculi, acute cholecystitis with no intra and extrahepatic biliary ductal dilatation or choledocholithiasis  Patient also had superimposed cholangitis and was treated with IV antibiotics and hydration.  Underwent laparoscopic subtotal cholecystectomy with postoperative day #1 today.  Admitted under surgical service.  Operative findings reviewed:The gallbladder was enlarged and gangrenous with areas of necrosis. There were dense omental adhesions to the gallbladder. The liver was extremely friable and fatty. There was severe necrosis and inflammation of the entire gallbladder and dense scarring the entire gallbladder is also involved the ductal system. The decision to do a subtotal cholecystectomy to preserve the ductal system which had significant scarring and inflammation was made. The robotic case was converted to a laparoscopic case due to the need for the [place] flex 60 mm stapler.     Postoperatively patient doing well and has minimal discomfort at the surgical site.  Right upper quadrant DAVID drain in place with 80mL of serosanguineous output.  Further postoperative care per primary team  Started on sips of clears with advancement to surgical soft diet as tolerated surgery wants to hold off on DVT prophylaxis and Plavix resumed today   Madsen catheter  discontinued yesterday   encourage out of bed, incentive  spirometry, PT OT evaluation and ambulation as tolerated.  Continue with as needed analgesics and antiemetics.

## 2024-10-27 ENCOUNTER — HOME HEALTH ADMISSION (OUTPATIENT)
Dept: HOME HEALTH SERVICES | Facility: HOME HEALTHCARE | Age: 64
End: 2024-10-27
Payer: COMMERCIAL

## 2024-10-27 VITALS
OXYGEN SATURATION: 90 % | WEIGHT: 239.2 LBS | BODY MASS INDEX: 35.43 KG/M2 | HEART RATE: 66 BPM | TEMPERATURE: 97.8 F | DIASTOLIC BLOOD PRESSURE: 81 MMHG | RESPIRATION RATE: 18 BRPM | HEIGHT: 69 IN | SYSTOLIC BLOOD PRESSURE: 136 MMHG

## 2024-10-27 LAB
ANION GAP SERPL CALCULATED.3IONS-SCNC: 6 MMOL/L (ref 4–13)
BACTERIA BLD CULT: NORMAL
BACTERIA BLD CULT: NORMAL
BASOPHILS # BLD MANUAL: 0 THOUSAND/UL (ref 0–0.1)
BASOPHILS NFR MAR MANUAL: 0 % (ref 0–1)
BUN SERPL-MCNC: 17 MG/DL (ref 5–25)
CALCIUM SERPL-MCNC: 7.8 MG/DL (ref 8.4–10.2)
CHLORIDE SERPL-SCNC: 101 MMOL/L (ref 96–108)
CO2 SERPL-SCNC: 32 MMOL/L (ref 21–32)
CREAT SERPL-MCNC: 0.95 MG/DL (ref 0.6–1.3)
EOSINOPHIL # BLD MANUAL: 0.18 THOUSAND/UL (ref 0–0.4)
EOSINOPHIL NFR BLD MANUAL: 2 % (ref 0–6)
ERYTHROCYTE [DISTWIDTH] IN BLOOD BY AUTOMATED COUNT: 14.6 % (ref 11.6–15.1)
GFR SERPL CREATININE-BSD FRML MDRD: 84 ML/MIN/1.73SQ M
GLUCOSE SERPL-MCNC: 126 MG/DL (ref 65–140)
HCT VFR BLD AUTO: 30.1 % (ref 36.5–49.3)
HGB BLD-MCNC: 10.1 G/DL (ref 12–17)
LYMPHOCYTES # BLD AUTO: 2.48 THOUSAND/UL (ref 0.6–4.47)
LYMPHOCYTES # BLD AUTO: 23 % (ref 14–44)
MAGNESIUM SERPL-MCNC: 2.1 MG/DL (ref 1.9–2.7)
MCH RBC QN AUTO: 31 PG (ref 26.8–34.3)
MCHC RBC AUTO-ENTMCNC: 33.6 G/DL (ref 31.4–37.4)
MCV RBC AUTO: 92 FL (ref 82–98)
METAMYELOCYTE ABSOLUTE CT: 0.44 THOUSAND/UL (ref 0–0.1)
METAMYELOCYTES NFR BLD MANUAL: 5 % (ref 0–1)
MONOCYTES # BLD AUTO: 0.53 THOUSAND/UL (ref 0–1.22)
MONOCYTES NFR BLD: 6 % (ref 4–12)
MYELOCYTE ABSOLUTE CT: 0.35 THOUSAND/UL (ref 0–0.1)
MYELOCYTES NFR BLD MANUAL: 4 % (ref 0–1)
NEUTROPHILS # BLD MANUAL: 4.86 THOUSAND/UL (ref 1.85–7.62)
NEUTS BAND NFR BLD MANUAL: 1 % (ref 0–8)
NEUTS SEG NFR BLD AUTO: 54 % (ref 43–75)
NRBC BLD AUTO-RTO: 1 /100 WBC (ref 0–2)
PLATELET # BLD AUTO: 208 THOUSANDS/UL (ref 149–390)
PLATELET BLD QL SMEAR: ADEQUATE
PMV BLD AUTO: 9 FL (ref 8.9–12.7)
POLYCHROMASIA BLD QL SMEAR: PRESENT
POTASSIUM SERPL-SCNC: 2.9 MMOL/L (ref 3.5–5.3)
RBC # BLD AUTO: 3.26 MILLION/UL (ref 3.88–5.62)
RBC MORPH BLD: NORMAL
SODIUM SERPL-SCNC: 139 MMOL/L (ref 135–147)
VARIANT LYMPHS # BLD AUTO: 5 %
WBC # BLD AUTO: 8.84 THOUSAND/UL (ref 4.31–10.16)

## 2024-10-27 PROCEDURE — 80048 BASIC METABOLIC PNL TOTAL CA: CPT

## 2024-10-27 PROCEDURE — 85007 BL SMEAR W/DIFF WBC COUNT: CPT

## 2024-10-27 PROCEDURE — 85027 COMPLETE CBC AUTOMATED: CPT

## 2024-10-27 PROCEDURE — 99232 SBSQ HOSP IP/OBS MODERATE 35: CPT | Performed by: INTERNAL MEDICINE

## 2024-10-27 PROCEDURE — 83735 ASSAY OF MAGNESIUM: CPT | Performed by: INTERNAL MEDICINE

## 2024-10-27 RX ORDER — POTASSIUM CHLORIDE 1500 MG/1
20 TABLET, EXTENDED RELEASE ORAL 2 TIMES DAILY
Qty: 20 TABLET | Refills: 0 | Status: SHIPPED | OUTPATIENT
Start: 2024-10-27 | End: 2024-11-06

## 2024-10-27 RX ORDER — POTASSIUM CHLORIDE 1500 MG/1
40 TABLET, EXTENDED RELEASE ORAL DAILY
Status: DISCONTINUED | OUTPATIENT
Start: 2024-10-27 | End: 2024-10-27 | Stop reason: HOSPADM

## 2024-10-27 RX ORDER — POTASSIUM CHLORIDE 14.9 MG/ML
20 INJECTION INTRAVENOUS ONCE
Status: COMPLETED | OUTPATIENT
Start: 2024-10-27 | End: 2024-10-27

## 2024-10-27 RX ORDER — LISINOPRIL 20 MG/1
20 TABLET ORAL DAILY
Start: 2024-11-04

## 2024-10-27 RX ADMIN — ACETAMINOPHEN 325MG 650 MG: 325 TABLET ORAL at 05:10

## 2024-10-27 RX ADMIN — CEFTRIAXONE 2000 MG: 2 INJECTION, SOLUTION INTRAVENOUS at 05:04

## 2024-10-27 RX ADMIN — POTASSIUM CHLORIDE 40 MEQ: 1500 TABLET, EXTENDED RELEASE ORAL at 08:44

## 2024-10-27 RX ADMIN — HYDROCHLOROTHIAZIDE 25 MG: 25 TABLET ORAL at 08:44

## 2024-10-27 RX ADMIN — POTASSIUM CHLORIDE 20 MEQ: 14.9 INJECTION, SOLUTION INTRAVENOUS at 08:45

## 2024-10-27 RX ADMIN — CLOPIDOGREL 75 MG: 75 TABLET ORAL at 08:44

## 2024-10-27 RX ADMIN — POTASSIUM CHLORIDE 20 MEQ: 14.9 INJECTION, SOLUTION INTRAVENOUS at 06:20

## 2024-10-27 RX ADMIN — ACETAMINOPHEN 325MG 650 MG: 325 TABLET ORAL at 11:59

## 2024-10-27 RX ADMIN — ACETAMINOPHEN 325MG 650 MG: 325 TABLET ORAL at 01:36

## 2024-10-27 RX ADMIN — METRONIDAZOLE 500 MG: 500 INJECTION, SOLUTION INTRAVENOUS at 04:19

## 2024-10-27 NOTE — ASSESSMENT & PLAN NOTE
As evidenced by tachycardia, tachypnea, leukocytosis likely in the setting of cholecystitis.  However cannot rule out choledocholithiasis.  Continue with IV ceftriaxone and Flagyl.   Blood cultures have remained negative to date.  Continue with ceftriaxone and Flagyl  Further antibiotics per primary team

## 2024-10-27 NOTE — ASSESSMENT & PLAN NOTE
Not on any medications an outpatient.    Principal Discharge DX:	Fever  Secondary Diagnosis:	Viral syndrome

## 2024-10-27 NOTE — PLAN OF CARE
Problem: Prexisting or High Potential for Compromised Skin Integrity  Goal: Skin integrity is maintained or improved  Description: INTERVENTIONS:  - Identify patients at risk for skin breakdown  - Assess and monitor skin integrity  - Assess and monitor nutrition and hydration status  - Monitor labs   - Assess for incontinence   - Turn and reposition patient  - Assist with mobility/ambulation  - Relieve pressure over bony prominences  - Avoid friction and shearing  - Provide appropriate hygiene as needed including keeping skin clean and dry  - Evaluate need for skin moisturizer/barrier cream  - Collaborate with interdisciplinary team   - Patient/family teaching  - Consider wound care consult   Outcome: Adequate for Discharge     Problem: PAIN - ADULT  Goal: Verbalizes/displays adequate comfort level or baseline comfort level  Description: Interventions:  - Encourage patient to monitor pain and request assistance  - Assess pain using appropriate pain scale  - Administer analgesics based on type and severity of pain and evaluate response  - Implement non-pharmacological measures as appropriate and evaluate response  - Consider cultural and social influences on pain and pain management  - Notify physician/advanced practitioner if interventions unsuccessful or patient reports new pain  Outcome: Adequate for Discharge     Problem: INFECTION - ADULT  Goal: Absence or prevention of progression during hospitalization  Description: INTERVENTIONS:  - Assess and monitor for signs and symptoms of infection  - Monitor lab/diagnostic results  - Monitor all insertion sites, i.e. indwelling lines, tubes, and drains  - Monitor endotracheal if appropriate and nasal secretions for changes in amount and color  - [place] appropriate cooling/warming therapies per order  - Administer medications as ordered  - Instruct and encourage patient and family to use good hand hygiene technique  - Identify and instruct in appropriate isolation  precautions for identified infection/condition  Outcome: Adequate for Discharge     Problem: SAFETY ADULT  Goal: Patient will remain free of falls  Description: INTERVENTIONS:  - Educate patient/family on patient safety including physical limitations  - Instruct patient to call for assistance with activity   - Consult OT/PT to assist with strengthening/mobility   - Keep Call bell within reach  - Keep bed low and locked with side rails adjusted as appropriate  - Keep care items and personal belongings within reach  - Initiate and maintain comfort rounds  - Make Fall Risk Sign visible to staff  - Apply yellow socks and bracelet for high fall risk patients  - Consider moving patient to room near nurses station  Outcome: Adequate for Discharge  Goal: Maintain or return to baseline ADL function  Description: INTERVENTIONS:  -  Assess patient's ability to carry out ADLs; assess patient's baseline for ADL function and identify physical deficits which impact ability to perform ADLs (bathing, care of mouth/teeth, toileting, grooming, dressing, etc.)  - Assess/evaluate cause of self-care deficits   - Assess range of motion  - Assess patient's mobility; develop plan if impaired  - Assess patient's need for assistive devices and provide as appropriate  - Encourage maximum independence but intervene and supervise when necessary  - Involve family in performance of ADLs  - Assess for home care needs following discharge   - Consider OT consult to assist with ADL evaluation and planning for discharge  - Provide patient education as appropriate  Outcome: Adequate for Discharge  Goal: Maintains/Returns to pre admission functional level  Description: INTERVENTIONS:  - Perform AM-PAC 6 Click Basic Mobility/ Daily Activity assessment daily.  - Set and communicate daily mobility goal to care team and patient/family/caregiver.   - Collaborate with rehabilitation services on mobility goals if consulted  - Out of bed for toileting  - Record  patient progress and toleration of activity level   Outcome: Adequate for Discharge     Problem: DISCHARGE PLANNING  Goal: Discharge to home or other facility with appropriate resources  Description: INTERVENTIONS:  - Identify barriers to discharge w/patient and caregiver  - Arrange for needed discharge resources and transportation as appropriate  - Identify discharge learning needs (meds, wound care, etc.)  - Arrange for interpretive services to assist at discharge as needed  - Refer to Case Management Department for coordinating discharge planning if the patient needs post-hospital services based on physician/advanced practitioner order or complex needs related to functional status, cognitive ability, or social support system  Outcome: Adequate for Discharge     Problem: Knowledge Deficit  Goal: Patient/family/caregiver demonstrates understanding of disease process, treatment plan, medications, and discharge instructions  Description: Complete learning assessment and assess knowledge base.  Interventions:  - Provide teaching at level of understanding  - Provide teaching via preferred learning methods  Outcome: Adequate for Discharge

## 2024-10-27 NOTE — ASSESSMENT & PLAN NOTE
Patient has history of right hemispheric CVA secondary to high-grade right carotid stenosis s/p R CEA by Dr Granger 7/26/21 Has been maintained on aspirin, Plavix.  Has not been on any statin therapy although her last vascular surgery note states that he should be on it .  Antiplatelets on hold in view of surgical procedure.  Resume once cleared by surgical team  resume statin therapy when patient is able to tolerate p.o.  Plavix resumed yesterday.  Hemoglobin remained stable

## 2024-10-27 NOTE — ASSESSMENT & PLAN NOTE
Patient had very difficult attempted cholecystectomy.  Apparently gangrenous gallbladder, they were only able to do a partial cholecystectomy and had some blood loss  Transition to oral antibiotics and discharge

## 2024-10-27 NOTE — ASSESSMENT & PLAN NOTE
Maintained on hydrochlorothiazide and lisinopril.  Currently lisinopril placed on hold.  Did have hypertensive urgency on admission with blood pressure of 203/95 likely secondary to pain.  Continue with IV labetalol for SBP greater than 160.  Likely can resume lisinopril within the week of discharge once blood pressure has stabilized.  Did have hypokalemia on HCTZ and recommended potassium supplementation on discharge with recommendations to repeat BMP within 1 week of discharge

## 2024-10-27 NOTE — PROGRESS NOTES
Progress Note - Hospitalist   Name: Hua Acevedo 64 y.o. male I MRN: [id number]  Unit/Bed#: [facility] 304-01 I Date of Admission: 10/22/2024   Date of Service: 10/27/2024 I Hospital Day: 5    Assessment & Plan  Acute cholecystitis s/p laparoscopic subtotal cholecystectomy  Patient presented with 2 days history of abdominal pain, nausea and vomiting.  Upon presentation to the emergency room was found to have abnormal transaminases and hyperbilirubinemia  CT scan of the abdomen and pelvis showsermediate density within the gallbladder likely due to sludge and/or stones. Pericholecystic fat stranding concerning for acute cholecystitis.  Moderate thickening of the hepatic flexure with surrounding fat stranding which may be due to focal colitis, likely reactive from adjacent gallbladder inflammation.  MRCP showed distended gallbladder with numerous gallstones, cystic duct calculi, acute cholecystitis with no intra and extrahepatic biliary ductal dilatation or choledocholithiasis  Patient also had superimposed cholangitis and was treated with IV antibiotics and hydration.  Underwent laparoscopic subtotal cholecystectomy with postoperative day #1 today.  Admitted under surgical service.  Operative findings reviewed:The gallbladder was enlarged and gangrenous with areas of necrosis. There were dense omental adhesions to the gallbladder. The liver was extremely friable and fatty. There was severe necrosis and inflammation of the entire gallbladder and dense scarring the entire gallbladder is also involved the ductal system. The decision to do a subtotal cholecystectomy to preserve the ductal system which had significant scarring and inflammation was made. The robotic case was converted to a laparoscopic case due to the need for the [place] flex 60 mm stapler.     Postoperatively patient doing well and has minimal discomfort at the surgical site.  Right upper quadrant DAVID drain in place with 80mL of serosanguineous  output.Drain was removed per surgical team today  Further postoperative care per primary team.  Tolerating diet well.  Clear from surgical standpoint for discharge  Hypertension  Maintained on hydrochlorothiazide and lisinopril.  Currently lisinopril placed on hold.  Did have hypertensive urgency on admission with blood pressure of 203/95 likely secondary to pain.  Continue with IV labetalol for SBP greater than 160.  Likely can resume lisinopril within the week of discharge once blood pressure has stabilized.  Did have hypokalemia on HCTZ and recommended potassium supplementation on discharge with recommendations to repeat BMP within 1 week of discharge    CVA (cerebral vascular accident) (HCC)  Patient has a prior history of right hemispheric CVA secondary to high-grade right carotid stenosis s/p R CEA by Dr Granger 7/26/21 .  Antiplatelets on hold currently in anticipation for possible need for OR/ERCP  Resume statins once able to tolerate p.o.  Hyperlipidemia  Resume statins once patient able to tolerate p.o.  Carotid stenosis, symptomatic, with infarction (HCC)  Patient has history of right hemispheric CVA secondary to high-grade right carotid stenosis s/p R CEA by Dr Granger 7/26/21 Has been maintained on aspirin, Plavix.  Has not been on any statin therapy although her last vascular surgery note states that he should be on it .  Antiplatelets on hold in view of surgical procedure.  Resume once cleared by surgical team  resume statin therapy when patient is able to tolerate p.o.  Plavix resumed yesterday.  Hemoglobin remained stable  Hashimoto's thyroiditis  Not on any medications an outpatient.   Sepsis (HCC)  As evidenced by tachycardia, tachypnea, leukocytosis likely in the setting of cholecystitis.  However cannot rule out choledocholithiasis.  Continue with IV ceftriaxone and Flagyl.   Blood cultures have remained negative to date.  Continue with ceftriaxone and Flagyl  Further antibiotics per primary  team  Acute cholecystitis with acute cholangitis  Patient had very difficult attempted cholecystectomy.  Apparently gangrenous gallbladder, they were only able to do a partial cholecystectomy and had some blood loss  Transition to oral antibiotics and discharge    Hypokalemia  Potassium at 2.9.  Replace.  Start low-dose potassium 20 mg twice daily upon discharge in view of patient being on hydrochlorothiazide.  Follow BMP in 1 week of discharge    VTE Pharmacologic Prophylaxis:   High Risk (Score >/= 5) - Pharmacological DVT Prophylaxis Ordered: enoxaparin (Lovenox). Sequential Compression Devices Ordered.    Mobility:   Basic Mobility Inpatient Raw Score: 20  JH-HLM Goal: 6: Walk 10 steps or more  JH-HLM Achieved: 6: Walk 10 steps or more  JH-HLM Goal achieved. Continue to encourage appropriate mobility.      Discussions with Specialists or Other Care Team Provider: andrea nursing     Discharge recommendations:  Resume lisinopril in 1 week of discharge  Continue current dose of hydrochlorothiazide  Start K-Dur 20 milligrams twice daily  Follow-up with PCP in 1 week of discharge and repeat BMP  Code Status: Level 1 - Full Code    Subjective   No acute events overnight.  No acute distress    Objective :  Temp:  [97.6 °F (36.4 °C)-98.1 °F (36.7 °C)] 97.8 °F (36.6 °C)  HR:  [65-75] 66  BP: (125-145)/(69-81) 136/81  Resp:  [18-21] 18  SpO2:  [90 %-93 %] 90 %  O2 Device: None (Room air)    Body mass index is 35.32 kg/m².     Input and Output Summary (last 24 hours):     Intake/Output Summary (Last 24 hours) at 10/27/2024 1302  Last data filed at 10/27/2024 1045  Gross per 24 hour   Intake 950 ml   Output 1930 ml   Net -980 ml       Physical Exam  Constitutional:       General: He is not in acute distress.  HENT:      Head: Normocephalic.      Nose: Nose normal.      Mouth/Throat:      Mouth: Mucous membranes are moist.   Eyes:      Extraocular Movements: Extraocular movements intact.      Pupils: Pupils are equal, round, and  reactive to light.   Cardiovascular:      Rate and Rhythm: Normal rate and regular rhythm.   Pulmonary:      Effort: Pulmonary effort is normal.      Breath sounds: Normal breath sounds.   Abdominal:      General: Bowel sounds are normal. There is no distension.      Tenderness: There is no abdominal tenderness. There is no guarding.   Musculoskeletal:      Right lower leg: No edema.      Left lower leg: No edema.   Skin:     General: Skin is warm.   Neurological:      General: No focal deficit present.      Mental Status: He is alert and oriented to person, place, and time. Mental status is at baseline.           Lines/Drains:  Lines/Drains/Airways       Active Status       Name Placement date Placement time Site Days    Closed/Suction Drain Medial RLQ Bulb 19 Fr. 10/24/24  1334  RLQ  2                      Telemetry:  Telemetry Orders (From admission, onward)               24 Hour Telemetry Monitoring  Continuous x 24 Hours (Telem)           Question:  Reason for 24 Hour Telemetry  Answer:  Metabolic/electrolyte disturbance with high probability of dysrhythmia. K level <3 or >6 OR KCL infusion >10mEq/hr                     Telemetry Reviewed: Normal Sinus Rhythm  Indication for Continued Telemetry Use: Metabolic/electrolyte disturbance with high probability of dysrhythmia               Lab Results: I have reviewed the following results:   Results from last 7 days   Lab Units 10/27/24  0429 10/26/24  0418 10/25/24  0449   WBC Thousand/uL 8.84   < > 11.61*   HEMOGLOBIN g/dL 10.1*   < > 11.4*   HEMATOCRIT % 30.1*   < > 33.9*   PLATELETS Thousands/uL 208   < > 179   BANDS PCT % 1  --   --    SEGS PCT %  --   --  87*   LYMPHO PCT % 23  --  6*   MONO PCT % 6  --  6   EOS PCT % 2  --  0    < > = values in this interval not displayed.     Results from last 7 days   Lab Units 10/27/24  0429 10/26/24  0418   SODIUM mmol/L 139 138   POTASSIUM mmol/L 2.9* 2.9*   CHLORIDE mmol/L 101 100   CO2 mmol/L 32 33*   BUN mg/dL  17 20   CREATININE mg/dL 0.95 0.86   ANION GAP mmol/L 6 5   CALCIUM mg/dL 7.8* 8.1*   ALBUMIN g/dL  --  3.2*   TOTAL BILIRUBIN mg/dL  --  1.05*   ALK PHOS U/L  --  94   ALT U/L  --  68*   AST U/L  --  27   GLUCOSE RANDOM mg/dL 126 167*         Results from last 7 days   Lab Units 10/24/24  1358   POC GLUCOSE mg/dl 158*         Results from last 7 days   Lab Units 10/23/24  0559 10/22/24  1537 10/22/24  0630 10/22/24  0341 10/22/24  0136   LACTIC ACID mmol/L  --   --  2.0 3.0* 2.7*   PROCALCITONIN ng/ml 1.35* 1.36*  --   --   --        Recent Cultures (last 7 days):   Results from last 7 days   Lab Units 10/22/24  0145 10/22/24  0136   BLOOD CULTURE  No Growth After 5 Days. No Growth After 5 Days.             Last 24 Hours Medication List:     Current Facility-Administered Medications:     acetaminophen (TYLENOL) tablet 650 mg, Q6H MITCHEL    cefTRIAXone (ROCEPHIN) IVPB (premix in dextrose) 2,000 mg 50 mL, Q24H, Last Rate: Stopped (10/27/24 0534)    clopidogrel (PLAVIX) tablet 75 mg, Daily    hydroCHLOROthiazide tablet 25 mg, Daily    HYDROmorphone (DILAUDID) injection 0.5 mg, Q3H PRN    labetalol (NORMODYNE) injection 10 mg, Q6H PRN    metroNIDAZOLE (FLAGYL) IVPB (premix) 500 mg 100 mL, Q8H, Last Rate: 500 mg (10/27/24 8042)    ondansetron (ZOFRAN) injection 4 mg, Q6H PRN    oxyCODONE (ROXICODONE) IR tablet 5 mg, Q6H PRN    potassium chloride (Klor-Con M20) CR tablet 40 mEq, Daily    Administrative Statements   Today, Patient Was Seen By: Mady Garcia MD      **Please Note: This note may have been constructed using a voice recognition system.**

## 2024-10-27 NOTE — ASSESSMENT & PLAN NOTE
Patient presented with 2 days history of abdominal pain, nausea and vomiting.  Upon presentation to the emergency room was found to have abnormal transaminases and hyperbilirubinemia  CT scan of the abdomen and pelvis showsermediate density within the gallbladder likely due to sludge and/or stones. Pericholecystic fat stranding concerning for acute cholecystitis.  Moderate thickening of the hepatic flexure with surrounding fat stranding which may be due to focal colitis, likely reactive from adjacent gallbladder inflammation.  MRCP showed distended gallbladder with numerous gallstones, cystic duct calculi, acute cholecystitis with no intra and extrahepatic biliary ductal dilatation or choledocholithiasis  Patient also had superimposed cholangitis and was treated with IV antibiotics and hydration.  Underwent laparoscopic subtotal cholecystectomy with postoperative day #1 today.  Admitted under surgical service.  Operative findings reviewed:The gallbladder was enlarged and gangrenous with areas of necrosis. There were dense omental adhesions to the gallbladder. The liver was extremely friable and fatty. There was severe necrosis and inflammation of the entire gallbladder and dense scarring the entire gallbladder is also involved the ductal system. The decision to do a subtotal cholecystectomy to preserve the ductal system which had significant scarring and inflammation was made. The robotic case was converted to a laparoscopic case due to the need for the [place] flex 60 mm stapler.     Postoperatively patient doing well and has minimal discomfort at the surgical site.  Right upper quadrant DAVID drain in place with 80mL of serosanguineous output.Drain was removed per surgical team today  Further postoperative care per primary team.  Tolerating diet well.  Clear from surgical standpoint for discharge

## 2024-10-27 NOTE — ASSESSMENT & PLAN NOTE
Potassium at 2.9.  Replace.  Start low-dose potassium 20 mg twice daily upon discharge in view of patient being on hydrochlorothiazide.  Follow BMP in 1 week of discharge

## 2024-10-27 NOTE — PLAN OF CARE
Problem: Prexisting or High Potential for Compromised Skin Integrity  Goal: Skin integrity is maintained or improved  Description: INTERVENTIONS:  - Identify patients at risk for skin breakdown  - Assess and monitor skin integrity  - Assess and monitor nutrition and hydration status  - Monitor labs   - Assess for incontinence   - Turn and reposition patient  - Assist with mobility/ambulation  - Relieve pressure over bony prominences  - Avoid friction and shearing  - Provide appropriate hygiene as needed including keeping skin clean and dry  - Evaluate need for skin moisturizer/barrier cream  - Collaborate with interdisciplinary team   - Patient/family teaching  - Consider wound care consult   Outcome: Progressing     Problem: PAIN - ADULT  Goal: Verbalizes/displays adequate comfort level or baseline comfort level  Description: Interventions:  - Encourage patient to monitor pain and request assistance  - Assess pain using appropriate pain scale  - Administer analgesics based on type and severity of pain and evaluate response  - Implement non-pharmacological measures as appropriate and evaluate response  - Consider cultural and social influences on pain and pain management  - Notify physician/advanced practitioner if interventions unsuccessful or patient reports new pain  Outcome: Progressing     Problem: INFECTION - ADULT  Goal: Absence or prevention of progression during hospitalization  Description: INTERVENTIONS:  - Assess and monitor for signs and symptoms of infection  - Monitor lab/diagnostic results  - Monitor all insertion sites, i.e. indwelling lines, tubes, and drains  - Monitor endotracheal if appropriate and nasal secretions for changes in amount and color  - [place] appropriate cooling/warming therapies per order  - Administer medications as ordered  - Instruct and encourage patient and family to use good hand hygiene technique  - Identify and instruct in appropriate isolation precautions for  identified infection/condition  Outcome: Progressing     Problem: SAFETY ADULT  Goal: Patient will remain free of falls  Description: INTERVENTIONS:  - Educate patient/family on patient safety including physical limitations  - Instruct patient to call for assistance with activity   - Consult OT/PT to assist with strengthening/mobility   - Keep Call bell within reach  - Keep bed low and locked with side rails adjusted as appropriate  - Keep care items and personal belongings within reach  - Initiate and maintain comfort rounds  - Make Fall Risk Sign visible to staff  - Offer Toileting every 2 Hours, in advance of need  - Initiate/Maintain bed alarm  - Obtain necessary fall risk management equipment: bed alarm  - Apply yellow socks and bracelet for high fall risk patients  - Consider moving patient to room near nurses station  Outcome: Progressing

## 2024-10-27 NOTE — CASE MANAGEMENT
Case Management Discharge Planning Note    Patient name Hua Acevedo  Location /-01 MRN [id number]  : 1960 Date 10/27/2024       Current Admission Date: 10/22/2024  Current Admission Diagnosis:Acute cholecystitis with acute cholangitis   Patient Active Problem List    Diagnosis Date Noted Date Diagnosed    Hypokalemia 10/26/2024     Acute cholecystitis with acute cholangitis 10/23/2024     Acute cholecystitis s/p laparoscopic subtotal cholecystectomy 10/22/2024     Hashimoto's thyroiditis 10/22/2024     Sepsis (HCC) 10/22/2024     TIA (transient ischemic attack) 2021     Carotid stenosis, symptomatic, with infarction (HCC) 2021     Gram-negative bacteremia 2021     Hyperlipidemia 2021     Hypertension 2021     CVA (cerebral vascular accident) (HCC) 2021     Carpal tunnel syndrome, bilateral 2020     Chronic pain of left knee 2020     Primary osteoarthritis of left knee 2020       LOS (days): 5  Geometric Mean LOS (GMLOS) (days): 5  Days to GMLOS:-0.4     OBJECTIVE:  Risk of Unplanned Readmission Score: 12.31         Current admission status: Inpatient   Preferred Pharmacy:   [facility] [address] [place] [address] [address] [facility]  [address] [facility] [address]  Phone: [phone] Fax: [phone]    Primary Care Provider: Blanka Suarez DO    Primary Insurance: [employer]  Secondary Insurance:     DISCHARGE DETAILS:           CM contacted patient in his room to discuss HHC choice from [facility] Provider Choice List. Patient chose [facility].  CM made [facility] Providers aware of patient choice.                      Requested Home Health Care         Home Health Agency Name:: [facility]         Other Referral/Resources/Interventions Provided:  Referral Comments: [facility] for  and [facility]

## 2024-10-27 NOTE — DISCHARGE SUMMARY
Discharge Summary - Surgery-General   Name: Hua Acevedo 64 y.o. male I MRN: [id number]  Unit/Bed#: -01 I Date of Admission: 10/22/2024   Date of Service: 10/27/2024 I Hospital Day: 5    Admission Date: 10/22/2024 0112  Discharge Date: 10/27/24  Admitting Diagnosis: Cholecystitis [K81.9]  Abdominal pain [R10.9]  Discharge Diagnosis:   Medical Problems       Resolved Problems  Date Reviewed: 7/27/2021   None       HPI: Per H&P performed 10/22:  Hua Acevedo is a 64 y.o. male with past medical history significant for HTN, hx TIA, hx of carotid endarterectomy on asa and plavix, Hashimoto's thyroiditis, hx of bowel resection, who initially presented to [facility] ED this am with complaints of LLQ pain, chills, nausea, one episode of bilious vomiting and constipation, with findings of elevated transaminases, inflammation near gallbladder, and leukocytosis, concerning for acute cholecystitis with possible choledocholithiasis.     Patient is seen in the ICU with wife at bedside.  Patient states that approximately 2 days ago he started to have severe left lower quadrant pain.  Says this pain was radiating to his back as well.  Says he does have known back pain along with degenerative joint disease, but this felt different than that usual pain.  Says that he is also started to become sweaty and felt as though he had chills.  Patient says he did not think to check his temperature.  Had an episode of nausea and vomited to see if this would relieve the pain, which it did not.  Also complains of constipation for the past few days.  Notes that his urine has also been dark recently.  Says that he has had similar pains on and off for the past 30 years, and reports having undergone history of bowel resection which initially was thought secondary to diverticulitis, but patient states that when a different physician looked at the pathology from the surgery there was no evidence of diverticulitis.     Patient states that  this pain that he has been experiencing does get worse after he eats a heavy or fatty meal.  According to his wife, pain became worse after he ate a steak.  Does not typically have right sided pain, reports most of his pain is on the left side.     Patient has been maintained on baby aspirin and Plavix for history of TIA and carotid endarterectomy around 2021.  Last dose of Plavix was 10/21/2020 4 AM.  Patient's daughter was going home to gather medication list so home medications could be updated accordingly.     Denies any known cardiac or pulmonary or diabetes issues.  Denies any injectable medications.    Procedures Performed: Attempted CHOLECYSTECTOMY LAPAROSCOPIC W/ ROBOTICS converted to laparoscopic subtotal cholecystectomy     Summary of Hospital Course: Patient presented 10/22 with above history and MRCP performed which was negative for common bile duct stone. LFTs were elevated and decision was made to trend and hold Plavix. HD1 LFTs continued to rise and patient was having fevers with tachycardia.  Decision was made to proceed with cholecystectomy.  Procedure performed 10/24 as above, with below findings. POD1 patient is doing well and able to be advanced to sips of clears.  Madsen was discontinued and drain output monitored.  LFTs and T. bili were starting to show improvement.  Later in the day patient was advanced to surgical soft diet which he tolerated well. POD2 LFTs showed significant improvement with Tbili down trending.  Plavix was restarted.  Patient was moving his bowels, ambulating, urinating, and tolerating regular diet.  Hemoglobin was monitored overnight and by postop day 3 patient was deemed stable for discharge.  Drain output had decreased significantly and drain was able to be removed prior to discharge.  Home health care has been set up for patient and rolling walker was at bedside.  Discharge information reviewed and patient verbalized understanding.  All questions answered.  He will  have follow-up in outpatient office about 2 weeks postoperatively.    Day of Discharge Exam:   General: no acute distress, pt appears well and comfortable, resting in bed watching TV, O2 saturation 93-94 resting on room air at time of exam  Skin: warm and dry to touch  Pulmonary: normal effort  Abdominal: Soft, nondistended abdomen, nontender to palpation, no guarding or rebound, drain with minimal serosanguineous thin fluid present, drain removed with 4 x 4 taped in place over prior drain site, other incisions are clean/dry/intact with Steri-Strips in place  Musculoskeletal: no LE edema present  Neuro: alert and oriented     Significant Findings, Care, Treatment and Services Provided: The gallbladder was enlarged and gangrenous with areas of necrosis. There were dense omental adhesions to the gallbladder. The liver was extremely friable and fatty. There was severe necrosis and inflammation of the entire gallbladder and dense scarring the entire gallbladder is also involved the ductal system. The decision to do a subtotal cholecystectomy to preserve the ductal system which had significant scarring and inflammation was made. The robotic case was converted to a laparoscopic case due to the need for the [place] flex 60 mm stapler.     CTAP IMPRESSION 10/22:   1.  Intermediate density within the gallbladder likely due to sludge and/or stones. Pericholecystic fat stranding concerning for acute cholecystitis.  2.  Moderate thickening of the hepatic flexure with surrounding fat stranding which may be due to focal colitis, likely reactive from adjacent gallbladder inflammation.  3.  Hepatic steatosis.  4.  Bibasilar right greater than left linear opacities likely due to atelectasis.    MRCP IMPRESSION: 10/22  Findings suspicious for acute cholecystitis.  No intrahepatic/extrahepatic biliary dilation or choledocholithiasis    Complications: None    Condition at Discharge: good     Discharge instructions/Information to  patient and family:   See After Visit Summary (AVS) for information provided to patient and family.      Provisions for Follow-Up Care:  See after visit summary for information related to follow-up care and any pertinent home health orders.      PCP: Blanka Suarez DO    Disposition: Home w/ HHC and RW    Planned Readmission: No     Discharge Medications:  See after visit summary for reconciled discharge medications provided to patient and family.      Discharge Statement:  I have spent a total time of 25 minutes in caring for this patient on the day of the visit/encounter.    Katrina Elizabeth Billig, PA-C  10/27/2024

## 2024-10-27 NOTE — PROGRESS NOTES
Patient:    MRN:  [id number]    Sara Request ID:  [id number]    Level of care reserved:  Home Health Agency    Partner Reserved:  [facility], [place] [address] [phone]    Clinical needs requested:    Geography searched:  [phone]    Start of Service:    Request sent:  7:30am EDT on 10/26/2024 by Ca Doll    Partner reserved:  1:33pm EDT on 10/27/2024 by Ca Doll    Choice list shared:  1:51pm EDT on 10/26/2024 by Ca Doll

## 2024-10-28 ENCOUNTER — HOME CARE VISIT (OUTPATIENT)
Dept: HOME HEALTH SERVICES | Facility: HOME HEALTHCARE | Age: 64
End: 2024-10-28
Payer: COMMERCIAL

## 2024-10-28 VITALS
TEMPERATURE: 98 F | DIASTOLIC BLOOD PRESSURE: 80 MMHG | OXYGEN SATURATION: 98 % | HEART RATE: 78 BPM | SYSTOLIC BLOOD PRESSURE: 140 MMHG | RESPIRATION RATE: 18 BRPM

## 2024-10-28 LAB
ABO GROUP BLD BPU: NORMAL
ABO GROUP BLD BPU: NORMAL
BPU ID: NORMAL
BPU ID: NORMAL
CROSSMATCH: NORMAL
CROSSMATCH: NORMAL
UNIT DISPENSE STATUS: NORMAL
UNIT DISPENSE STATUS: NORMAL
UNIT PRODUCT CODE: NORMAL
UNIT PRODUCT CODE: NORMAL
UNIT PRODUCT VOLUME: 350 ML
UNIT PRODUCT VOLUME: 350 ML
UNIT RH: NORMAL
UNIT RH: NORMAL

## 2024-10-28 PROCEDURE — G0299 HHS/HOSPICE OF RN EA 15 MIN: HCPCS

## 2024-10-28 PROCEDURE — 400013 VN SOC

## 2024-10-29 PROCEDURE — 88304 TISSUE EXAM BY PATHOLOGIST: CPT | Performed by: STUDENT IN AN ORGANIZED HEALTH CARE EDUCATION/TRAINING PROGRAM

## 2024-10-29 NOTE — CASE COMMUNICATION
Medication discrepancies or Major drug interactions none noted  Abnormal clinical findings weakness  This report is informational only, no response is needed  [facility] has Admitted your patient to Home Health service with the following disciplines: SN, PT and OT  Patient stated goals of care to get stronger and heal  Potential barriers to goal achievement pain  Primary focus of home health care:Other Postsurgical  Anticipated visi t pattern and next visit date 2w1 1w2  Thank you for allowing us to participate in the care of your patient.      Kimberly Aguirre RN

## 2024-10-30 ENCOUNTER — HOME CARE VISIT (OUTPATIENT)
Dept: HOME HEALTH SERVICES | Facility: HOME HEALTHCARE | Age: 64
End: 2024-10-30
Payer: COMMERCIAL

## 2024-10-31 ENCOUNTER — HOME CARE VISIT (OUTPATIENT)
Dept: HOME HEALTH SERVICES | Facility: HOME HEALTHCARE | Age: 64
End: 2024-10-31
Payer: COMMERCIAL

## 2024-11-01 ENCOUNTER — HOME CARE VISIT (OUTPATIENT)
Dept: HOME HEALTH SERVICES | Facility: HOME HEALTHCARE | Age: 64
End: 2024-11-01
Payer: COMMERCIAL

## 2024-11-01 PROCEDURE — G0299 HHS/HOSPICE OF RN EA 15 MIN: HCPCS

## 2024-11-01 NOTE — CASE COMMUNICATION
Called yesterday and left VM with pt requesting return call to this  regarding need for PT - no return call received.  Called and left M this AM.  No return call received.  Called wife and spoke with her who asked pt about tx.  He is declining PT at this time.  Did not see OT was ordered.  Did not inquire about that service.  Please cancel referral for PT as of this date.

## 2024-11-03 ENCOUNTER — HOME CARE VISIT (OUTPATIENT)
Dept: HOME HEALTH SERVICES | Facility: HOME HEALTHCARE | Age: 64
End: 2024-11-03
Payer: COMMERCIAL

## 2024-11-04 VITALS
RESPIRATION RATE: 18 BRPM | HEART RATE: 81 BPM | DIASTOLIC BLOOD PRESSURE: 78 MMHG | SYSTOLIC BLOOD PRESSURE: 122 MMHG | TEMPERATURE: 96.9 F | OXYGEN SATURATION: 93 %

## 2024-11-05 ENCOUNTER — HOME CARE VISIT (OUTPATIENT)
Dept: HOME HEALTH SERVICES | Facility: HOME HEALTHCARE | Age: 64
End: 2024-11-05
Payer: COMMERCIAL

## 2024-11-05 PROCEDURE — G0299 HHS/HOSPICE OF RN EA 15 MIN: HCPCS

## 2024-11-07 LAB
DME PARACHUTE DELIVERY DATE ACTUAL: NORMAL
DME PARACHUTE DELIVERY DATE REQUESTED: NORMAL
DME PARACHUTE ITEM DESCRIPTION: NORMAL
DME PARACHUTE ORDER STATUS: NORMAL
DME PARACHUTE SUPPLIER NAME: NORMAL
DME PARACHUTE SUPPLIER PHONE: NORMAL

## 2024-11-07 NOTE — UTILIZATION REVIEW
NOTIFICATION OF ADMISSION DISCHARGE   This is a Notification of Discharge from [facility]. Please be advised that this patient has been discharge from our facility. Below you will find the admission and discharge date and time including the patient’s disposition.   UTILIZATION REVIEW CONTACT:  Marisol Mcdonnell  Utilization   Network Utilization Review Department  Phone: [phone] x carefully listen to the prompts. All voicemails are confidential.  Email: [email or web address]     ADMISSION INFORMATION  PRESENTATION DATE: 10/22/2024  1:12 AM  OBERVATION ADMISSION DATE: N/A  INPATIENT ADMISSION DATE: 10/22/24  3:29 AM   DISCHARGE DATE: 10/27/2024  3:15 PM   DISPOSITION:Home with Home Health Care    Network Utilization Review Department  ATTENTION: Please call with any questions or concerns to [phone] and carefully listen to the prompts so that you are directed to the right person. All voicemails are confidential.   For Discharge needs, contact Care Management DC Support Team at [phone] opt. [phone]  Send all requests for admission clinical reviews, approved or denied determinations and any other requests to dedicated fax number below belonging to the campus where the patient is receiving treatment. List of dedicated fax numbers for the Facilities:  FACILITY NAME UR FAX NUMBER   ADMISSION DENIALS (Administrative/Medical Necessity) [phone]   DISCHARGE SUPPORT TEAM ([facility]) [phone]   PARENT CHILD HEALTH (Maternity/NICU/Pediatrics) [phone]   [facility] [phone]   [facility] [phone]   [facility] [phone]   [facility] [phone]   [facility] [phone]   [facility] [phone]   [facility] [phone]   [facility]  [facility] [phone]   [facility] [phone]   [facility] [phone]   [facility] [phone]   [facility] [phone]

## 2024-11-08 ENCOUNTER — HOME CARE VISIT (OUTPATIENT)
Dept: HOME HEALTH SERVICES | Facility: HOME HEALTHCARE | Age: 64
End: 2024-11-08
Payer: COMMERCIAL

## 2024-11-11 ENCOUNTER — HOME CARE VISIT (OUTPATIENT)
Dept: HOME HEALTH SERVICES | Facility: HOME HEALTHCARE | Age: 64
End: 2024-11-11
Payer: COMMERCIAL

## 2024-11-11 ENCOUNTER — APPOINTMENT (EMERGENCY)
Dept: RADIOLOGY | Facility: HOSPITAL | Age: 64
End: 2024-11-11
Payer: COMMERCIAL

## 2024-11-11 ENCOUNTER — HOSPITAL ENCOUNTER (EMERGENCY)
Facility: HOSPITAL | Age: 64
Discharge: HOME | End: 2024-11-11
Attending: EMERGENCY MEDICINE
Payer: COMMERCIAL

## 2024-11-11 VITALS
BODY MASS INDEX: 34.38 KG/M2 | WEIGHT: 232.81 LBS | OXYGEN SATURATION: 97 % | RESPIRATION RATE: 16 BRPM | TEMPERATURE: 96.8 F | DIASTOLIC BLOOD PRESSURE: 72 MMHG | SYSTOLIC BLOOD PRESSURE: 119 MMHG | HEART RATE: 67 BPM

## 2024-11-11 VITALS
TEMPERATURE: 98.4 F | DIASTOLIC BLOOD PRESSURE: 78 MMHG | OXYGEN SATURATION: 99 % | RESPIRATION RATE: 18 BRPM | SYSTOLIC BLOOD PRESSURE: 138 MMHG | HEART RATE: 76 BPM

## 2024-11-11 DIAGNOSIS — I47.10 SVT (SUPRAVENTRICULAR TACHYCARDIA) (HCC): Primary | ICD-10-CM

## 2024-11-11 LAB
ALBUMIN SERPL BCG-MCNC: 4.3 G/DL (ref 3.5–5)
ALP SERPL-CCNC: 78 U/L (ref 34–104)
ALT SERPL W P-5'-P-CCNC: 36 U/L (ref 7–52)
ANION GAP SERPL CALCULATED.3IONS-SCNC: 10 MMOL/L (ref 4–13)
AST SERPL W P-5'-P-CCNC: 32 U/L (ref 13–39)
ATRIAL RATE: 106 BPM
BASOPHILS # BLD AUTO: 0.02 THOUSANDS/ÂΜL (ref 0–0.1)
BASOPHILS NFR BLD AUTO: 1 % (ref 0–1)
BILIRUB SERPL-MCNC: 1.15 MG/DL (ref 0.2–1)
BNP SERPL-MCNC: 16 PG/ML (ref 0–100)
BUN SERPL-MCNC: 10 MG/DL (ref 5–25)
CALCIUM SERPL-MCNC: 9.7 MG/DL (ref 8.4–10.2)
CARDIAC TROPONIN I PNL SERPL HS: 4 NG/L
CHLORIDE SERPL-SCNC: 105 MMOL/L (ref 96–108)
CO2 SERPL-SCNC: 26 MMOL/L (ref 21–32)
CREAT SERPL-MCNC: 1.11 MG/DL (ref 0.6–1.3)
EOSINOPHIL # BLD AUTO: 0.08 THOUSAND/ÂΜL (ref 0–0.61)
EOSINOPHIL NFR BLD AUTO: 2 % (ref 0–6)
ERYTHROCYTE [DISTWIDTH] IN BLOOD BY AUTOMATED COUNT: 13.3 % (ref 11.6–15.1)
FLUAV AG UPPER RESP QL IA.RAPID: NEGATIVE
FLUBV AG UPPER RESP QL IA.RAPID: NEGATIVE
GFR SERPL CREATININE-BSD FRML MDRD: 69 ML/MIN/1.73SQ M
GLUCOSE SERPL-MCNC: 133 MG/DL (ref 65–140)
GLUCOSE SERPL-MCNC: 135 MG/DL (ref 65–140)
HCT VFR BLD AUTO: 35.4 % (ref 36.5–49.3)
HGB BLD-MCNC: 11.3 G/DL (ref 12–17)
IMM GRANULOCYTES # BLD AUTO: 0.01 THOUSAND/UL (ref 0–0.2)
IMM GRANULOCYTES NFR BLD AUTO: 0 % (ref 0–2)
LACTATE SERPL-SCNC: 1.3 MMOL/L (ref 0.5–2)
LACTATE SERPL-SCNC: 2.4 MMOL/L (ref 0.5–2)
LIPASE SERPL-CCNC: 35 U/L (ref 11–82)
LYMPHOCYTES # BLD AUTO: 1.37 THOUSANDS/ÂΜL (ref 0.6–4.47)
LYMPHOCYTES NFR BLD AUTO: 38 % (ref 14–44)
MCH RBC QN AUTO: 30.2 PG (ref 26.8–34.3)
MCHC RBC AUTO-ENTMCNC: 31.9 G/DL (ref 31.4–37.4)
MCV RBC AUTO: 95 FL (ref 82–98)
MONOCYTES # BLD AUTO: 0.24 THOUSAND/ÂΜL (ref 0.17–1.22)
MONOCYTES NFR BLD AUTO: 7 % (ref 4–12)
NEUTROPHILS # BLD AUTO: 1.89 THOUSANDS/ÂΜL (ref 1.85–7.62)
NEUTS SEG NFR BLD AUTO: 52 % (ref 43–75)
NRBC BLD AUTO-RTO: 0 /100 WBCS
P AXIS: 19 DEGREES
PLATELET # BLD AUTO: 209 THOUSANDS/UL (ref 149–390)
PMV BLD AUTO: 8.9 FL (ref 8.9–12.7)
POTASSIUM SERPL-SCNC: 4 MMOL/L (ref 3.5–5.3)
PR INTERVAL: 186 MS
PROT SERPL-MCNC: 7.9 G/DL (ref 6.4–8.4)
QRS AXIS: 11 DEGREES
QRS AXIS: 48 DEGREES
QRSD INTERVAL: 144 MS
QRSD INTERVAL: 84 MS
QT INTERVAL: 226 MS
QT INTERVAL: 316 MS
QTC INTERVAL: 413 MS
QTC INTERVAL: 419 MS
RBC # BLD AUTO: 3.74 MILLION/UL (ref 3.88–5.62)
SARS-COV+SARS-COV-2 AG RESP QL IA.RAPID: NEGATIVE
SODIUM SERPL-SCNC: 141 MMOL/L (ref 135–147)
T WAVE AXIS: 21 DEGREES
T WAVE AXIS: 25 DEGREES
TSH SERPL DL<=0.05 MIU/L-ACNC: 1.18 UIU/ML (ref 0.45–4.5)
VENTRICULAR RATE: 106 BPM
VENTRICULAR RATE: 201 BPM
WBC # BLD AUTO: 3.61 THOUSAND/UL (ref 4.31–10.16)

## 2024-11-11 PROCEDURE — 82948 REAGENT STRIP/BLOOD GLUCOSE: CPT

## 2024-11-11 PROCEDURE — 83605 ASSAY OF LACTIC ACID: CPT | Performed by: EMERGENCY MEDICINE

## 2024-11-11 PROCEDURE — 87811 SARS-COV-2 COVID19 W/OPTIC: CPT | Performed by: EMERGENCY MEDICINE

## 2024-11-11 PROCEDURE — 80053 COMPREHEN METABOLIC PANEL: CPT | Performed by: EMERGENCY MEDICINE

## 2024-11-11 PROCEDURE — 84484 ASSAY OF TROPONIN QUANT: CPT | Performed by: EMERGENCY MEDICINE

## 2024-11-11 PROCEDURE — 84443 ASSAY THYROID STIM HORMONE: CPT | Performed by: EMERGENCY MEDICINE

## 2024-11-11 PROCEDURE — 87804 INFLUENZA ASSAY W/OPTIC: CPT | Performed by: EMERGENCY MEDICINE

## 2024-11-11 PROCEDURE — 87040 BLOOD CULTURE FOR BACTERIA: CPT | Performed by: EMERGENCY MEDICINE

## 2024-11-11 PROCEDURE — 71045 X-RAY EXAM CHEST 1 VIEW: CPT

## 2024-11-11 PROCEDURE — 99285 EMERGENCY DEPT VISIT HI MDM: CPT | Performed by: EMERGENCY MEDICINE

## 2024-11-11 PROCEDURE — 36415 COLL VENOUS BLD VENIPUNCTURE: CPT | Performed by: EMERGENCY MEDICINE

## 2024-11-11 PROCEDURE — 83690 ASSAY OF LIPASE: CPT | Performed by: EMERGENCY MEDICINE

## 2024-11-11 PROCEDURE — 99285 EMERGENCY DEPT VISIT HI MDM: CPT

## 2024-11-11 PROCEDURE — 96361 HYDRATE IV INFUSION ADD-ON: CPT

## 2024-11-11 PROCEDURE — 96374 THER/PROPH/DIAG INJ IV PUSH: CPT

## 2024-11-11 PROCEDURE — 85025 COMPLETE CBC W/AUTO DIFF WBC: CPT | Performed by: EMERGENCY MEDICINE

## 2024-11-11 PROCEDURE — 83880 ASSAY OF NATRIURETIC PEPTIDE: CPT | Performed by: EMERGENCY MEDICINE

## 2024-11-11 PROCEDURE — 93005 ELECTROCARDIOGRAM TRACING: CPT

## 2024-11-11 RX ORDER — ADENOSINE 3 MG/ML
12 INJECTION INTRAVENOUS ONCE
Status: COMPLETED | OUTPATIENT
Start: 2024-11-11 | End: 2024-11-11

## 2024-11-11 RX ADMIN — SODIUM CHLORIDE 1000 ML: 0.9 INJECTION, SOLUTION INTRAVENOUS at 11:25

## 2024-11-11 RX ADMIN — SODIUM CHLORIDE 1000 ML: 0.9 INJECTION, SOLUTION INTRAVENOUS at 13:25

## 2024-11-11 RX ADMIN — ADENOSINE 12 MG: 3 INJECTION INTRAVENOUS at 11:29

## 2024-11-11 NOTE — ED PROVIDER NOTES
Time reflects when diagnosis was documented in both MDM as applicable and the Disposition within this note       Time User Action Codes Description Comment    11/11/2024  4:23 PM Bryant Oliva Add [I47.10] SVT (supraventricular tachycardia) ([facility])           ED Disposition       ED Disposition   Discharge    Condition   Stable    Date/Time   Mon Nov 11, 2024  4:23 PM    Comment   Hua Acevedo discharge to home/self care.                   Assessment & Plan       Medical Decision Making  Patient was seen and evaluated.  Presents with lightheadedness and shortness of breath as described related to SVT noted on initial triage.  Given adenosine 12 mg with successful cardioversion.  Also given IV fluids during his ED course.  Patient remained in sinus rhythm throughout his ED stay.  No recurrence of symptoms.  Symptoms resolved after conversion to sinus rhythm.  Workup as shown.  Chest x-ray unremarkable, troponin normal, no concerning electrolyte abnormalities.  Chest x-ray clear.  EKG nonischemic after cardioversion.  Showed some rate related ST depression on initial EKG.  White blood cell count slightly low, unclear clinical significance.  Anemia appears to be chronic.  Initially had elevated lactic acid, improved with IV fluids.  No clear indication for hospital admission.  Advised close primary care follow-up.  Will also place cardiology referral.  Stable for discharge from the emergency department.  Return precautions reviewed.  All questions answered.    Amount and/or Complexity of Data Reviewed  Labs: ordered.  Radiology: ordered.  ECG/medicine tests: ordered and independent interpretation performed.     Details: Initial EKG obtained at 1122 by my interpretation, which may be adversely affected due to poor baseline, demonstrates supraventricular tachycardia at a ventricular rate of 201 bpm.  Normal axis, normal intervals.  No acute ST elevations.  Diffuse ST depression noted, likely rate related.  Compared  "with prior EKG from October 22, 2024, ventricular rate has increased by 94 bpm, ST depressions now present.    Repeat EKG at 1130 demonstrates sinus tachycardia 106 bpm, normal axis, normal intervals.  No acute ST elevations or T wave inversions.  Compared with earlier EKG, ventricular rate has decreased by 95 bpm, and ST to depressions have resolved.    Risk  Prescription drug management.             Medications   sodium chloride 0.9 % bolus 1,000 mL (0 mL Intravenous Stopped 11/11/24 1216)   adenosine (ADENOCARD) injection 12 mg (12 mg Intravenous Given 11/11/24 1129)   sodium chloride 0.9 % bolus 1,000 mL (0 mL Intravenous Stopped 11/11/24 1458)       ED Risk Strat Scores                           SBIRT 20yo+      Flowsheet Row Most Recent Value   Initial Alcohol Screen: US AUDIT-C     1. How often do you have a drink containing alcohol? 0 Filed at: 11/11/2024 1122   2. How many drinks containing alcohol do you have on a typical day you are drinking?  0 Filed at: 11/11/2024 1122   3a. Male UNDER 65: How often do you have five or more drinks on one occasion? 0 Filed at: 11/11/2024 1122   Audit-C Score 0 Filed at: 11/11/2024 1122   JUAN DAVID: How many times in the past year have you...    Used an illegal drug or used a prescription medication for non-medical reasons? Never Filed at: 11/11/2024 1122                            History of Present Illness       Chief Complaint   Patient presents with    Medical Problem     Pt states his wife made him come to ER to r/o stroke for the \"cold sweats.\" NIH 0 in triage.     Rapid Heart Rate     Hr noted to be 190s in triage. Patient denies chest pain, palpitations or SOB.        Past Medical History:   Diagnosis Date    Diverticulitis large intestine     \"During Bowel Resection determined not to have Diverticulitis.\"- Patient    Hypertension     TIA (transient ischemic attack)       Past Surgical History:   Procedure Laterality Date    ANTERIOR CRUCIATE LIGAMENT REPAIR Right     " ACL Repair    BOWEL RESECTION      CAROTID ENDARTARECTOMY Right 7/26/2021    Procedure: ENDARTERECTOMY ARTERY CAROTID-- Right;  Surgeon: Jerod Granger MD;  Location: [facility];  Service: Vascular    FOOT SURGERY Right     patient not sure procedure    AZ LAPAROSCOPY SURG CHOLECYSTECTOMY N/A 10/24/2024    Procedure: attempted CHOLECYSTECTOMY LAPAROSCOPIC W/ ROBOTICS converted to laparoscopic subtotal cholecystectomy;  Surgeon: Beverley Parra DO;  Location:  MAIN OR;  Service: General    SHOULDER SURGERY Bilateral     Rotator cuff/ Right shoulder bone growth removal      History reviewed. No pertinent family history.   Social History     Tobacco Use    Smoking status: Former     Types: Cigarettes    Smokeless tobacco: Former     Types: Chew   Vaping Use    Vaping status: Never Used   Substance Use Topics    Alcohol use: Yes     Alcohol/week: 1.0 - 2.0 standard drink of alcohol     Types: 1 - 2 Cans of beer per week    Drug use: Never      E-Cigarette/Vaping    E-Cigarette Use Never User       E-Cigarette/Vaping Substances    Nicotine No     THC No     CBD No     Flavoring No     Other No     Unknown No       I have reviewed and agree with the history as documented.     Patient presents to the emergency department accompanied by his wife for evaluation of chief complaint of cold sweats.  Patient does have a history of stroke, wife was concerned about a stroke, but patient denied any acute neurologic complaints or any focal deficit.  States he felt lightheaded and had some neck and shoulder discomfort about an hour prior to arrival.  Heart rate noted to be in the 190s on arrival.  Patient denies any shortness of breath at present, did feel lightheaded and weak earlier.  Denies any recent illnesses.  No nausea or vomiting.  No chest pain throughout this episode.  No fevers.  No prior similar symptoms.  No other complaints, modifying factors, or associated symptoms.        Review of Systems   All other systems  reviewed and are negative.          Objective       ED Triage Vitals   Temperature Pulse Blood Pressure Respirations SpO2 Patient Position - Orthostatic VS   11/11/24 1120 11/11/24 1120 11/11/24 1120 11/11/24 1120 11/11/24 1120 11/11/24 1120   (!) 96.8 °F (36 °C) (!) 193 105/68 18 95 % Sitting      Temp Source Heart Rate Source BP Location FiO2 (%) Pain Score    11/11/24 1120 11/11/24 1120 11/11/24 1120 -- 11/11/24 1130    Temporal Monitor Right arm  No Pain      Vitals      Date and Time Temp Pulse SpO2 Resp BP Pain Score FACES Pain Rating User   11/11/24 1600 -- 67 97 % 16 119/72 No Pain -- NB   11/11/24 1500 -- 72 97 % 16 114/71 -- -- NB   11/11/24 1400 -- 74 96 % 19 109/65 -- -- NB   11/11/24 1345 -- 74 96 % 18 108/62 -- -- SS   11/11/24 1330 -- 75 95 % 17 110/59 -- -- NB   11/11/24 1315 -- 79 95 % 18 105/56 -- -- NB   11/11/24 1300 -- 82 96 % 18 101/56 -- -- NB   11/11/24 1245 -- 85 94 % 18 100/56 -- -- NB   11/11/24 1230 -- 84 93 % 18 100/59 -- -- NB   11/11/24 1215 -- 86 94 % 18 103/55 -- -- NB   11/11/24 1200 -- 86 93 % 18 102/63 -- -- NB   11/11/24 1145 -- 86 93 % 18 110/55 No Pain -- NB   11/11/24 1130 -- 109 95 % 18 133/79 No Pain -- NB   11/11/24 1120 96.8 °F (36 °C) 193 95 % 18 105/68 -- -- MB            Physical Exam  Vitals and nursing note reviewed.   Constitutional:       General: He is not in acute distress.     Appearance: He is well-developed.   HENT:      Head: Normocephalic and atraumatic.      Right Ear: External ear normal.      Left Ear: External ear normal.   Eyes:      Extraocular Movements: Extraocular movements intact.      Conjunctiva/sclera: Conjunctivae normal.      Pupils: Pupils are equal, round, and reactive to light.   Cardiovascular:      Rate and Rhythm: Regular rhythm. Tachycardia present.      Heart sounds: No murmur heard.     No friction rub. No gallop.   Pulmonary:      Effort: Pulmonary effort is normal. No respiratory distress.      Breath sounds: Normal breath sounds. No  wheezing, rhonchi or rales.   Abdominal:      General: Abdomen is flat. There is no distension.      Palpations: Abdomen is soft.      Tenderness: There is no abdominal tenderness. There is no guarding or rebound.   Musculoskeletal:         General: No swelling. Normal range of motion.      Cervical back: Neck supple.   Skin:     General: Skin is warm and moist.      Capillary Refill: Capillary refill takes less than 2 seconds.   Neurological:      General: No focal deficit present.      Mental Status: He is alert and oriented to person, place, and time.   Psychiatric:         Mood and Affect: Mood normal.         Behavior: Behavior normal.         Results Reviewed       Procedure Component Value Units Date/Time    Lactic acid 2 Hours [[id number]]  (Normal) Collected: 11/11/24 1458    Lab Status: Final result Specimen: Blood from Arm, Left Updated: 11/11/24 1524     LACTIC ACID 1.3 mmol/L     Narrative:      Result may be elevated if tourniquet was used during collection.    B-Type Natriuretic Peptide(BNP) [[id number]]  (Normal) Collected: 11/11/24 1218    Lab Status: Final result Specimen: Blood from Arm, Left Updated: 11/11/24 1253     BNP 16 pg/mL     HS Troponin 0hr (reflex protocol) [[id number]]  (Normal) Collected: 11/11/24 1129    Lab Status: Final result Specimen: Blood from Arm, Left Updated: 11/11/24 1232     hs TnI 0hr 4 ng/L     CBC and differential [[id number]]  (Abnormal) Collected: 11/11/24 1218    Lab Status: Final result Specimen: Blood from Arm, Left Updated: 11/11/24 1224     WBC 3.61 Thousand/uL      RBC 3.74 Million/uL      Hemoglobin 11.3 g/dL      Hematocrit 35.4 %      MCV 95 fL      MCH 30.2 pg      MCHC 31.9 g/dL      RDW 13.3 %      MPV 8.9 fL      Platelets 209 Thousands/uL      nRBC 0 /100 WBCs      Segmented % 52 %      Immature Grans % 0 %      Lymphocytes % 38 %      Monocytes % 7 %      Eosinophils Relative 2 %      Basophils Relative 1 %      Absolute Neutrophils 1.89 Thousands/µL       Absolute Immature Grans 0.01 Thousand/uL      Absolute Lymphocytes 1.37 Thousands/µL      Absolute Monocytes 0.24 Thousand/µL      Eosinophils Absolute 0.08 Thousand/µL      Basophils Absolute 0.02 Thousands/µL     TSH, 3rd generation with Free T4 reflex [[id number]]  (Normal) Collected: 11/11/24 1129    Lab Status: Final result Specimen: Blood from Arm, Left Updated: 11/11/24 1214     TSH 3RD GENERATON 1.176 uIU/mL     Lactic acid, plasma (w/reflex if result > 2.0) [[id number]]  (Abnormal) Collected: 11/11/24 1145    Lab Status: Final result Specimen: Blood from Arm, Left Updated: 11/11/24 1203     LACTIC ACID 2.4 mmol/L     Narrative:      Result may be elevated if tourniquet was used during collection.    FLU/COVID Rapid Antigen (30 min. TAT) - Preferred screening test in ED [[id number]]  (Normal) Collected: 11/11/24 1129    Lab Status: Final result Specimen: Nares from Nose Updated: 11/11/24 1202     SARS COV Rapid Antigen Negative     Influenza A Rapid Antigen Negative     Influenza B Rapid Antigen Negative    Narrative:      This test has been performed using the Quidel Tosha 2 FLU+SARS Antigen test under the Emergency Use Authorization (EUA). This test has been validated by the  and verified by the performing laboratory. The Tosha uses lateral flow immunofluorescent sandwich assay to detect SARS-COV, Influenza A and Influenza B Antigen.     The Quidel Tosha 2 SARS Antigen test does not differentiate between SARS-CoV and SARS-CoV-2.     Negative results are presumptive and may be confirmed with a molecular assay, if necessary, for patient management. Negative results do not rule out SARS-CoV-2 or influenza infection and should not be used as the sole basis for treatment or patient management decisions. A negative test result may occur if the level of antigen in a sample is below the limit of detection of this test.     Positive results are indicative of the presence of viral antigens, but do not rule  out bacterial infection or co-infection with other viruses.     All test results should be used as an adjunct to clinical observations and other information available to the provider.    FOR PEDIATRIC PATIENTS - copy/paste COVID Guidelines URL to browser: [email or web address]    Comprehensive metabolic panel [[id number]]  (Abnormal) Collected: 11/11/24 1129    Lab Status: Final result Specimen: Blood from Arm, Left Updated: 11/11/24 1201     Sodium 141 mmol/L      Potassium 4.0 mmol/L      Chloride 105 mmol/L      CO2 26 mmol/L      ANION GAP 10 mmol/L      BUN 10 mg/dL      Creatinine 1.11 mg/dL      Glucose 133 mg/dL      Calcium 9.7 mg/dL      AST 32 U/L      ALT 36 U/L      Alkaline Phosphatase 78 U/L      Total Protein 7.9 g/dL      Albumin 4.3 g/dL      Total Bilirubin 1.15 mg/dL      eGFR 69 ml/min/1.73sq m     Narrative:      National Kidney Disease Foundation guidelines for Chronic Kidney Disease (CKD):     Stage 1 with normal or high GFR (GFR > 90 mL/min/1.73 square meters)    Stage 2 Mild CKD (GFR = 60-89 mL/min/1.73 square meters)    Stage 3A Moderate CKD (GFR = 45-59 mL/min/1.73 square meters)    Stage 3B Moderate CKD (GFR = 30-44 mL/min/1.73 square meters)    Stage 4 Severe CKD (GFR = 15-29 mL/min/1.73 square meters)    Stage 5 End Stage CKD (GFR <15 mL/min/1.73 square meters)  Note: GFR calculation is accurate only with a steady state creatinine    Lipase [[id number]]  (Normal) Collected: 11/11/24 1129    Lab Status: Final result Specimen: Blood from Arm, Left Updated: 11/11/24 1201     Lipase 35 u/L     Blood culture #1 [[id number]] Collected: 11/11/24 1140    Lab Status: In process Specimen: Blood from Arm, Left Updated: 11/11/24 1147    Blood culture #2 [[id number]] Collected: 11/11/24 1140    Lab Status: In process Specimen: Blood from Arm, Left Updated: 11/11/24 1147    UA w Reflex to Microscopic w Reflex to Culture [[id number]]     Lab Status: No  result Specimen: Urine     Fingerstick Glucose (POCT) [[id number]]  (Normal) Collected: 11/11/24 1118    Lab Status: Final result Specimen: Blood Updated: 11/11/24 1119     POC Glucose 135 mg/dl             XR chest 1 view portable   Final Interpretation by Silverio Bowling MD (11/11 1310)      No acute cardiopulmonary disease.            Workstation performed: [id number]             Procedures    ED Medication and Procedure Management   Prior to Admission Medications   Prescriptions Last Dose Informant Patient Reported? Taking?   acetaminophen (TYLENOL) 650 mg CR tablet  Self No No   Sig: Take 1 tablet (650 mg total) by mouth every 8 (eight) hours as needed for mild pain   aspirin 81 mg chewable tablet  Self No No   Sig: Chew 1 tablet (81 mg total) daily   clopidogrel (PLAVIX) 75 mg tablet   Yes No   Sig: Take 75 mg by mouth daily   hydrochlorothiazide (HYDRODIURIL) 25 mg tablet  Self Yes No   Sig: Take 25 mg by mouth daily   lisinopril (ZESTRIL) 20 mg tablet   No No   Sig: Take 1 tablet (20 mg total) by mouth daily Do not start before November 4, 2024.   polyethylene glycol (MIRALAX) 17 g packet  Self No No   Sig: Take 17 g by mouth daily   Patient taking differently: Take 17 g by mouth daily. dissolve in 8 oz liquid of choice      potassium chloride (Klor-Con M20) 20 mEq tablet   No No   Sig: Take 1 tablet (20 mEq total) by mouth 2 (two) times a day for 10 days      Facility-Administered Medications: None     Patient's Medications   Discharge Prescriptions    No medications on file       ED SEPSIS DOCUMENTATION   Time reflects when diagnosis was documented in both MDM as applicable and the Disposition within this note       Time User Action Codes Description Comment    11/11/2024  4:23 PM Bryant Oliva Add [I47.10] SVT (supraventricular tachycardia) ([facility])                  Bryant Oliva MD  11/11/24 3886

## 2024-11-13 ENCOUNTER — HOME CARE VISIT (OUTPATIENT)
Dept: HOME HEALTH SERVICES | Facility: HOME HEALTHCARE | Age: 64
End: 2024-11-13
Payer: COMMERCIAL

## 2024-11-13 VITALS
SYSTOLIC BLOOD PRESSURE: 130 MMHG | DIASTOLIC BLOOD PRESSURE: 76 MMHG | HEART RATE: 68 BPM | OXYGEN SATURATION: 97 % | TEMPERATURE: 97.4 F | RESPIRATION RATE: 16 BRPM

## 2024-11-13 PROCEDURE — G0299 HHS/HOSPICE OF RN EA 15 MIN: HCPCS

## 2024-11-16 LAB
BACTERIA BLD CULT: NORMAL
BACTERIA BLD CULT: NORMAL

## 2024-11-21 PROBLEM — A41.9 SEPSIS (HCC): Status: RESOLVED | Noted: 2024-10-22 | Resolved: 2024-11-21

## 2025-01-03 ENCOUNTER — TELEPHONE (OUTPATIENT)
Dept: CARDIOLOGY CLINIC | Facility: CLINIC | Age: 65
End: 2025-01-03

## 2025-01-10 ENCOUNTER — OFFICE VISIT (OUTPATIENT)
Dept: CARDIOLOGY CLINIC | Facility: CLINIC | Age: 65
End: 2025-01-10
Payer: COMMERCIAL

## 2025-01-10 VITALS
DIASTOLIC BLOOD PRESSURE: 110 MMHG | TEMPERATURE: 97.9 F | WEIGHT: 234 LBS | SYSTOLIC BLOOD PRESSURE: 198 MMHG | OXYGEN SATURATION: 98 % | BODY MASS INDEX: 34.66 KG/M2 | HEIGHT: 69 IN | HEART RATE: 76 BPM

## 2025-01-10 DIAGNOSIS — I47.10 SVT (SUPRAVENTRICULAR TACHYCARDIA) (HCC): Primary | ICD-10-CM

## 2025-01-10 DIAGNOSIS — I65.23 CAROTID STENOSIS, BILATERAL: ICD-10-CM

## 2025-01-10 DIAGNOSIS — I10 PRIMARY HYPERTENSION: ICD-10-CM

## 2025-01-10 PROCEDURE — 99204 OFFICE O/P NEW MOD 45 MIN: CPT | Performed by: INTERNAL MEDICINE

## 2025-01-10 RX ORDER — VALSARTAN AND HYDROCHLOROTHIAZIDE 320; 25 MG/1; MG/1
1 TABLET, FILM COATED ORAL DAILY
Qty: 90 TABLET | Refills: 3 | Status: SHIPPED | OUTPATIENT
Start: 2025-01-10

## 2025-01-10 RX ORDER — METOPROLOL SUCCINATE 25 MG/1
25 TABLET, EXTENDED RELEASE ORAL DAILY
Qty: 90 TABLET | Refills: 3 | Status: SHIPPED | OUTPATIENT
Start: 2025-01-10

## 2025-01-10 NOTE — PROGRESS NOTES
[facility] [place]  [address]  [address]  [facility] [address] [phone]      Patient name: Hua Acevedo   YOB: 1960   MR no: [id number]        Diagnosis ICD-10-CM Associated Orders   1. SVT (supraventricular tachycardia) (HCC)  I47.10 Ambulatory Referral to Cardiology     metoprolol succinate (TOPROL-XL) 25 mg 24 hr tablet     Echo complete w/ contrast if indicated      2. Primary hypertension  I10 valsartan-hydrochlorothiazide (DIOVAN-HCT) 320-25 MG per tablet     metoprolol succinate (TOPROL-XL) 25 mg 24 hr tablet     Echo complete w/ contrast if indicated      3. Carotid stenosis, bilateral  I65.23 Ambulatory referral to Vascular Surgery           Assessment and Recommendations:    1. SVT (supraventricular tachycardia) (HCC)  -     Ambulatory Referral to Cardiology  -     metoprolol succinate (TOPROL-XL) 25 mg 24 hr tablet; Take 1 tablet (25 mg total) by mouth daily  -     Echo complete w/ contrast if indicated; Future; Expected date: 01/10/2025  2. Primary hypertension  -     valsartan-hydrochlorothiazide (DIOVAN-HCT) 320-25 MG per tablet; Take 1 tablet by mouth daily  -     metoprolol succinate (TOPROL-XL) 25 mg 24 hr tablet; Take 1 tablet (25 mg total) by mouth daily  -     Echo complete w/ contrast if indicated; Future; Expected date: 01/10/2025  3. Carotid stenosis, bilateral  -     Ambulatory referral to Vascular Surgery; Future     Based on his prior history of SVT, I would recommend starting low-dose metoprolol.  No indication for extended monitoring at the present time.  Patient admits that he does not check his blood pressure regularly and when he does it at home, it is usually high.  There is a documentation of possible dry cough due to lisinopril in the past as well.  I would recommend changing that and hydrochlorothiazide to full dose valsartan hydrochlorothiazide combination.  I have advised him to keep a regular  "home blood pressure diary and to reach out to my office if the numbers are persistently above 130/80.  I will also advised him to choose a new primary care physician since his PCP moved.  Patient does not remember whether he is taking any statins or not.  I have advised him to check it at home and call the office.  According to previous records he was on atorvastatin 40 mg daily.  Need to check most recent lipid profile.  We will also check an updated echocardiogram for LV function assessment as well as aortic root assessment which was reported to be mildly enlarged during his last echocardiogram in 2021.  Patient has also not seen vascular surgery for over a year and a half.  I have sent an ambulatory referral to vascular medicine and asked him to call for a follow-up appointment.  He also does not need to be on dual antiplatelet therapy so many years out from his carotid endarterectomy.  However I will leave that decision to vascular surgery.      CHIEF COMPLAINT:  Hypertension, SVT    HPI:  64-year-old male with past medical history significant for hypertension and severe right carotid disease with right hemispheric stroke status post right carotid endarterectomy in July 2021, presents for his first cardiology visit since 2021.  Patient states that he is doing well from cardiac standpoint and is here because the ER advised him to get a cardiology follow-up.  He denies any chest pain, current palpitations or syncope.  He admits to not being very active and does have mild dyspnea on exertion which has been chronic.  He is suffering from significant bilateral knee osteoarthritis.  Review of record reveals that he was seen in the emergency room in November 2024 with an episode of SVT which was terminated with adenosine.    Past Medical History:   Diagnosis Date    Diverticulitis large intestine     \"During Bowel Resection determined not to have Diverticulitis.\"- Patient    Hypertension     TIA (transient ischemic " attack)         Past Surgical History:   Procedure Laterality Date    ANTERIOR CRUCIATE LIGAMENT REPAIR Right     ACL Repair    BOWEL RESECTION      CAROTID ENDARTARECTOMY Right 7/26/2021    Procedure: ENDARTERECTOMY ARTERY CAROTID-- Right;  Surgeon: Jerod Granger MD;  Location: [facility];  Service: Vascular    FOOT SURGERY Right     patient not sure procedure    UT LAPAROSCOPY SURG CHOLECYSTECTOMY N/A 10/24/2024    Procedure: attempted CHOLECYSTECTOMY LAPAROSCOPIC W/ ROBOTICS converted to laparoscopic subtotal cholecystectomy;  Surgeon: Beverley Parra DO;  Location: [facility];  Service: General    SHOULDER SURGERY Bilateral     Rotator cuff/ Right shoulder bone growth removal        Social History     Tobacco Use    Smoking status: Former     Types: Cigarettes    Smokeless tobacco: Former     Types: Chew   Vaping Use    Vaping status: Never Used   Substance Use Topics    Alcohol use: Yes     Alcohol/week: 1.0 - 2.0 standard drink of alcohol     Types: 1 - 2 Cans of beer per week    Drug use: Never       History reviewed. No pertinent family history.     No Known Allergies      Current Outpatient Medications:     acetaminophen (TYLENOL) 650 mg CR tablet, Take 1 tablet (650 mg total) by mouth every 8 (eight) hours as needed for mild pain, Disp: 30 tablet, Rfl: 0    aspirin 81 mg chewable tablet, Chew 1 tablet (81 mg total) daily, Disp: , Rfl:     clopidogrel (PLAVIX) 75 mg tablet, Take 75 mg by mouth daily, Disp: , Rfl:     metoprolol succinate (TOPROL-XL) 25 mg 24 hr tablet, Take 1 tablet (25 mg total) by mouth daily, Disp: 90 tablet, Rfl: 3    polyethylene glycol (MIRALAX) 17 g packet, Take 17 g by mouth daily, Disp: , Rfl: 0    valsartan-hydrochlorothiazide (DIOVAN-HCT) 320-25 MG per tablet, Take 1 tablet by mouth daily, Disp: 90 tablet, Rfl: 3    potassium chloride (Klor-Con M20) 20 mEq tablet, Take 1 tablet (20 mEq total) by mouth 2 (two) times a day for 10 days (Patient not taking: Reported on  "1/10/2025), Disp: 20 tablet, Rfl: 0     Lab Results   Component Value Date    CREATININE 1.11 11/11/2024    CREATININE 0.95 10/27/2024    CREATININE 0.86 10/26/2024    K 4.0 11/11/2024    K 2.9 (L) 10/27/2024    K 2.9 (L) 10/26/2024    SODIUM 141 11/11/2024    SODIUM 139 10/27/2024    SODIUM 138 10/26/2024    LDLCALC 97 07/15/2021    TRIG 222 (H) 07/15/2021    HDL 31 (L) 07/15/2021    CHOLESTEROL 172 07/15/2021       I have personally reviewed the ECG from November 11, 2024 which showed sinus tachycardia and otherwise unremarkable ECG.    REVIEW OF SYSTEMS   Positive for: Bilateral knee arthritis, dyspnea on exertion  Negative for: All remaining as reviewed below and in HPI.    SYSTEM SYMPTOMS REVIEWED:  General--weight change, fever, night sweats  Respiratory--cough, wheezing, shortness of breath, sputum production  Cardiovascular--chest pain, syncope, dyspnea on exertion, edema, decline in exercise tolerance, claudication   Gastrointestinal--persistent vomiting, diarrhea, abdominal distention, blood in stool   Muscular or skeletal--joint pain or swelling   Neurologic--headaches, syncope, abnormal movement  Hematologic--history of easy bruising and bleeding   Endocrine--thyroid enlargement, heat or cold intolerance, polyuria   Psychiatric--anxiety, depression     Vitals:    01/10/25 0838   BP: (!) 198/110   Pulse: 76   Temp: 97.9 °F (36.6 °C)   SpO2: 98%   Weight: 106 kg (234 lb)   Height: 5' 9\" (1.753 m)       Wt Readings from Last 3 Encounters:   01/10/25 106 kg (234 lb)   11/11/24 106 kg (232 lb 12.9 oz)   10/24/24 109 kg (239 lb 3.2 oz)        Body mass index is 34.56 kg/m².     General physical examination:    General appearance: Alert, no acute distress, appears stated age, moderate obesity  HEENT: Mucous membranes are moist.  No obvious abnormality noted.  Neck: Supple with no lymphadenopathy.  No JVD.  Carotid pulses are intact.  No carotid bruit.  Cardiovascular system: Regular rhythm.  Normal S1 and S2.  " "No murmurs.  No rubs or gallops. Extremities: No edema. No cyanosis.  Pulmonary: Respirations unlabored.  Good air entry bilaterally.  Clear to auscultation bilaterally.  Gastrointestinal: Abdomen is soft and nontender.  Bowel sounds are positive.  Musculoskeletal: Upper Extremities: Normal upper motor strength. Lower Extremity: Normal motor strength. Gait: Normal.   Skin: Skin is warm. No rashes or lesions.  Neurological: Patient is alert and oriented with no gross motor deficits.  Psychiatric: Mood is normal.  Behavior is normal.        Thank you for allowing me to be a part of this patient's care. If you have further questions, please feel free to contact me.    Ja Theodore MD, FACC, MATEO    Portions of the record  have been created with voice recognition software.  Occasional grammatical mistakes or wrong word or \"sound alike\" substitutions may have occurred due to the inherent limitations of voice recognition software. Please reach out to me directly for any clarifications.     "

## 2025-01-17 ENCOUNTER — HOSPITAL ENCOUNTER (OUTPATIENT)
Dept: NON INVASIVE DIAGNOSTICS | Facility: HOSPITAL | Age: 65
Discharge: HOME | End: 2025-01-17
Attending: INTERNAL MEDICINE
Payer: COMMERCIAL

## 2025-01-17 ENCOUNTER — RESULTS FOLLOW-UP (OUTPATIENT)
Dept: CARDIOLOGY CLINIC | Facility: CLINIC | Age: 65
End: 2025-01-17

## 2025-01-17 VITALS
HEIGHT: 69 IN | BODY MASS INDEX: 34.66 KG/M2 | WEIGHT: 234 LBS | HEART RATE: 80 BPM | DIASTOLIC BLOOD PRESSURE: 110 MMHG | SYSTOLIC BLOOD PRESSURE: 198 MMHG

## 2025-01-17 DIAGNOSIS — I47.10 SVT (SUPRAVENTRICULAR TACHYCARDIA) (HCC): ICD-10-CM

## 2025-01-17 DIAGNOSIS — I10 PRIMARY HYPERTENSION: ICD-10-CM

## 2025-01-17 LAB
AORTIC ROOT: 3.8 CM
AORTIC VALVE MEAN VELOCITY: 11.4 M/S
ASCENDING AORTA: 3.6 CM
AV LVOT MEAN GRADIENT: 3 MMHG
AV LVOT PEAK GRADIENT: 7 MMHG
AV MEAN PRESS GRAD SYS DOP V1V2: 6 MMHG
AV PEAK GRADIENT: 8 MMHG
AV VELOCITY RATIO: 0.89
AV VMAX SYS DOP: 1.44 M/S
BSA FOR ECHO PROCEDURE: 2.21 M2
DOP CALC AO VTI: 22.16 CM
DOP CALC LVOT PEAK VEL VTI: 19.8 CM
DOP CALC LVOT PEAK VEL: 1.29 M/S
E WAVE DECELERATION TIME: 192 MS
E/A RATIO: 0.65
FRACTIONAL SHORTENING: 37 (ref 28–44)
INTERVENTRICULAR SEPTUM IN DIASTOLE (PARASTERNAL SHORT AXIS VIEW): 1 CM
INTERVENTRICULAR SEPTUM: 1 CM (ref 0.6–1.1)
LAAS-AP2: 20.4 CM2
LAAS-AP4: 12.3 CM2
LEFT ATRIUM SIZE: 3.9 CM
LEFT ATRIUM VOLUME (MOD BIPLANE): 41 ML
LEFT ATRIUM VOLUME INDEX (MOD BIPLANE): 18.6 ML/M2
LEFT INTERNAL DIMENSION IN SYSTOLE: 2.7 CM (ref 2.1–4)
LEFT VENTRICULAR INTERNAL DIMENSION IN DIASTOLE: 4.3 CM (ref 3.5–6)
LEFT VENTRICULAR POSTERIOR WALL IN END DIASTOLE: 1 CM
LEFT VENTRICULAR STROKE VOLUME: 54 ML
LV EF US.2D.A4C+ESTIMATED: 72 %
LVSV (TEICH): 54 ML
MV E'TISSUE VEL-LAT: 11 CM/S
MV E'TISSUE VEL-SEP: 8 CM/S
MV PEAK A VEL: 0.78 M/S
MV PEAK E VEL: 51 CM/S
MV STENOSIS PRESSURE HALF TIME: 56 MS
MV VALVE AREA P 1/2 METHOD: 3.93
RIGHT ATRIUM AREA SYSTOLE A4C: 11 CM2
RIGHT VENTRICLE ID DIMENSION: 2.7 CM
SL CV LEFT ATRIUM LENGTH A2C: 6 CM
SL CV LV EF: 70
SL CV PED ECHO LEFT VENTRICLE DIASTOLIC VOLUME (MOD BIPLANE) 2D: 82 ML
SL CV PED ECHO LEFT VENTRICLE SYSTOLIC VOLUME (MOD BIPLANE) 2D: 28 ML
TR MAX PG: 19 MMHG
TR PEAK VELOCITY: 2.2 M/S
TRICUSPID ANNULAR PLANE SYSTOLIC EXCURSION: 2.3 CM
TRICUSPID VALVE PEAK REGURGITATION VELOCITY: 2.2 M/S

## 2025-01-17 PROCEDURE — 93306 TTE W/DOPPLER COMPLETE: CPT | Performed by: INTERNAL MEDICINE

## 2025-01-17 PROCEDURE — 93306 TTE W/DOPPLER COMPLETE: CPT

## 2025-05-22 ENCOUNTER — HOSPITAL ENCOUNTER (INPATIENT)
Facility: HOSPITAL | Age: 65
LOS: 4 days | Discharge: HOME | DRG: 551 | End: 2025-05-26
Attending: EMERGENCY MEDICINE | Admitting: FAMILY MEDICINE
Payer: COMMERCIAL

## 2025-05-22 ENCOUNTER — APPOINTMENT (EMERGENCY)
Dept: CT IMAGING | Facility: HOSPITAL | Age: 65
DRG: 551 | End: 2025-05-22
Payer: COMMERCIAL

## 2025-05-22 ENCOUNTER — APPOINTMENT (EMERGENCY)
Dept: RADIOLOGY | Facility: HOSPITAL | Age: 65
DRG: 551 | End: 2025-05-22
Payer: COMMERCIAL

## 2025-05-22 DIAGNOSIS — M54.50 ACUTE BILATERAL LOW BACK PAIN WITHOUT SCIATICA: ICD-10-CM

## 2025-05-22 DIAGNOSIS — E87.6 HYPOKALEMIA: ICD-10-CM

## 2025-05-22 DIAGNOSIS — R65.10 SIRS (SYSTEMIC INFLAMMATORY RESPONSE SYNDROME) (HCC): ICD-10-CM

## 2025-05-22 DIAGNOSIS — M54.50 ACUTE LOW BACK PAIN: Primary | ICD-10-CM

## 2025-05-22 DIAGNOSIS — R19.7 DIARRHEA: ICD-10-CM

## 2025-05-22 DIAGNOSIS — R09.02 HYPOXIA: ICD-10-CM

## 2025-05-22 DIAGNOSIS — K62.5 BRBPR (BRIGHT RED BLOOD PER RECTUM): ICD-10-CM

## 2025-05-22 LAB
2HR DELTA HS TROPONIN: 6 NG/L
4HR DELTA HS TROPONIN: 4 NG/L
ALBUMIN SERPL BCG-MCNC: 4.3 G/DL (ref 3.5–5)
ALP SERPL-CCNC: 60 U/L (ref 34–104)
ALT SERPL W P-5'-P-CCNC: 29 U/L (ref 7–52)
ANION GAP SERPL CALCULATED.3IONS-SCNC: 9 MMOL/L (ref 4–13)
APTT PPP: 31 SECONDS (ref 23–34)
AST SERPL W P-5'-P-CCNC: 19 U/L (ref 13–39)
ATRIAL RATE: 104 BPM
BACTERIA UR QL AUTO: NORMAL /HPF
BASE EX.OXY STD BLDV CALC-SCNC: 92.6 % (ref 60–80)
BASE EXCESS BLDV CALC-SCNC: 3.3 MMOL/L
BASOPHILS # BLD AUTO: 0.03 THOUSANDS/ÂΜL (ref 0–0.1)
BASOPHILS NFR BLD AUTO: 0 % (ref 0–1)
BILIRUB SERPL-MCNC: 2.54 MG/DL (ref 0.2–1)
BILIRUB UR QL STRIP: NEGATIVE
BNP SERPL-MCNC: 38 PG/ML (ref 0–100)
BUN SERPL-MCNC: 11 MG/DL (ref 5–25)
CALCIUM SERPL-MCNC: 8.6 MG/DL (ref 8.4–10.2)
CARDIAC TROPONIN I PNL SERPL HS: 13 NG/L (ref ?–50)
CARDIAC TROPONIN I PNL SERPL HS: 17 NG/L (ref ?–50)
CARDIAC TROPONIN I PNL SERPL HS: 19 NG/L (ref ?–50)
CHLORIDE SERPL-SCNC: 100 MMOL/L (ref 96–108)
CLARITY UR: CLEAR
CO2 SERPL-SCNC: 29 MMOL/L (ref 21–32)
COLOR UR: YELLOW
CREAT SERPL-MCNC: 1.01 MG/DL (ref 0.6–1.3)
CRP SERPL QL: 174 MG/L
EOSINOPHIL # BLD AUTO: 0.23 THOUSAND/ÂΜL (ref 0–0.61)
EOSINOPHIL NFR BLD AUTO: 3 % (ref 0–6)
ERYTHROCYTE [DISTWIDTH] IN BLOOD BY AUTOMATED COUNT: 13.7 % (ref 11.6–15.1)
FLUAV AG UPPER RESP QL IA.RAPID: NEGATIVE
FLUAV RNA RESP QL NAA+PROBE: NEGATIVE
FLUBV AG UPPER RESP QL IA.RAPID: NEGATIVE
FLUBV RNA RESP QL NAA+PROBE: NEGATIVE
GFR SERPL CREATININE-BSD FRML MDRD: 77 ML/MIN/1.73SQ M
GLUCOSE SERPL-MCNC: 130 MG/DL (ref 65–140)
GLUCOSE UR STRIP-MCNC: NEGATIVE MG/DL
HCO3 BLDV-SCNC: 26.7 MMOL/L (ref 24–30)
HCT VFR BLD AUTO: 40.4 % (ref 36.5–49.3)
HGB BLD-MCNC: 13.9 G/DL (ref 12–17)
HGB UR QL STRIP.AUTO: ABNORMAL
IMM GRANULOCYTES # BLD AUTO: 0.04 THOUSAND/UL (ref 0–0.2)
IMM GRANULOCYTES NFR BLD AUTO: 1 % (ref 0–2)
INR PPP: 1.08 (ref 0.85–1.19)
KETONES UR STRIP-MCNC: NEGATIVE MG/DL
LACTATE SERPL-SCNC: 1.3 MMOL/L (ref 0.5–2)
LEUKOCYTE ESTERASE UR QL STRIP: NEGATIVE
LYMPHOCYTES # BLD AUTO: 0.61 THOUSANDS/ÂΜL (ref 0.6–4.47)
LYMPHOCYTES NFR BLD AUTO: 8 % (ref 14–44)
MAGNESIUM SERPL-MCNC: 1.6 MG/DL (ref 1.9–2.7)
MCH RBC QN AUTO: 31.8 PG (ref 26.8–34.3)
MCHC RBC AUTO-ENTMCNC: 34.4 G/DL (ref 31.4–37.4)
MCV RBC AUTO: 92 FL (ref 82–98)
MONOCYTES # BLD AUTO: 0.63 THOUSAND/ÂΜL (ref 0.17–1.22)
MONOCYTES NFR BLD AUTO: 8 % (ref 4–12)
NEUTROPHILS # BLD AUTO: 6.26 THOUSANDS/ÂΜL (ref 1.85–7.62)
NEUTS SEG NFR BLD AUTO: 80 % (ref 43–75)
NITRITE UR QL STRIP: NEGATIVE
NON-SQ EPI CELLS URNS QL MICRO: NORMAL /HPF
NRBC BLD AUTO-RTO: 0 /100 WBCS
O2 CT BLDV-SCNC: 19.1 ML/DL
P AXIS: 25 DEGREES
PCO2 BLDV: 36.8 MM HG (ref 42–50)
PH BLDV: 7.48 [PH] (ref 7.3–7.4)
PH UR STRIP.AUTO: 6 [PH]
PLATELET # BLD AUTO: 142 THOUSANDS/UL (ref 149–390)
PMV BLD AUTO: 9 FL (ref 8.9–12.7)
PO2 BLDV: 66.6 MM HG (ref 35–45)
POTASSIUM SERPL-SCNC: 2.7 MMOL/L (ref 3.5–5.3)
PR INTERVAL: 188 MS
PROCALCITONIN SERPL-MCNC: 0.58 NG/ML
PROT SERPL-MCNC: 7.2 G/DL (ref 6.4–8.4)
PROT UR STRIP-MCNC: NEGATIVE MG/DL
PROTHROMBIN TIME: 14.4 SECONDS (ref 12.3–15)
QRS AXIS: -22 DEGREES
QRSD INTERVAL: 94 MS
QT INTERVAL: 322 MS
QTC INTERVAL: 423 MS
RBC # BLD AUTO: 4.37 MILLION/UL (ref 3.88–5.62)
RBC #/AREA URNS AUTO: NORMAL /HPF
RSV RNA RESP QL NAA+PROBE: NEGATIVE
SARS-COV+SARS-COV-2 AG RESP QL IA.RAPID: NEGATIVE
SARS-COV-2 RNA RESP QL NAA+PROBE: NEGATIVE
SODIUM SERPL-SCNC: 138 MMOL/L (ref 135–147)
SP GR UR STRIP.AUTO: 1.01 (ref 1–1.03)
T WAVE AXIS: 2 DEGREES
UROBILINOGEN UR QL STRIP.AUTO: 0.2 E.U./DL
VENTRICULAR RATE: 104 BPM
WBC # BLD AUTO: 7.8 THOUSAND/UL (ref 4.31–10.16)
WBC #/AREA URNS AUTO: NORMAL /HPF

## 2025-05-22 PROCEDURE — 83880 ASSAY OF NATRIURETIC PEPTIDE: CPT | Performed by: PHYSICIAN ASSISTANT

## 2025-05-22 PROCEDURE — 86140 C-REACTIVE PROTEIN: CPT | Performed by: NURSE PRACTITIONER

## 2025-05-22 PROCEDURE — 87505 NFCT AGENT DETECTION GI: CPT | Performed by: NURSE PRACTITIONER

## 2025-05-22 PROCEDURE — 93010 ELECTROCARDIOGRAM REPORT: CPT | Performed by: INTERNAL MEDICINE

## 2025-05-22 PROCEDURE — 0241U HB NFCT DS VIR RESP RNA 4 TRGT: CPT | Performed by: NURSE PRACTITIONER

## 2025-05-22 PROCEDURE — 85025 COMPLETE CBC W/AUTO DIFF WBC: CPT | Performed by: PHYSICIAN ASSISTANT

## 2025-05-22 PROCEDURE — 84145 PROCALCITONIN (PCT): CPT | Performed by: PHYSICIAN ASSISTANT

## 2025-05-22 PROCEDURE — 71275 CT ANGIOGRAPHY CHEST: CPT

## 2025-05-22 PROCEDURE — 83735 ASSAY OF MAGNESIUM: CPT | Performed by: PHYSICIAN ASSISTANT

## 2025-05-22 PROCEDURE — 81001 URINALYSIS AUTO W/SCOPE: CPT | Performed by: PHYSICIAN ASSISTANT

## 2025-05-22 PROCEDURE — 87177 OVA AND PARASITES SMEARS: CPT | Performed by: NURSE PRACTITIONER

## 2025-05-22 PROCEDURE — 83605 ASSAY OF LACTIC ACID: CPT | Performed by: PHYSICIAN ASSISTANT

## 2025-05-22 PROCEDURE — 93005 ELECTROCARDIOGRAM TRACING: CPT

## 2025-05-22 PROCEDURE — 87040 BLOOD CULTURE FOR BACTERIA: CPT | Performed by: PHYSICIAN ASSISTANT

## 2025-05-22 PROCEDURE — 72125 CT NECK SPINE W/O DYE: CPT

## 2025-05-22 PROCEDURE — 99285 EMERGENCY DEPT VISIT HI MDM: CPT | Performed by: PHYSICIAN ASSISTANT

## 2025-05-22 PROCEDURE — 99223 1ST HOSP IP/OBS HIGH 75: CPT | Performed by: NURSE PRACTITIONER

## 2025-05-22 PROCEDURE — 96365 THER/PROPH/DIAG IV INF INIT: CPT

## 2025-05-22 PROCEDURE — 36415 COLL VENOUS BLD VENIPUNCTURE: CPT | Performed by: PHYSICIAN ASSISTANT

## 2025-05-22 PROCEDURE — 85610 PROTHROMBIN TIME: CPT | Performed by: PHYSICIAN ASSISTANT

## 2025-05-22 PROCEDURE — 87804 INFLUENZA ASSAY W/OPTIC: CPT | Performed by: PHYSICIAN ASSISTANT

## 2025-05-22 PROCEDURE — 87811 SARS-COV-2 COVID19 W/OPTIC: CPT | Performed by: PHYSICIAN ASSISTANT

## 2025-05-22 PROCEDURE — 71045 X-RAY EXAM CHEST 1 VIEW: CPT

## 2025-05-22 PROCEDURE — 96376 TX/PRO/DX INJ SAME DRUG ADON: CPT

## 2025-05-22 PROCEDURE — 96368 THER/DIAG CONCURRENT INF: CPT

## 2025-05-22 PROCEDURE — 82805 BLOOD GASES W/O2 SATURATION: CPT | Performed by: PHYSICIAN ASSISTANT

## 2025-05-22 PROCEDURE — 84484 ASSAY OF TROPONIN QUANT: CPT | Performed by: PHYSICIAN ASSISTANT

## 2025-05-22 PROCEDURE — 85730 THROMBOPLASTIN TIME PARTIAL: CPT | Performed by: PHYSICIAN ASSISTANT

## 2025-05-22 PROCEDURE — 74177 CT ABD & PELVIS W/CONTRAST: CPT

## 2025-05-22 PROCEDURE — 80053 COMPREHEN METABOLIC PANEL: CPT | Performed by: PHYSICIAN ASSISTANT

## 2025-05-22 PROCEDURE — 96366 THER/PROPH/DIAG IV INF ADDON: CPT

## 2025-05-22 PROCEDURE — 99285 EMERGENCY DEPT VISIT HI MDM: CPT

## 2025-05-22 PROCEDURE — 87209 SMEAR COMPLEX STAIN: CPT | Performed by: NURSE PRACTITIONER

## 2025-05-22 RX ORDER — SODIUM CHLORIDE, SODIUM GLUCONATE, SODIUM ACETATE, POTASSIUM CHLORIDE, MAGNESIUM CHLORIDE, SODIUM PHOSPHATE, DIBASIC, AND POTASSIUM PHOSPHATE .53; .5; .37; .037; .03; .012; .00082 G/100ML; G/100ML; G/100ML; G/100ML; G/100ML; G/100ML; G/100ML
100 INJECTION, SOLUTION INTRAVENOUS CONTINUOUS
Status: DISCONTINUED | OUTPATIENT
Start: 2025-05-22 | End: 2025-05-25

## 2025-05-22 RX ORDER — POTASSIUM CHLORIDE 1500 MG/1
40 TABLET, EXTENDED RELEASE ORAL ONCE
Status: COMPLETED | OUTPATIENT
Start: 2025-05-22 | End: 2025-05-22

## 2025-05-22 RX ORDER — SODIUM CHLORIDE, SODIUM GLUCONATE, SODIUM ACETATE, POTASSIUM CHLORIDE, MAGNESIUM CHLORIDE, SODIUM PHOSPHATE, DIBASIC, AND POTASSIUM PHOSPHATE .53; .5; .37; .037; .03; .012; .00082 G/100ML; G/100ML; G/100ML; G/100ML; G/100ML; G/100ML; G/100ML
1000 INJECTION, SOLUTION INTRAVENOUS ONCE
Status: COMPLETED | OUTPATIENT
Start: 2025-05-22 | End: 2025-05-22

## 2025-05-22 RX ORDER — MAGNESIUM SULFATE HEPTAHYDRATE 40 MG/ML
2 INJECTION, SOLUTION INTRAVENOUS ONCE
Status: COMPLETED | OUTPATIENT
Start: 2025-05-22 | End: 2025-05-22

## 2025-05-22 RX ORDER — ACETAMINOPHEN 325 MG/1
975 TABLET ORAL EVERY 8 HOURS PRN
Status: DISCONTINUED | OUTPATIENT
Start: 2025-05-22 | End: 2025-05-26 | Stop reason: HOSPADM

## 2025-05-22 RX ORDER — ENOXAPARIN SODIUM 100 MG/ML
40 INJECTION SUBCUTANEOUS DAILY
Status: DISCONTINUED | OUTPATIENT
Start: 2025-05-23 | End: 2025-05-26 | Stop reason: HOSPADM

## 2025-05-22 RX ORDER — OXYCODONE HYDROCHLORIDE 5 MG/1
5 TABLET ORAL EVERY 6 HOURS PRN
Refills: 0 | Status: DISCONTINUED | OUTPATIENT
Start: 2025-05-22 | End: 2025-05-26 | Stop reason: HOSPADM

## 2025-05-22 RX ORDER — ACETAMINOPHEN 325 MG/1
975 TABLET ORAL ONCE
Status: COMPLETED | OUTPATIENT
Start: 2025-05-22 | End: 2025-05-22

## 2025-05-22 RX ADMIN — SODIUM CHLORIDE, SODIUM GLUCONATE, SODIUM ACETATE, POTASSIUM CHLORIDE, MAGNESIUM CHLORIDE, SODIUM PHOSPHATE, DIBASIC, AND POTASSIUM PHOSPHATE 1000 ML: .53; .5; .37; .037; .03; .012; .00082 INJECTION, SOLUTION INTRAVENOUS at 18:22

## 2025-05-22 RX ADMIN — IOHEXOL 100 ML: 350 INJECTION, SOLUTION INTRAVENOUS at 16:49

## 2025-05-22 RX ADMIN — SODIUM CHLORIDE, SODIUM GLUCONATE, SODIUM ACETATE, POTASSIUM CHLORIDE, MAGNESIUM CHLORIDE, SODIUM PHOSPHATE, DIBASIC, AND POTASSIUM PHOSPHATE 100 ML/HR: .53; .5; .37; .037; .03; .012; .00082 INJECTION, SOLUTION INTRAVENOUS at 22:12

## 2025-05-22 RX ADMIN — AMPICILLIN SODIUM AND SULBACTAM SODIUM 3 G: 100; 50 INJECTION, POWDER, FOR SOLUTION INTRAVENOUS at 15:56

## 2025-05-22 RX ADMIN — MAGNESIUM SULFATE HEPTAHYDRATE 2 G: 2 INJECTION, SOLUTION INTRAVENOUS at 17:46

## 2025-05-22 RX ADMIN — ACETAMINOPHEN 975 MG: 325 TABLET ORAL at 15:55

## 2025-05-22 RX ADMIN — SODIUM CHLORIDE, SODIUM GLUCONATE, SODIUM ACETATE, POTASSIUM CHLORIDE, MAGNESIUM CHLORIDE, SODIUM PHOSPHATE, DIBASIC, AND POTASSIUM PHOSPHATE 1000 ML: .53; .5; .37; .037; .03; .012; .00082 INJECTION, SOLUTION INTRAVENOUS at 17:48

## 2025-05-22 RX ADMIN — POTASSIUM CHLORIDE 40 MEQ: 1500 TABLET, EXTENDED RELEASE ORAL at 18:23

## 2025-05-22 RX ADMIN — SODIUM CHLORIDE, SODIUM GLUCONATE, SODIUM ACETATE, POTASSIUM CHLORIDE, MAGNESIUM CHLORIDE, SODIUM PHOSPHATE, DIBASIC, AND POTASSIUM PHOSPHATE 1000 ML: .53; .5; .37; .037; .03; .012; .00082 INJECTION, SOLUTION INTRAVENOUS at 16:04

## 2025-05-22 RX ADMIN — SODIUM CHLORIDE 500 ML: 0.9 INJECTION, SOLUTION INTRAVENOUS at 15:33

## 2025-05-22 RX ADMIN — POTASSIUM CHLORIDE 40 MEQ: 1500 TABLET, EXTENDED RELEASE ORAL at 22:11

## 2025-05-22 NOTE — ED NOTES
Patient went to CT and came back with both lines removed. Rad tech made this RN aware.      Tammy Ball, JENNIE  05/22/25 [id number]

## 2025-05-22 NOTE — Clinical Note
Case was discussed with  and the patient's admission status was agreed to be Admission Status: inpatient status to the service of Dr. Pradhan

## 2025-05-22 NOTE — ED PROVIDER NOTES
Time reflects when diagnosis was documented in both MDM as applicable and the Disposition within this note       Time User Action Codes Description Comment    5/22/2025  6:12 PM Melly, Flores Add [M54.50] Acute low back pain     5/22/2025  6:13 PM Melly, Flores Add [R09.02] Hypoxia     5/22/2025  6:13 PM Melly, Flores Add [A41.9] Sepsis (HCC)     5/22/2025  6:17 PM Melly, Flores Add [E87.6] Hypokalemia     5/22/2025  6:17 PM Melly, Flores Add [M54.50] Acute bilateral low back pain without sciatica     5/22/2025  8:24 PM MellyMark yeung Remove [A41.9] Sepsis (HCC)     5/22/2025  8:24 PM Melly, Flores Add [R65.10] SIRS (systemic inflammatory response syndrome) (HCC)     5/22/2025  9:47 PM Cate Mcgraw Add [R19.7] Diarrhea           ED Disposition       ED Disposition   Admit    Condition   Stable    Date/Time   Thu May 22, 2025  8:24 PM    Comment   Case was discussed with anitra  and the patient's admission status was agreed to be Admission Status: inpatient status to the service of Dr. Bright   .               Assessment & Plan       Medical Decision Making  The patient is a 65-year-old male who presents from home for back pain generalized weakness.  The patient states that he has had low back pain for 3 weeks.  It gradually worsened.  He was unable to get a bed today so his wife made him call EMS.  The patient did not see any provider for this.  Has been taking Tylenol at home.  He denies any falls or traumas any lower leg weakness numbness tingling bowel or bladder incontinence abdominal pain chest pain shortness of breath cough.  Upon arrival the patient was hypoxic 87% on room air and was placed on oxygen denies any use of oxygen at home.    The patient on examination did not know he had a fever.  The patient also was requiring oxygen she does not wear at home.  The patient's blood work demonstrated low potassium low magnesium.  Patient was a sepsis alert by nursing staff based on his temperature and vital signs.   Patient continued to meet SIRS criteria but unfortunately no infectious etiology was found.  Possible viral syndrome?  Patient did complain of back pain only and after CT scans were resulted we did speak with [facility] for admission but ultimately we did obtain further imaging prior to doing so.  No acute infectious etiology found on lumbar CT therefore was accepted to [facility] service for admission.  Did empirically cover for lung etiology.     Differential included but was not limited to pneumonia, PE, dehydration, respiratory failure, CHF, hypertension, abscess, cellulitis, wound, dysuria/UTI, pyelonephritis, abscess, diverticulitis, viral syndrome     Amount and/or Complexity of Data Reviewed  Labs: ordered. Decision-making details documented in ED Course.  Radiology: ordered and independent interpretation performed. Decision-making details documented in ED Course.  ECG/medicine tests: ordered and independent interpretation performed. Decision-making details documented in ED Course.    Risk  OTC drugs.  Prescription drug management.  Decision regarding hospitalization.        ED Course as of 05/22/25 2208   Thu May 22, 2025   1550 Patient's BMI > 30. For purposes of fluid resuscitation, IBW was utilized to calculate target volume to be administered.      1816 Reaching out to DR. Bright / [facility] for admission    1832 [facility] recs CT lumbar and cervical spine prior to admission here because the patient has fevers and pain in the low back       Medications   enoxaparin (LOVENOX) subcutaneous injection 40 mg (has no administration in time range)   potassium chloride (Klor-Con M20) CR tablet 40 mEq (has no administration in time range)   acetaminophen (TYLENOL) tablet 975 mg (has no administration in time range)   oxyCODONE (ROXICODONE) IR tablet 5 mg (has no administration in time range)   sodium chloride 0.9 % bolus 500 mL (0 mL Intravenous Stopped 5/22/25 1633)   multi-electrolyte (Plasmalyte-A/Isolyte-S PH 7.4/Normosol-R) IV  bolus 1,000 mL (0 mL Intravenous Stopped 5/22/25 1823)     Followed by   multi-electrolyte (Plasmalyte-A/Isolyte-S PH 7.4/Normosol-R) IV bolus 1,000 mL (0 mL Intravenous Stopped 5/22/25 1823)     Followed by   multi-electrolyte (Plasmalyte-A/Isolyte-S PH 7.4/Normosol-R) IV bolus 1,000 mL (0 mL Intravenous Stopped 5/22/25 1834)   ampicillin-sulbactam (UNASYN) 3 g in sodium chloride 0.9 % 100 mL IVPB (0 g Intravenous Stopped 5/22/25 1626)   acetaminophen (TYLENOL) tablet 975 mg (975 mg Oral Given 5/22/25 1555)   magnesium sulfate 2 g/50 mL IVPB (premix) 2 g (0 g Intravenous Stopped 5/22/25 1816)   iohexol (OMNIPAQUE) 350 MG/ML injection (MULTI-DOSE) 100 mL (100 mL Intravenous Given 5/22/25 1649)   potassium chloride (Klor-Con M20) CR tablet 40 mEq (40 mEq Oral Given 5/22/25 1823)       ED Risk Strat Scores                    No data recorded        SBIRT 20yo+      Flowsheet Row Most Recent Value   Initial Alcohol Screen: US AUDIT-C     1. How often do you have a drink containing alcohol? 0 Filed at: 05/22/2025 1459   2. How many drinks containing alcohol do you have on a typical day you are drinking?  0 Filed at: 05/22/2025 1459   3b. FEMALE Any Age, or MALE 65+: How often do you have 4 or more drinks on one occassion? 0 Filed at: 05/22/2025 1459   Audit-C Score 0 Filed at: 05/22/2025 1459   JUAN DAVID: How many times in the past year have you...    Used an illegal drug or used a prescription medication for non-medical reasons? Never Filed at: 05/22/2025 1459                            History of Present Illness       Chief Complaint   Patient presents with    Back Pain     Has chronic lower back pain but worsening yesterday into today       Past Medical History[1]   Past Surgical History[2]   Family History[3]   Social History[4]   E-Cigarette/Vaping    E-Cigarette Use Never User       E-Cigarette/Vaping Substances    Nicotine No     THC No     CBD No     Flavoring No     Other No     Unknown No       I have reviewed and  agree with the history as documented.     The patient is a 65-year-old male who presents from home for back pain generalized weakness.  The patient states that he has had low back pain for 3 weeks.  It gradually worsened.  He was unable to get a bed today so his wife made him call EMS.  The patient did not see any provider for this.  Has been taking Tylenol at home.  He denies any falls or traumas any lower leg weakness numbness tingling bowel or bladder incontinence abdominal pain chest pain shortness of breath cough.  Upon arrival the patient was hypoxic 87% on room air and was placed on oxygen denies any use of oxygen at home.          Review of Systems   All other systems reviewed and are negative.          Objective       ED Triage Vitals   Temperature Pulse Blood Pressure Respirations SpO2 Patient Position - Orthostatic VS   05/22/25 1536 05/22/25 1459 05/22/25 1459 05/22/25 1459 05/22/25 1459 05/22/25 1459   (S) (!) 101.1 °F (38.4 °C) 105 162/77 18 (S) (!) 87 % Lying      Temp Source Heart Rate Source BP Location FiO2 (%) Pain Score    05/22/25 1536 05/22/25 1459 05/22/25 1459 -- 05/22/25 1459    Oral Monitor Left arm  4      Vitals      Date and Time Temp Pulse SpO2 Resp BP Pain Score FACES Pain Rating User   05/22/25 2202 98.6 °F (37 °C) 79 89 % 17 117/70 -- -- DII   05/22/25 2101 97.9 °F (36.6 °C) 97 95 % 17 143/91 -- -- DII   05/22/25 2101 -- -- -- -- -- No Pain -- KS   05/22/25 2015 -- 83 95 % 17 138/68 -- -- AD   05/22/25 2000 97.9 °F (36.6 °C) 84 96 % 17 132/76 -- -- AD   05/22/25 1915 -- 97 94 % 18 144/77 -- -- AD   05/22/25 1900 -- 92 95 % 19 144/77 -- -- AD   05/22/25 1815 -- 91 93 % 20 175/93 -- -- SB   05/22/25 1800 -- 85 93 % 16 122/63 -- -- SB   05/22/25 1745 98.5 °F (36.9 °C) 88 95 % 20 138/69 -- -- SB   05/22/25 1730 -- 89 95 % 20 134/67 -- -- SB   05/22/25 1715 -- 92 96 % 18 113/60 -- -- SB   05/22/25 1600 -- 102 94 % 18 113/65 -- -- SB   05/22/25 1555 -- -- -- -- -- Med Not Given for Pain -  for MAR use only -- SB   05/22/25 1536  101.1 °F (38.4 °C) -- -- -- -- -- -- SB   05/22/25 1459 -- 105  87 % 18 162/77 4 -- SB            Physical Exam  Vitals and nursing note reviewed.   Constitutional:       General: He is in acute distress.      Appearance: He is well-developed.   HENT:      Head: Normocephalic.      Mouth/Throat:      Mouth: Mucous membranes are dry.     Eyes:      Pupils: Pupils are equal, round, and reactive to light.       Cardiovascular:      Rate and Rhythm: Regular rhythm. Tachycardia present.      Heart sounds: No murmur heard.  Pulmonary:      Effort: Pulmonary effort is normal. No respiratory distress.      Breath sounds: Normal breath sounds.   Abdominal:      General: Bowel sounds are normal.      Palpations: Abdomen is soft.      Tenderness: There is no abdominal tenderness.     Musculoskeletal:         General: No deformity or signs of injury.      Cervical back: Normal range of motion.      Thoracic back: Normal.      Lumbar back: Tenderness present. No bony tenderness. Negative right straight leg raise test and negative left straight leg raise test.        Back:       Right lower leg: Edema present.      Left lower leg: Edema present.     Skin:     General: Skin is warm and dry.      Capillary Refill: Capillary refill takes less than 2 seconds.     Neurological:      General: No focal deficit present.      Mental Status: He is alert and oriented to person, place, and time.     Psychiatric:         Behavior: Behavior normal.         Results Reviewed       Procedure Component Value Units Date/Time    Blood culture #1 [[id number]] Collected: 05/22/25 1535    Lab Status: Preliminary result Specimen: Blood from Arm, Left Updated: 05/22/25 2101     Blood Culture Received in Microbiology Lab. Culture in Progress.    Blood culture #2 [[id number]] Collected: 05/22/25 1525    Lab Status: Preliminary result Specimen: Blood from Arm, Right Updated: 05/22/25 2101     Blood Culture Received in  Microbiology Lab. Culture in Progress.    FLU/RSV/COVID - if FLU/RSV clinically relevant [[id number]]     Lab Status: No result Specimen: Nares from Nose     HS Troponin I 4hr [[id number]]  (Normal) Collected: 05/22/25 1910    Lab Status: Final result Specimen: Blood from Arm, Left Updated: 05/22/25 1939     hs TnI 4hr 17 ng/L      Delta 4hr hsTnI 4 ng/L     C-reactive protein [[id number]]  (Abnormal) Collected: 05/22/25 1744    Lab Status: Final result Specimen: Blood from Arm, Left Updated: 05/22/25 1921     .0 mg/L     Urine Microscopic [[id number]]  (Normal) Collected: 05/22/25 1832    Lab Status: Final result Specimen: Urine, Clean Catch Updated: 05/22/25 1914     RBC, UA 0-1 /hpf      WBC, UA 0-1 /hpf      Epithelial Cells Occasional /hpf      Bacteria, UA Occasional /hpf     UA w Reflex to Microscopic w Reflex to Culture [[id number]]  (Abnormal) Collected: 05/22/25 1832    Lab Status: Final result Specimen: Urine, Clean Catch Updated: 05/22/25 1846     Color, UA Yellow     Clarity, UA Clear     Specific Gravity, UA 1.010     pH, UA 6.0     Leukocytes, UA Negative     Nitrite, UA Negative     Protein, UA Negative mg/dl      Glucose, UA Negative mg/dl      Ketones, UA Negative mg/dl      Urobilinogen, UA 0.2 E.U./dl      Bilirubin, UA Negative     Occult Blood, UA Trace-Intact    HS Troponin I 2hr [[id number]]  (Normal) Collected: 05/22/25 1744    Lab Status: Final result Specimen: Blood from Arm, Left Updated: 05/22/25 1817     hs TnI 2hr 19 ng/L      Delta 2hr hsTnI 6 ng/L     Procalcitonin [[id number]]  (Abnormal) Collected: 05/22/25 1525    Lab Status: Final result Specimen: Blood from Arm, Right Updated: 05/22/25 1609     Procalcitonin 0.58 ng/ml     HS Troponin 0hr (reflex protocol) [[id number]]  (Normal) Collected: 05/22/25 1525    Lab Status: Final result Specimen: Blood from Arm, Right Updated: 05/22/25 1609     hs TnI 0hr 13 ng/L     B-Type Natriuretic Peptide(BNP) [[id number]]  (Normal) Collected:  05/22/25 1525    Lab Status: Final result Specimen: Blood from Arm, Right Updated: 05/22/25 1604     BNP 38 pg/mL     Comprehensive metabolic panel [[id number]]  (Abnormal) Collected: 05/22/25 1525    Lab Status: Final result Specimen: Blood from Arm, Right Updated: 05/22/25 1556     Sodium 138 mmol/L      Potassium 2.7 mmol/L      Chloride 100 mmol/L      CO2 29 mmol/L      ANION GAP 9 mmol/L      BUN 11 mg/dL      Creatinine 1.01 mg/dL      Glucose 130 mg/dL      Calcium 8.6 mg/dL      AST 19 U/L      ALT 29 U/L      Alkaline Phosphatase 60 U/L      Total Protein 7.2 g/dL      Albumin 4.3 g/dL      Total Bilirubin 2.54 mg/dL      eGFR 77 ml/min/1.73sq m     Narrative:      National Kid[employer] guidelines for Chronic Kidney Disease (CKD):     Stage 1 with normal or high GFR (GFR > 90 mL/min/1.73 square meters)    Stage 2 Mild CKD (GFR = 60-89 mL/min/1.73 square meters)    Stage 3A Moderate CKD (GFR = 45-59 mL/min/1.73 square meters)    Stage 3B Moderate CKD (GFR = 30-44 mL/min/1.73 square meters)    Stage 4 Severe CKD (GFR = 15-29 mL/min/1.73 square meters)    Stage 5 End Stage CKD (GFR <15 mL/min/1.73 square meters)  Note: GFR calculation is accurate only with a steady state creatinine    Magnesium [[id number]]  (Abnormal) Collected: 05/22/25 1525    Lab Status: Final result Specimen: Blood from Arm, Right Updated: 05/22/25 1556     Magnesium 1.6 mg/dL     Lactic acid [[id number]]  (Normal) Collected: 05/22/25 1525    Lab Status: Final result Specimen: Blood from Arm, Right Updated: 05/22/25 1555     LACTIC ACID 1.3 mmol/L     Narrative:      Result may be elevated if tourniquet was used during collection.    Protime-INR [[id number]]  (Normal) Collected: 05/22/25 1525    Lab Status: Final result Specimen: Blood from Arm, Right Updated: 05/22/25 1553     Protime 14.4 seconds      INR 1.08    Narrative:      INR Therapeutic Range    Indication                                             INR  Range      Atrial Fibrillation                                               2.0-3.0  Hypercoagulable State                                    2.0.2.3  Left Ventricular Asist Device                            2.0-3.0  Mechanical Heart Valve                                  -    Aortic(with afib, MI, embolism, HF, LA enlargement,    and/or coagulopathy)                                     2.0-3.0 (2.5-3.5)     Mitral                                                             2.5-3.5  Prosthetic/Bioprosthetic Heart Valve               2.0-3.0  Venous thromboembolism (VTE: VT, PE        2.0-3.0    APTT [[id number]]  (Normal) Collected: 05/22/25 1525    Lab Status: Final result Specimen: Blood from Arm, Right Updated: 05/22/25 1553     PTT 31 seconds     FLU/COVID Rapid Antigen (30 min. TAT) - Preferred screening test in ED [[id number]]  (Normal) Collected: 05/22/25 1526    Lab Status: Final result Specimen: Nares from Nose Updated: 05/22/25 1550     SARS COV Rapid Antigen Negative     Influenza A Rapid Antigen Negative     Influenza B Rapid Antigen Negative    Narrative:      This test has been performed using the [employer] Tosha 2 FLU+SARS Antigen test under the Emergency Use Authorization (EUA). This test has been validated by the  and verified by the performing laboratory. The Tosha uses lateral flow immunofluorescent sandwich assay to detect SARS-COV, Influenza A and Influenza B Antigen.     The [employer] Tosha 2 SARS Antigen test does not differentiate between SARS-CoV and SARS-CoV-2.     Negative results are presumptive and may be confirmed with a molecular assay, if necessary, for patient management. Negative results do not rule out SARS-CoV-2 or influenza infection and should not be used as the sole basis for treatment or patient management decisions. A negative test result may occur if the level of antigen in a sample is below the limit of detection of this test.     Positive results are indicative of the  presence of viral antigens, but do not rule out bacterial infection or co-infection with other viruses.     All test results should be used as an adjunct to clinical observations and other information available to the provider.    FOR PEDIATRIC PATIENTS - copy/paste COVID Guidelines URL to browser: [email or web address]    Blood gas, Venous [[id number]]  (Abnormal) Collected: 05/22/25 1525    Lab Status: Final result Specimen: Blood from Arm, Right Updated: 05/22/25 1541     pH, Maximus 7.478     pCO2, Maximus 36.8 mm Hg      pO2, Maximus 66.6 mm Hg      HCO3, Maximus 26.7 mmol/L      Base Excess, Maximus 3.3 mmol/L      O2 Content, Maximus 19.1 ml/dL      O2 HGB, VENOUS 92.6 %     CBC and differential [[id number]]  (Abnormal) Collected: 05/22/25 1525    Lab Status: Final result Specimen: Blood from Arm, Right Updated: 05/22/25 1536     WBC 7.80 Thousand/uL      RBC 4.37 Million/uL      Hemoglobin 13.9 g/dL      Hematocrit 40.4 %      MCV 92 fL      MCH 31.8 pg      MCHC 34.4 g/dL      RDW 13.7 %      MPV 9.0 fL      Platelets 142 Thousands/uL      nRBC 0 /100 WBCs      Segmented % 80 %      Immature Grans % 1 %      Lymphocytes % 8 %      Monocytes % 8 %      Eosinophils Relative 3 %      Basophils Relative 0 %      Absolute Neutrophils 6.26 Thousands/µL      Absolute Immature Grans 0.04 Thousand/uL      Absolute Lymphocytes 0.61 Thousands/µL      Absolute Monocytes 0.63 Thousand/µL      Eosinophils Absolute 0.23 Thousand/µL      Basophils Absolute 0.03 Thousands/µL             CT cervical spine without contrast   Final Interpretation by Phillip Garcia MD (05/22 2042)      No evidence of acute cervical spine fracture or traumatic malalignment.                  Workstation performed: [id number]         CT recon only lumbar spine   Final Interpretation by Kvng Fernandes MD (05/22 1935)      1.  No evidence of discitis osteomyelitis of the lumbar spine. Multilevel degenerative  changes of the lumbar spine, detailed above, with degenerative disc disease worst at the L3-4 level where there is at least moderate central canal stenosis.   2.  Please see the CT of the abdomen and pelvis from earlier the same day for full description of the intra-abdominal findings.            Workstation performed: [id number]         CT pe study w abdomen pelvis w contrast   Final Interpretation by Kvng Fernandes MD (05/22 7295)      1.  No evidence of pulmonary embolism or other acute pathology within the chest, abdomen, or pelvis. No clear CT evidence of osteomyelitis discitis.   2.  Chronic findings, detailed above.         Workstation performed: [id number]         XR chest portable   Final Interpretation by Sammy Veelz MD (05/22 9100)      No acute cardiopulmonary disease.            Workstation performed: [id number]             ECG 12 Lead Documentation Only    Date/Time: 5/22/2025 3:29 PM    Performed by: Mark Pickard PA-C  Authorized by: Mark Pickard PA-C    Indications / Diagnosis:  Hypoxia  ECG reviewed by me, the ED Provider: yes    Patient location:  ED  Previous ECG:     Previous ECG:  Unavailable  Rate:     ECG rate:  104    ECG rate assessment: tachycardic    Rhythm:     Rhythm: sinus tachycardia    ST segments:     ST segments:  Normal  T waves:     T waves: normal        ED Medication and Procedure Management   Prior to Admission Medications   Prescriptions Last Dose Informant Patient Reported? Taking?   acetaminophen (TYLENOL) 650 mg CR tablet Past Week Self No Yes   Sig: Take 1 tablet (650 mg total) by mouth every 8 (eight) hours as needed for mild pain   aspirin 81 mg chewable tablet Not Taking Self No No   Sig: Chew 1 tablet (81 mg total) daily   Patient not taking: Reported on 5/22/2025   clopidogrel (PLAVIX) 75 mg tablet Not Taking  Yes No   Sig: Take 75 mg by mouth daily   Patient not taking: Reported on 5/22/2025   metoprolol succinate (TOPROL-XL) 25 mg 24 hr tablet  Past Week  No Yes   Sig: Take 1 tablet (25 mg total) by mouth daily   polyethylene glycol (MIRALAX) 17 g packet Not Taking Self No No   Sig: Take 17 g by mouth daily   Patient not taking: Reported on 5/22/2025   potassium chloride (Klor-Con M20) 20 mEq tablet   No No   Sig: Take 1 tablet (20 mEq total) by mouth 2 (two) times a day for 10 days   Patient not taking: Reported on 1/10/2025   valsartan-hydrochlorothiazide (DIOVAN-HCT) 320-25 MG per tablet Past Week  No Yes   Sig: Take 1 tablet by mouth daily      Facility-Administered Medications: None     Current Discharge Medication List        CONTINUE these medications which have NOT CHANGED    Details   acetaminophen (TYLENOL) 650 mg CR tablet Take 1 tablet (650 mg total) by mouth every 8 (eight) hours as needed for mild pain  Qty: 30 tablet, Refills: 0    Associated Diagnoses: Pain, dental      metoprolol succinate (TOPROL-XL) 25 mg 24 hr tablet Take 1 tablet (25 mg total) by mouth daily  Qty: 90 tablet, Refills: 3    Associated Diagnoses: SVT (supraventricular tachycardia) ([facility]); Primary hypertension      valsartan-hydrochlorothiazide (DIOVAN-HCT) 320-25 MG per tablet Take 1 tablet by mouth daily  Qty: 90 tablet, Refills: 3    Associated Diagnoses: Primary hypertension      aspirin 81 mg chewable tablet Chew 1 tablet (81 mg total) daily    Associated Diagnoses: CVA (cerebral vascular accident) (HCC)      clopidogrel (PLAVIX) 75 mg tablet Take 75 mg by mouth daily      polyethylene glycol (MIRALAX) 17 g packet Take 17 g by mouth daily  Refills: 0    Associated Diagnoses: Constipation      potassium chloride (Klor-Con M20) 20 mEq tablet Take 1 tablet (20 mEq total) by mouth 2 (two) times a day for 10 days  Qty: 20 tablet, Refills: 0    Associated Diagnoses: Cholecystitis           No discharge procedures on file.  ED SEPSIS DOCUMENTATION   Time reflects when diagnosis was documented in both MDM as applicable and the Disposition within this note       Time User  "Action Codes Description Comment    5/22/2025  6:12 PM Melly, Flores Add [M54.50] Acute low back pain     5/22/2025  6:13 PM Melly, Flores Add [R09.02] Hypoxia     5/22/2025  6:13 PM Melly, Flores Add [A41.9] Sepsis (HCC)     5/22/2025  6:17 PM Melly, Flores Add [E87.6] Hypokalemia     5/22/2025  6:17 PM Melly, Flores Add [M54.50] Acute bilateral low back pain without sciatica     5/22/2025  8:24 PM Melly, Flores Remove [A41.9] Sepsis (HCC)     5/22/2025  8:24 PM Melly, Flores Add [R65.10] SIRS (systemic inflammatory response syndrome) (HCC)     5/22/2025  9:47 PM Mariluz, Cate Add [R19.7] Diarrhea            Initial Sepsis Screening       Row Name 05/22/25 1812                Is the patient's history suggestive of a new or worsening infection? Yes (Proceed)  -SB        Suspected source of infection suspect infection, source unknown  -SB        Indicate SIRS criteria Hyperthemia > 38.3C (100.9F) OR Hypothermia <36C (96.8F);Tachycardia > 90 bpm;Leukocytosis (WBC > 65247 IJL) OR Leukopenia (WBC <4000 IJL) OR Bandemia (WBC >10% bands)  -SB        Are two or more of the above signs & symptoms of infection both present and new to the patient? Yes (Proceed)  -SB        Assess for evidence of organ dysfunction: Are any of the below criteria present within 6 hours of suspected infection and SIRS criteria that are NOT considered to be chronic conditions? --                  User Key  (r) = Recorded By, (t) = Taken By, (c) = Cosigned By      Initials Name Provider Type    SB Mark Pickard PA-C Physician Assistant                         [1]   Past Medical History:  Diagnosis Date    Diverticulitis large intestine     \"During Bowel Resection determined not to have Diverticulitis.\"- Patient    Hypertension     TIA (transient ischemic attack)    [2]   Past Surgical History:  Procedure Laterality Date    ANTERIOR CRUCIATE LIGAMENT REPAIR Right     ACL Repair    BOWEL RESECTION      CAROTID ENDARTARECTOMY Right 7/26/2021    " Procedure: ENDARTERECTOMY ARTERY CAROTID-- Right;  Surgeon: Jerod Granger MD;  Location: [facility];  Service: Vascular    FOOT SURGERY Right     patient not sure procedure    DE LAPAROSCOPY SURG CHOLECYSTECTOMY N/A 10/24/2024    Procedure: attempted CHOLECYSTECTOMY LAPAROSCOPIC W/ ROBOTICS converted to laparoscopic subtotal cholecystectomy;  Surgeon: Beverley Parra DO;  Location:  MAIN OR;  Service: General    SHOULDER SURGERY Bilateral     Rotator cuff/ Right shoulder bone growth removal   [3] No family history on file.  [4]   Social History  Tobacco Use    Smoking status: Former     Types: Cigarettes    Smokeless tobacco: Former     Types: Chew   Vaping Use    Vaping status: Never Used   Substance Use Topics    Alcohol use: Yes     Alcohol/week: 1.0 - 2.0 standard drink of alcohol     Types: 1 - 2 Cans of beer per week    Drug use: Never        Mark Pickadr PA-C  05/22/25 8440

## 2025-05-22 NOTE — ED NOTES
Patient O2 saturation 87% on room air, placed on 2L nasal cannula.     Tammy Ball RN  05/22/25 9432

## 2025-05-22 NOTE — SEPSIS NOTE
Sepsis Note   Hua Acevedo 65 y.o. male MRN: [id number]  Unit/Bed#: ED 05 Encounter: [id number]       Initial Sepsis Screening       Row Name 05/22/25 1812                Is the patient's history suggestive of a new or worsening infection? Yes (Proceed)  -SB        Suspected source of infection suspect infection, source unknown  -SB        Indicate SIRS criteria Hyperthemia > 38.3C (100.9F) OR Hypothermia <36C (96.8F);Tachycardia > 90 bpm;Leukocytosis (WBC > 28668 IJL) OR Leukopenia (WBC <4000 IJL) OR Bandemia (WBC >10% bands)  -SB        Are two or more of the above signs & symptoms of infection both present and new to the patient? Yes (Proceed)  -SB        Assess for evidence of organ dysfunction: Are any of the below criteria present within 6 hours of suspected infection and SIRS criteria that are NOT considered to be chronic conditions? --                  User Key  (r) = Recorded By, (t) = Taken By, (c) = Cosigned By      Initials Name Provider Type    TILA Pickard PA-C Physician Assistant                   Initial Sepsis Screening       Row Name 05/22/25 1812                   Initial Sepsis Assessment    Is the patient's history suggestive of a new or worsening infection? Yes (Proceed)  -SB        Suspected source of infection suspect infection, source unknown  -SB        Indicate SIRS criteria Hyperthemia > 38.3C (100.9F) OR Hypothermia <36C (96.8F);Tachycardia > 90 bpm;Leukocytosis (WBC > 71605 IJL) OR Leukopenia (WBC <4000 IJL) OR Bandemia (WBC >10% bands)  -SB        Are two or more of the above signs & symptoms of infection both present and new to the patient? Yes (Proceed)  -SB                  User Key  (r) = Recorded By, (t) = Taken By, (c) = Cosigned By      Initials Name Provider Type    TILA Pickard PA-C Physician Assistant                         Body mass index is 34.44 kg/m².  Wt Readings from Last 1 Encounters:   05/22/25 106 kg (233 lb 4 oz)     IBW (Ideal Body  Weight): 70.7 kg    Ideal body weight: 70.7 kg (155 lb 13.8 oz)  Adjusted ideal body weight: 84.7 kg (186 lb 13.1 oz)

## 2025-05-23 PROBLEM — K62.5 BRBPR (BRIGHT RED BLOOD PER RECTUM): Status: ACTIVE | Noted: 2025-05-23

## 2025-05-23 PROBLEM — E83.42 HYPOMAGNESEMIA: Status: ACTIVE | Noted: 2025-05-23

## 2025-05-23 PROBLEM — R65.10 SIRS (SYSTEMIC INFLAMMATORY RESPONSE SYNDROME) (HCC): Status: ACTIVE | Noted: 2025-05-23

## 2025-05-23 PROBLEM — R17 ELEVATED BILIRUBIN: Status: ACTIVE | Noted: 2025-05-23

## 2025-05-23 PROBLEM — M54.9 BACK PAIN: Status: ACTIVE | Noted: 2025-05-23

## 2025-05-23 LAB
ALBUMIN SERPL BCG-MCNC: 3.6 G/DL (ref 3.5–5)
ALP SERPL-CCNC: 49 U/L (ref 34–104)
ALT SERPL W P-5'-P-CCNC: 24 U/L (ref 7–52)
ANION GAP SERPL CALCULATED.3IONS-SCNC: 5 MMOL/L (ref 4–13)
AST SERPL W P-5'-P-CCNC: 23 U/L (ref 13–39)
B BURGDOR IGG+IGM SER QL IA: NEGATIVE
BILIRUB SERPL-MCNC: 1.57 MG/DL (ref 0.2–1)
BUN SERPL-MCNC: 8 MG/DL (ref 5–25)
CALCIUM SERPL-MCNC: 7.9 MG/DL (ref 8.4–10.2)
CHLORIDE SERPL-SCNC: 104 MMOL/L (ref 96–108)
CO2 SERPL-SCNC: 30 MMOL/L (ref 21–32)
CREAT SERPL-MCNC: 0.87 MG/DL (ref 0.6–1.3)
ERYTHROCYTE [DISTWIDTH] IN BLOOD BY AUTOMATED COUNT: 13.8 % (ref 11.6–15.1)
ERYTHROCYTE [SEDIMENTATION RATE] IN BLOOD: 20 MM/HOUR (ref 0–19)
GFR SERPL CREATININE-BSD FRML MDRD: 90 ML/MIN/1.73SQ M
GLUCOSE SERPL-MCNC: 120 MG/DL (ref 65–140)
HCT VFR BLD AUTO: 36.1 % (ref 36.5–49.3)
HGB BLD-MCNC: 12.4 G/DL (ref 12–17)
MAGNESIUM SERPL-MCNC: 2.3 MG/DL (ref 1.9–2.7)
MCH RBC QN AUTO: 32.1 PG (ref 26.8–34.3)
MCHC RBC AUTO-ENTMCNC: 34.3 G/DL (ref 31.4–37.4)
MCV RBC AUTO: 94 FL (ref 82–98)
PHOSPHATE SERPL-MCNC: 1.6 MG/DL (ref 2.3–4.1)
PLATELET # BLD AUTO: 143 THOUSANDS/UL (ref 149–390)
PMV BLD AUTO: 9 FL (ref 8.9–12.7)
POTASSIUM SERPL-SCNC: 3.3 MMOL/L (ref 3.5–5.3)
PROT SERPL-MCNC: 6.1 G/DL (ref 6.4–8.4)
RBC # BLD AUTO: 3.86 MILLION/UL (ref 3.88–5.62)
SODIUM SERPL-SCNC: 139 MMOL/L (ref 135–147)
TSH SERPL DL<=0.05 MIU/L-ACNC: 1.43 UIU/ML (ref 0.45–4.5)
WBC # BLD AUTO: 4.86 THOUSAND/UL (ref 4.31–10.16)

## 2025-05-23 PROCEDURE — 83735 ASSAY OF MAGNESIUM: CPT | Performed by: NURSE PRACTITIONER

## 2025-05-23 PROCEDURE — 80053 COMPREHEN METABOLIC PANEL: CPT | Performed by: NURSE PRACTITIONER

## 2025-05-23 PROCEDURE — 85652 RBC SED RATE AUTOMATED: CPT | Performed by: NURSE PRACTITIONER

## 2025-05-23 PROCEDURE — 99232 SBSQ HOSP IP/OBS MODERATE 35: CPT | Performed by: HOSPITALIST

## 2025-05-23 PROCEDURE — 86618 LYME DISEASE ANTIBODY: CPT | Performed by: NURSE PRACTITIONER

## 2025-05-23 PROCEDURE — 84100 ASSAY OF PHOSPHORUS: CPT | Performed by: NURSE PRACTITIONER

## 2025-05-23 PROCEDURE — 85027 COMPLETE CBC AUTOMATED: CPT | Performed by: NURSE PRACTITIONER

## 2025-05-23 PROCEDURE — 84443 ASSAY THYROID STIM HORMONE: CPT | Performed by: NURSE PRACTITIONER

## 2025-05-23 RX ORDER — CHOLESTYRAMINE LIGHT 4 G/5.7G
4 POWDER, FOR SUSPENSION ORAL DAILY
Status: DISCONTINUED | OUTPATIENT
Start: 2025-05-23 | End: 2025-05-24

## 2025-05-23 RX ORDER — ASPIRIN 81 MG/1
81 TABLET, CHEWABLE ORAL DAILY
Status: DISCONTINUED | OUTPATIENT
Start: 2025-05-23 | End: 2025-05-26 | Stop reason: HOSPADM

## 2025-05-23 RX ORDER — METOPROLOL SUCCINATE 25 MG/1
25 TABLET, EXTENDED RELEASE ORAL DAILY
Status: DISCONTINUED | OUTPATIENT
Start: 2025-05-23 | End: 2025-05-26 | Stop reason: HOSPADM

## 2025-05-23 RX ORDER — CEFTRIAXONE 2 G/50ML
2000 INJECTION, SOLUTION INTRAVENOUS EVERY 24 HOURS
Status: DISCONTINUED | OUTPATIENT
Start: 2025-05-23 | End: 2025-05-25

## 2025-05-23 RX ORDER — LOSARTAN POTASSIUM 50 MG/1
100 TABLET ORAL DAILY
Status: DISCONTINUED | OUTPATIENT
Start: 2025-05-23 | End: 2025-05-26 | Stop reason: HOSPADM

## 2025-05-23 RX ORDER — POTASSIUM CHLORIDE 14.9 MG/ML
20 INJECTION INTRAVENOUS
Status: COMPLETED | OUTPATIENT
Start: 2025-05-23 | End: 2025-05-23

## 2025-05-23 RX ORDER — MAGNESIUM SULFATE 1 G/100ML
1 INJECTION INTRAVENOUS ONCE
Status: DISCONTINUED | OUTPATIENT
Start: 2025-05-23 | End: 2025-05-23

## 2025-05-23 RX ADMIN — SODIUM CHLORIDE, SODIUM GLUCONATE, SODIUM ACETATE, POTASSIUM CHLORIDE, MAGNESIUM CHLORIDE, SODIUM PHOSPHATE, DIBASIC, AND POTASSIUM PHOSPHATE 100 ML/HR: .53; .5; .37; .037; .03; .012; .00082 INJECTION, SOLUTION INTRAVENOUS at 09:09

## 2025-05-23 RX ADMIN — POTASSIUM CHLORIDE 20 MEQ: 14.9 INJECTION, SOLUTION INTRAVENOUS at 19:32

## 2025-05-23 RX ADMIN — ASPIRIN 81 MG CHEWABLE TABLET 81 MG: 81 TABLET CHEWABLE at 09:08

## 2025-05-23 RX ADMIN — METOPROLOL SUCCINATE ER TABLETS 25 MG: 25 TABLET, FILM COATED, EXTENDED RELEASE ORAL at 09:08

## 2025-05-23 RX ADMIN — LOSARTAN POTASSIUM 100 MG: 50 TABLET, FILM COATED ORAL at 09:08

## 2025-05-23 RX ADMIN — SODIUM CHLORIDE, SODIUM GLUCONATE, SODIUM ACETATE, POTASSIUM CHLORIDE, MAGNESIUM CHLORIDE, SODIUM PHOSPHATE, DIBASIC, AND POTASSIUM PHOSPHATE 100 ML/HR: .53; .5; .37; .037; .03; .012; .00082 INJECTION, SOLUTION INTRAVENOUS at 19:49

## 2025-05-23 RX ADMIN — Medication 6 MG: at 23:19

## 2025-05-23 RX ADMIN — CEFTRIAXONE 2000 MG: 2 INJECTION, SOLUTION INTRAVENOUS at 05:45

## 2025-05-23 RX ADMIN — POTASSIUM CHLORIDE 20 MEQ: 14.9 INJECTION, SOLUTION INTRAVENOUS at 16:52

## 2025-05-23 RX ADMIN — ENOXAPARIN SODIUM 40 MG: 40 INJECTION SUBCUTANEOUS at 09:09

## 2025-05-23 RX ADMIN — CHOLESTYRAMINE LIGHT 4 G: 4 POWDER, FOR SUSPENSION ORAL at 12:07

## 2025-05-23 NOTE — CONSULTS
Consultation - Gastroenterology   Name: Hua Acevedo 65 y.o. 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 No other GI symptoms at this time. Had a hx of BRBPR in the past with this diarrhea which may be related to outlet bleeding from the stool frequency vs IBD  -r/o infection including C diff  -Trial of questran. Hold off on imodium/lomotil until infection can be ruled out.  -ok for diet as tolerated from GI standpoint. Would limits/avoid dairy or try BRAT diet if diarrhea persists.  -no inpatient colonoscopy planned at this time but would recommend one outpatient for hx of BRBPR    BRBPR (bright red blood per rectum)  As above. No bleeding currently.   Elevated bilirubin  Mildly elevated on arrival, no indication of obstruction on CT PE 5/22/25      History of Present Illness   HPI:  Hua Acevedo is a 65 y.o. male with PMHx of SVT, HTN, bilateral carotid stenosis s/p R carotid endarterectomy 7/21, Hx of R CVA, CCY recently for stones and cholecystitis (pt reports about 3 months ago) who presents with back pain, generalized weakness, and malaise for several days. On arrival, he was febrile meeting SIRS criteria with hypokalemia, hypomagnesemia. GI consulted for persistent diarrhea since CCY about 3 months ago    He reports 3 months of up to 8 BMs daily with loose to watery stool. He has urgency shortly after eating daily and has nocturnal BMs. He reports BRBPR with the diarrhea for the first month or so  of his symptoms but this has not happened recently. No black BMs and denies significant upper GI symptoms. No known family or personal hx of IBD or celiac dz. He reports a prior colonoscopy in [place] 3-5 years ago. Not available to review at this time. He is able to eat a normal diet.   Denies a hx of pancreatitis. No recent abx- likely was on abx at time of CCY but none since. No recent travel or sick contacts.     CT without significant abdominal findings. S/p CCY  Labs on arrival notable for isolated elevated T bili 2.54 which is down to 1.57 today.     Endoscopic risk assessment:  Anticoagulation use:plavix, aspirin  Diabetes medication:n  [facility] history:stroke, carotid stenosis  Abdominal surgeries:ccy   Sleep apnea:n  O2 use:n    Past Medical History[1]  Past Surgical History[2]  Social History   Social History     Substance and Sexual Activity   Alcohol Use Yes    Alcohol/week: 1.0 - 2.0 standard drink of alcohol    Types: 1 - 2 Cans of beer per week     Social History     Substance and Sexual Activity   Drug Use Never     Tobacco Use History[3]    Family History[4]      Medications Prior to Admission:     acetaminophen (TYLENOL) 650 mg CR tablet    metoprolol succinate (TOPROL-XL) 25 mg 24 hr tablet    valsartan-hydrochlorothiazide (DIOVAN-HCT) 320-25 MG per tablet    aspirin 81 mg chewable tablet    clopidogrel (PLAVIX) 75 mg tablet    polyethylene glycol (MIRALAX) 17 g packet    potassium chloride (Klor-Con M20) 20 mEq tablet    Current Facility-Administered Medications:     acetaminophen (TYLENOL) tablet 975 mg, Q8H PRN    aspirin chewable tablet 81 mg, Daily    cefTRIAXone (ROCEPHIN) IVPB (premix in dextrose) 2,000 mg 50 mL, Q24H, Last Rate: 2,000 mg (05/23/25 0545)    cholestyramine sugar free (QUESTRAN LIGHT) packet 4 g, Daily    enoxaparin (LOVENOX) subcutaneous injection 40 mg, Daily    losartan (COZAAR) tablet 100 mg, Daily    metoprolol succinate (TOPROL-XL) 24 hr tablet 25 mg, Daily     "multi-electrolyte (Plasmalyte-A/Isolyte-S PH 7.4/Normosol-R) IV solution, Continuous, Last Rate: 100 mL/hr (05/23/25 0909)    oxyCODONE (ROXICODONE) IR tablet 5 mg, Q6H PRN  Allergies[5]    Physical Exam  Constitutional:       General: He is not in acute distress.     Appearance: Normal appearance. He is not ill-appearing.   HENT:      Head: Normocephalic and atraumatic.     Eyes:      Conjunctiva/sclera: Conjunctivae normal.     Pulmonary:      Effort: Pulmonary effort is normal.   Abdominal:      General: Abdomen is flat. There is no distension.      Palpations: Abdomen is soft.      Tenderness: There is no abdominal tenderness.     Skin:     General: Skin is warm and dry.      Coloration: Skin is not jaundiced.     Neurological:      Mental Status: He is alert and oriented to person, place, and time.     Psychiatric:         Mood and Affect: Mood normal.         Behavior: Behavior normal.       Most Recent Vital Signs:  Vitals:    05/22/25 2101 05/22/25 2202 05/23/25 0707 05/23/25 0909   BP: 143/91 117/70 123/73 133/76   BP Location: Right arm      Pulse: 97 79 77 86   Resp: 17 17     Temp: 97.9 °F (36.6 °C) 98.6 °F (37 °C) 99 °F (37.2 °C)    TempSrc: Temporal      SpO2: 95% (!) 89% 92% 91%   Weight:       Height: 5' 9\" (1.753 m)          Intake/Output Summary (Last 24 hours) at 5/23/2025 0934  Last data filed at 5/23/2025 0845  Gross per 24 hour   Intake 2770 ml   Output --   Net 2770 ml       LABS/IMAGING  Lab Results: I have reviewed all relevant lab results during this hospitalization.    Imaging Studies:  I have reviewed all the relevant images during this hospitalizations    Counseling / Coordination of Care  Total time spent today 45 minutes. Greater than 50% of total time was spent with the patient and / or family counseling and / or coordination of care.    Sravan Meléndez PA-C         [1]   Past Medical History:  Diagnosis Date    Diverticulitis large intestine     \"During Bowel Resection determined " "not to have Diverticulitis.\"- Patient    Hypertension     TIA (transient ischemic attack)    [2]   Past Surgical History:  Procedure Laterality Date    ANTERIOR CRUCIATE LIGAMENT REPAIR Right     ACL Repair    BOWEL RESECTION      CAROTID ENDARTARECTOMY Right 7/26/2021    Procedure: ENDARTERECTOMY ARTERY CAROTID-- Right;  Surgeon: Jerod Granger MD;  Location:  MAIN OR;  Service: Vascular    FOOT SURGERY Right     patient not sure procedure    ND LAPAROSCOPY SURG CHOLECYSTECTOMY N/A 10/24/2024    Procedure: attempted CHOLECYSTECTOMY LAPAROSCOPIC W/ ROBOTICS converted to laparoscopic subtotal cholecystectomy;  Surgeon: Beverley Parra DO;  Location:  MAIN OR;  Service: General    SHOULDER SURGERY Bilateral     Rotator cuff/ Right shoulder bone growth removal   [3]   Social History  Tobacco Use   Smoking Status Former    Types: Cigarettes   Smokeless Tobacco Former    Types: Chew   [4] No family history on file.  [5] No Known Allergies    "

## 2025-05-23 NOTE — PLAN OF CARE
Problem: PAIN - ADULT  Goal: Verbalizes/displays adequate comfort level or baseline comfort level  Description: Interventions:  - Encourage patient to monitor pain and request assistance  - Assess pain using appropriate pain scale  - Administer analgesics as ordered based on type and severity of pain and evaluate response  - Implement non-pharmacological measures as appropriate and evaluate response  - Consider cultural and social influences on pain and pain management  - Notify physician/advanced practitioner if interventions unsuccessful or patient reports new pain  - Educate patient/family on pain management process including their role and importance of  reporting pain   - Provide non-pharmacologic/complimentary pain relief interventions  Outcome: Progressing     Problem: INFECTION - ADULT  Goal: Absence or prevention of progression during hospitalization  Description: INTERVENTIONS:  - Assess and monitor for signs and symptoms of infection  - Monitor lab/diagnostic results  - Monitor all insertion sites, i.e. indwelling lines, tubes, and drains  - Monitor endotracheal if appropriate and nasal secretions for changes in amount and color  - [place] appropriate cooling/warming therapies per order  - Administer medications as ordered  - Instruct and encourage patient and family to use good hand hygiene technique  - Identify and instruct in appropriate isolation precautions for identified infection/condition  Outcome: Progressing  Goal: Absence of fever/infection during neutropenic period  Description: INTERVENTIONS:  - Monitor WBC  - Perform strict hand hygiene  - Limit to healthy visitors only  - No plants, dried, fresh or silk flowers with escalante in patient room  Outcome: Progressing     Problem: SAFETY ADULT  Goal: Patient will remain free of falls  Description: INTERVENTIONS:  - Educate patient/family on patient safety including physical limitations  - Instruct patient to call for assistance with activity   -  Consider consulting OT/PT to assist with strengthening/mobility based on AM PAC & JH-HLM score  - Consult OT/PT to assist with strengthening/mobility   - Keep Call bell within reach  - Keep bed low and locked with side rails adjusted as appropriate  - Keep care items and personal belongings within reach  - Initiate and maintain comfort rounds  - Make Fall Risk Sign visible to staff  - Offer Toileting every  Hours, in advance of need  - Initiate/Maintain alarm  - Obtain necessary fall risk management equipment:   - Apply yellow socks and bracelet for high fall risk patients  - Consider moving patient to room near nurses station  Outcome: Progressing  Goal: Maintain or return to baseline ADL function  Description: INTERVENTIONS:  -  Assess patient's ability to carry out ADLs; assess patient's baseline for ADL function and identify physical deficits which impact ability to perform ADLs (bathing, care of mouth/teeth, toileting, grooming, dressing, etc.)  - Assess/evaluate cause of self-care deficits   - Assess range of motion  - Assess patient's mobility; develop plan if impaired  - Assess patient's need for assistive devices and provide as appropriate  - Encourage maximum independence but intervene and supervise when necessary  - Involve family in performance of ADLs  - Assess for home care needs following discharge   - Consider OT consult to assist with ADL evaluation and planning for discharge  - Provide patient education as appropriate  - Monitor functional capacity and physical performance, use of AM PAC & JH-HLM   - Monitor gait, balance and fatigue with ambulation    Outcome: Progressing  Goal: Maintains/Returns to pre admission functional level  Description: INTERVENTIONS:  - Perform AM-PAC 6 Click Basic Mobility/ Daily Activity assessment daily.  - Set and communicate daily mobility goal to care team and patient/family/caregiver.   - Collaborate with rehabilitation services on mobility goals if consulted  -  Perform Range of Motion  times a day.  - Reposition patient every  hours.  - Dangle patient  times a day  - Stand patient  times a day  - Ambulate patient  times a day  - Out of bed to chair  times a day   - Out of bed for meals  times a day  - Out of bed for toileting  - Record patient progress and toleration of activity level   Outcome: Progressing     Problem: DISCHARGE PLANNING  Goal: Discharge to home or other facility with appropriate resources  Description: INTERVENTIONS:  - Identify barriers to discharge w/patient and caregiver  - Arrange for needed discharge resources and transportation as appropriate  - Identify discharge learning needs (meds, wound care, etc.)  - Arrange for interpretive services to assist at discharge as needed  - Refer to Case Management Department for coordinating discharge planning if the patient needs post-hospital services based on physician/advanced practitioner order or complex needs related to functional status, cognitive ability, or social support system  Outcome: Progressing     Problem: Knowledge Deficit  Goal: Patient/family/caregiver demonstrates understanding of disease process, treatment plan, medications, and discharge instructions  Description: Complete learning assessment and assess knowledge base.  Interventions:  - Provide teaching at level of understanding  - Provide teaching via preferred learning methods  Outcome: Progressing

## 2025-05-23 NOTE — ASSESSMENT & PLAN NOTE
History of chronic back pain due to degenerative disease  Pain has been severe over the past few days prompting ED evaluation  Meeting SIRS criteria on admission  No CT evidence of discitis  Blood cultures pending  Pain control

## 2025-05-23 NOTE — PLAN OF CARE
Problem: PAIN - ADULT  Goal: Verbalizes/displays adequate comfort level or baseline comfort level  Description: Interventions:  - Encourage patient to monitor pain and request assistance  - Assess pain using appropriate pain scale  - Administer analgesics as ordered based on type and severity of pain and evaluate response  - Implement non-pharmacological measures as appropriate and evaluate response  - Consider cultural and social influences on pain and pain management  - Notify physician/advanced practitioner if interventions unsuccessful or patient reports new pain  - Educate patient/family on pain management process including their role and importance of  reporting pain   - Provide non-pharmacologic/complimentary pain relief interventions  Outcome: Progressing     Problem: INFECTION - ADULT  Goal: Absence or prevention of progression during hospitalization  Description: INTERVENTIONS:  - Assess and monitor for signs and symptoms of infection  - Monitor lab/diagnostic results  - Monitor all insertion sites, i.e. indwelling lines, tubes, and drains  - Monitor endotracheal if appropriate and nasal secretions for changes in amount and color  - [place] appropriate cooling/warming therapies per order  - Administer medications as ordered  - Instruct and encourage patient and family to use good hand hygiene technique  - Identify and instruct in appropriate isolation precautions for identified infection/condition  Outcome: Progressing  Goal: Absence of fever/infection during neutropenic period  Description: INTERVENTIONS:  - Monitor WBC  - Perform strict hand hygiene  - Limit to healthy visitors only  - No plants, dried, fresh or silk flowers with escalante in patient room  Outcome: Progressing

## 2025-05-23 NOTE — UTILIZATION REVIEW
Initial Clinical Review    Admission: Date/Time/Statement:   Admission Orders (From admission, onward)       Ordered        05/22/25 2023  INPATIENT ADMISSION  Once                          Orders Placed This Encounter   Procedures    INPATIENT ADMISSION     Standing Status:   Standing     Number of Occurrences:   1     Level of Care:   Med Surg [16]     Estimated length of stay:   More than 2 Midnights     Certification:   I certify that inpatient services are medically necessary for this patient for a duration of greater than two midnights. See H&P and MD Progress Notes for additional information about the patient's course of treatment.     ED Arrival Information       Expected   5/22/2025 14:54    Arrival   5/22/2025 14:56    Acuity   Urgent              Means of arrival   Ambulance    Escorted by   [facility]    Service   Hospitalist    Admission type   Emergency              Arrival complaint   Back pain             Chief Complaint   Patient presents with    Back Pain     Has chronic lower back pain but worsening yesterday into today       Initial Presentation:   65 yom to ER from home via EMS for back pain & generalized weakness for few days. Hx SVT, HTN, bilateral carotid stenosis s/p right carotid on neurectomy 7/21, history of right CVA, cholecystectomy. Presents febrile, tachycardic, hypoxic, dry mucous membranes, reports diarrhea & fatigue. Admission CXR neg. CT PE studt, a/p neg. CT lumbar spine: No evidence of discitis osteomyelitis of the lumbar spine. Multilevel degenerative changes of the lumbar spine, detailed above, with degenerative disc disease worst at the L3-4 level where there is at least moderate central canal stenosis.  CT cervical spine neg. Labs: , K 2.7, Mg 1.6, tbili 2.54, VBG: pH 7.478, pCO2 36.8, pO2 66.6, procalcitonin 0.58, .  Admitted to inpatient status for hypokalemia. Plan: electrolyte repletion, IVABTIVF, consult GI, pain mgt.     Anticipated Length of  Stay/Certification Statement:   Patient will be admitted on an inpatient basis with an anticipated length of stay of greater than 2 midnights secondary to back pain, SIRS.     Date: 5/23/25   Day 2:   Hypokalemia POA likely 2nd copious diarrhea. K 3.3 today, monitor. Questran per GI, monitor. IVABT continued for SIRS. Blood cultures x 2 pending. Check Lyme titer as patient spends time in his yard. IVF maintained.     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 No other GI symptoms at this time. Had a hx of BRBPR in the past with this diarrhea which may be related to outlet bleeding from the stool frequency vs IBD  -r/o infection including C diff  -Trial of questran. Hold off on imodium/lomotil until infection can be ruled out.      ED Treatment-Medication Administration from 05/22/2025 1456 to 05/22/2025 2052         Date/Time Order Dose Route Action     05/22/2025 1533 sodium chloride 0.9 % bolus 500 mL 500 mL Intravenous New Bag     05/22/2025 1604 multi-electrolyte (Plasmalyte-A/Isolyte-S PH 7.4/Normosol-R) IV bolus 1,000 mL 1,000 mL Intravenous New Bag     05/22/2025 1747 multi-electrolyte (Plasmalyte-A/Isolyte-S PH 7.4/Normosol-R) IV bolus 1,000 mL -- Intravenous Restarted     05/22/2025 1748 multi-electrolyte (Plasmalyte-A/Isolyte-S PH 7.4/Normosol-R) IV bolus 1,000 mL 1,000 mL Intravenous New Bag     05/22/2025 1822 multi-electrolyte (Plasmalyte-A/Isolyte-S PH 7.4/Normosol-R) IV bolus 1,000 mL 1,000 mL Intravenous New Bag     05/22/2025 1556 ampicillin-sulbactam (UNASYN) 3 g in sodium chloride 0.9 % 100 mL IVPB 3 g Intravenous New Bag     05/22/2025 1555 acetaminophen (TYLENOL) tablet 975 mg 975 mg Oral Given     05/22/2025 1746 magnesium sulfate 2 g/50 mL IVPB (premix) 2 g 2 g Intravenous New Bag     05/22/2025 8449 iohexol (OMNIPAQUE) 350 MG/ML injection (MULTI-DOSE) 100 mL 100 mL  Intravenous Given     05/22/2025 1823 potassium chloride (Klor-Con M20) CR tablet 40 mEq 40 mEq Oral Given            Scheduled Medications:  aspirin, 81 mg, Oral, Daily  cefTRIAXone, 2,000 mg, Intravenous, Q24H  cholestyramine sugar free (QUESTRAN LIGHT) packet 4 g, Oral, Daily  enoxaparin, 40 mg, Subcutaneous, Daily  losartan, 100 mg, Oral, Daily  metoprolol succinate, 25 mg, Oral, Daily      Continuous IV Infusions:  multi-electrolyte, 100 mL/hr, Intravenous, Continuous      PRN Meds:  acetaminophen, 975 mg, Oral, Q8H PRN  oxyCODONE, 5 mg, Oral, Q6H PRN      ED Triage Vitals   Temperature Pulse Respirations Blood Pressure SpO2 Pain Score   05/22/25 1536 05/22/25 1459 05/22/25 1459 05/22/25 1459 05/22/25 1459 05/22/25 1459   (S) (!) 101.1 °F (38.4 °C) 105 18 162/77 (S) (!) 87 % 4     Weight (last 2 days)       Date/Time Weight    05/22/25 2059 106 (233.2)    05/22/25 1459 106 (233.25)            Vital Signs (last 3 days)       Date/Time Temp Pulse Resp BP MAP (mmHg) SpO2 Calculated FIO2 (%) - Nasal Cannula Nasal Cannula O2 Flow Rate (L/min) O2 Device Patient Position - Orthostatic VS [place] Coma Scale Score Pain    05/23/25 12:11:50 98.6 °F (37 °C) 80 -- -- -- 94 % -- -- -- -- -- --    05/23/25 09:09:09 -- 86 -- 133/76 95 91 % -- -- -- -- -- --    05/23/25 07:07:34 99 °F (37.2 °C) 77 -- 123/73 90 92 % -- -- -- -- -- --    05/22/25 22:02:49 98.6 °F (37 °C) 79 17 117/70 86 89 % -- -- -- -- -- --    05/22/25 21:01:52 97.9 °F (36.6 °C) 97 17 143/91 108 95 % -- -- -- Sitting -- --    05/22/25 2101 -- -- -- -- -- -- -- -- -- -- -- No Pain    05/22/25 2100 -- -- -- -- -- 95 % -- -- None (Room air) -- -- --    05/22/25 2015 -- 83 17 138/68 94 95 % 28 2 L/min Nasal cannula Sitting -- --    05/22/25 2000 97.9 °F (36.6 °C) 84 17 132/76 -- 96 % 28 2 L/min Nasal cannula Sitting -- --    05/22/25 1915 -- 97 18 144/77 105 94 % 28 2 L/min Nasal cannula Sitting -- --    05/22/25 1900 -- 92 19 144/77 -- 95 % 28 2 L/min Nasal  cannula Sitting -- --    05/22/25 1815 -- 91 20 175/93 117 93 % 28 2 L/min Nasal cannula Lying -- --    05/22/25 1800 -- 85 16 122/63 87 93 % 28 2 L/min Nasal cannula Lying -- --    05/22/25 1745 98.5 °F (36.9 °C) 88 20 138/69 94 95 % 28 2 L/min Nasal cannula Lying -- --    05/22/25 1730 -- 89 20 134/67 96 95 % 28 2 L/min Nasal cannula Lying -- --    05/22/25 1715 -- 92 18 113/60 79 96 % 28 2 L/min Nasal cannula Lying -- --    05/22/25 1600 -- 102 18 113/65 84 94 % 28 2 L/min Nasal cannula Lying -- --    05/22/25 1555 -- -- -- -- -- -- -- -- -- -- -- Med Not Given for Pain - for MAR use only    05/22/25 1554 -- -- -- -- -- -- -- -- Nasal cannula -- 15 --    05/22/25 1536 101.1 °F (38.4 °C)  -- -- -- -- -- -- -- -- -- -- --    05/22/25 1459 -- 105 18 162/77 -- 87 %  -- -- None (Room air) Lying -- 4              Pertinent Labs/Diagnostic Test Results:   Radiology:  CT cervical spine without contrast   Final Interpretation by Phillip Garcia MD (05/22 2042)      No evidence of acute cervical spine fracture or traumatic malalignment.                  Workstation performed: [id number]         CT recon only lumbar spine   Final Interpretation by Kvng Fernandes MD (05/22 1935)      1.  No evidence of discitis osteomyelitis of the lumbar spine. Multilevel degenerative changes of the lumbar spine, detailed above, with degenerative disc disease worst at the L3-4 level where there is at least moderate central canal stenosis.   2.  Please see the CT of the abdomen and pelvis from earlier the same day for full description of the intra-abdominal findings.            Workstation performed: [id number]         CT pe study w abdomen pelvis w contrast   Final Interpretation by Kvng Fernandes MD (05/22 1749)      1.  No evidence of pulmonary embolism or other acute pathology within the chest, abdomen, or pelvis. No clear CT evidence of osteomyelitis discitis.   2.  Chronic findings, detailed above.         Workstation  "performed: [id number]         XR chest portable   Final Interpretation by Sammy Velez MD (05/22 1601)      No acute cardiopulmonary disease.            Workstation performed: [id number]           Cardiology:  ECG 12 lead   Final Result by Ja Theodore MD (05/22 1546)   Sinus tachycardia   Cannot exclude old IMI   Abnormal ECG   When compared with ECG of 11-Nov-2024 11:30,   No significant change was found   Confirmed by Ja Theodore ([id number]) on 5/22/2025 3:46:45 PM        GI:  No orders to display       Results from last 7 days   Lab Units 05/22/25  2214   SARS-COV-2  Negative     Results from last 7 days   Lab Units 05/23/25  0526 05/22/25  1525   WBC Thousand/uL 4.86 7.80   HEMOGLOBIN g/dL 12.4 13.9   HEMATOCRIT % 36.1* 40.4   PLATELETS Thousands/uL 143* 142*   TOTAL NEUT ABS Thousands/µL  --  6.26         Results from last 7 days   Lab Units 05/23/25  0526 05/22/25  1525   SODIUM mmol/L 139 138   POTASSIUM mmol/L 3.3* 2.7*   CHLORIDE mmol/L 104 100   CO2 mmol/L 30 29   ANION GAP mmol/L 5 9   BUN mg/dL 8 11   CREATININE mg/dL 0.87 1.01   EGFR ml/min/1.73sq m 90 77   CALCIUM mg/dL 7.9* 8.6   MAGNESIUM mg/dL 2.3 1.6*   PHOSPHORUS mg/dL 1.6*  --      Results from last 7 days   Lab Units 05/23/25  0526 05/22/25  1525   AST U/L 23 19   ALT U/L 24 29   ALK PHOS U/L 49 60   TOTAL PROTEIN g/dL 6.1* 7.2   ALBUMIN g/dL 3.6 4.3   TOTAL BILIRUBIN mg/dL 1.57* 2.54*         Results from last 7 days   Lab Units 05/23/25  0526 05/22/25  1525   GLUCOSE RANDOM mg/dL 120 130             No results found for: \"BETA-HYDROXYBUTYRATE\"       Results from last 7 days   Lab Units 05/22/25  1525   PH SIXTO  7.478*   PCO2 SIXTO mm Hg 36.8*   PO2 SIXTO mm Hg 66.6*   HCO3 SIXTO mmol/L 26.7   BASE EXC SIXTO mmol/L 3.3   O2 CONTENT SIXTO ml/dL 19.1   O2 HGB, VENOUS % 92.6*             Results from last 7 days   Lab Units 05/22/25  1910 05/22/25  1744 05/22/25  1525   HS TNI 0HR ng/L  --   --  13   HS TNI 2HR ng/L  --  19  --    HSTNI D2 ng/L  --  6  --  "   HS TNI 4HR ng/L 17  --   --    HSTNI D4 ng/L 4  --   --          Results from last 7 days   Lab Units 05/22/25  1525   PROTIME seconds 14.4   INR  1.08   PTT seconds 31     Results from last 7 days   Lab Units 05/23/25  0526   TSH 3RD GENERATON uIU/mL 1.434     Results from last 7 days   Lab Units 05/22/25  1525   PROCALCITONIN ng/ml 0.58*     Results from last 7 days   Lab Units 05/22/25  1525   LACTIC ACID mmol/L 1.3             Results from last 7 days   Lab Units 05/22/25  1525   BNP pg/mL 38                         Results from last 7 days   Lab Units 05/23/25  0526 05/22/25  1744   CRP mg/L  --  174.0*   SED RATE mm/hour 20*  --              Results from last 7 days   Lab Units 05/22/25  1832   CLARITY UA  Clear   COLOR UA  Yellow   SPEC GRAV UA  1.010   PH UA  6.0   GLUCOSE UA mg/dl Negative   KETONES UA mg/dl Negative   BLOOD UA  Trace-Intact*   PROTEIN UA mg/dl Negative   NITRITE UA  Negative   BILIRUBIN UA  Negative   UROBILINOGEN UA E.U./dl 0.2   LEUKOCYTES UA  Negative   WBC UA /hpf 0-1   RBC UA /hpf 0-1   BACTERIA UA /hpf Occasional   EPITHELIAL CELLS WET PREP /hpf Occasional     Results from last 7 days   Lab Units 05/22/25  2214   INFLUENZA A PCR  Negative   INFLUENZA B PCR  Negative   RSV PCR  Negative                             Results from last 7 days   Lab Units 05/22/25  1535 05/22/25  1525   BLOOD CULTURE  Received in Microbiology Lab. Culture in Progress. Received in Microbiology Lab. Culture in Progress.                   Past Medical History[1]  Present on Admission:   Hypokalemia   Hypertension   Elevated bilirubin      Admitting Diagnosis: Hypokalemia [E87.6]  Back pain [M54.9]  Hypoxia [R09.02]  Acute low back pain [M54.50]  SIRS (systemic inflammatory response syndrome) (HCC) [R65.10]  Acute bilateral low back pain without sciatica [M54.50]  Age/Sex: 65 y.o. male    Network Utilization Review Department  ATTENTION: Please call with any questions or concerns to [phone] and  "carefully listen to the prompts so that you are directed to the right person. All voicemails are confidential.   For Discharge needs, contact Care Management DC Support Team at [phone] opt. [phone]  Send all requests for admission clinical reviews, approved or denied determinations and any other requests to dedicated fax number below belonging to the campus where the patient is receiving treatment. List of dedicated fax numbers for the Facilities:  FACILITY NAME UR FAX NUMBER   ADMISSION DENIALS (Administrative/Medical Necessity) [phone]   DISCHARGE SUPPORT TEAM (NETWORK) [phone]   PARENT CHILD HEALTH (Maternity/NICU/Pediatrics) [phone]   [facility] [phone]   [facility] [phone]   [facility] [phone]   [facility] [phone]   Critical access hospital [phone]   [facility] [phone]   [facility] [phone]   [facility] [phone]   [facility] [phone]   [facility] [phone]   [facility] [phone]   [facility] [phone]              [1]   Past Medical History:  Diagnosis Date    Diverticulitis large intestine     \"During Bowel Resection determined not to have Diverticulitis.\"- Patient    Hypertension     TIA (transient ischemic attack)      "

## 2025-05-23 NOTE — ASSESSMENT & PLAN NOTE
Reports diarrheal episodes multiple times per day whenever he ingests food or fluid beginning after cholecystectomy  Denies hematochezia  Presents with multiple electrolyte abnormalities  Check stool studies-enteric panel, O&P  CT abdomen pelvis reviewed-stomach and bowel unremarkable  Appreciate gastroenterology consultation

## 2025-05-23 NOTE — ASSESSMENT & PLAN NOTE
Patient has had diarrhea ever since having a cholecystectomy about 4 months ago.  Seen by GI today.  They think it is likely postcholecystectomy diarrhea.    They started him on Questran    Avoiding Imodium until we prove with noninfectious, stool culture still pending

## 2025-05-23 NOTE — ASSESSMENT & PLAN NOTE
History of chronic back pain due to degenerative disease  Pain has been severe over the past few days prompting ED evaluation  Meeting SIRS criteria on admission  No CT evidence of discitis  Blood cultures pending  Pain control   No

## 2025-05-23 NOTE — ASSESSMENT & PLAN NOTE
Continue PTA metoprolol succinate  Continue ARB but will hold HCTZ in setting of hypovolemia and multiple electrolyte derangements  Trend blood pressure

## 2025-05-23 NOTE — PROGRESS NOTES
Progress Note - Hospitalist   Name: Hua Acevedo 65 y.o. male I MRN: [id number]  Unit/Bed#: -01 I Date of Admission: 5/22/2025   Date of Service: 5/23/2025 I Hospital Day: 1    Assessment & Plan  Hypokalemia  Due to months of diarrhea following cholecystectomy  Diarrhea  Patient has had diarrhea ever since having a cholecystectomy about 4 months ago.  Seen by GI today.  They think it is likely postcholecystectomy diarrhea.    They started him on Questran    Avoiding Imodium until we prove with noninfectious, stool culture still pending  Hypertension  Continue PTA metoprolol succinate  Continue ARB but will hold HCTZ in setting of hypovolemia and multiple electrolyte derangements  Trend blood pressure  Hypomagnesemia  Mg 1.6  Replete and recheck  SIRS (systemic inflammatory response syndrome) (HCC)  Meeting criteria with tachycardia and fever of 101.1 F  Patient not aware of his fever but states he has been feeling ill for the past few days  UA unremarkable, lactic acid normal, procalcitonin mildly elevated  Blood cultures x 2 pending  Check Lyme titer as patient spends time in his yard  Check COVID/flu PCR  Elevated , check ESR  Trend fever curve and leukocytosis  Empiric ceftriaxone pending culture results  Back pain  History of chronic back pain due to degenerative disease  Pain has been severe over the past few days prompting ED evaluation  Meeting SIRS criteria on admission  No CT evidence of discitis  Blood cultures pending  Pain control  BRBPR (bright red blood per rectum)    Elevated bilirubin      VTE Pharmacologic Prophylaxis:                 Current Length of Stay: 1 day(s)  Current Patient Status: Inpatient   Certification Statement:     Discharge Plan:  once diarrhea resolves, he can go home.  Hopefully soon.        Subjective   Still having a lot of diarrhea.  Multiple loose brown stools per day.  No blood or blackness in his stool today.  This is started ever since he had his  cholecystectomy.  Minimal abdominal pain.    Objective   Vitals:   Temp (24hrs), Av.4 °F (36.9 °C), Min:97.9 °F (36.6 °C), Max:99 °F (37.2 °C)    Temp:  [97.9 °F (36.6 °C)-99 °F (37.2 °C)] 98.4 °F (36.9 °C)  HR:  [77-97] 81  Resp:  [16-20] 18  BP: (113-175)/(60-93) 124/73  SpO2:  [89 %-96 %] 92 %  Body mass index is 34.44 kg/m².         Physical Exam  Vitals and nursing note reviewed.   Constitutional:       General: He is not in acute distress.     Appearance: He is well-developed.   HENT:      Head: Normocephalic and atraumatic.     Eyes:      Conjunctiva/sclera: Conjunctivae normal.       Cardiovascular:      Rate and Rhythm: Normal rate and regular rhythm.      Heart sounds: No murmur heard.  Pulmonary:      Effort: Pulmonary effort is normal. No respiratory distress.      Breath sounds: Normal breath sounds.   Abdominal:      Palpations: Abdomen is soft.      Tenderness: There is no abdominal tenderness.     Musculoskeletal:         General: No swelling.      Cervical back: Neck supple.      Right lower leg: No edema.      Left lower leg: No edema.     Skin:     General: Skin is warm and dry.      Capillary Refill: Capillary refill takes less than 2 seconds.     Neurological:      Mental Status: He is alert.     Psychiatric:         Mood and Affect: Mood normal.           Lab results  Results from last 7 days   Lab Units 25  0526 25  1525   WBC Thousand/uL 4.86 7.80   HEMOGLOBIN g/dL 12.4 13.9   PLATELETS Thousands/uL 143* 142*   MCV fL 94 92   INR   --  1.08     Results from last 7 days   Lab Units 25  0526 25  1525   SODIUM mmol/L 139 138   POTASSIUM mmol/L 3.3* 2.7*   CHLORIDE mmol/L 104 100   CO2 mmol/L 30 29   ANION GAP mmol/L 5 9   BUN mg/dL 8 11   CREATININE mg/dL 0.87 1.01   CALCIUM mg/dL 7.9* 8.6   ALBUMIN g/dL 3.6 4.3   TOTAL BILIRUBIN mg/dL 1.57* 2.54*   ALK PHOS U/L 49 60   ALT U/L 24 29   AST U/L 23 19   EGFR ml/min/1.73sq m 90 77   GLUCOSE RANDOM mg/dL 120 130      Results from last 7 days   Lab Units 05/23/25  0526 05/22/25  1525   MAGNESIUM mg/dL 2.3 1.6*   PHOSPHORUS mg/dL 1.6*  --              Last 24 Hours Medication List:     Current Facility-Administered Medications:     acetaminophen (TYLENOL) tablet 975 mg, Q8H PRN    aspirin chewable tablet 81 mg, Daily    cefTRIAXone (ROCEPHIN) IVPB (premix in dextrose) 2,000 mg 50 mL, Q24H, Last Rate: 2,000 mg (05/23/25 0545)    cholestyramine sugar free (QUESTRAN LIGHT) packet 4 g, Daily    enoxaparin (LOVENOX) subcutaneous injection 40 mg, Daily    losartan (COZAAR) tablet 100 mg, Daily    metoprolol succinate (TOPROL-XL) 24 hr tablet 25 mg, Daily    multi-electrolyte (Plasmalyte-A/Isolyte-S PH 7.4/Normosol-R) IV solution, Continuous, Last Rate: 100 mL/hr (05/23/25 0909)    oxyCODONE (ROXICODONE) IR tablet 5 mg, Q6H PRN

## 2025-05-23 NOTE — ASSESSMENT & PLAN NOTE
Meeting criteria with tachycardia and fever of 101.1 F  Patient not aware of his fever but states he has been feeling ill for the past few days  UA unremarkable, lactic acid normal, procalcitonin mildly elevated  Blood cultures x 2 pending  Check Lyme titer as patient spends time in his yard  Check COVID/flu PCR  Elevated , check ESR  Trend fever curve and leukocytosis  Empiric ceftriaxone pending culture results

## 2025-05-23 NOTE — ASSESSMENT & PLAN NOTE
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 No other GI symptoms at this time. Had a hx of BRBPR in the past with this diarrhea which may be related to outlet bleeding from the stool frequency vs IBD  -r/o infection including C diff  -Trial of questran. Hold off on imodium/lomotil until infection can be ruled out.  -ok for diet as tolerated from GI standpoint. Would limits/avoid dairy or try BRAT diet if diarrhea persists.  -no inpatient colonoscopy planned at this time but would recommend one outpatient for hx of BRBPR

## 2025-05-23 NOTE — H&P
H&P - Hospitalist   Name: Hua Acevedo 65 y.o. male I MRN: [id number]  Unit/Bed#: -01 I Date of Admission: 5/22/2025   Date of Service: 5/23/2025 I Hospital Day: 1     Assessment & Plan  Hypokalemia  Potassium 2.7  Probably secondary to copious diarrhea  Replete and recheck  Diarrhea  Reports diarrheal episodes multiple times per day whenever he ingests food or fluid beginning after cholecystectomy  Denies hematochezia  Presents with multiple electrolyte abnormalities  Check stool studies-enteric panel, O&P  CT abdomen pelvis reviewed-stomach and bowel unremarkable  Appreciate gastroenterology consultation  Hypertension  Continue PTA metoprolol succinate  Continue ARB but will hold HCTZ in setting of hypovolemia and multiple electrolyte derangements  Trend blood pressure  Hypomagnesemia  Mg 1.6  Replete and recheck  SIRS (systemic inflammatory response syndrome) (HCC)  Meeting criteria with tachycardia and fever of 101.1 F  Patient not aware of his fever but states he has been feeling ill for the past few days  UA unremarkable, lactic acid normal, procalcitonin mildly elevated  Blood cultures x 2 pending  Check Lyme titer as patient spends time in his yard  Check COVID/flu PCR  Elevated , check ESR  Trend fever curve and leukocytosis  Empiric ceftriaxone pending culture results  Back pain  History of chronic back pain due to degenerative disease  Pain has been severe over the past few days prompting ED evaluation  Meeting SIRS criteria on admission  No CT evidence of discitis  Blood cultures pending  Pain control    High Risk (Score >/= 5) - Pharmacological DVT Prophylaxis Ordered: enoxaparin (Lovenox). Sequential Compression Devices Ordered.  VTE Pharmacologic Prophylaxis:     Code Status: Level 1 - Full Code   Discussion with family: Patient declined call to .     Anticipated Length of Stay: Patient will be admitted on an inpatient basis with an anticipated length of stay of  greater than 2 midnights secondary to back pain, SIRS.    History of Present Illness   Chief Complaint: Back pain    Hua Acevedo is a 65 y.o. male with a PMH of SVT, HTN, bilateral carotid stenosis s/p right carotid on neurectomy 7/21, history of right CVA, cholecystectomy, who presents with back pain and generalized weakness.  Patient has been feeling unwell for the past few days, denies cough/nausea or vomiting.  Incidentally found to have a fever in the ED meeting SIRS criteria.  Found to be hypokalemic, hypomagnesemic, no source of infection noted on imaging and presents to the medical service for further evaluation treatment.    Review of Systems   Constitutional:  Positive for activity change and fatigue. Negative for chills and fever.   HENT:  Negative for ear pain and sore throat.    Eyes:  Negative for pain and visual disturbance.   Respiratory:  Negative for cough and shortness of breath.    Cardiovascular:  Negative for chest pain and palpitations.   Gastrointestinal:  Positive for diarrhea. Negative for abdominal pain and vomiting.   Genitourinary:  Negative for dysuria and hematuria.   Musculoskeletal:  Positive for back pain. Negative for arthralgias.   Skin:  Negative for color change and rash.   Neurological:  Positive for weakness. Negative for seizures and syncope.   All other systems reviewed and are negative.      Historical Information   Past Medical History[1]  Past Surgical History[2]  Social History[3]  E-Cigarette/Vaping    E-Cigarette Use Never User      E-Cigarette/Vaping Substances    Nicotine No     THC No     CBD No     Flavoring No     Other No     Unknown No      Family History[4]  Social History:  Marital Status: /Civil Union   Patient Pre-hospital Living Situation: Home  Patient Pre-hospital Level of Mobility: unable to be assessed at time of evaluation  Patient Pre-hospital Diet Restrictions: None    Meds/Allergies   I have reviewed home medications with patient  personally.  Prior to Admission medications    Medication Sig Start Date End Date Taking? Authorizing Provider   acetaminophen (TYLENOL) 650 mg CR tablet Take 1 tablet (650 mg total) by mouth every 8 (eight) hours as needed for mild pain 7/31/20  Yes Mark Pickard PA-C   metoprolol succinate (TOPROL-XL) 25 mg 24 hr tablet Take 1 tablet (25 mg total) by mouth daily 1/10/25  Yes Ja Theodore MD   valsartan-hydrochlorothiazide (DIOVAN-HCT) 320-25 MG per tablet Take 1 tablet by mouth daily 1/10/25  Yes Ja Theodore MD   aspirin 81 mg chewable tablet Chew 1 tablet (81 mg total) daily  Patient not taking: Reported on 5/22/2025 7/27/21   Yg Cavanaugh DO   clopidogrel (PLAVIX) 75 mg tablet Take 75 mg by mouth daily  Patient not taking: Reported on 5/22/2025    Historical Provider, MD   polyethylene glycol (MIRALAX) 17 g packet Take 17 g by mouth daily  Patient not taking: Reported on 5/22/2025 7/28/21   Yg Cavanaugh DO   potassium chloride (Klor-Con M20) 20 mEq tablet Take 1 tablet (20 mEq total) by mouth 2 (two) times a day for 10 days  Patient not taking: Reported on 1/10/2025 10/27/24 1/10/25  Mady Garcia MD     No Known Allergies    Objective :  Temp:  [97.9 °F (36.6 °C)-101.1 °F (38.4 °C)] 98.6 °F (37 °C)  HR:  [] 79  BP: (113-175)/(60-93) 117/70  Resp:  [16-20] 17  SpO2:  [87 %-96 %] 89 %  O2 Device: None (Room air)  Nasal Cannula O2 Flow Rate (L/min):  [2 L/min] 2 L/min    Physical Exam  Vitals and nursing note reviewed.   Constitutional:       General: He is not in acute distress.     Appearance: He is well-developed.   HENT:      Head: Normocephalic and atraumatic.      Mouth/Throat:      Mouth: Mucous membranes are dry.     Eyes:      Conjunctiva/sclera: Conjunctivae normal.       Cardiovascular:      Rate and Rhythm: Regular rhythm. Tachycardia present.      Heart sounds: No murmur heard.  Pulmonary:      Effort: Pulmonary effort is normal. No respiratory distress.      Breath sounds: Normal  "breath sounds.   Abdominal:      Palpations: Abdomen is soft.      Tenderness: There is no abdominal tenderness.     Musculoskeletal:         General: No swelling.      Cervical back: Neck supple.     Skin:     General: Skin is warm and dry.      Capillary Refill: Capillary refill takes less than 2 seconds.     Neurological:      General: No focal deficit present.      Mental Status: He is alert and oriented to person, place, and time.     Psychiatric:         Mood and Affect: Mood normal.         Behavior: Behavior normal.         Lab Results: I have reviewed the following results:  Results from last 7 days   Lab Units 05/22/25  1525   WBC Thousand/uL 7.80   HEMOGLOBIN g/dL 13.9   HEMATOCRIT % 40.4   PLATELETS Thousands/uL 142*   SEGS PCT % 80*   LYMPHO PCT % 8*   MONO PCT % 8   EOS PCT % 3     Results from last 7 days   Lab Units 05/22/25  1525   SODIUM mmol/L 138   POTASSIUM mmol/L 2.7*   CHLORIDE mmol/L 100   CO2 mmol/L 29   BUN mg/dL 11   CREATININE mg/dL 1.01   ANION GAP mmol/L 9   CALCIUM mg/dL 8.6   ALBUMIN g/dL 4.3   TOTAL BILIRUBIN mg/dL 2.54*   ALK PHOS U/L 60   ALT U/L 29   AST U/L 19   GLUCOSE RANDOM mg/dL 130     Results from last 7 days   Lab Units 05/22/25  1525   INR  1.08         Lab Results   Component Value Date    HGBA1C 5.3 07/15/2021     Results from last 7 days   Lab Units 05/22/25  1525   LACTIC ACID mmol/L 1.3   PROCALCITONIN ng/ml 0.58*       Imaging Results Review: I reviewed radiology reports from this admission including: CT chest, CT abdomen/pelvis, CT C-spine, and CT lumbar spine.  Other Study Results Review: EKG was reviewed.     ** Please Note: This note has been constructed using a voice recognition system. **         [1]   Past Medical History:  Diagnosis Date    Diverticulitis large intestine     \"During Bowel Resection determined not to have Diverticulitis.\"- Patient    Hypertension     TIA (transient ischemic attack)    [2]   Past Surgical History:  Procedure Laterality Date    " ANTERIOR CRUCIATE LIGAMENT REPAIR Right     ACL Repair    BOWEL RESECTION      CAROTID ENDARTARECTOMY Right 7/26/2021    Procedure: ENDARTERECTOMY ARTERY CAROTID-- Right;  Surgeon: Jerod Granger MD;  Location: [facility];  Service: Vascular    FOOT SURGERY Right     patient not sure procedure    [place] LAPAROSCOPY SURG CHOLECYSTECTOMY N/A 10/24/2024    Procedure: attempted CHOLECYSTECTOMY LAPAROSCOPIC W/ ROBOTICS converted to laparoscopic subtotal cholecystectomy;  Surgeon: Beverley Parra DO;  Location:  MAIN OR;  Service: General    SHOULDER SURGERY Bilateral     Rotator cuff/ Right shoulder bone growth removal   [3]   Social History  Tobacco Use    Smoking status: Former     Types: Cigarettes    Smokeless tobacco: Former     Types: Chew   Vaping Use    Vaping status: Never Used   Substance and Sexual Activity    Alcohol use: Yes     Alcohol/week: 1.0 - 2.0 standard drink of alcohol     Types: 1 - 2 Cans of beer per week    Drug use: Never   [4] No family history on file.

## 2025-05-24 LAB
ANION GAP SERPL CALCULATED.3IONS-SCNC: 7 MMOL/L (ref 4–13)
ANION GAP SERPL CALCULATED.3IONS-SCNC: 8 MMOL/L (ref 4–13)
BASOPHILS # BLD AUTO: 0.03 THOUSANDS/ÂΜL (ref 0–0.1)
BASOPHILS NFR BLD AUTO: 1 % (ref 0–1)
BUN SERPL-MCNC: 5 MG/DL (ref 5–25)
BUN SERPL-MCNC: 6 MG/DL (ref 5–25)
C COLI+JEJUNI TUF STL QL NAA+PROBE: POSITIVE
CALCIUM SERPL-MCNC: 8 MG/DL (ref 8.4–10.2)
CALCIUM SERPL-MCNC: 8.3 MG/DL (ref 8.4–10.2)
CHLORIDE SERPL-SCNC: 105 MMOL/L (ref 96–108)
CHLORIDE SERPL-SCNC: 107 MMOL/L (ref 96–108)
CO2 SERPL-SCNC: 27 MMOL/L (ref 21–32)
CO2 SERPL-SCNC: 29 MMOL/L (ref 21–32)
CREAT SERPL-MCNC: 0.75 MG/DL (ref 0.6–1.3)
CREAT SERPL-MCNC: 0.83 MG/DL (ref 0.6–1.3)
EC STX1+STX2 GENES STL QL NAA+PROBE: NEGATIVE
EOSINOPHIL # BLD AUTO: 0.16 THOUSAND/ÂΜL (ref 0–0.61)
EOSINOPHIL NFR BLD AUTO: 4 % (ref 0–6)
ERYTHROCYTE [DISTWIDTH] IN BLOOD BY AUTOMATED COUNT: 13.6 % (ref 11.6–15.1)
GFR SERPL CREATININE-BSD FRML MDRD: 92 ML/MIN/1.73SQ M
GFR SERPL CREATININE-BSD FRML MDRD: 96 ML/MIN/1.73SQ M
GLUCOSE SERPL-MCNC: 105 MG/DL (ref 65–140)
GLUCOSE SERPL-MCNC: 110 MG/DL (ref 65–140)
GLUCOSE SERPL-MCNC: 125 MG/DL (ref 65–140)
HCT VFR BLD AUTO: 34.3 % (ref 36.5–49.3)
HGB BLD-MCNC: 11.6 G/DL (ref 12–17)
IMM GRANULOCYTES # BLD AUTO: 0.01 THOUSAND/UL (ref 0–0.2)
IMM GRANULOCYTES NFR BLD AUTO: 0 % (ref 0–2)
LYMPHOCYTES # BLD AUTO: 1.07 THOUSANDS/ÂΜL (ref 0.6–4.47)
LYMPHOCYTES NFR BLD AUTO: 26 % (ref 14–44)
MAGNESIUM SERPL-MCNC: 2.2 MG/DL (ref 1.9–2.7)
MCH RBC QN AUTO: 32 PG (ref 26.8–34.3)
MCHC RBC AUTO-ENTMCNC: 33.8 G/DL (ref 31.4–37.4)
MCV RBC AUTO: 95 FL (ref 82–98)
MONOCYTES # BLD AUTO: 0.54 THOUSAND/ÂΜL (ref 0.17–1.22)
MONOCYTES NFR BLD AUTO: 13 % (ref 4–12)
NEUTROPHILS # BLD AUTO: 2.29 THOUSANDS/ÂΜL (ref 1.85–7.62)
NEUTS SEG NFR BLD AUTO: 56 % (ref 43–75)
NRBC BLD AUTO-RTO: 0 /100 WBCS
PLATELET # BLD AUTO: 129 THOUSANDS/UL (ref 149–390)
PMV BLD AUTO: 9.3 FL (ref 8.9–12.7)
POTASSIUM SERPL-SCNC: 3 MMOL/L (ref 3.5–5.3)
POTASSIUM SERPL-SCNC: 3.4 MMOL/L (ref 3.5–5.3)
RBC # BLD AUTO: 3.63 MILLION/UL (ref 3.88–5.62)
SALMONELLA SP SPAO STL QL NAA+PROBE: NEGATIVE
SHIGELLA SP+EIEC IPAH STL QL NAA+PROBE: NEGATIVE
SODIUM SERPL-SCNC: 141 MMOL/L (ref 135–147)
SODIUM SERPL-SCNC: 142 MMOL/L (ref 135–147)
WBC # BLD AUTO: 4.1 THOUSAND/UL (ref 4.31–10.16)

## 2025-05-24 PROCEDURE — 86666 EHRLICHIA ANTIBODY: CPT | Performed by: FAMILY MEDICINE

## 2025-05-24 PROCEDURE — 80048 BASIC METABOLIC PNL TOTAL CA: CPT | Performed by: FAMILY MEDICINE

## 2025-05-24 PROCEDURE — NC001 PR NO CHARGE

## 2025-05-24 PROCEDURE — 82948 REAGENT STRIP/BLOOD GLUCOSE: CPT

## 2025-05-24 PROCEDURE — 99233 SBSQ HOSP IP/OBS HIGH 50: CPT | Performed by: FAMILY MEDICINE

## 2025-05-24 PROCEDURE — 86753 PROTOZOA ANTIBODY NOS: CPT | Performed by: FAMILY MEDICINE

## 2025-05-24 PROCEDURE — 80048 BASIC METABOLIC PNL TOTAL CA: CPT | Performed by: HOSPITALIST

## 2025-05-24 PROCEDURE — 85025 COMPLETE CBC W/AUTO DIFF WBC: CPT | Performed by: HOSPITALIST

## 2025-05-24 PROCEDURE — 87468 ANAPLSMA PHGCYTOPHLM AMP PRB: CPT | Performed by: FAMILY MEDICINE

## 2025-05-24 PROCEDURE — 83735 ASSAY OF MAGNESIUM: CPT | Performed by: HOSPITALIST

## 2025-05-24 PROCEDURE — 87207 SMEAR SPECIAL STAIN: CPT | Performed by: FAMILY MEDICINE

## 2025-05-24 RX ORDER — POTASSIUM CHLORIDE 1500 MG/1
40 TABLET, EXTENDED RELEASE ORAL ONCE
Status: COMPLETED | OUTPATIENT
Start: 2025-05-24 | End: 2025-05-24

## 2025-05-24 RX ORDER — CHOLESTYRAMINE LIGHT 4 G/5.7G
4 POWDER, FOR SUSPENSION ORAL 2 TIMES DAILY
Status: DISCONTINUED | OUTPATIENT
Start: 2025-05-24 | End: 2025-05-26 | Stop reason: HOSPADM

## 2025-05-24 RX ORDER — METOPROLOL TARTRATE 1 MG/ML
5 INJECTION, SOLUTION INTRAVENOUS ONCE
Status: COMPLETED | OUTPATIENT
Start: 2025-05-24 | End: 2025-05-24

## 2025-05-24 RX ORDER — POTASSIUM CHLORIDE 14.9 MG/ML
20 INJECTION INTRAVENOUS ONCE
Status: COMPLETED | OUTPATIENT
Start: 2025-05-24 | End: 2025-05-24

## 2025-05-24 RX ORDER — METOPROLOL TARTRATE 1 MG/ML
INJECTION, SOLUTION INTRAVENOUS
Status: COMPLETED
Start: 2025-05-24 | End: 2025-05-24

## 2025-05-24 RX ORDER — DOXYCYCLINE 100 MG/1
100 CAPSULE ORAL EVERY 12 HOURS SCHEDULED
Status: DISCONTINUED | OUTPATIENT
Start: 2025-05-24 | End: 2025-05-26 | Stop reason: HOSPADM

## 2025-05-24 RX ORDER — LOPERAMIDE HYDROCHLORIDE 2 MG/1
2 CAPSULE ORAL 3 TIMES DAILY PRN
Status: DISCONTINUED | OUTPATIENT
Start: 2025-05-24 | End: 2025-05-26 | Stop reason: HOSPADM

## 2025-05-24 RX ORDER — METOPROLOL TARTRATE 1 MG/ML
5 INJECTION, SOLUTION INTRAVENOUS ONCE
Status: DISCONTINUED | OUTPATIENT
Start: 2025-05-24 | End: 2025-05-26 | Stop reason: HOSPADM

## 2025-05-24 RX ORDER — POTASSIUM CHLORIDE 1500 MG/1
TABLET, EXTENDED RELEASE ORAL
Status: COMPLETED
Start: 2025-05-24 | End: 2025-05-24

## 2025-05-24 RX ORDER — POTASSIUM CHLORIDE 14.9 MG/ML
INJECTION INTRAVENOUS
Status: DISPENSED
Start: 2025-05-24 | End: 2025-05-24

## 2025-05-24 RX ADMIN — ASPIRIN 81 MG CHEWABLE TABLET 81 MG: 81 TABLET CHEWABLE at 09:50

## 2025-05-24 RX ADMIN — POTASSIUM CHLORIDE 20 MEQ: 14.9 INJECTION, SOLUTION INTRAVENOUS at 07:55

## 2025-05-24 RX ADMIN — LOPERAMIDE HYDROCHLORIDE 2 MG: 2 CAPSULE ORAL at 12:18

## 2025-05-24 RX ADMIN — POTASSIUM CHLORIDE 20 MEQ: 14.9 INJECTION, SOLUTION INTRAVENOUS at 09:53

## 2025-05-24 RX ADMIN — DOXYCYCLINE 100 MG: 100 CAPSULE ORAL at 22:56

## 2025-05-24 RX ADMIN — DOXYCYCLINE 100 MG: 100 CAPSULE ORAL at 12:16

## 2025-05-24 RX ADMIN — POTASSIUM CHLORIDE 40 MEQ: 1500 TABLET, EXTENDED RELEASE ORAL at 07:55

## 2025-05-24 RX ADMIN — ENOXAPARIN SODIUM 40 MG: 40 INJECTION SUBCUTANEOUS at 09:50

## 2025-05-24 RX ADMIN — Medication 1 TABLET: at 12:16

## 2025-05-24 RX ADMIN — POTASSIUM CHLORIDE 20 MEQ: 1500 TABLET, EXTENDED RELEASE ORAL at 07:54

## 2025-05-24 RX ADMIN — CHOLESTYRAMINE LIGHT 4 G: 4 POWDER, FOR SUSPENSION ORAL at 17:52

## 2025-05-24 RX ADMIN — SODIUM CHLORIDE, SODIUM GLUCONATE, SODIUM ACETATE, POTASSIUM CHLORIDE, MAGNESIUM CHLORIDE, SODIUM PHOSPHATE, DIBASIC, AND POTASSIUM PHOSPHATE 100 ML/HR: .53; .5; .37; .037; .03; .012; .00082 INJECTION, SOLUTION INTRAVENOUS at 05:24

## 2025-05-24 RX ADMIN — Medication 1 TABLET: at 17:53

## 2025-05-24 RX ADMIN — METOPROLOL TARTRATE 5 MG: 1 INJECTION, SOLUTION INTRAVENOUS at 07:53

## 2025-05-24 RX ADMIN — METOROPROLOL TARTRATE 5 MG: 5 INJECTION, SOLUTION INTRAVENOUS at 07:53

## 2025-05-24 RX ADMIN — CEFTRIAXONE 2000 MG: 2 INJECTION, SOLUTION INTRAVENOUS at 05:24

## 2025-05-24 RX ADMIN — METOPROLOL SUCCINATE ER TABLETS 25 MG: 25 TABLET, FILM COATED, EXTENDED RELEASE ORAL at 09:50

## 2025-05-24 RX ADMIN — CHOLESTYRAMINE LIGHT 4 G: 4 POWDER, FOR SUSPENSION ORAL at 09:52

## 2025-05-24 RX ADMIN — SODIUM CHLORIDE, SODIUM GLUCONATE, SODIUM ACETATE, POTASSIUM CHLORIDE, MAGNESIUM CHLORIDE, SODIUM PHOSPHATE, DIBASIC, AND POTASSIUM PHOSPHATE 100 ML/HR: .53; .5; .37; .037; .03; .012; .00082 INJECTION, SOLUTION INTRAVENOUS at 15:54

## 2025-05-24 NOTE — ASSESSMENT & PLAN NOTE
Meeting criteria with tachycardia and fever of 101.1 F  Patient not aware of his fever but states he has been feeling ill for the past few days  UA unremarkable, lactic acid normal, procalcitonin mildly elevated  Blood cultures x 2 pending  Check Lyme titer,anaplasma,ehrlichia,babesia as patient spends time in his yard  neg COVID/flu PCR  Elevated , check ESR  Trend fever curve and leukocytosis  Empiric ceftriaxone and also started doxycycline pending results

## 2025-05-24 NOTE — RAPID RESPONSE
Rapid Response Note  Hua Acevedo 65 y.o. male MRN: [id number]  Unit/Bed#: -01 Encounter: [id number]    Rapid Response Notification(s):   Response called date/time:  5/24/2025 7:42 AM  Response team arrival date/time:  5/24/2025 7:44 AM  Response end date/time:  5/24/2025 8:00 AM  [facility]  Rapid response location:  [facility] unit  Primary reason for rapid response call:  Acute change in heart rate    Rapid Response Intervention(s):   Airway:  None  Breathing:  None  Circulation:  None  Fluids administered:  None  Medications administered:  Other (comment) (Metoprolol)       Assessment:   Hypokalemia   SVT    Plan:   EKG  Metoprolol 5mg IV x1 followed by metoprolol 2.5 x2  Give potassium replacements  Monitor BMP Q8 given consistent diarrhea, K will need to be monitor and replaced frequently      Rapid Response Outcome:   Transfer:  Remain on floor  Primary service notified of transfer: Yes    Code Status: Level 1 (Full Code)      Family notified: Primary team to notify Family Member       Background/Situation:   Hua Acevedo is a 65 y.o. male who was an RRT due to HR in 180s. Patient asymptomatic denies chest pain, sob, palpitations. Nurse noting he is diaphoretic but patient is content. Came in due to consistent diarrhea and feeling unwell past couple of days. K noted to be 3.0 this AM. Ordered EKG, Metoprolol and K 40 oral/40 IV to be given. HR dropped to 156 with 5mg IV metoprolol with stable BP. Metoprolol 2.5mg IV given x2 with unchanged HR or BP. Considered cardizem vs adenosine but ultimately patient converted back to NSR in the 80-90s and did not require further intervention.     Review of Systems   Constitutional: Negative.         Patient feels nothing, stating he doesn't see the big deal, was curious why everyone was in his room, and is requesting to get up to use the bathroom   HENT: Negative.     Eyes: Negative.    Respiratory: Negative.     Cardiovascular: Negative.     Gastrointestinal: Negative.    Endocrine: Negative.    Genitourinary: Negative.    Musculoskeletal: Negative.    Allergic/Immunologic: Negative.    Neurological: Negative.    Hematological: Negative.    Psychiatric/Behavioral: Negative.         Objective:   Vitals:    05/24/25 0001 05/24/25 0744 05/24/25 0747 05/24/25 0751   BP: 115/71 (!) 157/114 (!) 172/115 (!) 155/107   Pulse: 74 (!) 179 (!) 179 (!) 182   Resp: 18      Temp: 98.8 °F (37.1 °C)  98.8 °F (37.1 °C)    TempSrc:       SpO2: 95% 94% 93% 92%   Weight:       Height:         Physical Exam  Constitutional:       General: He is not in acute distress.     Appearance: Normal appearance. He is not ill-appearing or toxic-appearing.   HENT:      Head: Normocephalic.      Nose: Nose normal.      Mouth/Throat:      Mouth: Mucous membranes are moist.      Pharynx: Oropharynx is clear.     Eyes:      Extraocular Movements: Extraocular movements intact.      Pupils: Pupils are equal, round, and reactive to light.       Cardiovascular:      Rate and Rhythm: Regular rhythm. Tachycardia present.      Pulses: Normal pulses.   Pulmonary:      Effort: Pulmonary effort is normal. No respiratory distress.      Breath sounds: Normal breath sounds. No wheezing or rhonchi.   Abdominal:      General: Abdomen is flat.      Palpations: Abdomen is soft.     Musculoskeletal:         General: Normal range of motion.      Right lower leg: No edema.      Left lower leg: No edema.     Skin:     General: Skin is warm and dry.      Capillary Refill: Capillary refill takes less than 2 seconds.     Neurological:      General: No focal deficit present.      Mental Status: He is alert and oriented to person, place, and time. Mental status is at baseline.     Psychiatric:         Mood and Affect: Mood normal.         Behavior: Behavior normal.         Thought Content: Thought content normal.         Judgment: Judgment normal.

## 2025-05-24 NOTE — ASSESSMENT & PLAN NOTE
Continues to have loose stools daily for the 3-4 months since he had his cholecystectomy done.  Cultures including enteric panel and C. difficile are pending at this time  Continues to have persistent low potassium we will try to slow down diarrhea and continue to replace potassium.  Monitor BMP every 12.  Magnesium is normal

## 2025-05-24 NOTE — PLAN OF CARE
Problem: PAIN - ADULT  Goal: Verbalizes/displays adequate comfort level or baseline comfort level  Description: Interventions:  - Encourage patient to monitor pain and request assistance  - Assess pain using appropriate pain scale  - Administer analgesics as ordered based on type and severity of pain and evaluate response  - Implement non-pharmacological measures as appropriate and evaluate response  - Consider cultural and social influences on pain and pain management  - Notify physician/advanced practitioner if interventions unsuccessful or patient reports new pain  - Educate patient/family on pain management process including their role and importance of  reporting pain   - Provide non-pharmacologic/complimentary pain relief interventions  Outcome: Progressing     Problem: INFECTION - ADULT  Goal: Absence or prevention of progression during hospitalization  Description: INTERVENTIONS:  - Assess and monitor for signs and symptoms of infection  - Monitor lab/diagnostic results  - Monitor all insertion sites, i.e. indwelling lines, tubes, and drains  - Monitor endotracheal if appropriate and nasal secretions for changes in amount and color  - [place] appropriate cooling/warming therapies per order  - Administer medications as ordered  - Instruct and encourage patient and family to use good hand hygiene technique  - Identify and instruct in appropriate isolation precautions for identified infection/condition  Outcome: Progressing     Problem: Knowledge Deficit  Goal: Patient/family/caregiver demonstrates understanding of disease process, treatment plan, medications, and discharge instructions  Description: Complete learning assessment and assess knowledge base.  Interventions:  - Provide teaching at level of understanding  - Provide teaching via preferred learning methods  Outcome: Progressing

## 2025-05-24 NOTE — ASSESSMENT & PLAN NOTE
Patient has had diarrhea ever since having a cholecystectomy about 4 months ago.  Seen by GI today.  They think it is likely postcholecystectomy diarrhea.  They started him on Questran.  Increased Questran to twice daily  Also added Imodium  Patient had a rapid response today with SVT due to low potassium today.  Stool cultures are pending at this time. keep Potassium close to 4

## 2025-05-24 NOTE — QUICK NOTE
RRT for tachycardia. Patient noted to have HR in 180 on danial. RN noted that patient was diaphoretic. Patient currently asymptomatic. CC at bedside. K noted to be 3.0, ordered 40 meq potassium PO and 40 meq potassium IV. Gave 2 doses IV lopressor 5 mg and will place on telemetry. Continue to monitor HR at this time. Patient remains without any symptoms at this time.

## 2025-05-24 NOTE — PROGRESS NOTES
Progress Note - Hospitalist   Name: Hua Acevedo 65 y.o. male I MRN: [id number]  Unit/Bed#: -01 I Date of Admission: 5/22/2025   Date of Service: 5/24/2025 I Hospital Day: 2    Assessment & Plan  Hypokalemia  Continues to have loose stools daily for the 3-4 months since he had his cholecystectomy done.  Cultures including enteric panel and C. difficile are pending at this time  Continues to have persistent low potassium we will try to slow down diarrhea and continue to replace potassium.  Monitor BMP every 12.  Magnesium is normal  Diarrhea  Patient has had diarrhea ever since having a cholecystectomy about 4 months ago.  Seen by GI today.  They think it is likely postcholecystectomy diarrhea.  They started him on Questran.  Increased Questran to twice daily  Also added Imodium  Patient had a rapid response today with SVT due to low potassium today.  Stool cultures are pending at this time. keep Potassium close to 4  Hypertension  Continue PTA metoprolol succinate  Continue ARB but will hold HCTZ in setting of hypovolemia and multiple electrolyte derangements  blood pressure controlled  Hypomagnesemia  Mg 1.6  Replete and recheck  SIRS (systemic inflammatory response syndrome) (HCC)  Meeting criteria with tachycardia and fever of 101.1 F  Patient not aware of his fever but states he has been feeling ill for the past few days  UA unremarkable, lactic acid normal, procalcitonin mildly elevated  Blood cultures x 2 pending  Check Lyme titer,anaplasma,ehrlichia,babesia as patient spends time in his yard  neg COVID/flu PCR  Elevated , check ESR  Trend fever curve and leukocytosis  Empiric ceftriaxone and also started doxycycline pending results  Back pain  History of chronic back pain due to degenerative disease  Pain has been severe over the past few days prompting ED evaluation  Meeting SIRS criteria on admission  No CT evidence of discitis  Blood cultures pending  Pain control    SIRS of  non-infectious origin WITH ACUTE ORGAN DYSFUNCTION, POA, in patient admitted with lower back pain/hypokalemia/hypomagnesemia/postcholecystectomy diarrhea, evidenced by fever (Tmax 101.1), tachycardia (), with evidence of acute organ dysfunction given Tbili 2.54, requiring treatment with blood/stool cultures, Tylenol, Emperic Ceftriaxone, 3.5 L IVF bolus.   VTE Pharmacologic Prophylaxis:   Moderate Risk (Score 3-4) - Pharmacological DVT Prophylaxis Ordered: enoxaparin (Lovenox).    Mobility:   Basic Mobility Inpatient Raw Score: 23  JH-HLM Goal: 7: Walk 25 feet or more  JH-HLM Achieved: 7: Walk 25 feet or more  JH-HLM Goal achieved. Continue to encourage appropriate mobility.    Patient Centered Rounds: I performed bedside rounds with nursing staff today.   Discussions with Specialists or Other Care Team Provider: none    Education and Discussions with Family / Patient: Updated  (wife) at bedside.    Current Length of Stay: 2 day(s)  Current Patient Status: Inpatient   Certification Statement: The patient will continue to require additional inpatient hospital stay due to sirs  Discharge Plan: Anticipate discharge in 24-48 hrs to home.    Code Status: Level 1 - Full Code    Subjective   Patient continues to have diarrhea every day.  Had fever in the ED and also noted to have low potassium due to ongoing diarrhea had rapid response today due to SVT due to electrolyte abnormality    Objective :  Temp:  [98.4 °F (36.9 °C)-99 °F (37.2 °C)] 98.8 °F (37.1 °C)  HR:  [] 80  BP: (115-172)/() 116/62  Resp:  [18] 18  SpO2:  [92 %-95 %] 92 %    Body mass index is 34.44 kg/m².     Input and Output Summary (last 24 hours):     Intake/Output Summary (Last 24 hours) at 5/24/2025 1131  Last data filed at 5/24/2025 0900  Gross per 24 hour   Intake 4173 ml   Output 300 ml   Net 3873 ml       Physical Exam  Vitals and nursing note reviewed.   Constitutional:       Appearance: He is ill-appearing.   HENT:       Head: Normocephalic and atraumatic.      Right Ear: External ear normal.      Left Ear: External ear normal.      Nose: Nose normal.      Mouth/Throat:      Pharynx: Oropharynx is clear.     Eyes:      Pupils: Pupils are equal, round, and reactive to light.       Cardiovascular:      Rate and Rhythm: Normal rate and regular rhythm.      Heart sounds: Normal heart sounds.   Pulmonary:      Effort: Pulmonary effort is normal.      Breath sounds: Normal breath sounds.   Abdominal:      General: Bowel sounds are normal.      Palpations: Abdomen is soft.      Tenderness: There is no abdominal tenderness.     Musculoskeletal:         General: Normal range of motion.      Cervical back: Normal range of motion and neck supple.     Skin:     General: Skin is warm and dry.      Capillary Refill: Capillary refill takes less than 2 seconds.     Neurological:      General: No focal deficit present.      Mental Status: He is alert and oriented to person, place, and time.     Psychiatric:         Mood and Affect: Mood normal.           Lines/Drains:        Telemetry:  Telemetry Orders (From admission, onward)               24 Hour Telemetry Monitoring  Continuous x 24 Hours (Telem)        Expiring   Question:  Reason for 24 Hour Telemetry  Answer:  Arrhythmias requiring acute medical intervention / PPM or ICD malfunction                     Telemetry Reviewed: svt  Indication for Continued Telemetry Use: Arrthymias requiring medical therapy               Lab Results: I have reviewed the following results:   Results from last 7 days   Lab Units 05/24/25  0525   WBC Thousand/uL 4.10*   HEMOGLOBIN g/dL 11.6*   HEMATOCRIT % 34.3*   PLATELETS Thousands/uL 129*   SEGS PCT % 56   LYMPHO PCT % 26   MONO PCT % 13*   EOS PCT % 4     Results from last 7 days   Lab Units 05/24/25  0525 05/23/25  0526   SODIUM mmol/L 141 139   POTASSIUM mmol/L 3.0* 3.3*   CHLORIDE mmol/L 105 104   CO2 mmol/L 29 30   BUN mg/dL 5 8   CREATININE mg/dL 0.75  0.87   ANION GAP mmol/L 7 5   CALCIUM mg/dL 8.0* 7.9*   ALBUMIN g/dL  --  3.6   TOTAL BILIRUBIN mg/dL  --  1.57*   ALK PHOS U/L  --  49   ALT U/L  --  24   AST U/L  --  23   GLUCOSE RANDOM mg/dL 105 120     Results from last 7 days   Lab Units 05/22/25  1525   INR  1.08     Results from last 7 days   Lab Units 05/24/25  0743   POC GLUCOSE mg/dl 110         Results from last 7 days   Lab Units 05/22/25  1525   LACTIC ACID mmol/L 1.3   PROCALCITONIN ng/ml 0.58*       Recent Cultures (last 7 days):   Results from last 7 days   Lab Units 05/22/25  1535 05/22/25  1525   BLOOD CULTURE  No Growth at 24 hrs. No Growth at 24 hrs.       Imaging Results Review: I reviewed radiology reports from this admission including: CT chest and CT C-spine.  Other Study Results Review: EKG was reviewed.     Last 24 Hours Medication List:     Current Facility-Administered Medications:     acetaminophen (TYLENOL) tablet 975 mg, Q8H PRN    aspirin chewable tablet 81 mg, Daily    cefTRIAXone (ROCEPHIN) IVPB (premix in dextrose) 2,000 mg 50 mL, Q24H, Last Rate: 2,000 mg (05/24/25 0524)    cholestyramine sugar free (QUESTRAN LIGHT) packet 4 g, BID    doxycycline hyclate (VIBRAMYCIN) capsule 100 mg, Q12H MITCHEL    enoxaparin (LOVENOX) subcutaneous injection 40 mg, Daily    loperamide (IMODIUM) capsule 2 mg, TID PRN    losartan (COZAAR) tablet 100 mg, Daily    melatonin tablet 6 mg, HS PRN    metoprolol (LOPRESSOR) injection 5 mg, Once    metoprolol succinate (TOPROL-XL) 24 hr tablet 25 mg, Daily    multi-electrolyte (Plasmalyte-A/Isolyte-S PH 7.4/Normosol-R) IV solution, Continuous, Last Rate: 100 mL/hr (05/24/25 0524)    oxyCODONE (ROXICODONE) IR tablet 5 mg, Q6H PRN    [COMPLETED] potassium chloride 20 mEq IVPB (premix), Once, Last Rate: Stopped (05/24/25 0953) **FOLLOWED BY** potassium chloride 20 mEq IVPB (premix), Once, Last Rate: 20 mEq (05/24/25 0953)    potassium chloride 20 mEq/100 mL IVPB (premix) **ADS Override Pull**,      potassium-sodium phosphateS (K-PHOS,PHOSPHA 250) -250 mg tablet 1 tablet, BID With Meals    Administrative Statements   Today, Patient Was Seen By: Karina Cee MD      **Please Note: This note may have been constructed using a voice recognition system.**

## 2025-05-24 NOTE — ASSESSMENT & PLAN NOTE
Continue PTA metoprolol succinate  Continue ARB but will hold HCTZ in setting of hypovolemia and multiple electrolyte derangements  blood pressure controlled

## 2025-05-24 NOTE — PLAN OF CARE
Problem: PAIN - ADULT  Goal: Verbalizes/displays adequate comfort level or baseline comfort level  Description: Interventions:  - Encourage patient to monitor pain and request assistance  - Assess pain using appropriate pain scale  - Administer analgesics as ordered based on type and severity of pain and evaluate response  - Implement non-pharmacological measures as appropriate and evaluate response  - Consider cultural and social influences on pain and pain management  - Notify physician/advanced practitioner if interventions unsuccessful or patient reports new pain  - Educate patient/family on pain management process including their role and importance of  reporting pain   - Provide non-pharmacologic/complimentary pain relief interventions  Outcome: Progressing     Problem: INFECTION - ADULT  Goal: Absence or prevention of progression during hospitalization  Description: INTERVENTIONS:  - Assess and monitor for signs and symptoms of infection  - Monitor lab/diagnostic results  - Monitor all insertion sites, i.e. indwelling lines, tubes, and drains  - Monitor endotracheal if appropriate and nasal secretions for changes in amount and color  - [place] appropriate cooling/warming therapies per order  - Administer medications as ordered  - Instruct and encourage patient and family to use good hand hygiene technique  - Identify and instruct in appropriate isolation precautions for identified infection/condition  Outcome: Progressing  Goal: Absence of fever/infection during neutropenic period  Description: INTERVENTIONS:  - Monitor WBC  - Perform strict hand hygiene  - Limit to healthy visitors only  - No plants, dried, fresh or silk flowers with escalante in patient room  Outcome: Progressing     Problem: SAFETY ADULT  Goal: Patient will remain free of falls  Description: INTERVENTIONS:  - Educate patient/family on patient safety including physical limitations  - Instruct patient to call for assistance with activity   -  Consider consulting OT/PT to assist with strengthening/mobility based on AM PAC & JH-HLM score  - Consult OT/PT to assist with strengthening/mobility   - Keep Call bell within reach  - Keep bed low and locked with side rails adjusted as appropriate  - Keep care items and personal belongings within reach  - Initiate and maintain comfort rounds  - Make Fall Risk Sign visible to staff  - Offer Toileting every 2 Hours, in advance of need  - Initiate/Maintain bed alarm  - Obtain necessary fall risk management equipment  - Apply yellow socks and bracelet for high fall risk patients  - Consider moving patient to room near nurses station  Outcome: Progressing  Goal: Maintain or return to baseline ADL function  Description: INTERVENTIONS:  -  Assess patient's ability to carry out ADLs; assess patient's baseline for ADL function and identify physical deficits which impact ability to perform ADLs (bathing, care of mouth/teeth, toileting, grooming, dressing, etc.)  - Assess/evaluate cause of self-care deficits   - Assess range of motion  - Assess patient's mobility; develop plan if impaired  - Assess patient's need for assistive devices and provide as appropriate  - Encourage maximum independence but intervene and supervise when necessary  - Involve family in performance of ADLs  - Assess for home care needs following discharge   - Consider OT consult to assist with ADL evaluation and planning for discharge  - Provide patient education as appropriate  - Monitor functional capacity and physical performance, use of AM PAC & JH-HLM   - Monitor gait, balance and fatigue with ambulation    Outcome: Progressing  Goal: Maintains/Returns to pre admission functional level  Description: INTERVENTIONS:  - Perform AM-PAC 6 Click Basic Mobility/ Daily Activity assessment daily.  - Set and communicate daily mobility goal to care team and patient/family/caregiver.   - Collaborate with rehabilitation services on mobility goals if consulted  -  Reposition patient every 2 hours.  - Stand patient 3 times a day  - Ambulate patient 3 times a day  - Out of bed to chair 3 times a day   - Out of bed for meals 3 times a day  - Out of bed for toileting  - Record patient progress and toleration of activity level   Outcome: Progressing     Problem: DISCHARGE PLANNING  Goal: Discharge to home or other facility with appropriate resources  Description: INTERVENTIONS:  - Identify barriers to discharge w/patient and caregiver  - Arrange for needed discharge resources and transportation as appropriate  - Identify discharge learning needs (meds, wound care, etc.)  - Arrange for interpretive services to assist at discharge as needed  - Refer to Case Management Department for coordinating discharge planning if the patient needs post-hospital services based on physician/advanced practitioner order or complex needs related to functional status, cognitive ability, or social support system  Outcome: Progressing     Problem: Knowledge Deficit  Goal: Patient/family/caregiver demonstrates understanding of disease process, treatment plan, medications, and discharge instructions  Description: Complete learning assessment and assess knowledge base.  Interventions:  - Provide teaching at level of understanding  - Provide teaching via preferred learning methods  Outcome: Progressing     Problem: CARDIOVASCULAR - ADULT  Goal: Maintains optimal cardiac output and hemodynamic stability  Description: INTERVENTIONS:  - Monitor I/O, vital signs and rhythm  - Monitor for S/S and trends of decreased cardiac output  - Administer and titrate ordered vasoactive medications to optimize hemodynamic stability  - Assess quality of pulses, skin color and temperature  - Assess for signs of decreased coronary artery perfusion  - Instruct patient to report change in severity of symptoms  Outcome: Progressing  Goal: Absence of cardiac dysrhythmias or at baseline rhythm  Description: INTERVENTIONS:  -  Continuous cardiac monitoring, vital signs, obtain 12 lead EKG if ordered  - Administer antiarrhythmic and heart rate control medications as ordered  - Monitor electrolytes and administer replacement therapy as ordered  Outcome: Progressing     Problem: METABOLIC, FLUID AND ELECTROLYTES - ADULT  Goal: Electrolytes maintained within normal limits  Description: INTERVENTIONS:  - Monitor labs and assess patient for signs and symptoms of electrolyte imbalances  - Administer electrolyte replacement as ordered  - Monitor response to electrolyte replacements, including repeat lab results as appropriate  - Instruct patient on fluid and nutrition as appropriate  Outcome: Progressing  Goal: Fluid balance maintained  Description: INTERVENTIONS:  - Monitor labs   - Monitor I/O and WT  - Instruct patient on fluid and nutrition as appropriate  - Assess for signs & symptoms of volume excess or deficit  Outcome: Progressing  Goal: Glucose maintained within target range  Description: INTERVENTIONS:  - Monitor Blood Glucose as ordered  - Assess for signs and symptoms of hyperglycemia and hypoglycemia  - Administer ordered medications to maintain glucose within target range  - Assess nutritional intake and initiate nutrition service referral as needed  Outcome: Progressing

## 2025-05-24 NOTE — UTILIZATION REVIEW
SEE INITIAL REVIEW AT BOTTOM    Continued Stay Review    Date: 5/24                          Current Patient Class: Inpatient  Current Level of Care: Med Surg    HPI:65 y.o. male initially admitted on 5/22     Current Diagnosis: SIRS, Diarrhea, BRBRP Hypokalemia, Hypomagnesemia    Assessment/Plan: Date: 5/24  Day 3: Has surpassed a 2nd midnight with active treatments and services.  S/p Rapid Response; Acute change in heart rate in 180s  Hypokalemia, SVT; EKG  Metoprolol 5mg IV x1 followed by metoprolol 2.5 x2  Give potassium replacements  Monitor BMP Q8 given consistent diarrhea, K will need to be monitor and replaced frequently   Nurse noting he is diaphoretic but patient is content. Came in due to consistent diarrhea and feeling unwell past couple of days. K noted to be 3.0 this AM. Ordered EKG, Metoprolol and K 40 oral/40 IV to be given. HR dropped to 156 with 5mg IV metoprolol with stable BP. Metoprolol 2.5mg IV given x2 with unchanged HR or BP. Considered cardizem vs adenosine but ultimately patient converted back to NSR in the 80-90s and did not require further intervention.     Progress notes;   Pt continues with peS/p Rapid Response today with SVT due to low potassium today. rsistent low potassium. Try to slow down diarrhea and continue to replace potassium.  BMP q12h. Questran increased to Bid per GI. Imodium added.   Stool cultures pending. Keep potassium close to 4.   Iv antibiotics. IVFs. Bld cultures pending. Pain control  Check Lyme titer,anaplasma,ehrlichia,babesia as patient spends time in his yard     Medications:   Scheduled Medications:  aspirin, 81 mg, Oral, Daily  cefTRIAXone, 2,000 mg, Intravenous, Q24H  cholestyramine sugar free, 4 g, Oral, BID  enoxaparin, 40 mg, Subcutaneous, Daily  losartan, 100 mg, Oral, Daily  metoprolol, 5 mg, Intravenous, Once  metoprolol succinate, 25 mg, Oral, Daily  potassium chloride, 20 mEq, Intravenous, Once  potassium chloride, , ,       Continuous IV  Infusions:  multi-electrolyte, 100 mL/hr, Intravenous, Continuous      PRN Meds:  acetaminophen, 975 mg, Oral, Q8H PRN  loperamide, 2 mg, Oral, TID PRN  melatonin, 6 mg, Oral, HS PRN  oxyCODONE, 5 mg, Oral, Q6H PRN  potassium chloride, , ,       Discharge Plan: TBD    Vital Signs (last 3 days)       Date/Time Temp Pulse Resp BP MAP (mmHg) SpO2 Calculated FIO2 (%) - Nasal Cannula Nasal Cannula O2 Flow Rate (L/min) O2 Device Patient Position - Orthostatic VS [place] Coma Scale Score Pain    05/24/25 0950 -- 80 -- 116/62 -- -- -- -- -- -- -- --    05/24/25 07:51:45 -- 182 -- 155/107 123 92 % -- -- -- -- -- --    05/24/25 0750 -- -- -- -- -- -- -- -- -- -- 15 No Pain    05/24/25 07:47:24 98.8 °F (37.1 °C) 179 -- 172/115 134 93 % -- -- -- -- -- --    05/24/25 07:44:14 -- 179 -- 157/114 128 94 % -- -- -- -- -- --    05/24/25 00:01:14 98.8 °F (37.1 °C) 74 18 115/71 86 95 % -- -- -- -- -- --    05/23/25 22:57:34 99 °F (37.2 °C) 74 -- 125/72 90 94 % -- -- -- -- -- --    05/23/25 2044 -- -- -- -- -- -- -- -- -- -- 15 No Pain    05/23/25 15:17:30 98.4 °F (36.9 °C) 81 18 124/73 90 92 % -- -- -- -- -- --    05/23/25 12:11:50 98.6 °F (37 °C) 80 -- -- -- 94 % -- -- -- -- -- --    05/23/25 09:09:09 -- 86 -- 133/76 95 91 % -- -- -- -- -- --    05/23/25 0908 -- -- -- -- -- -- -- -- None (Room air) -- -- No Pain    05/23/25 07:07:34 99 °F (37.2 °C) 77 -- 123/73 90 92 % -- -- -- -- -- --    05/22/25 22:02:49 98.6 °F (37 °C) 79 17 117/70 86 89 % -- -- -- -- -- --    05/22/25 21:01:52 97.9 °F (36.6 °C) 97 17 143/91 108 95 % -- -- -- Sitting -- --    05/22/25 2101 -- -- -- -- -- -- -- -- -- -- -- No Pain    05/22/25 2100 -- -- -- -- -- 95 % -- -- None (Room air) -- -- --    05/22/25 2015 -- 83 17 138/68 94 95 % 28 2 L/min Nasal cannula Sitting -- --    05/22/25 2000 97.9 °F (36.6 °C) 84 17 132/76 -- 96 % 28 2 L/min Nasal cannula Sitting -- --    05/22/25 1915 -- 97 18 144/77 105 94 % 28 2 L/min Nasal cannula Sitting -- --    05/22/25 1900  -- 92 19 144/77 -- 95 % 28 2 L/min Nasal cannula Sitting -- --    05/22/25 1815 -- 91 20 175/93 117 93 % 28 2 L/min Nasal cannula Lying -- --    05/22/25 1800 -- 85 16 122/63 87 93 % 28 2 L/min Nasal cannula Lying -- --    05/22/25 1745 98.5 °F (36.9 °C) 88 20 138/69 94 95 % 28 2 L/min Nasal cannula Lying -- --    05/22/25 1730 -- 89 20 134/67 96 95 % 28 2 L/min Nasal cannula Lying -- --    05/22/25 1715 -- 92 18 113/60 79 96 % 28 2 L/min Nasal cannula Lying -- --    05/22/25 1600 -- 102 18 113/65 84 94 % 28 2 L/min Nasal cannula Lying -- --    05/22/25 1555 -- -- -- -- -- -- -- -- -- -- -- Med Not Given for Pain - for MAR use only    05/22/25 1554 -- -- -- -- -- -- -- -- Nasal cannula -- 15 --    05/22/25 1536 101.1 °F (38.4 °C)  -- -- -- -- -- -- -- -- -- -- --    05/22/25 1459 -- 105 18 162/77 -- 87 %  -- -- None (Room air) Lying -- 4          Weight (last 2 days)       Date/Time Weight    05/22/25 2059 106 (233.2)    05/22/25 1459 106 (233.25)            Pertinent Labs/Diagnostic Results:   Radiology:  CT cervical spine without contrast   Final Interpretation by Phillip Garcia MD (05/22 2042)      No evidence of acute cervical spine fracture or traumatic malalignment.                  Workstation performed: [id number]         CT recon only lumbar spine   Final Interpretation by Kvng Fernandes MD (05/22 1935)      1.  No evidence of discitis osteomyelitis of the lumbar spine. Multilevel degenerative changes of the lumbar spine, detailed above, with degenerative disc disease worst at the L3-4 level where there is at least moderate central canal stenosis.   2.  Please see the CT of the abdomen and pelvis from earlier the same day for full description of the intra-abdominal findings.            Workstation performed: [id number]         CT pe study w abdomen pelvis w contrast   Final Interpretation by Kvng Fernandes MD (05/22 1749)      1.  No evidence of pulmonary embolism or other acute pathology within  "the chest, abdomen, or pelvis. No clear CT evidence of osteomyelitis discitis.   2.  Chronic findings, detailed above.         Workstation performed: [id number]         XR chest portable   Final Interpretation by Sammy Velez MD (05/22 1601)      No acute cardiopulmonary disease.            Workstation performed: [id number]           Cardiology:  ECG 12 lead   Final Result by Ja Theodore MD (05/22 9086)   Sinus tachycardia   Cannot exclude old IMI   Abnormal ECG   When compared with ECG of 11-Nov-2024 11:30,   No significant change was found   Confirmed by Ja Theodore ([id number]) on 5/22/2025 3:46:45 PM        GI:  No orders to display       Results from last 7 days   Lab Units 05/22/25 2214   SARS-COV-2  Negative     Results from last 7 days   Lab Units 05/24/25 0525 05/23/25  0526 05/22/25  1525   WBC Thousand/uL 4.10* 4.86 7.80   HEMOGLOBIN g/dL 11.6* 12.4 13.9   HEMATOCRIT % 34.3* 36.1* 40.4   PLATELETS Thousands/uL 129* 143* 142*   TOTAL NEUT ABS Thousands/µL 2.29  --  6.26         Results from last 7 days   Lab Units 05/24/25  0525 05/23/25  0526 05/22/25  1525   SODIUM mmol/L 141 139 138   POTASSIUM mmol/L 3.0* 3.3* 2.7*   CHLORIDE mmol/L 105 104 100   CO2 mmol/L 29 30 29   ANION GAP mmol/L 7 5 9   BUN mg/dL 5 8 11   CREATININE mg/dL 0.75 0.87 1.01   EGFR ml/min/1.73sq m 96 90 77   CALCIUM mg/dL 8.0* 7.9* 8.6   MAGNESIUM mg/dL 2.2 2.3 1.6*   PHOSPHORUS mg/dL  --  1.6*  --      Results from last 7 days   Lab Units 05/23/25  0526 05/22/25  1525   AST U/L 23 19   ALT U/L 24 29   ALK PHOS U/L 49 60   TOTAL PROTEIN g/dL 6.1* 7.2   ALBUMIN g/dL 3.6 4.3   TOTAL BILIRUBIN mg/dL 1.57* 2.54*     Results from last 7 days   Lab Units 05/24/25  0743   POC GLUCOSE mg/dl 110     Results from last 7 days   Lab Units 05/24/25  0525 05/23/25  0526 05/22/25  1525   GLUCOSE RANDOM mg/dL 105 120 130             No results found for: \"BETA-HYDROXYBUTYRATE\"       Results from last 7 days   Lab Units 05/22/25  1525   PH SIXTO  " 7.478*   PCO2 SIXTO mm Hg 36.8*   PO2 SIXTO mm Hg 66.6*   HCO3 SIXTO mmol/L 26.7   BASE EXC SIXTO mmol/L 3.3   O2 CONTENT SIXTO ml/dL 19.1   O2 HGB, VENOUS % 92.6*             Results from last 7 days   Lab Units 05/22/25  1910 05/22/25  1744 05/22/25  1525   HS TNI 0HR ng/L  --   --  13   HS TNI 2HR ng/L  --  19  --    HSTNI D2 ng/L  --  6  --    HS TNI 4HR ng/L 17  --   --    HSTNI D4 ng/L 4  --   --          Results from last 7 days   Lab Units 05/22/25  1525   PROTIME seconds 14.4   INR  1.08   PTT seconds 31     Results from last 7 days   Lab Units 05/23/25  0526   TSH 3RD GENERATON uIU/mL 1.434     Results from last 7 days   Lab Units 05/22/25  1525   PROCALCITONIN ng/ml 0.58*     Results from last 7 days   Lab Units 05/22/25  1525   LACTIC ACID mmol/L 1.3             Results from last 7 days   Lab Units 05/22/25  1525   BNP pg/mL 38                         Results from last 7 days   Lab Units 05/23/25  0526 05/22/25  1744   CRP mg/L  --  174.0*   SED RATE mm/hour 20*  --              Results from last 7 days   Lab Units 05/22/25  1832   CLARITY UA  Clear   COLOR UA  Yellow   SPEC GRAV UA  1.010   PH UA  6.0   GLUCOSE UA mg/dl Negative   KETONES UA mg/dl Negative   BLOOD UA  Trace-Intact*   PROTEIN UA mg/dl Negative   NITRITE UA  Negative   BILIRUBIN UA  Negative   UROBILINOGEN UA E.U./dl 0.2   LEUKOCYTES UA  Negative   WBC UA /hpf 0-1   RBC UA /hpf 0-1   BACTERIA UA /hpf Occasional   EPITHELIAL CELLS WET PREP /hpf Occasional     Results from last 7 days   Lab Units 05/22/25  2214   INFLUENZA A PCR  Negative   INFLUENZA B PCR  Negative   RSV PCR  Negative                             Results from last 7 days   Lab Units 05/22/25  1535 05/22/25  1525   BLOOD CULTURE  No Growth at 24 hrs. No Growth at 24 hrs.                   Network Utilization Review Department  ATTENTION: Please call with any questions or concerns to [phone] and carefully listen to the prompts so that you are directed to the right person. All  voicemails are confidential.   For Discharge needs, contact Care Management DC Support Team at [phone] opt. [phone]  Send all requests for admission clinical reviews, approved or denied determinations and any other requests to dedicated fax number below belonging to the campus where the patient is receiving treatment. List of dedicated fax numbers for the Facilities:  FACILITY NAME UR FAX NUMBER   ADMISSION DENIALS (Administrative/Medical Necessity) [phone]   DISCHARGE SUPPORT TEAM (NETWORK) [phone]   PARENT CHILD HEALTH (Maternity/NICU/Pediatrics) [phone]   [facility] [phone]   [facility] [phone]   [facility] [phone]   [facility] [phone]   [facility] [phone]   [facility] [phone]   [facility] [phone]   [facility] [phone]   [facility] [phone]   [facility] [phone]   [facility] [phone]   [facility] [phone]

## 2025-05-25 LAB
ANION GAP SERPL CALCULATED.3IONS-SCNC: 8 MMOL/L (ref 4–13)
BUN SERPL-MCNC: 5 MG/DL (ref 5–25)
CALCIUM SERPL-MCNC: 8.5 MG/DL (ref 8.4–10.2)
CHLORIDE SERPL-SCNC: 106 MMOL/L (ref 96–108)
CO2 SERPL-SCNC: 29 MMOL/L (ref 21–32)
CREAT SERPL-MCNC: 0.83 MG/DL (ref 0.6–1.3)
ERYTHROCYTE [DISTWIDTH] IN BLOOD BY AUTOMATED COUNT: 13.4 % (ref 11.6–15.1)
GFR SERPL CREATININE-BSD FRML MDRD: 92 ML/MIN/1.73SQ M
GLUCOSE SERPL-MCNC: 96 MG/DL (ref 65–140)
HCT VFR BLD AUTO: 37.3 % (ref 36.5–49.3)
HGB BLD-MCNC: 12.5 G/DL (ref 12–17)
MAGNESIUM SERPL-MCNC: 2.2 MG/DL (ref 1.9–2.7)
MCH RBC QN AUTO: 31.2 PG (ref 26.8–34.3)
MCHC RBC AUTO-ENTMCNC: 33.5 G/DL (ref 31.4–37.4)
MCV RBC AUTO: 93 FL (ref 82–98)
PHOSPHATE SERPL-MCNC: 2.6 MG/DL (ref 2.3–4.1)
PLATELET # BLD AUTO: 156 THOUSANDS/UL (ref 149–390)
PMV BLD AUTO: 9.1 FL (ref 8.9–12.7)
POTASSIUM SERPL-SCNC: 3.9 MMOL/L (ref 3.5–5.3)
RBC # BLD AUTO: 4.01 MILLION/UL (ref 3.88–5.62)
SODIUM SERPL-SCNC: 143 MMOL/L (ref 135–147)
WBC # BLD AUTO: 4.34 THOUSAND/UL (ref 4.31–10.16)

## 2025-05-25 PROCEDURE — 80048 BASIC METABOLIC PNL TOTAL CA: CPT | Performed by: FAMILY MEDICINE

## 2025-05-25 PROCEDURE — 84100 ASSAY OF PHOSPHORUS: CPT | Performed by: FAMILY MEDICINE

## 2025-05-25 PROCEDURE — 83735 ASSAY OF MAGNESIUM: CPT | Performed by: FAMILY MEDICINE

## 2025-05-25 PROCEDURE — 85027 COMPLETE CBC AUTOMATED: CPT | Performed by: FAMILY MEDICINE

## 2025-05-25 PROCEDURE — 99232 SBSQ HOSP IP/OBS MODERATE 35: CPT | Performed by: FAMILY MEDICINE

## 2025-05-25 RX ORDER — AZITHROMYCIN 250 MG/1
500 TABLET, FILM COATED ORAL EVERY 24 HOURS
Status: DISCONTINUED | OUTPATIENT
Start: 2025-05-25 | End: 2025-05-26 | Stop reason: HOSPADM

## 2025-05-25 RX ADMIN — CHOLESTYRAMINE LIGHT 4 G: 4 POWDER, FOR SUSPENSION ORAL at 08:30

## 2025-05-25 RX ADMIN — ENOXAPARIN SODIUM 40 MG: 40 INJECTION SUBCUTANEOUS at 08:30

## 2025-05-25 RX ADMIN — AZITHROMYCIN DIHYDRATE 500 MG: 250 TABLET, FILM COATED ORAL at 10:00

## 2025-05-25 RX ADMIN — CEFTRIAXONE 2000 MG: 2 INJECTION, SOLUTION INTRAVENOUS at 05:20

## 2025-05-25 RX ADMIN — METOPROLOL SUCCINATE ER TABLETS 25 MG: 25 TABLET, FILM COATED, EXTENDED RELEASE ORAL at 08:30

## 2025-05-25 RX ADMIN — Medication 1 TABLET: at 08:30

## 2025-05-25 RX ADMIN — DOXYCYCLINE 100 MG: 100 CAPSULE ORAL at 08:30

## 2025-05-25 RX ADMIN — DOXYCYCLINE 100 MG: 100 CAPSULE ORAL at 20:10

## 2025-05-25 RX ADMIN — CHOLESTYRAMINE LIGHT 4 G: 4 POWDER, FOR SUSPENSION ORAL at 18:33

## 2025-05-25 RX ADMIN — Medication 1 TABLET: at 18:33

## 2025-05-25 RX ADMIN — ASPIRIN 81 MG CHEWABLE TABLET 81 MG: 81 TABLET CHEWABLE at 08:30

## 2025-05-25 RX ADMIN — SODIUM CHLORIDE, SODIUM GLUCONATE, SODIUM ACETATE, POTASSIUM CHLORIDE, MAGNESIUM CHLORIDE, SODIUM PHOSPHATE, DIBASIC, AND POTASSIUM PHOSPHATE 100 ML/HR: .53; .5; .37; .037; .03; .012; .00082 INJECTION, SOLUTION INTRAVENOUS at 00:29

## 2025-05-25 RX ADMIN — LOSARTAN POTASSIUM 100 MG: 50 TABLET, FILM COATED ORAL at 08:30

## 2025-05-25 RX ADMIN — SODIUM CHLORIDE, SODIUM GLUCONATE, SODIUM ACETATE, POTASSIUM CHLORIDE, MAGNESIUM CHLORIDE, SODIUM PHOSPHATE, DIBASIC, AND POTASSIUM PHOSPHATE 100 ML/HR: .53; .5; .37; .037; .03; .012; .00082 INJECTION, SOLUTION INTRAVENOUS at 09:32

## 2025-05-25 NOTE — ASSESSMENT & PLAN NOTE
History of chronic back pain due to degenerative disease  Pain has been severe over the past few days prompting ED evaluation  Meeting SIRS criteria on admission  No CT evidence of discitis  Blood cultures neg. Back pain improving  Pain control

## 2025-05-25 NOTE — PLAN OF CARE
Problem: PAIN - ADULT  Goal: Verbalizes/displays adequate comfort level or baseline comfort level  Description: Interventions:  - Encourage patient to monitor pain and request assistance  - Assess pain using appropriate pain scale  - Administer analgesics as ordered based on type and severity of pain and evaluate response  - Implement non-pharmacological measures as appropriate and evaluate response  - Consider cultural and social influences on pain and pain management  - Notify physician/advanced practitioner if interventions unsuccessful or patient reports new pain  - Educate patient/family on pain management process including their role and importance of  reporting pain   - Provide non-pharmacologic/complimentary pain relief interventions  Outcome: Progressing     Problem: INFECTION - ADULT  Goal: Absence or prevention of progression during hospitalization  Description: INTERVENTIONS:  - Assess and monitor for signs and symptoms of infection  - Monitor lab/diagnostic results  - Monitor all insertion sites, i.e. indwelling lines, tubes, and drains  - Monitor endotracheal if appropriate and nasal secretions for changes in amount and color  - [place] appropriate cooling/warming therapies per order  - Administer medications as ordered  - Instruct and encourage patient and family to use good hand hygiene technique  - Identify and instruct in appropriate isolation precautions for identified infection/condition  Outcome: Progressing  Goal: Absence of fever/infection during neutropenic period  Description: INTERVENTIONS:  - Monitor WBC  - Perform strict hand hygiene  - Limit to healthy visitors only  - No plants, dried, fresh or silk flowers with escalante in patient room  Outcome: Progressing     Problem: SAFETY ADULT  Goal: Patient will remain free of falls  Description: INTERVENTIONS:  - Educate patient/family on patient safety including physical limitations  - Instruct patient to call for assistance with activity   -  Consider consulting OT/PT to assist with strengthening/mobility based on AM PAC & JH-HLM score  - Consult OT/PT to assist with strengthening/mobility   - Keep Call bell within reach  - Keep bed low and locked with side rails adjusted as appropriate  - Keep care items and personal belongings within reach  - Initiate and maintain comfort rounds  - Make Fall Risk Sign visible to staff  - Offer Toileting every  Hours, in advance of need  - Initiate/Maintain alarm  - Obtain necessary fall risk management equipment:   - Apply yellow socks and bracelet for high fall risk patients  - Consider moving patient to room near nurses station  Outcome: Progressing  Goal: Maintain or return to baseline ADL function  Description: INTERVENTIONS:  -  Assess patient's ability to carry out ADLs; assess patient's baseline for ADL function and identify physical deficits which impact ability to perform ADLs (bathing, care of mouth/teeth, toileting, grooming, dressing, etc.)  - Assess/evaluate cause of self-care deficits   - Assess range of motion  - Assess patient's mobility; develop plan if impaired  - Assess patient's need for assistive devices and provide as appropriate  - Encourage maximum independence but intervene and supervise when necessary  - Involve family in performance of ADLs  - Assess for home care needs following discharge   - Consider OT consult to assist with ADL evaluation and planning for discharge  - Provide patient education as appropriate  - Monitor functional capacity and physical performance, use of AM PAC & JH-HLM   - Monitor gait, balance and fatigue with ambulation    Outcome: Progressing  Goal: Maintains/Returns to pre admission functional level  Description: INTERVENTIONS:  - Perform AM-PAC 6 Click Basic Mobility/ Daily Activity assessment daily.  - Set and communicate daily mobility goal to care team and patient/family/caregiver.   - Collaborate with rehabilitation services on mobility goals if consulted  -  Perform Range of Motion  times a day.  - Reposition patient every  hours.  - Dangle patient  times a day  - Stand patient  times a day  - Ambulate patient  times a day  - Out of bed to chair  times a day   - Out of bed for meals  times a day  - Out of bed for toileting  - Record patient progress and toleration of activity level   Outcome: Progressing     Problem: DISCHARGE PLANNING  Goal: Discharge to home or other facility with appropriate resources  Description: INTERVENTIONS:  - Identify barriers to discharge w/patient and caregiver  - Arrange for needed discharge resources and transportation as appropriate  - Identify discharge learning needs (meds, wound care, etc.)  - Arrange for interpretive services to assist at discharge as needed  - Refer to Case Management Department for coordinating discharge planning if the patient needs post-hospital services based on physician/advanced practitioner order or complex needs related to functional status, cognitive ability, or social support system  Outcome: Progressing     Problem: Knowledge Deficit  Goal: Patient/family/caregiver demonstrates understanding of disease process, treatment plan, medications, and discharge instructions  Description: Complete learning assessment and assess knowledge base.  Interventions:  - Provide teaching at level of understanding  - Provide teaching via preferred learning methods  Outcome: Progressing     Problem: CARDIOVASCULAR - ADULT  Goal: Maintains optimal cardiac output and hemodynamic stability  Description: INTERVENTIONS:  - Monitor I/O, vital signs and rhythm  - Monitor for S/S and trends of decreased cardiac output  - Administer and titrate ordered vasoactive medications to optimize hemodynamic stability  - Assess quality of pulses, skin color and temperature  - Assess for signs of decreased coronary artery perfusion  - Instruct patient to report change in severity of symptoms  Outcome: Progressing  Goal: Absence of cardiac  dysrhythmias or at baseline rhythm  Description: INTERVENTIONS:  - Continuous cardiac monitoring, vital signs, obtain 12 lead EKG if ordered  - Administer antiarrhythmic and heart rate control medications as ordered  - Monitor electrolytes and administer replacement therapy as ordered  Outcome: Progressing     Problem: METABOLIC, FLUID AND ELECTROLYTES - ADULT  Goal: Electrolytes maintained within normal limits  Description: INTERVENTIONS:  - Monitor labs and assess patient for signs and symptoms of electrolyte imbalances  - Administer electrolyte replacement as ordered  - Monitor response to electrolyte replacements, including repeat lab results as appropriate  - Instruct patient on fluid and nutrition as appropriate  Outcome: Progressing  Goal: Fluid balance maintained  Description: INTERVENTIONS:  - Monitor labs   - Monitor I/O and WT  - Instruct patient on fluid and nutrition as appropriate  - Assess for signs & symptoms of volume excess or deficit  Outcome: Progressing  Goal: Glucose maintained within target range  Description: INTERVENTIONS:  - Monitor Blood Glucose as ordered  - Assess for signs and symptoms of hyperglycemia and hypoglycemia  - Administer ordered medications to maintain glucose within target range  - Assess nutritional intake and initiate nutrition service referral as needed  Outcome: Progressing

## 2025-05-25 NOTE — NURSING NOTE
"RN and PCA attempted blood work. RN and PCA unsuccessful at this time. Patient is now currently refusing blood work. Patient stated \" I am tired of getting poked\". Patient educated on importance of blood work. Patient also stated \" I am tired of this shit I may just leave when my wife gets here\". RN told patient to make RN aware if that is his plan.   "

## 2025-05-25 NOTE — PROGRESS NOTES
Progress Note - Hospitalist   Name: Hua Acevedo 65 y.o. male I MRN: [id number]  Unit/Bed#: -[facility] I Date of Admission: 5/22/2025   Date of Service: 5/25/2025 I Hospital Day: 3    Assessment & Plan  Hypokalemia  Continues to have loose stools daily for the 3-4 months since he had his cholecystectomy done.  Cultures including enteric panel show  + campylobacter and C. difficile neg  Continues to have persistent low potassium we will try to slow down diarrhea and continue to replace potassium.  Monitor BMP every 12.  Magnesium is normal  Diarrhea  Patient has had diarrhea ever since having a cholecystectomy about 4 months ago.  Seen by GI .  They think it is likely postcholecystectomy diarrhea.  They started him on Questran.  Increased Questran to twice daily  Also added Imodium  Patient had a rapid response 5/24 with SVT due to low potassium .  Diarrhea is improving and no further rapid responses as electrolytes are stable.  Started on Zithromax due to Campylobacter positive.  If patient's diarrhea remains stable anticipate discharge tomorrow  Essential hypertension  Continue PTA metoprolol succinate  Continue ARB but will hold HCTZ in setting of hypovolemia and multiple electrolyte derangements  blood pressure controlled  SIRS (systemic inflammatory response syndrome) (HCC)  Meeting criteria with tachycardia and fever of 101.1 F  Patient not aware of his fever but states he has been feeling ill for the past few days  UA unremarkable, lactic acid normal, procalcitonin mildly elevated  Blood cultures x 2 neg  Neg  Lyme titer, pending anaplasma,ehrlichia,babesia as patient spends time in his yard  neg COVID/flu PCR  Elevated ,  ESR 20  Empiric ceftriaxone stopped and also started doxycycline pending results of Anaplasma and Ehrlichia.  Also placed on Zithromax for Campylobacter.  Back pain  History of chronic back pain due to degenerative disease  Pain has been severe over the past few days prompting  ED evaluation  Meeting SIRS criteria on admission  No CT evidence of discitis  Blood cultures neg. Back pain improving  Pain control    VTE Pharmacologic Prophylaxis:   Moderate Risk (Score 3-4) - Pharmacological DVT Prophylaxis Ordered: enoxaparin (Lovenox).    Mobility:   Basic Mobility Inpatient Raw Score: 23  JH-HLM Goal: 7: Walk 25 feet or more  JH-HLM Achieved: 7: Walk 25 feet or more  JH-HLM Goal achieved. Continue to encourage appropriate mobility.    Patient Centered Rounds: I performed bedside rounds with nursing staff today.   Discussions with Specialists or Other Care Team Provider: Diarrhea    Education and Discussions with Family / Patient: update wife    Current Length of Stay: 3 day(s)  Current Patient Status: Inpatient   Certification Statement: The patient will continue to require additional inpatient hospital stay due to fever and diarrhea  Discharge Plan: Anticipate discharge tomorrow to home.    Code Status: Level 1 - Full Code    Subjective   Patient states his diarrhea is slowing down he is feeling a little bit better today no fevers or chills reported    Objective :  Temp:  [98.2 °F (36.8 °C)-99.3 °F (37.4 °C)] 98.6 °F (37 °C)  HR:  [57-73] 64  BP: (111-141)/(62-84) 141/79  Resp:  [17-18] 18  SpO2:  [92 %-95 %] 92 %  O2 Device: None (Room air)    Body mass index is 34.44 kg/m².     Input and Output Summary (last 24 hours):     Intake/Output Summary (Last 24 hours) at 5/25/2025 1100  Last data filed at 5/25/2025 0900  Gross per 24 hour   Intake 480 ml   Output --   Net 480 ml       Physical Exam  Vitals and nursing note reviewed.   Constitutional:       Appearance: Normal appearance.   HENT:      Head: Normocephalic and atraumatic.      Right Ear: External ear normal.      Left Ear: External ear normal.      Nose: Nose normal.      Mouth/Throat:      Pharynx: Oropharynx is clear.     Cardiovascular:      Rate and Rhythm: Normal rate and regular rhythm.      Heart sounds: Normal heart sounds.    Pulmonary:      Effort: Pulmonary effort is normal.      Breath sounds: Normal breath sounds.   Abdominal:      General: Bowel sounds are normal.      Palpations: Abdomen is soft.      Tenderness: There is no abdominal tenderness.     Musculoskeletal:         General: Normal range of motion.      Cervical back: Normal range of motion and neck supple.     Skin:     General: Skin is warm and dry.      Capillary Refill: Capillary refill takes less than 2 seconds.     Neurological:      General: No focal deficit present.      Mental Status: He is alert and oriented to person, place, and time.     Psychiatric:         Mood and Affect: Mood normal.           Lines/Drains:              Lab Results: I have reviewed the following results:   Results from last 7 days   Lab Units 05/25/25  0750 05/24/25  0525   WBC Thousand/uL 4.34 4.10*   HEMOGLOBIN g/dL 12.5 11.6*   HEMATOCRIT % 37.3 34.3*   PLATELETS Thousands/uL 156 129*   SEGS PCT %  --  56   LYMPHO PCT %  --  26   MONO PCT %  --  13*   EOS PCT %  --  4     Results from last 7 days   Lab Units 05/25/25  0749 05/24/25  0525 05/23/25  0526   SODIUM mmol/L 143   < > 139   POTASSIUM mmol/L 3.9   < > 3.3*   CHLORIDE mmol/L 106   < > 104   CO2 mmol/L 29   < > 30   BUN mg/dL 5   < > 8   CREATININE mg/dL 0.83   < > 0.87   ANION GAP mmol/L 8   < > 5   CALCIUM mg/dL 8.5   < > 7.9*   ALBUMIN g/dL  --   --  3.6   TOTAL BILIRUBIN mg/dL  --   --  1.57*   ALK PHOS U/L  --   --  49   ALT U/L  --   --  24   AST U/L  --   --  23   GLUCOSE RANDOM mg/dL 96   < > 120    < > = values in this interval not displayed.     Results from last 7 days   Lab Units 05/22/25  1525   INR  1.08     Results from last 7 days   Lab Units 05/24/25  0743   POC GLUCOSE mg/dl 110         Results from last 7 days   Lab Units 05/22/25  1525   LACTIC ACID mmol/L 1.3   PROCALCITONIN ng/ml 0.58*       Recent Cultures (last 7 days):   Results from last 7 days   Lab Units 05/22/25  1535 05/22/25  1525   BLOOD CULTURE   No Growth at 48 hrs. No Growth at 48 hrs.       Imaging Results Review: I reviewed radiology reports from this admission including: CT abdomen/pelvis.  Other Study Results Review: No additional pertinent studies reviewed.  telemetry reviewed  Last 24 Hours Medication List:     Current Facility-Administered Medications:     acetaminophen (TYLENOL) tablet 975 mg, Q8H PRN    aspirin chewable tablet 81 mg, Daily    azithromycin (ZITHROMAX) tablet 500 mg, Q24H    cholestyramine sugar free (QUESTRAN LIGHT) packet 4 g, BID    doxycycline hyclate (VIBRAMYCIN) capsule 100 mg, Q12H MITCHEL    enoxaparin (LOVENOX) subcutaneous injection 40 mg, Daily    loperamide (IMODIUM) capsule 2 mg, TID PRN    losartan (COZAAR) tablet 100 mg, Daily    melatonin tablet 6 mg, HS PRN    metoprolol (LOPRESSOR) injection 5 mg, Once    metoprolol succinate (TOPROL-XL) 24 hr tablet 25 mg, Daily    multi-electrolyte (Plasmalyte-A/Isolyte-S PH 7.4/Normosol-R) IV solution, Continuous, Last Rate: 100 mL/hr (05/25/25 7532)    oxyCODONE (ROXICODONE) IR tablet 5 mg, Q6H PRN    potassium-sodium phosphateS (K-PHOS,PHOSPHA 250) -250 mg tablet 1 tablet, BID With Meals    Administrative Statements   Today, Patient Was Seen By: Karina Cee MD      **Please Note: This note may have been constructed using a voice recognition system.**

## 2025-05-25 NOTE — ASSESSMENT & PLAN NOTE
Meeting criteria with tachycardia and fever of 101.1 F  Patient not aware of his fever but states he has been feeling ill for the past few days  UA unremarkable, lactic acid normal, procalcitonin mildly elevated  Blood cultures x 2 neg  Neg  Lyme titer, pending anaplasma,ehrlichia,babesia as patient spends time in his yard  neg COVID/flu PCR  Elevated ,  ESR 20  Empiric ceftriaxone stopped and also started doxycycline pending results of Anaplasma and Ehrlichia.  Also placed on Zithromax for Campylobacter.

## 2025-05-25 NOTE — ASSESSMENT & PLAN NOTE
Continues to have loose stools daily for the 3-4 months since he had his cholecystectomy done.  Cultures including enteric panel show  + campylobacter and C. difficile neg  Continues to have persistent low potassium we will try to slow down diarrhea and continue to replace potassium.  Monitor BMP every 12.  Magnesium is normal

## 2025-05-25 NOTE — ASSESSMENT & PLAN NOTE
Patient has had diarrhea ever since having a cholecystectomy about 4 months ago.  Seen by GI .  They think it is likely postcholecystectomy diarrhea.  They started him on Questran.  Increased Questran to twice daily  Also added Imodium  Patient had a rapid response 5/24 with SVT due to low potassium .  Diarrhea is improving and no further rapid responses as electrolytes are stable.  Started on Zithromax due to Campylobacter positive.  If patient's diarrhea remains stable anticipate discharge tomorrow

## 2025-05-25 NOTE — PLAN OF CARE
Problem: PAIN - ADULT  Goal: Verbalizes/displays adequate comfort level or baseline comfort level  Description: Interventions:  - Encourage patient to monitor pain and request assistance  - Assess pain using appropriate pain scale  - Administer analgesics as ordered based on type and severity of pain and evaluate response  - Implement non-pharmacological measures as appropriate and evaluate response  - Consider cultural and social influences on pain and pain management  - Notify physician/advanced practitioner if interventions unsuccessful or patient reports new pain  - Educate patient/family on pain management process including their role and importance of  reporting pain   - Provide non-pharmacologic/complimentary pain relief interventions  Outcome: Progressing     Problem: INFECTION - ADULT  Goal: Absence or prevention of progression during hospitalization  Description: INTERVENTIONS:  - Assess and monitor for signs and symptoms of infection  - Monitor lab/diagnostic results  - Monitor all insertion sites, i.e. indwelling lines, tubes, and drains  - Monitor endotracheal if appropriate and nasal secretions for changes in amount and color  - [place] appropriate cooling/warming therapies per order  - Administer medications as ordered  - Instruct and encourage patient and family to use good hand hygiene technique  - Identify and instruct in appropriate isolation precautions for identified infection/condition  Outcome: Progressing  Goal: Absence of fever/infection during neutropenic period  Description: INTERVENTIONS:  - Monitor WBC  - Perform strict hand hygiene  - Limit to healthy visitors only  - No plants, dried, fresh or silk flowers with escalante in patient room  Outcome: Progressing     Problem: SAFETY ADULT  Goal: Patient will remain free of falls  Description: INTERVENTIONS:  - Educate patient/family on patient safety including physical limitations  - Instruct patient to call for assistance with activity   -  Consider consulting OT/PT to assist with strengthening/mobility based on AM PAC & JH-HLM score  - Consult OT/PT to assist with strengthening/mobility   - Keep Call bell within reach  - Keep bed low and locked with side rails adjusted as appropriate  - Keep care items and personal belongings within reach  - Initiate and maintain comfort rounds  - Make Fall Risk Sign visible to staff  - Obtain necessary fall risk management equipment  - Apply yellow socks and bracelet for high fall risk patients  - Consider moving patient to room near nurses station  Outcome: Progressing  Goal: Maintain or return to baseline ADL function  Description: INTERVENTIONS:  -  Assess patient's ability to carry out ADLs; assess patient's baseline for ADL function and identify physical deficits which impact ability to perform ADLs (bathing, care of mouth/teeth, toileting, grooming, dressing, etc.)  - Assess/evaluate cause of self-care deficits   - Assess range of motion  - Assess patient's mobility; develop plan if impaired  - Assess patient's need for assistive devices and provide as appropriate  - Encourage maximum independence but intervene and supervise when necessary  - Involve family in performance of ADLs  - Assess for home care needs following discharge   - Consider OT consult to assist with ADL evaluation and planning for discharge  - Provide patient education as appropriate  - Monitor functional capacity and physical performance, use of AM PAC & JH-HLM   - Monitor gait, balance and fatigue with ambulation    Outcome: Progressing  Goal: Maintains/Returns to pre admission functional level  Description: INTERVENTIONS:  - Perform AM-PAC 6 Click Basic Mobility/ Daily Activity assessment daily.  - Set and communicate daily mobility goal to care team and patient/family/caregiver.   - Collaborate with rehabilitation services on mobility goals if consulted  - Reposition patient every 2 hours.  - Stand patient 3 times a day  - Ambulate patient  3 times a day  - Out of bed to chair 3 times a day   - Out of bed for meals 3 times a day  - Out of bed for toileting  - Record patient progress and toleration of activity level   Outcome: Progressing     Problem: DISCHARGE PLANNING  Goal: Discharge to home or other facility with appropriate resources  Description: INTERVENTIONS:  - Identify barriers to discharge w/patient and caregiver  - Arrange for needed discharge resources and transportation as appropriate  - Identify discharge learning needs (meds, wound care, etc.)  - Arrange for interpretive services to assist at discharge as needed  - Refer to Case Management Department for coordinating discharge planning if the patient needs post-hospital services based on physician/advanced practitioner order or complex needs related to functional status, cognitive ability, or social support system  Outcome: Progressing     Problem: Knowledge Deficit  Goal: Patient/family/caregiver demonstrates understanding of disease process, treatment plan, medications, and discharge instructions  Description: Complete learning assessment and assess knowledge base.  Interventions:  - Provide teaching at level of understanding  - Provide teaching via preferred learning methods  Outcome: Progressing     Problem: CARDIOVASCULAR - ADULT  Goal: Maintains optimal cardiac output and hemodynamic stability  Description: INTERVENTIONS:  - Monitor I/O, vital signs and rhythm  - Monitor for S/S and trends of decreased cardiac output  - Administer and titrate ordered vasoactive medications to optimize hemodynamic stability  - Assess quality of pulses, skin color and temperature  - Assess for signs of decreased coronary artery perfusion  - Instruct patient to report change in severity of symptoms  Outcome: Progressing  Goal: Absence of cardiac dysrhythmias or at baseline rhythm  Description: INTERVENTIONS:  - Continuous cardiac monitoring, vital signs, obtain 12 lead EKG if ordered  - Administer  antiarrhythmic and heart rate control medications as ordered  - Monitor electrolytes and administer replacement therapy as ordered  Outcome: Progressing     Problem: METABOLIC, FLUID AND ELECTROLYTES - ADULT  Goal: Electrolytes maintained within normal limits  Description: INTERVENTIONS:  - Monitor labs and assess patient for signs and symptoms of electrolyte imbalances  - Administer electrolyte replacement as ordered  - Monitor response to electrolyte replacements, including repeat lab results as appropriate  - Instruct patient on fluid and nutrition as appropriate  Outcome: Progressing  Goal: Fluid balance maintained  Description: INTERVENTIONS:  - Monitor labs   - Monitor I/O and WT  - Instruct patient on fluid and nutrition as appropriate  - Assess for signs & symptoms of volume excess or deficit  Outcome: Progressing  Goal: Glucose maintained within target range  Description: INTERVENTIONS:  - Monitor Blood Glucose as ordered  - Assess for signs and symptoms of hyperglycemia and hypoglycemia  - Administer ordered medications to maintain glucose within target range  - Assess nutritional intake and initiate nutrition service referral as needed  Outcome: Progressing

## 2025-05-26 VITALS
BODY MASS INDEX: 34.54 KG/M2 | RESPIRATION RATE: 18 BRPM | HEART RATE: 61 BPM | HEIGHT: 69 IN | DIASTOLIC BLOOD PRESSURE: 93 MMHG | TEMPERATURE: 98.1 F | OXYGEN SATURATION: 94 % | SYSTOLIC BLOOD PRESSURE: 159 MMHG | WEIGHT: 233.2 LBS

## 2025-05-26 LAB
ANION GAP SERPL CALCULATED.3IONS-SCNC: 5 MMOL/L (ref 4–13)
BUN SERPL-MCNC: 7 MG/DL (ref 5–25)
CALCIUM SERPL-MCNC: 8.4 MG/DL (ref 8.4–10.2)
CHLORIDE SERPL-SCNC: 111 MMOL/L (ref 96–108)
CO2 SERPL-SCNC: 28 MMOL/L (ref 21–32)
CREAT SERPL-MCNC: 0.85 MG/DL (ref 0.6–1.3)
GFR SERPL CREATININE-BSD FRML MDRD: 91 ML/MIN/1.73SQ M
GLUCOSE SERPL-MCNC: 93 MG/DL (ref 65–140)
POTASSIUM SERPL-SCNC: 3.6 MMOL/L (ref 3.5–5.3)
SODIUM SERPL-SCNC: 144 MMOL/L (ref 135–147)

## 2025-05-26 PROCEDURE — 99239 HOSP IP/OBS DSCHRG MGMT >30: CPT | Performed by: FAMILY MEDICINE

## 2025-05-26 PROCEDURE — 80048 BASIC METABOLIC PNL TOTAL CA: CPT | Performed by: FAMILY MEDICINE

## 2025-05-26 RX ORDER — LOPERAMIDE HYDROCHLORIDE 2 MG/1
2 CAPSULE ORAL 3 TIMES DAILY PRN
Qty: 30 CAPSULE | Refills: 0 | Status: SHIPPED | OUTPATIENT
Start: 2025-05-26

## 2025-05-26 RX ORDER — CHOLESTYRAMINE LIGHT 4 G/5.7G
4 POWDER, FOR SUSPENSION ORAL 2 TIMES DAILY
Qty: 60 PACKET | Refills: 0 | Status: SHIPPED | OUTPATIENT
Start: 2025-05-26

## 2025-05-26 RX ADMIN — Medication 1 TABLET: at 09:34

## 2025-05-26 RX ADMIN — LOSARTAN POTASSIUM 100 MG: 50 TABLET, FILM COATED ORAL at 09:35

## 2025-05-26 RX ADMIN — CHOLESTYRAMINE LIGHT 4 G: 4 POWDER, FOR SUSPENSION ORAL at 09:39

## 2025-05-26 RX ADMIN — ASPIRIN 81 MG CHEWABLE TABLET 81 MG: 81 TABLET CHEWABLE at 09:34

## 2025-05-26 RX ADMIN — ENOXAPARIN SODIUM 40 MG: 40 INJECTION SUBCUTANEOUS at 09:33

## 2025-05-26 RX ADMIN — AZITHROMYCIN DIHYDRATE 500 MG: 250 TABLET, FILM COATED ORAL at 09:34

## 2025-05-26 RX ADMIN — DOXYCYCLINE 100 MG: 100 CAPSULE ORAL at 09:34

## 2025-05-26 RX ADMIN — METOPROLOL SUCCINATE ER TABLETS 25 MG: 25 TABLET, FILM COATED, EXTENDED RELEASE ORAL at 09:35

## 2025-05-26 NOTE — PLAN OF CARE
Problem: PAIN - ADULT  Goal: Verbalizes/displays adequate comfort level or baseline comfort level  Description: Interventions:  - Encourage patient to monitor pain and request assistance  - Assess pain using appropriate pain scale  - Administer analgesics as ordered based on type and severity of pain and evaluate response  - Implement non-pharmacological measures as appropriate and evaluate response  - Consider cultural and social influences on pain and pain management  - Notify physician/advanced practitioner if interventions unsuccessful or patient reports new pain  - Educate patient/family on pain management process including their role and importance of  reporting pain   - Provide non-pharmacologic/complimentary pain relief interventions  Outcome: Progressing     Problem: INFECTION - ADULT  Goal: Absence or prevention of progression during hospitalization  Description: INTERVENTIONS:  - Assess and monitor for signs and symptoms of infection  - Monitor lab/diagnostic results  - Monitor all insertion sites, i.e. indwelling lines, tubes, and drains  - Monitor endotracheal if appropriate and nasal secretions for changes in amount and color  - [place] appropriate cooling/warming therapies per order  - Administer medications as ordered  - Instruct and encourage patient and family to use good hand hygiene technique  - Identify and instruct in appropriate isolation precautions for identified infection/condition  Outcome: Progressing  Goal: Absence of fever/infection during neutropenic period  Description: INTERVENTIONS:  - Monitor WBC  - Perform strict hand hygiene  - Limit to healthy visitors only  - No plants, dried, fresh or silk flowers with escalante in patient room  Outcome: Progressing

## 2025-05-26 NOTE — ASSESSMENT & PLAN NOTE
Perhaps multifactorial as per patient has been ongoing for 3 to 4 months  Certainly, a possibility of cholecystectomy related malabsorption and subsequent diarrhea contributing to presentation  Also, patient Campylobacter positive and completed 3 days of azithromycin therapy  Questran has been initiated and increased to twice a day  Patient now having semiformed stools with resolution of hypokalemia

## 2025-05-26 NOTE — PLAN OF CARE
Problem: INFECTION - ADULT  Goal: Absence or prevention of progression during hospitalization  Description: INTERVENTIONS:  - Assess and monitor for signs and symptoms of infection  - Monitor lab/diagnostic results  - Monitor all insertion sites, i.e. indwelling lines, tubes, and drains  - Monitor endotracheal if appropriate and nasal secretions for changes in amount and color  - [place] appropriate cooling/warming therapies per order  - Administer medications as ordered  - Instruct and encourage patient and family to use good hand hygiene technique  - Identify and instruct in appropriate isolation precautions for identified infection/condition  Outcome: Progressing     Problem: PAIN - ADULT  Goal: Verbalizes/displays adequate comfort level or baseline comfort level  Description: Interventions:  - Encourage patient to monitor pain and request assistance  - Assess pain using appropriate pain scale  - Administer analgesics as ordered based on type and severity of pain and evaluate response  - Implement non-pharmacological measures as appropriate and evaluate response  - Consider cultural and social influences on pain and pain management  - Notify physician/advanced practitioner if interventions unsuccessful or patient reports new pain  - Educate patient/family on pain management process including their role and importance of  reporting pain   - Provide non-pharmacologic/complimentary pain relief interventions  Outcome: Progressing     Problem: CARDIOVASCULAR - ADULT  Goal: Maintains optimal cardiac output and hemodynamic stability  Description: INTERVENTIONS:  - Monitor I/O, vital signs and rhythm  - Monitor for S/S and trends of decreased cardiac output  - Administer and titrate ordered vasoactive medications to optimize hemodynamic stability  - Assess quality of pulses, skin color and temperature  - Assess for signs of decreased coronary artery perfusion  - Instruct patient to report change in severity of  symptoms  Outcome: Progressing     Problem: METABOLIC, FLUID AND ELECTROLYTES - ADULT  Goal: Electrolytes maintained within normal limits  Description: INTERVENTIONS:  - Monitor labs and assess patient for signs and symptoms of electrolyte imbalances  - Administer electrolyte replacement as ordered  - Monitor response to electrolyte replacements, including repeat lab results as appropriate  - Instruct patient on fluid and nutrition as appropriate  Outcome: Progressing

## 2025-05-26 NOTE — ASSESSMENT & PLAN NOTE
History of chronic back pain due to degenerative disease  Pain has been severe over the past few days prompting ED evaluation  Meeting SIRS criteria on admission  No CT evidence of discitis  Conservative management with continued home analgesic regimen

## 2025-05-26 NOTE — ASSESSMENT & PLAN NOTE
Resolved upon discharge today (5/26)  Meeting criteria with tachycardia and fever of 101.1 F on presentation  Stool cultures eventually positive for Campylobacter  UA unremarkable, lactic acid normal, procalcitonin mildly elevated  Blood cultures x 2 neg  neg COVID/flu PCR  Elevated ,  ESR 20  Completed 3 days of Zithromax for Campylobacter.

## 2025-05-26 NOTE — ASSESSMENT & PLAN NOTE
Continue PTA metoprolol succinate  Continue ARB but will held HCTZ in setting of hypovolemia and multiple electrolyte derangements-resume upon discharge disease have resolved  blood pressure controlled

## 2025-05-26 NOTE — ASSESSMENT & PLAN NOTE
Resolved on discharge today (5/26)  Patient presented initially (5/22) with complaints of diarrhea  Patient hypokalemia as a result of intractable diarrhea with volume losses  See plan below in regard to diarrhea-improved significantly from presentation  Hypokalemia has resolved  Repletion orally upon discharge

## 2025-05-27 LAB
B MICROTI IGG TITR SER: NORMAL {TITER}
B MICROTI IGM TITR SER: NORMAL {TITER}
BACTERIA BLD CULT: NORMAL
BACTERIA BLD CULT: NORMAL
RESULT/COMMENT: NORMAL

## 2025-05-27 NOTE — UTILIZATION REVIEW
NOTIFICATION OF ADMISSION DISCHARGE   This is a Notification of Discharge from [facility]. Please be advised that this patient has been discharge from our facility. Below you will find the admission and discharge date and time including the patient’s disposition.   UTILIZATION REVIEW CONTACT:  Utilization Review Assistants  Network Utilization Review Department  Phone: [phone] x carefully listen to the prompts. All voicemails are confidential.  Email: [email or web address]     ADMISSION INFORMATION  PRESENTATION DATE: 5/22/2025  2:56 PM  OBERVATION ADMISSION DATE: N/A  INPATIENT ADMISSION DATE: 5/22/25  8:23 PM   DISCHARGE DATE: 5/26/2025 11:37 AM   DISPOSITION:Home/Self Care    Network Utilization Review Department  ATTENTION: Please call with any questions or concerns to [phone] and carefully listen to the prompts so that you are directed to the right person. All voicemails are confidential.   For Discharge needs, contact Care Management DC Support Team at [phone] opt. 2  Send all requests for admission clinical reviews, approved or denied determinations and any other requests to dedicated fax number below belonging to the campus where the patient is receiving treatment. List of dedicated fax numbers for the Facilities:  FACILITY NAME UR FAX NUMBER   ADMISSION DENIALS (Administrative/Medical Necessity) [phone]   DISCHARGE SUPPORT TEAM ([facility]) [phone]   PARENT CHILD HEALTH (Maternity/NICU/Pediatrics) [phone]   [facility] [phone]   [facility] [phone]   Critical access hospital [phone]   [facility] [phone]   [facility] [phone]   [facility] [phone]   [facility] [phone]   [facility] [phone]   [facility]  [facility] [phone]   [facility] [phone]   [facility] [phone]   [facility] [phone]

## 2025-05-28 LAB
A PHAGOCYTOPH DNA BLD QL NAA+PROBE: NEGATIVE
A PHAGOCYTOPH IGG TITR SER IF: NEGATIVE {TITER}
A PHAGOCYTOPH IGM TITR SER IF: NEGATIVE {TITER}
BLD SMEAR FINDING POSITIVE INTERP: NO
E CHAFFEENSIS IGG TITR SER IF: NEGATIVE {TITER}
E CHAFFEENSIS IGM TITR SER IF: NEGATIVE {TITER}
PARASITE BLD: NO
PARASITE BLD: NO
PARASITE INFECTED RBC BLD-NFR: 0 %
RESULT/COMMENT: NORMAL

## 2025-05-28 PROCEDURE — 85060 BLOOD SMEAR INTERPRETATION: CPT | Performed by: STUDENT IN AN ORGANIZED HEALTH CARE EDUCATION/TRAINING PROGRAM

## 2025-05-28 PROCEDURE — 87207 SMEAR SPECIAL STAIN: CPT | Performed by: STUDENT IN AN ORGANIZED HEALTH CARE EDUCATION/TRAINING PROGRAM

## 2025-05-28 NOTE — DISCHARGE SUMMARY
Discharge Summary - Hospitalist   Name: Hua Acevedo 65 y.o. male I MRN: [id number]  Unit/Bed#: -01 I Date of Admission: 5/22/2025   Date of Service: 5/26/2025 I Hospital Day: 4     Assessment & Plan  Hypokalemia  Resolved on discharge today (5/26)  Patient presented initially (5/22) with complaints of diarrhea  Patient hypokalemia as a result of intractable diarrhea with volume losses  See plan below in regard to diarrhea-improved significantly from presentation  Hypokalemia has resolved  Repletion orally upon discharge  Diarrhea  Perhaps multifactorial as per patient has been ongoing for 3 to 4 months  Certainly, a possibility of cholecystectomy related malabsorption and subsequent diarrhea contributing to presentation  Also, patient Campylobacter positive and completed 3 days of azithromycin therapy  Questran has been initiated and increased to twice a day  Patient now having semiformed stools with resolution of hypokalemia    Essential hypertension  Continue PTA metoprolol succinate  Continue ARB but will held HCTZ in setting of hypovolemia and multiple electrolyte derangements-resume upon discharge disease have resolved  blood pressure controlled  SIRS (systemic inflammatory response syndrome) (HCC)  Resolved upon discharge today (5/26)  Meeting criteria with tachycardia and fever of 101.1 F on presentation  Stool cultures eventually positive for Campylobacter  UA unremarkable, lactic acid normal, procalcitonin mildly elevated  Blood cultures x 2 neg  neg COVID/flu PCR  Elevated ,  ESR 20  Completed 3 days of Zithromax for Campylobacter.  Back pain  History of chronic back pain due to degenerative disease  Pain has been severe over the past few days prompting ED evaluation  Meeting SIRS criteria on admission  No CT evidence of discitis  Conservative management with continued home analgesic regimen     Medical Problems       Resolved Problems  Date Reviewed: 5/25/2025   None    "    Discharging Physician / Practitioner: Jadiel Lowery DO  PCP: Blanka Suarez DO  Admission Date:   Admission Orders (From admission, onward)       Ordered        05/22/25 2023  INPATIENT ADMISSION  Once                          Discharge Date: 5/26/2025      Test Results Pending at Discharge (will require follow up):  None    Medication Changes for Discharge & Rationale:   See discharge medication reconciliation  See after visit summary for reconciled discharge medications provided to patient and/or family.     Consultations During Hospital Stay:  None    Procedures Performed:   None    Significant Findings / Test Results:   None    Incidental Findings:   None      Hospital Course:   Please see the problem specific assessment plan for details of the patient's hospital course    Discharge Day Visit / Exam:   Subjective: Ready for discharge  Vitals: Blood Pressure: 159/93 (05/26/25 0742)  Pulse: 61 (05/25/25 2315)  Temperature: 98.1 °F (36.7 °C) (05/26/25 0742)  Temp Source: Temporal (05/22/25 2101)  Respirations: 18 (05/26/25 0742)  Height: 5' 9\" (175.3 cm) (05/22/25 2101)  Weight - Scale: 106 kg (233 lb 3.2 oz) (05/22/25 2059)  SpO2: 94 % (05/25/25 2315)  Physical Exam   Vitals and nursing note reviewed.   Constitutional:       Appearance: Normal appearance.   HENT:      Head: Normocephalic and atraumatic.      Right Ear: External ear normal.      Left Ear: External ear normal.      Nose: Nose normal.      Mouth/Throat:      Pharynx: Oropharynx is clear.     Cardiovascular:      Rate and Rhythm: Normal rate and regular rhythm.      Heart sounds: Normal heart sounds.   Pulmonary:      Effort: Pulmonary effort is normal.      Breath sounds: Normal breath sounds.   Abdominal:      General: Bowel sounds are normal.      Palpations: Abdomen is soft.      Tenderness: There is no abdominal tenderness.     Musculoskeletal:         General: Normal range of motion.      Cervical back: Normal range of motion and " neck supple.     Skin:     General: Skin is warm and dry.      Capillary Refill: Capillary refill takes less than 2 seconds.     Neurological:      General: No focal deficit present.      Mental Status: He is alert and oriented to person, place, and time.     Psychiatric:         Mood and Affect: Mood normal.       Discussion with Family: Patient declined call to .     Discharge instructions/Information to patient and family:   See after visit summary for information provided to patient and family.      Provisions for Follow-Up Care:  See after visit summary for information related to follow-up care and any pertinent home health orders.      Mobility at time of Discharge:   Basic Mobility Inpatient Raw Score: 23  JH-HLM Goal: 7: Walk 25 feet or more  JH-HLM Achieved: 7: Walk 25 feet or more  HLM Goal achieved. Continue to encourage appropriate mobility.     Disposition:   Home    Planned Readmission: No    Administrative Statements   Discharge Statement:  I have spent a total time of 44 minutes in caring for this patient on the day of the visit/encounter. >30 minutes of time was spent on: Risks and benefits of tx options, Importance of tx compliance, Counseling / Coordination of care, and Reviewing / ordering tests, medicine, procedures  .    **Please Note: This note may have been constructed using a voice recognition system**

## 2025-05-30 ENCOUNTER — TELEPHONE (OUTPATIENT)
Dept: VASCULAR SURGERY | Facility: CLINIC | Age: 65
End: 2025-05-30

## 2025-05-30 NOTE — TELEPHONE ENCOUNTER
Called pt and lmom to inform them JAM will no longer be available for their OV 6/2 and we are calling to reschedule

## 2025-07-01 RX ORDER — CHOLESTYRAMINE 4 G/9G
1 POWDER, FOR SUSPENSION ORAL 2 TIMES DAILY WITH MEALS
COMMUNITY
Start: 2025-05-26

## (undated) DEVICE — TIP COVER ACCESSORY

## (undated) DEVICE — SUT SILK 2-0 18 IN A185H

## (undated) DEVICE — GLOVE INDICATOR PI UNDERGLOVE SZ 8 BLUE

## (undated) DEVICE — 3M™ IOBAN™ 2 ANTIMICROBIAL INCISE DRAPE 6640EZ: Brand: IOBAN™ 2

## (undated) DEVICE — DECANTER: Brand: UNBRANDED

## (undated) DEVICE — 3M™ STERI-STRIP™ COMPOUND BENZOIN TINCTURE 40 BAGS/CARTON 4 CARTONS/CASE C1544: Brand: 3M™ STERI-STRIP™

## (undated) DEVICE — 1200CC GUARDIAN II: Brand: GUARDIAN

## (undated) DEVICE — SUT SILK 4-0 18 IN A183H

## (undated) DEVICE — BLADELESS OBTURATOR: Brand: WECK VISTA

## (undated) DEVICE — SPONGE LAP 18 X 18 IN STRL RFD

## (undated) DEVICE — ENDOPATH 5MM ENDOSCOPIC BLUNT TIP DISSECTORS (12 POUCHES CONTAINING 3 DISSECTORS EACH): Brand: ENDOPATH

## (undated) DEVICE — STANDARD SURGICAL GOWN, L: Brand: CONVERTORS

## (undated) DEVICE — MEDI-VAC YANK SUCT HNDL W/TPRD BULBOUS TIP: Brand: CARDINAL HEALTH

## (undated) DEVICE — GLOVE SRG BIOGEL ORTHOPEDIC 6.5

## (undated) DEVICE — 3M™ STERI-STRIP™ REINFORCED ADHESIVE SKIN CLOSURES, R1547, 1/2 IN X 4 IN (12 MM X 100 MM), 6 STRIPS/ENVELOPE: Brand: 3M™ STERI-STRIP™

## (undated) DEVICE — SYRINGE 1ML SLIP TIP

## (undated) DEVICE — MICRO HVTSA, 0.5G AND HVTSA SOURCEMARK PRODUCT CODE M1206 AND M1206-01: Brand: EXOFIN MICRO HVTSA, 0.5G

## (undated) DEVICE — CAROTID ARTERY SHUNT KIT,RADIOPAQUE LINE, STRAIGHT: Brand: ARGYLE

## (undated) DEVICE — PAD GROUNDING ADULT

## (undated) DEVICE — EXOFIN PRECISION PEN HIGH VISCOSITY TOPICAL SKIN ADHESIVE: Brand: EXOFIN PRECISION PEN, 1G

## (undated) DEVICE — HEM-O-LOK CLIP CARTRIDGE MED/LARGE DA VINCI SI/XI

## (undated) DEVICE — CHLORAPREP HI-LITE 26ML ORANGE

## (undated) DEVICE — SURGICEL 4 X 8IN

## (undated) DEVICE — SUT VICRYL 4-0 PS-2 18 IN J496G

## (undated) DEVICE — PENCIL ELECTROSURG E-Z CLEAN -0035H

## (undated) DEVICE — JP CHANNEL DRAIN 19FR, FULL FLUTES: Brand: JACKSON-PRATT

## (undated) DEVICE — PROXIMATE PLUS MD MULTI-DIRECTIONAL RELEASE SKIN STAPLERS CONTAINS 35 STAINLESS STEEL STAPLES APPROXIMATE CLOSED DIMENSIONS: 6.9MM X 3.9MM WIDE: Brand: PROXIMATE

## (undated) DEVICE — Device

## (undated) DEVICE — TUBING SMOKE EVAC W/FILTRATION DEVICE PLUMEPORT ACTIV

## (undated) DEVICE — INTENDED FOR TISSUE SEPARATION, AND OTHER PROCEDURES THAT REQUIRE A SHARP SURGICAL BLADE TO PUNCTURE OR CUT.: Brand: BARD-PARKER SAFETY BLADES SIZE 15, STERILE

## (undated) DEVICE — TROCAR: Brand: KII FIOS FIRST ENTRY

## (undated) DEVICE — ELECTRO LUBE IS A SINGLE PATIENT USE DEVICE THAT IS INTENDED TO BE USED ON ELECTROSURGICAL ELECTRODES TO REDUCE STICKING.: Brand: KEY SURGICAL ELECTRO LUBE

## (undated) DEVICE — CURVED BIPOLAR DISSECTOR: Brand: ENDOWRIST;DAVINCI SI

## (undated) DEVICE — MONOPOLAR CURVED SCISSORS: Brand: ENDOWRIST

## (undated) DEVICE — SYRINGE 10ML LL

## (undated) DEVICE — COLUMN DRAPE

## (undated) DEVICE — INTENDED FOR TISSUE SEPARATION, AND OTHER PROCEDURES THAT REQUIRE A SHARP SURGICAL BLADE TO PUNCTURE OR CUT.: Brand: BARD-PARKER SAFETY BLADES SIZE 10, STERILE

## (undated) DEVICE — SUCTION IRRIGATOR: Brand: ENDOWRIST

## (undated) DEVICE — PROGRASP FORCEPS: Brand: ENDOWRIST

## (undated) DEVICE — PETRI DISH STERILE

## (undated) DEVICE — CANNULA SEAL

## (undated) DEVICE — SUT PROLENE 6-0 C-1/C-1 30 IN 8706H

## (undated) DEVICE — MARYLAND BIPOLAR FORCEPS: Brand: ENDOWRIST

## (undated) DEVICE — SUT VICRYL PLUS 0 54IN VCP287G

## (undated) DEVICE — SUT MONOCRYL 3-0 SH 27 IN Y416H

## (undated) DEVICE — DRAPE PROBE NEO-PROBE/ULTRASOUND

## (undated) DEVICE — DRAPE SURGIKIT SADDLE BAG

## (undated) DEVICE — SUT VICRYL 0 CT-1 27 IN J260H

## (undated) DEVICE — PAD GROUNDING DUAL ADULT

## (undated) DEVICE — TOWEL SURG XR DETECT GREEN STRL RFD

## (undated) DEVICE — SUT PROLENE 7-0 BV175-6 24 IN M8737

## (undated) DEVICE — VISUALIZATION SYSTEM: Brand: CLEARIFY

## (undated) DEVICE — INTENDED FOR TISSUE SEPARATION, AND OTHER PROCEDURES THAT REQUIRE A SHARP SURGICAL BLADE TO PUNCTURE OR CUT.: Brand: BARD-PARKER ® CARBON RIB-BACK BLADES

## (undated) DEVICE — TISSUE RETRIEVAL SYSTEM: Brand: INZII RETRIEVAL SYSTEM

## (undated) DEVICE — JACKSON-PRATT 100CC BULB RESERVOIR: Brand: CARDINAL HEALTH

## (undated) DEVICE — LIGACLIP MCA MULTIPLE CLIP APPLIERS, 20 MEDIUM CLIPS: Brand: LIGACLIP

## (undated) DEVICE — GAUZE SPONGES,16 PLY: Brand: CURITY

## (undated) DEVICE — ASTOUND FABRIC REINFORCED SURGICAL GOWN: Brand: CONVERTORS

## (undated) DEVICE — THYROID SHEET: Brand: CONVERTORS

## (undated) DEVICE — SUT SILK 3-0 18 IN A184H

## (undated) DEVICE — ECHELON FLEX 60 ARTICULATING ENDOSCOPIC LINEAR CUTTER (NO CARTRIDGE): Brand: ECHELON FLEX ENDOPATH

## (undated) DEVICE — 3000CC GUARDIAN II: Brand: GUARDIAN

## (undated) DEVICE — OCCLUSIVE GAUZE STRIP,3% BISMUTH TRIBROMOPHENATE IN PETROLATUM BLEND: Brand: XEROFORM

## (undated) DEVICE — ARM DRAPE

## (undated) DEVICE — BAG DECANTER

## (undated) DEVICE — ALLENTOWN LAP CHOLE APP PACK: Brand: CARDINAL HEALTH

## (undated) DEVICE — 40595 XL TRENDELENBURG POSITIONING KIT: Brand: 40595 XL TRENDELENBURG POSITIONING KIT

## (undated) DEVICE — HEMOSTATIC MATRIX SURGIFLO 8ML W/THROMBIN

## (undated) DEVICE — SUT PROLENE 6-0 BV130 30 IN 8709H

## (undated) DEVICE — SUT MONOCRYL 4-0 PS-2 18 IN Y496G

## (undated) DEVICE — MEDIUM-LARGE CLIP APPLIER: Brand: ENDOWRIST

## (undated) DEVICE — TRAY FOLEY 16FR URIMETER SURESTEP

## (undated) DEVICE — BETHL CAROTID ENDARTERECTOMY: Brand: CARDINAL HEALTH

## (undated) DEVICE — SCD SEQUENTIAL COMPRESSION COMFORT SLEEVE MEDIUM KNEE LENGTH: Brand: KENDALL SCD

## (undated) DEVICE — LIGAMAX 5 MM ENDOSCOPIC MULTIPLE CLIP APPLIER: Brand: LIGAMAX

## (undated) DEVICE — DRAPE EQUIPMENT RF WAND

## (undated) DEVICE — TUBING INSUFFLATION SET ISO CONNECTOR

## (undated) DEVICE — 2, DISPOSABLE SUCTION/IRRIGATOR WITHOUT DISPOSABLE TIP: Brand: STRYKEFLOW

## (undated) DEVICE — ADHESIVE SKIN HIGH VISCOSITY EXOFIN 1ML

## (undated) DEVICE — ENDOPATH ECHELON ENDOSCOPIC LINEAR CUTTER RELOADS, GREEN, 60MM: Brand: ECHELON ENDOPATH

## (undated) DEVICE — TUBING SUCTION 5MM X 12 FT

## (undated) DEVICE — GLOVE SRG BIOGEL 7.5